# Patient Record
Sex: MALE | Race: WHITE | NOT HISPANIC OR LATINO | Employment: OTHER | URBAN - METROPOLITAN AREA
[De-identification: names, ages, dates, MRNs, and addresses within clinical notes are randomized per-mention and may not be internally consistent; named-entity substitution may affect disease eponyms.]

---

## 2020-01-01 ENCOUNTER — HOSPITAL ENCOUNTER (INPATIENT)
Facility: HOSPITAL | Age: 75
LOS: 8 days | DRG: 177 | End: 2020-12-23
Attending: EMERGENCY MEDICINE | Admitting: INTERNAL MEDICINE
Payer: MEDICARE

## 2020-01-01 ENCOUNTER — PATIENT OUTREACH (OUTPATIENT)
Dept: ADMINISTRATIVE | Facility: CLINIC | Age: 75
End: 2020-01-01

## 2020-01-01 ENCOUNTER — EXTERNAL HOME HEALTH (OUTPATIENT)
Dept: HOME HEALTH SERVICES | Facility: HOSPITAL | Age: 75
End: 2020-01-01
Payer: MEDICARE

## 2020-01-01 ENCOUNTER — HOSPITAL ENCOUNTER (INPATIENT)
Facility: HOSPITAL | Age: 75
LOS: 4 days | Discharge: NURSING, REHAB OR LONG-TERM CARE | DRG: 947 | End: 2020-11-03
Attending: EMERGENCY MEDICINE | Admitting: INTERNAL MEDICINE
Payer: MEDICARE

## 2020-01-01 ENCOUNTER — DOCUMENTATION ONLY (OUTPATIENT)
Dept: CARDIOLOGY | Facility: CLINIC | Age: 75
End: 2020-01-01

## 2020-01-01 ENCOUNTER — CARE AT HOME (OUTPATIENT)
Dept: HOME HEALTH SERVICES | Facility: CLINIC | Age: 75
End: 2020-01-01
Payer: MEDICARE

## 2020-01-01 ENCOUNTER — HOSPITAL ENCOUNTER (OUTPATIENT)
Facility: HOSPITAL | Age: 75
Discharge: NURSING, REHAB OR LONG-TERM CARE | End: 2020-11-07
Attending: EMERGENCY MEDICINE | Admitting: INTERNAL MEDICINE
Payer: MEDICARE

## 2020-01-01 ENCOUNTER — HOSPITAL ENCOUNTER (INPATIENT)
Facility: HOSPITAL | Age: 75
LOS: 5 days | Discharge: HOME HEALTH | DRG: 291 | End: 2020-10-23
Attending: EMERGENCY MEDICINE | Admitting: INTERNAL MEDICINE
Payer: MEDICARE

## 2020-01-01 ENCOUNTER — TELEPHONE (OUTPATIENT)
Dept: CARDIOLOGY | Facility: CLINIC | Age: 75
End: 2020-01-01

## 2020-01-01 VITALS
SYSTOLIC BLOOD PRESSURE: 125 MMHG | TEMPERATURE: 98 F | HEART RATE: 72 BPM | DIASTOLIC BLOOD PRESSURE: 78 MMHG | RESPIRATION RATE: 16 BRPM | OXYGEN SATURATION: 98 %

## 2020-01-01 VITALS
BODY MASS INDEX: 30.62 KG/M2 | DIASTOLIC BLOOD PRESSURE: 86 MMHG | HEART RATE: 80 BPM | RESPIRATION RATE: 18 BRPM | TEMPERATURE: 98 F | OXYGEN SATURATION: 98 % | SYSTOLIC BLOOD PRESSURE: 159 MMHG | WEIGHT: 213.88 LBS | HEIGHT: 70 IN

## 2020-01-01 VITALS
SYSTOLIC BLOOD PRESSURE: 127 MMHG | DIASTOLIC BLOOD PRESSURE: 78 MMHG | HEIGHT: 70 IN | TEMPERATURE: 98 F | BODY MASS INDEX: 29.03 KG/M2 | OXYGEN SATURATION: 96 % | RESPIRATION RATE: 18 BRPM | WEIGHT: 202.81 LBS | HEART RATE: 84 BPM

## 2020-01-01 VITALS
BODY MASS INDEX: 27.46 KG/M2 | SYSTOLIC BLOOD PRESSURE: 214 MMHG | OXYGEN SATURATION: 83 % | WEIGHT: 191.81 LBS | DIASTOLIC BLOOD PRESSURE: 114 MMHG | TEMPERATURE: 98 F | HEIGHT: 70 IN

## 2020-01-01 VITALS
DIASTOLIC BLOOD PRESSURE: 87 MMHG | WEIGHT: 198.44 LBS | BODY MASS INDEX: 28.41 KG/M2 | OXYGEN SATURATION: 96 % | HEART RATE: 98 BPM | TEMPERATURE: 99 F | RESPIRATION RATE: 18 BRPM | HEIGHT: 70 IN | SYSTOLIC BLOOD PRESSURE: 162 MMHG

## 2020-01-01 DIAGNOSIS — M79.89 LEG SWELLING: ICD-10-CM

## 2020-01-01 DIAGNOSIS — E11.9 DIABETES MELLITUS, NEW ONSET: ICD-10-CM

## 2020-01-01 DIAGNOSIS — R00.0 TACHYCARDIA: ICD-10-CM

## 2020-01-01 DIAGNOSIS — R79.89 ELEVATED TROPONIN: ICD-10-CM

## 2020-01-01 DIAGNOSIS — R00.1 BRADYCARDIA: ICD-10-CM

## 2020-01-01 DIAGNOSIS — U07.1 COVID-19: ICD-10-CM

## 2020-01-01 DIAGNOSIS — R06.02 SOB (SHORTNESS OF BREATH): ICD-10-CM

## 2020-01-01 DIAGNOSIS — I50.9 CHF (CONGESTIVE HEART FAILURE): Primary | ICD-10-CM

## 2020-01-01 DIAGNOSIS — R53.1 WEAKNESS: Primary | ICD-10-CM

## 2020-01-01 DIAGNOSIS — E87.6 HYPOKALEMIA: ICD-10-CM

## 2020-01-01 DIAGNOSIS — R07.9 CHEST PAIN: ICD-10-CM

## 2020-01-01 DIAGNOSIS — E11.9 DIABETES MELLITUS, NEW ONSET: Primary | ICD-10-CM

## 2020-01-01 DIAGNOSIS — G11.4: ICD-10-CM

## 2020-01-01 DIAGNOSIS — R53.1 GENERALIZED WEAKNESS: ICD-10-CM

## 2020-01-01 DIAGNOSIS — R55 SYNCOPE: Primary | ICD-10-CM

## 2020-01-01 DIAGNOSIS — E11.9 NEW ONSET TYPE 2 DIABETES MELLITUS: ICD-10-CM

## 2020-01-01 DIAGNOSIS — U07.1 COVID-19 VIRUS INFECTION: ICD-10-CM

## 2020-01-01 DIAGNOSIS — G93.41 ENCEPHALOPATHY, METABOLIC: ICD-10-CM

## 2020-01-01 DIAGNOSIS — R73.9 HYPERGLYCEMIA: ICD-10-CM

## 2020-01-01 DIAGNOSIS — R06.02 SHORTNESS OF BREATH: ICD-10-CM

## 2020-01-01 DIAGNOSIS — I50.41 ACUTE COMBINED SYSTOLIC AND DIASTOLIC HEART FAILURE: ICD-10-CM

## 2020-01-01 DIAGNOSIS — R79.89 ELEVATED LACTIC ACID LEVEL: ICD-10-CM

## 2020-01-01 DIAGNOSIS — J18.9 PNEUMONIA OF LEFT LOWER LOBE DUE TO INFECTIOUS ORGANISM: ICD-10-CM

## 2020-01-01 DIAGNOSIS — R09.02 HYPOXIA: Primary | ICD-10-CM

## 2020-01-01 DIAGNOSIS — I63.9 CEREBROVASCULAR ACCIDENT (CVA), UNSPECIFIED MECHANISM: ICD-10-CM

## 2020-01-01 DIAGNOSIS — I50.31 ACUTE DIASTOLIC HEART FAILURE: ICD-10-CM

## 2020-01-01 LAB
25(OH)D3+25(OH)D2 SERPL-MCNC: 29 NG/ML (ref 30–96)
ABO + RH BLD: NORMAL
ALBUMIN SERPL BCP-MCNC: 1.7 G/DL (ref 3.5–5.2)
ALBUMIN SERPL BCP-MCNC: 1.8 G/DL (ref 3.5–5.2)
ALBUMIN SERPL BCP-MCNC: 2 G/DL (ref 3.5–5.2)
ALBUMIN SERPL BCP-MCNC: 2.3 G/DL (ref 3.5–5.2)
ALBUMIN SERPL BCP-MCNC: 2.4 G/DL (ref 3.5–5.2)
ALBUMIN SERPL BCP-MCNC: 2.4 G/DL (ref 3.5–5.2)
ALBUMIN SERPL BCP-MCNC: 2.5 G/DL (ref 3.5–5.2)
ALBUMIN SERPL BCP-MCNC: 2.6 G/DL (ref 3.5–5.2)
ALBUMIN SERPL BCP-MCNC: 2.7 G/DL (ref 3.5–5.2)
ALBUMIN SERPL BCP-MCNC: 2.7 G/DL (ref 3.5–5.2)
ALBUMIN SERPL BCP-MCNC: 2.9 G/DL (ref 3.5–5.2)
ALBUMIN SERPL BCP-MCNC: 3 G/DL (ref 3.5–5.2)
ALDOST SERPL-MCNC: 5.5 NG/DL
ALLENS TEST: ABNORMAL
ALLENS TEST: ABNORMAL
ALP SERPL-CCNC: 101 U/L (ref 55–135)
ALP SERPL-CCNC: 105 U/L (ref 55–135)
ALP SERPL-CCNC: 107 U/L (ref 55–135)
ALP SERPL-CCNC: 110 U/L (ref 55–135)
ALP SERPL-CCNC: 139 U/L (ref 55–135)
ALP SERPL-CCNC: 218 U/L (ref 55–135)
ALP SERPL-CCNC: 68 U/L (ref 55–135)
ALP SERPL-CCNC: 69 U/L (ref 55–135)
ALP SERPL-CCNC: 80 U/L (ref 55–135)
ALP SERPL-CCNC: 81 U/L (ref 55–135)
ALP SERPL-CCNC: 82 U/L (ref 55–135)
ALP SERPL-CCNC: 83 U/L (ref 55–135)
ALP SERPL-CCNC: 85 U/L (ref 55–135)
ALP SERPL-CCNC: 88 U/L (ref 55–135)
ALP SERPL-CCNC: 90 U/L (ref 55–135)
ALP SERPL-CCNC: 91 U/L (ref 55–135)
ALP SERPL-CCNC: 98 U/L (ref 55–135)
ALP SERPL-CCNC: 98 U/L (ref 55–135)
ALT SERPL W/O P-5'-P-CCNC: 100 U/L (ref 10–44)
ALT SERPL W/O P-5'-P-CCNC: 107 U/L (ref 10–44)
ALT SERPL W/O P-5'-P-CCNC: 196 U/L (ref 10–44)
ALT SERPL W/O P-5'-P-CCNC: 202 U/L (ref 10–44)
ALT SERPL W/O P-5'-P-CCNC: 203 U/L (ref 10–44)
ALT SERPL W/O P-5'-P-CCNC: 218 U/L (ref 10–44)
ALT SERPL W/O P-5'-P-CCNC: 221 U/L (ref 10–44)
ALT SERPL W/O P-5'-P-CCNC: 248 U/L (ref 10–44)
ALT SERPL W/O P-5'-P-CCNC: 32 U/L (ref 10–44)
ALT SERPL W/O P-5'-P-CCNC: 38 U/L (ref 10–44)
ALT SERPL W/O P-5'-P-CCNC: 40 U/L (ref 10–44)
ALT SERPL W/O P-5'-P-CCNC: 42 U/L (ref 10–44)
ALT SERPL W/O P-5'-P-CCNC: 43 U/L (ref 10–44)
ALT SERPL W/O P-5'-P-CCNC: 46 U/L (ref 10–44)
ALT SERPL W/O P-5'-P-CCNC: 65 U/L (ref 10–44)
ALT SERPL W/O P-5'-P-CCNC: 66 U/L (ref 10–44)
ALT SERPL W/O P-5'-P-CCNC: 68 U/L (ref 10–44)
ALT SERPL W/O P-5'-P-CCNC: 85 U/L (ref 10–44)
ALT SERPL W/O P-5'-P-CCNC: 92 U/L (ref 10–44)
ALT SERPL W/O P-5'-P-CCNC: 92 U/L (ref 10–44)
AMMONIA PLAS-SCNC: 39 UMOL/L (ref 10–50)
AMMONIA PLAS-SCNC: 53 UMOL/L (ref 10–50)
AMMONIA PLAS-SCNC: 75 UMOL/L (ref 10–50)
AMPHET+METHAMPHET UR QL: NEGATIVE
AMPHET+METHAMPHET UR QL: NEGATIVE
ANION GAP SERPL CALC-SCNC: 10 MMOL/L (ref 8–16)
ANION GAP SERPL CALC-SCNC: 12 MMOL/L (ref 8–16)
ANION GAP SERPL CALC-SCNC: 12 MMOL/L (ref 8–16)
ANION GAP SERPL CALC-SCNC: 14 MMOL/L (ref 8–16)
ANION GAP SERPL CALC-SCNC: 15 MMOL/L (ref 8–16)
ANION GAP SERPL CALC-SCNC: 5 MMOL/L (ref 8–16)
ANION GAP SERPL CALC-SCNC: 6 MMOL/L (ref 8–16)
ANION GAP SERPL CALC-SCNC: 7 MMOL/L (ref 8–16)
ANION GAP SERPL CALC-SCNC: 8 MMOL/L (ref 8–16)
ANION GAP SERPL CALC-SCNC: 9 MMOL/L (ref 8–16)
AORTIC ROOT ANNULUS: 3.66 CM
APTT BLDCRRT: 26.3 SEC (ref 21–32)
APTT BLDCRRT: 38.8 SEC (ref 21–32)
APTT BLDCRRT: 53.2 SEC (ref 21–32)
APTT BLDCRRT: 54.4 SEC (ref 21–32)
APTT BLDCRRT: 56.8 SEC (ref 21–32)
APTT BLDCRRT: 58.5 SEC (ref 21–32)
APTT BLDCRRT: 58.9 SEC (ref 21–32)
APTT BLDCRRT: 59 SEC (ref 21–32)
APTT BLDCRRT: 60.5 SEC (ref 21–32)
APTT BLDCRRT: 66.1 SEC (ref 21–32)
APTT BLDCRRT: 70.2 SEC (ref 21–32)
APTT BLDCRRT: 86 SEC (ref 21–32)
ASCENDING AORTA: 2.97 CM
AST SERPL-CCNC: 100 U/L (ref 10–40)
AST SERPL-CCNC: 122 U/L (ref 10–40)
AST SERPL-CCNC: 150 U/L (ref 10–40)
AST SERPL-CCNC: 26 U/L (ref 10–40)
AST SERPL-CCNC: 26 U/L (ref 10–40)
AST SERPL-CCNC: 29 U/L (ref 10–40)
AST SERPL-CCNC: 33 U/L (ref 10–40)
AST SERPL-CCNC: 35 U/L (ref 10–40)
AST SERPL-CCNC: 37 U/L (ref 10–40)
AST SERPL-CCNC: 37 U/L (ref 10–40)
AST SERPL-CCNC: 38 U/L (ref 10–40)
AST SERPL-CCNC: 42 U/L (ref 10–40)
AST SERPL-CCNC: 43 U/L (ref 10–40)
AST SERPL-CCNC: 53 U/L (ref 10–40)
AST SERPL-CCNC: 56 U/L (ref 10–40)
AST SERPL-CCNC: 68 U/L (ref 10–40)
AST SERPL-CCNC: 77 U/L (ref 10–40)
AST SERPL-CCNC: 78 U/L (ref 10–40)
AST SERPL-CCNC: 78 U/L (ref 10–40)
AST SERPL-CCNC: 87 U/L (ref 10–40)
AV INDEX (PROSTH): 0.78
AV MEAN GRADIENT: 7 MMHG
AV PEAK GRADIENT: 13 MMHG
AV VALVE AREA: 3.48 CM2
AV VELOCITY RATIO: 0.72
BACTERIA #/AREA URNS HPF: ABNORMAL /HPF
BACTERIA #/AREA URNS HPF: ABNORMAL /HPF
BACTERIA #/AREA URNS HPF: NORMAL /HPF
BACTERIA BLD CULT: ABNORMAL
BACTERIA BLD CULT: NORMAL
BACTERIA UR CULT: NORMAL
BACTERIA UR CULT: NORMAL
BARBITURATES UR QL SCN>200 NG/ML: NEGATIVE
BARBITURATES UR QL SCN>200 NG/ML: NEGATIVE
BASOPHILS # BLD AUTO: 0 K/UL (ref 0–0.2)
BASOPHILS # BLD AUTO: 0.01 K/UL (ref 0–0.2)
BASOPHILS # BLD AUTO: 0.02 K/UL (ref 0–0.2)
BASOPHILS # BLD AUTO: 0.02 K/UL (ref 0–0.2)
BASOPHILS # BLD AUTO: 0.03 K/UL (ref 0–0.2)
BASOPHILS # BLD AUTO: 0.03 K/UL (ref 0–0.2)
BASOPHILS # BLD AUTO: 0.04 K/UL (ref 0–0.2)
BASOPHILS # BLD AUTO: 0.05 K/UL (ref 0–0.2)
BASOPHILS # BLD AUTO: ABNORMAL K/UL (ref 0–0.2)
BASOPHILS NFR BLD: 0 % (ref 0–1.9)
BASOPHILS NFR BLD: 0.1 % (ref 0–1.9)
BASOPHILS NFR BLD: 0.2 % (ref 0–1.9)
BASOPHILS NFR BLD: 0.3 % (ref 0–1.9)
BASOPHILS NFR BLD: 0.4 % (ref 0–1.9)
BENZODIAZ UR QL SCN>200 NG/ML: NEGATIVE
BENZODIAZ UR QL SCN>200 NG/ML: NEGATIVE
BILIRUB SERPL-MCNC: 0.5 MG/DL (ref 0.1–1)
BILIRUB SERPL-MCNC: 0.6 MG/DL (ref 0.1–1)
BILIRUB SERPL-MCNC: 0.7 MG/DL (ref 0.1–1)
BILIRUB SERPL-MCNC: 0.8 MG/DL (ref 0.1–1)
BILIRUB SERPL-MCNC: 0.8 MG/DL (ref 0.1–1)
BILIRUB SERPL-MCNC: 0.9 MG/DL (ref 0.1–1)
BILIRUB SERPL-MCNC: 1 MG/DL (ref 0.1–1)
BILIRUB SERPL-MCNC: 1 MG/DL (ref 0.1–1)
BILIRUB SERPL-MCNC: 1.1 MG/DL (ref 0.1–1)
BILIRUB SERPL-MCNC: 1.2 MG/DL (ref 0.1–1)
BILIRUB SERPL-MCNC: 1.4 MG/DL (ref 0.1–1)
BILIRUB UR QL STRIP: NEGATIVE
BLD GP AB SCN CELLS X3 SERPL QL: NORMAL
BNP SERPL-MCNC: 130 PG/ML (ref 0–99)
BNP SERPL-MCNC: 159 PG/ML (ref 0–99)
BNP SERPL-MCNC: 283 PG/ML (ref 0–99)
BNP SERPL-MCNC: 303 PG/ML (ref 0–99)
BUN SERPL-MCNC: 15 MG/DL (ref 8–23)
BUN SERPL-MCNC: 16 MG/DL (ref 8–23)
BUN SERPL-MCNC: 17 MG/DL (ref 8–23)
BUN SERPL-MCNC: 20 MG/DL (ref 8–23)
BUN SERPL-MCNC: 21 MG/DL (ref 8–23)
BUN SERPL-MCNC: 21 MG/DL (ref 8–23)
BUN SERPL-MCNC: 22 MG/DL (ref 8–23)
BUN SERPL-MCNC: 23 MG/DL (ref 8–23)
BUN SERPL-MCNC: 23 MG/DL (ref 8–23)
BUN SERPL-MCNC: 24 MG/DL (ref 8–23)
BUN SERPL-MCNC: 24 MG/DL (ref 8–23)
BUN SERPL-MCNC: 25 MG/DL (ref 8–23)
BUN SERPL-MCNC: 26 MG/DL (ref 8–23)
BUN SERPL-MCNC: 28 MG/DL (ref 8–23)
BUN SERPL-MCNC: 28 MG/DL (ref 8–23)
BUN SERPL-MCNC: 35 MG/DL (ref 8–23)
BUN SERPL-MCNC: 40 MG/DL (ref 8–23)
BUN SERPL-MCNC: 49 MG/DL (ref 8–23)
BUN SERPL-MCNC: 59 MG/DL (ref 8–23)
BUN SERPL-MCNC: 63 MG/DL (ref 8–23)
BUN SERPL-MCNC: 67 MG/DL (ref 8–23)
BUN SERPL-MCNC: 74 MG/DL (ref 8–23)
BZE UR QL SCN: NEGATIVE
BZE UR QL SCN: NEGATIVE
CALCIUM SERPL-MCNC: 7.6 MG/DL (ref 8.7–10.5)
CALCIUM SERPL-MCNC: 7.7 MG/DL (ref 8.7–10.5)
CALCIUM SERPL-MCNC: 7.8 MG/DL (ref 8.7–10.5)
CALCIUM SERPL-MCNC: 7.9 MG/DL (ref 8.7–10.5)
CALCIUM SERPL-MCNC: 8 MG/DL (ref 8.7–10.5)
CALCIUM SERPL-MCNC: 8.2 MG/DL (ref 8.7–10.5)
CALCIUM SERPL-MCNC: 8.4 MG/DL (ref 8.7–10.5)
CALCIUM SERPL-MCNC: 8.6 MG/DL (ref 8.7–10.5)
CALCIUM SERPL-MCNC: 9 MG/DL (ref 8.7–10.5)
CANNABINOIDS UR QL SCN: NEGATIVE
CANNABINOIDS UR QL SCN: NEGATIVE
CHLORIDE SERPL-SCNC: 101 MMOL/L (ref 95–110)
CHLORIDE SERPL-SCNC: 102 MMOL/L (ref 95–110)
CHLORIDE SERPL-SCNC: 103 MMOL/L (ref 95–110)
CHLORIDE SERPL-SCNC: 104 MMOL/L (ref 95–110)
CHLORIDE SERPL-SCNC: 105 MMOL/L (ref 95–110)
CHLORIDE SERPL-SCNC: 107 MMOL/L (ref 95–110)
CHLORIDE SERPL-SCNC: 107 MMOL/L (ref 95–110)
CHLORIDE SERPL-SCNC: 109 MMOL/L (ref 95–110)
CHLORIDE SERPL-SCNC: 90 MMOL/L (ref 95–110)
CHLORIDE SERPL-SCNC: 91 MMOL/L (ref 95–110)
CHLORIDE SERPL-SCNC: 91 MMOL/L (ref 95–110)
CHLORIDE SERPL-SCNC: 93 MMOL/L (ref 95–110)
CHLORIDE SERPL-SCNC: 93 MMOL/L (ref 95–110)
CHLORIDE SERPL-SCNC: 96 MMOL/L (ref 95–110)
CHLORIDE SERPL-SCNC: 97 MMOL/L (ref 95–110)
CHLORIDE SERPL-SCNC: 98 MMOL/L (ref 95–110)
CHLORIDE SERPL-SCNC: 99 MMOL/L (ref 95–110)
CHOLEST SERPL-MCNC: 115 MG/DL (ref 120–199)
CHOLEST/HDLC SERPL: 3.6 {RATIO} (ref 2–5)
CK SERPL-CCNC: 105 U/L (ref 20–200)
CK SERPL-CCNC: 1501 U/L (ref 20–200)
CK SERPL-CCNC: 77 U/L (ref 20–200)
CLARITY UR: ABNORMAL
CLARITY UR: CLEAR
CO2 SERPL-SCNC: 25 MMOL/L (ref 23–29)
CO2 SERPL-SCNC: 25 MMOL/L (ref 23–29)
CO2 SERPL-SCNC: 26 MMOL/L (ref 23–29)
CO2 SERPL-SCNC: 27 MMOL/L (ref 23–29)
CO2 SERPL-SCNC: 27 MMOL/L (ref 23–29)
CO2 SERPL-SCNC: 28 MMOL/L (ref 23–29)
CO2 SERPL-SCNC: 28 MMOL/L (ref 23–29)
CO2 SERPL-SCNC: 29 MMOL/L (ref 23–29)
CO2 SERPL-SCNC: 30 MMOL/L (ref 23–29)
CO2 SERPL-SCNC: 31 MMOL/L (ref 23–29)
CO2 SERPL-SCNC: 31 MMOL/L (ref 23–29)
CO2 SERPL-SCNC: 32 MMOL/L (ref 23–29)
CO2 SERPL-SCNC: 33 MMOL/L (ref 23–29)
CO2 SERPL-SCNC: 34 MMOL/L (ref 23–29)
CO2 SERPL-SCNC: 38 MMOL/L (ref 23–29)
CO2 SERPL-SCNC: 39 MMOL/L (ref 23–29)
CO2 SERPL-SCNC: 40 MMOL/L (ref 23–29)
CO2 SERPL-SCNC: 41 MMOL/L (ref 23–29)
COLOR UR: YELLOW
CREAT SERPL-MCNC: 0.7 MG/DL (ref 0.5–1.4)
CREAT SERPL-MCNC: 0.8 MG/DL (ref 0.5–1.4)
CREAT SERPL-MCNC: 0.9 MG/DL (ref 0.5–1.4)
CREAT SERPL-MCNC: 1 MG/DL (ref 0.5–1.4)
CREAT SERPL-MCNC: 1.1 MG/DL (ref 0.5–1.4)
CREAT SERPL-MCNC: 1.2 MG/DL (ref 0.5–1.4)
CREAT SERPL-MCNC: 1.2 MG/DL (ref 0.5–1.4)
CREAT SERPL-MCNC: 1.3 MG/DL (ref 0.5–1.4)
CREAT SERPL-MCNC: 1.4 MG/DL (ref 0.5–1.4)
CREAT SERPL-MCNC: 1.6 MG/DL (ref 0.5–1.4)
CREAT SERPL-MCNC: 1.7 MG/DL (ref 0.5–1.4)
CREAT UR-MCNC: 28.9 MG/DL (ref 23–375)
CREAT UR-MCNC: 35.7 MG/DL (ref 23–375)
CREAT UR-MCNC: 35.7 MG/DL (ref 23–375)
CREAT UR-MCNC: 82.8 MG/DL (ref 23–375)
CRP SERPL-MCNC: 169.8 MG/L (ref 0–8.2)
CRP SERPL-MCNC: 263.5 MG/L (ref 0–8.2)
CRP SERPL-MCNC: 61.1 MG/L (ref 0–8.2)
CRP SERPL-MCNC: 81.4 MG/L (ref 0–8.2)
CV ECHO LV RWT: 0.57 CM
CV STRESS BASE HR: 81 BPM
D DIMER PPP IA.FEU-MCNC: 1.1 MG/L FEU
D DIMER PPP IA.FEU-MCNC: 10.59 MG/L FEU
D DIMER PPP IA.FEU-MCNC: 2.56 MG/L FEU
DELSYS: ABNORMAL
DELSYS: ABNORMAL
DIASTOLIC BLOOD PRESSURE: 95 MMHG
DIFFERENTIAL METHOD: ABNORMAL
DOP CALC AO PEAK VEL: 1.79 M/S
DOP CALC AO VTI: 27.91 CM
DOP CALC LVOT AREA: 4.5 CM2
DOP CALC LVOT DIAMETER: 2.39 CM
DOP CALC LVOT PEAK VEL: 1.28 M/S
DOP CALC LVOT STROKE VOLUME: 97.03 CM3
DOP CALCLVOT PEAK VEL VTI: 21.64 CM
E WAVE DECELERATION TIME: 190.97 MSEC
E/A RATIO: 0.72
E/E' RATIO: 12.15 M/S
ECHO LV POSTERIOR WALL: 1.08 CM (ref 0.6–1.1)
EOSINOPHIL # BLD AUTO: 0 K/UL (ref 0–0.5)
EOSINOPHIL # BLD AUTO: ABNORMAL K/UL (ref 0–0.5)
EOSINOPHIL NFR BLD: 0 % (ref 0–8)
EOSINOPHIL NFR BLD: 0.1 % (ref 0–8)
EOSINOPHIL NFR BLD: 0.2 % (ref 0–8)
EOSINOPHIL NFR BLD: 0.4 % (ref 0–8)
ERYTHROCYTE [DISTWIDTH] IN BLOOD BY AUTOMATED COUNT: 13.4 % (ref 11.5–14.5)
ERYTHROCYTE [DISTWIDTH] IN BLOOD BY AUTOMATED COUNT: 13.5 % (ref 11.5–14.5)
ERYTHROCYTE [DISTWIDTH] IN BLOOD BY AUTOMATED COUNT: 13.7 % (ref 11.5–14.5)
ERYTHROCYTE [DISTWIDTH] IN BLOOD BY AUTOMATED COUNT: 13.9 % (ref 11.5–14.5)
ERYTHROCYTE [DISTWIDTH] IN BLOOD BY AUTOMATED COUNT: 13.9 % (ref 11.5–14.5)
ERYTHROCYTE [DISTWIDTH] IN BLOOD BY AUTOMATED COUNT: 14.4 % (ref 11.5–14.5)
ERYTHROCYTE [DISTWIDTH] IN BLOOD BY AUTOMATED COUNT: 15.9 % (ref 11.5–14.5)
ERYTHROCYTE [DISTWIDTH] IN BLOOD BY AUTOMATED COUNT: 16 % (ref 11.5–14.5)
ERYTHROCYTE [DISTWIDTH] IN BLOOD BY AUTOMATED COUNT: 16.2 % (ref 11.5–14.5)
ERYTHROCYTE [DISTWIDTH] IN BLOOD BY AUTOMATED COUNT: 16.3 % (ref 11.5–14.5)
ERYTHROCYTE [DISTWIDTH] IN BLOOD BY AUTOMATED COUNT: 16.5 % (ref 11.5–14.5)
ERYTHROCYTE [DISTWIDTH] IN BLOOD BY AUTOMATED COUNT: 16.7 % (ref 11.5–14.5)
ERYTHROCYTE [DISTWIDTH] IN BLOOD BY AUTOMATED COUNT: 17.3 % (ref 11.5–14.5)
ERYTHROCYTE [DISTWIDTH] IN BLOOD BY AUTOMATED COUNT: 17.3 % (ref 11.5–14.5)
ERYTHROCYTE [DISTWIDTH] IN BLOOD BY AUTOMATED COUNT: 17.4 % (ref 11.5–14.5)
ERYTHROCYTE [DISTWIDTH] IN BLOOD BY AUTOMATED COUNT: 17.5 % (ref 11.5–14.5)
ERYTHROCYTE [DISTWIDTH] IN BLOOD BY AUTOMATED COUNT: 17.6 % (ref 11.5–14.5)
ERYTHROCYTE [DISTWIDTH] IN BLOOD BY AUTOMATED COUNT: 17.8 % (ref 11.5–14.5)
ERYTHROCYTE [DISTWIDTH] IN BLOOD BY AUTOMATED COUNT: 17.9 % (ref 11.5–14.5)
ERYTHROCYTE [DISTWIDTH] IN BLOOD BY AUTOMATED COUNT: 17.9 % (ref 11.5–14.5)
ERYTHROCYTE [SEDIMENTATION RATE] IN BLOOD BY WESTERGREN METHOD: 105 MM/HR (ref 0–10)
EST. GFR  (AFRICAN AMERICAN): 45 ML/MIN/1.73 M^2
EST. GFR  (AFRICAN AMERICAN): 48 ML/MIN/1.73 M^2
EST. GFR  (AFRICAN AMERICAN): 57 ML/MIN/1.73 M^2
EST. GFR  (AFRICAN AMERICAN): >60 ML/MIN/1.73 M^2
EST. GFR  (NON AFRICAN AMERICAN): 39 ML/MIN/1.73 M^2
EST. GFR  (NON AFRICAN AMERICAN): 42 ML/MIN/1.73 M^2
EST. GFR  (NON AFRICAN AMERICAN): 49 ML/MIN/1.73 M^2
EST. GFR  (NON AFRICAN AMERICAN): 53 ML/MIN/1.73 M^2
EST. GFR  (NON AFRICAN AMERICAN): 59 ML/MIN/1.73 M^2
EST. GFR  (NON AFRICAN AMERICAN): 59 ML/MIN/1.73 M^2
EST. GFR  (NON AFRICAN AMERICAN): >60 ML/MIN/1.73 M^2
ESTIMATED AVG GLUCOSE: 220 MG/DL (ref 68–131)
ETHANOL SERPL-MCNC: <10 MG/DL
FERRITIN SERPL-MCNC: 1907 NG/ML (ref 20–300)
FERRITIN SERPL-MCNC: 2413 NG/ML (ref 20–300)
FERRITIN SERPL-MCNC: 3174 NG/ML (ref 20–300)
FERRITIN SERPL-MCNC: 3314 NG/ML (ref 20–300)
FERRITIN SERPL-MCNC: 3329 NG/ML (ref 20–300)
FERRITIN SERPL-MCNC: 588 NG/ML (ref 20–300)
FOLATE SERPL-MCNC: 5.5 NG/ML (ref 4–24)
FOLATE SERPL-MCNC: 6.5 NG/ML (ref 4–24)
FRACTIONAL SHORTENING: 40 % (ref 28–44)
GLUCOSE SERPL-MCNC: 104 MG/DL (ref 70–110)
GLUCOSE SERPL-MCNC: 104 MG/DL (ref 70–110)
GLUCOSE SERPL-MCNC: 111 MG/DL (ref 70–110)
GLUCOSE SERPL-MCNC: 122 MG/DL (ref 70–110)
GLUCOSE SERPL-MCNC: 125 MG/DL (ref 70–110)
GLUCOSE SERPL-MCNC: 144 MG/DL (ref 70–110)
GLUCOSE SERPL-MCNC: 149 MG/DL (ref 70–110)
GLUCOSE SERPL-MCNC: 150 MG/DL (ref 70–110)
GLUCOSE SERPL-MCNC: 161 MG/DL (ref 70–110)
GLUCOSE SERPL-MCNC: 162 MG/DL (ref 70–110)
GLUCOSE SERPL-MCNC: 163 MG/DL (ref 70–110)
GLUCOSE SERPL-MCNC: 170 MG/DL (ref 70–110)
GLUCOSE SERPL-MCNC: 172 MG/DL (ref 70–110)
GLUCOSE SERPL-MCNC: 194 MG/DL (ref 70–110)
GLUCOSE SERPL-MCNC: 215 MG/DL (ref 70–110)
GLUCOSE SERPL-MCNC: 233 MG/DL (ref 70–110)
GLUCOSE SERPL-MCNC: 235 MG/DL (ref 70–110)
GLUCOSE SERPL-MCNC: 236 MG/DL (ref 70–110)
GLUCOSE SERPL-MCNC: 241 MG/DL (ref 70–110)
GLUCOSE SERPL-MCNC: 249 MG/DL (ref 70–110)
GLUCOSE SERPL-MCNC: 293 MG/DL (ref 70–110)
GLUCOSE SERPL-MCNC: 313 MG/DL (ref 70–110)
GLUCOSE SERPL-MCNC: 343 MG/DL (ref 70–110)
GLUCOSE SERPL-MCNC: 480 MG/DL (ref 70–110)
GLUCOSE SERPL-MCNC: 77 MG/DL (ref 70–110)
GLUCOSE SERPL-MCNC: 92 MG/DL (ref 70–110)
GLUCOSE UR QL STRIP: ABNORMAL
GLUCOSE UR QL STRIP: NEGATIVE
HAV IGM SERPL QL IA: NEGATIVE
HBA1C MFR BLD HPLC: 9.3 % (ref 4–5.6)
HBV CORE IGM SERPL QL IA: NEGATIVE
HBV SURFACE AG SERPL QL IA: NEGATIVE
HCO3 UR-SCNC: 39.1 MMOL/L (ref 24–28)
HCO3 UR-SCNC: 40.7 MMOL/L (ref 24–28)
HCT VFR BLD AUTO: 25 % (ref 40–54)
HCT VFR BLD AUTO: 25.5 % (ref 40–54)
HCT VFR BLD AUTO: 25.6 % (ref 40–54)
HCT VFR BLD AUTO: 25.8 % (ref 40–54)
HCT VFR BLD AUTO: 26.4 % (ref 40–54)
HCT VFR BLD AUTO: 26.9 % (ref 40–54)
HCT VFR BLD AUTO: 27.4 % (ref 40–54)
HCT VFR BLD AUTO: 27.6 % (ref 40–54)
HCT VFR BLD AUTO: 27.7 % (ref 40–54)
HCT VFR BLD AUTO: 28.6 % (ref 40–54)
HCT VFR BLD AUTO: 29.7 % (ref 40–54)
HCT VFR BLD AUTO: 29.9 % (ref 40–54)
HCT VFR BLD AUTO: 30.1 % (ref 40–54)
HCT VFR BLD AUTO: 31.3 % (ref 40–54)
HCT VFR BLD AUTO: 31.3 % (ref 40–54)
HCT VFR BLD AUTO: 31.7 % (ref 40–54)
HCT VFR BLD AUTO: 37.3 % (ref 40–54)
HCT VFR BLD AUTO: 39.5 % (ref 40–54)
HCT VFR BLD AUTO: 39.9 % (ref 40–54)
HCT VFR BLD AUTO: 41.3 % (ref 40–54)
HCT VFR BLD AUTO: 42.7 % (ref 40–54)
HCT VFR BLD AUTO: 43.1 % (ref 40–54)
HCV AB SERPL QL IA: NEGATIVE
HCV AB SERPL QL IA: NEGATIVE
HDLC SERPL-MCNC: 32 MG/DL (ref 40–75)
HDLC SERPL: 27.8 % (ref 20–50)
HGB BLD-MCNC: 10 G/DL (ref 14–18)
HGB BLD-MCNC: 10.1 G/DL (ref 14–18)
HGB BLD-MCNC: 12.2 G/DL (ref 14–18)
HGB BLD-MCNC: 12.9 G/DL (ref 14–18)
HGB BLD-MCNC: 13 G/DL (ref 14–18)
HGB BLD-MCNC: 13.3 G/DL (ref 14–18)
HGB BLD-MCNC: 14 G/DL (ref 14–18)
HGB BLD-MCNC: 14.1 G/DL (ref 14–18)
HGB BLD-MCNC: 7.6 G/DL (ref 14–18)
HGB BLD-MCNC: 7.9 G/DL (ref 14–18)
HGB BLD-MCNC: 7.9 G/DL (ref 14–18)
HGB BLD-MCNC: 8 G/DL (ref 14–18)
HGB BLD-MCNC: 8.2 G/DL (ref 14–18)
HGB BLD-MCNC: 8.3 G/DL (ref 14–18)
HGB BLD-MCNC: 8.7 G/DL (ref 14–18)
HGB BLD-MCNC: 8.9 G/DL (ref 14–18)
HGB BLD-MCNC: 9.5 G/DL (ref 14–18)
HGB BLD-MCNC: 9.7 G/DL (ref 14–18)
HGB BLD-MCNC: 9.7 G/DL (ref 14–18)
HGB BLD-MCNC: 9.8 G/DL (ref 14–18)
HGB UR QL STRIP: ABNORMAL
HGB UR QL STRIP: NEGATIVE
HGB UR QL STRIP: NEGATIVE
HYALINE CASTS #/AREA URNS LPF: 0 /LPF
IMM GRANULOCYTES # BLD AUTO: 0.03 K/UL (ref 0–0.04)
IMM GRANULOCYTES # BLD AUTO: 0.05 K/UL (ref 0–0.04)
IMM GRANULOCYTES # BLD AUTO: 0.06 K/UL (ref 0–0.04)
IMM GRANULOCYTES # BLD AUTO: 0.07 K/UL (ref 0–0.04)
IMM GRANULOCYTES # BLD AUTO: 0.08 K/UL (ref 0–0.04)
IMM GRANULOCYTES # BLD AUTO: 0.09 K/UL (ref 0–0.04)
IMM GRANULOCYTES # BLD AUTO: 0.1 K/UL (ref 0–0.04)
IMM GRANULOCYTES # BLD AUTO: 0.11 K/UL (ref 0–0.04)
IMM GRANULOCYTES # BLD AUTO: 0.13 K/UL (ref 0–0.04)
IMM GRANULOCYTES # BLD AUTO: 0.15 K/UL (ref 0–0.04)
IMM GRANULOCYTES # BLD AUTO: 0.21 K/UL (ref 0–0.04)
IMM GRANULOCYTES # BLD AUTO: 0.24 K/UL (ref 0–0.04)
IMM GRANULOCYTES # BLD AUTO: 0.32 K/UL (ref 0–0.04)
IMM GRANULOCYTES # BLD AUTO: 0.39 K/UL (ref 0–0.04)
IMM GRANULOCYTES # BLD AUTO: 0.5 K/UL (ref 0–0.04)
IMM GRANULOCYTES # BLD AUTO: 0.53 K/UL (ref 0–0.04)
IMM GRANULOCYTES # BLD AUTO: 0.54 K/UL (ref 0–0.04)
IMM GRANULOCYTES # BLD AUTO: ABNORMAL K/UL (ref 0–0.04)
IMM GRANULOCYTES NFR BLD AUTO: 0.7 % (ref 0–0.5)
IMM GRANULOCYTES NFR BLD AUTO: 0.7 % (ref 0–0.5)
IMM GRANULOCYTES NFR BLD AUTO: 0.9 % (ref 0–0.5)
IMM GRANULOCYTES NFR BLD AUTO: 1 % (ref 0–0.5)
IMM GRANULOCYTES NFR BLD AUTO: 1.1 % (ref 0–0.5)
IMM GRANULOCYTES NFR BLD AUTO: 1.1 % (ref 0–0.5)
IMM GRANULOCYTES NFR BLD AUTO: 1.2 % (ref 0–0.5)
IMM GRANULOCYTES NFR BLD AUTO: 1.2 % (ref 0–0.5)
IMM GRANULOCYTES NFR BLD AUTO: 1.4 % (ref 0–0.5)
IMM GRANULOCYTES NFR BLD AUTO: 1.6 % (ref 0–0.5)
IMM GRANULOCYTES NFR BLD AUTO: 1.6 % (ref 0–0.5)
IMM GRANULOCYTES NFR BLD AUTO: 1.8 % (ref 0–0.5)
IMM GRANULOCYTES NFR BLD AUTO: 2 % (ref 0–0.5)
IMM GRANULOCYTES NFR BLD AUTO: 2.1 % (ref 0–0.5)
IMM GRANULOCYTES NFR BLD AUTO: 2.4 % (ref 0–0.5)
IMM GRANULOCYTES NFR BLD AUTO: 2.5 % (ref 0–0.5)
IMM GRANULOCYTES NFR BLD AUTO: 2.9 % (ref 0–0.5)
IMM GRANULOCYTES NFR BLD AUTO: 3.3 % (ref 0–0.5)
IMM GRANULOCYTES NFR BLD AUTO: 3.4 % (ref 0–0.5)
IMM GRANULOCYTES NFR BLD AUTO: ABNORMAL % (ref 0–0.5)
INR PPP: 1 (ref 0.8–1.2)
INR PPP: 1.1 (ref 0.8–1.2)
INTERVENTRICULAR SEPTUM: 1.39 CM (ref 0.6–1.1)
IRON SERPL-MCNC: 38 UG/DL (ref 45–160)
IRON SERPL-MCNC: 72 UG/DL (ref 45–160)
KETONES UR QL STRIP: NEGATIVE
LA MAJOR: 4.62 CM
LA WIDTH: 4.11 CM
LACTATE SERPL-SCNC: 1.6 MMOL/L (ref 0.5–2.2)
LACTATE SERPL-SCNC: 2.2 MMOL/L (ref 0.5–2.2)
LACTATE SERPL-SCNC: 2.5 MMOL/L (ref 0.5–2.2)
LACTATE SERPL-SCNC: 2.5 MMOL/L (ref 0.5–2.2)
LACTATE SERPL-SCNC: 3.2 MMOL/L (ref 0.5–2.2)
LDH SERPL L TO P-CCNC: 1051 U/L (ref 110–260)
LDH SERPL L TO P-CCNC: 1192 U/L (ref 110–260)
LDH SERPL L TO P-CCNC: 738 U/L (ref 110–260)
LDH SERPL L TO P-CCNC: 996 U/L (ref 110–260)
LDLC SERPL CALC-MCNC: 3.4 MG/DL (ref 63–159)
LEFT ATRIUM SIZE: 4.21 CM
LEFT INTERNAL DIMENSION IN SYSTOLE: 2.29 CM (ref 2.1–4)
LEFT VENTRICLE DIASTOLIC VOLUME INDEX: 28.4 ML/M2
LEFT VENTRICLE DIASTOLIC VOLUME: 62.69 ML
LEFT VENTRICLE MASS INDEX: 73 G/M2
LEFT VENTRICLE SYSTOLIC VOLUME INDEX: 8.1 ML/M2
LEFT VENTRICLE SYSTOLIC VOLUME: 17.96 ML
LEFT VENTRICULAR INTERNAL DIMENSION IN DIASTOLE: 3.82 CM (ref 3.5–6)
LEFT VENTRICULAR MASS: 161.27 G
LEUKOCYTE ESTERASE UR QL STRIP: ABNORMAL
LEUKOCYTE ESTERASE UR QL STRIP: NEGATIVE
LV LATERAL E/E' RATIO: 11.29 M/S
LV SEPTAL E/E' RATIO: 13.17 M/S
LYMPHOCYTES # BLD AUTO: 0.3 K/UL (ref 1–4.8)
LYMPHOCYTES # BLD AUTO: 0.3 K/UL (ref 1–4.8)
LYMPHOCYTES # BLD AUTO: 0.4 K/UL (ref 1–4.8)
LYMPHOCYTES # BLD AUTO: 0.5 K/UL (ref 1–4.8)
LYMPHOCYTES # BLD AUTO: 0.5 K/UL (ref 1–4.8)
LYMPHOCYTES # BLD AUTO: 0.6 K/UL (ref 1–4.8)
LYMPHOCYTES # BLD AUTO: 0.7 K/UL (ref 1–4.8)
LYMPHOCYTES # BLD AUTO: 0.7 K/UL (ref 1–4.8)
LYMPHOCYTES # BLD AUTO: 0.8 K/UL (ref 1–4.8)
LYMPHOCYTES # BLD AUTO: 1 K/UL (ref 1–4.8)
LYMPHOCYTES # BLD AUTO: 1.1 K/UL (ref 1–4.8)
LYMPHOCYTES # BLD AUTO: 1.2 K/UL (ref 1–4.8)
LYMPHOCYTES # BLD AUTO: 1.3 K/UL (ref 1–4.8)
LYMPHOCYTES # BLD AUTO: ABNORMAL K/UL (ref 1–4.8)
LYMPHOCYTES NFR BLD: 15 % (ref 18–48)
LYMPHOCYTES NFR BLD: 16.5 % (ref 18–48)
LYMPHOCYTES NFR BLD: 19 % (ref 18–48)
LYMPHOCYTES NFR BLD: 21 % (ref 18–48)
LYMPHOCYTES NFR BLD: 4.4 % (ref 18–48)
LYMPHOCYTES NFR BLD: 5.1 % (ref 18–48)
LYMPHOCYTES NFR BLD: 5.3 % (ref 18–48)
LYMPHOCYTES NFR BLD: 5.4 % (ref 18–48)
LYMPHOCYTES NFR BLD: 5.4 % (ref 18–48)
LYMPHOCYTES NFR BLD: 5.6 % (ref 18–48)
LYMPHOCYTES NFR BLD: 5.8 % (ref 18–48)
LYMPHOCYTES NFR BLD: 6 % (ref 18–48)
LYMPHOCYTES NFR BLD: 6.5 % (ref 18–48)
LYMPHOCYTES NFR BLD: 6.6 % (ref 18–48)
LYMPHOCYTES NFR BLD: 6.9 % (ref 18–48)
LYMPHOCYTES NFR BLD: 7.5 % (ref 18–48)
LYMPHOCYTES NFR BLD: 7.8 % (ref 18–48)
LYMPHOCYTES NFR BLD: 8.2 % (ref 18–48)
LYMPHOCYTES NFR BLD: 8.3 % (ref 18–48)
LYMPHOCYTES NFR BLD: 8.5 % (ref 18–48)
LYMPHOCYTES NFR BLD: 9.2 % (ref 18–48)
LYMPHOCYTES NFR BLD: 9.5 % (ref 18–48)
MAGNESIUM SERPL-MCNC: 2 MG/DL (ref 1.6–2.6)
MAGNESIUM SERPL-MCNC: 2.1 MG/DL (ref 1.6–2.6)
MAGNESIUM SERPL-MCNC: 2.2 MG/DL (ref 1.6–2.6)
MAGNESIUM SERPL-MCNC: 2.2 MG/DL (ref 1.6–2.6)
MAGNESIUM SERPL-MCNC: 2.4 MG/DL (ref 1.6–2.6)
MCH RBC QN AUTO: 28.7 PG (ref 27–31)
MCH RBC QN AUTO: 28.9 PG (ref 27–31)
MCH RBC QN AUTO: 28.9 PG (ref 27–31)
MCH RBC QN AUTO: 29 PG (ref 27–31)
MCH RBC QN AUTO: 29 PG (ref 27–31)
MCH RBC QN AUTO: 29.1 PG (ref 27–31)
MCH RBC QN AUTO: 29.1 PG (ref 27–31)
MCH RBC QN AUTO: 29.2 PG (ref 27–31)
MCH RBC QN AUTO: 29.2 PG (ref 27–31)
MCH RBC QN AUTO: 29.4 PG (ref 27–31)
MCH RBC QN AUTO: 29.4 PG (ref 27–31)
MCH RBC QN AUTO: 29.5 PG (ref 27–31)
MCH RBC QN AUTO: 29.7 PG (ref 27–31)
MCH RBC QN AUTO: 29.8 PG (ref 27–31)
MCH RBC QN AUTO: 29.9 PG (ref 27–31)
MCHC RBC AUTO-ENTMCNC: 29.6 G/DL (ref 32–36)
MCHC RBC AUTO-ENTMCNC: 29.7 G/DL (ref 32–36)
MCHC RBC AUTO-ENTMCNC: 29.9 G/DL (ref 32–36)
MCHC RBC AUTO-ENTMCNC: 30.4 G/DL (ref 32–36)
MCHC RBC AUTO-ENTMCNC: 30.6 G/DL (ref 32–36)
MCHC RBC AUTO-ENTMCNC: 30.9 G/DL (ref 32–36)
MCHC RBC AUTO-ENTMCNC: 31.1 G/DL (ref 32–36)
MCHC RBC AUTO-ENTMCNC: 31.1 G/DL (ref 32–36)
MCHC RBC AUTO-ENTMCNC: 31.3 G/DL (ref 32–36)
MCHC RBC AUTO-ENTMCNC: 31.5 G/DL (ref 32–36)
MCHC RBC AUTO-ENTMCNC: 31.8 G/DL (ref 32–36)
MCHC RBC AUTO-ENTMCNC: 31.9 G/DL (ref 32–36)
MCHC RBC AUTO-ENTMCNC: 31.9 G/DL (ref 32–36)
MCHC RBC AUTO-ENTMCNC: 32.2 G/DL (ref 32–36)
MCHC RBC AUTO-ENTMCNC: 32.2 G/DL (ref 32–36)
MCHC RBC AUTO-ENTMCNC: 32.5 G/DL (ref 32–36)
MCHC RBC AUTO-ENTMCNC: 32.5 G/DL (ref 32–36)
MCHC RBC AUTO-ENTMCNC: 32.6 G/DL (ref 32–36)
MCHC RBC AUTO-ENTMCNC: 32.7 G/DL (ref 32–36)
MCHC RBC AUTO-ENTMCNC: 33 G/DL (ref 32–36)
MCV RBC AUTO: 100 FL (ref 82–98)
MCV RBC AUTO: 88 FL (ref 82–98)
MCV RBC AUTO: 89 FL (ref 82–98)
MCV RBC AUTO: 90 FL (ref 82–98)
MCV RBC AUTO: 91 FL (ref 82–98)
MCV RBC AUTO: 92 FL (ref 82–98)
MCV RBC AUTO: 93 FL (ref 82–98)
MCV RBC AUTO: 93 FL (ref 82–98)
MCV RBC AUTO: 94 FL (ref 82–98)
MCV RBC AUTO: 94 FL (ref 82–98)
MCV RBC AUTO: 96 FL (ref 82–98)
MCV RBC AUTO: 97 FL (ref 82–98)
METHADONE UR QL SCN>300 NG/ML: NEGATIVE
METHADONE UR QL SCN>300 NG/ML: NEGATIVE
MICROSCOPIC COMMENT: ABNORMAL
MICROSCOPIC COMMENT: ABNORMAL
MICROSCOPIC COMMENT: NORMAL
MODE: ABNORMAL
MODE: ABNORMAL
MONOCYTES # BLD AUTO: 0.1 K/UL (ref 0.3–1)
MONOCYTES # BLD AUTO: 0.2 K/UL (ref 0.3–1)
MONOCYTES # BLD AUTO: 0.3 K/UL (ref 0.3–1)
MONOCYTES # BLD AUTO: 0.3 K/UL (ref 0.3–1)
MONOCYTES # BLD AUTO: 0.4 K/UL (ref 0.3–1)
MONOCYTES # BLD AUTO: 0.4 K/UL (ref 0.3–1)
MONOCYTES # BLD AUTO: 0.5 K/UL (ref 0.3–1)
MONOCYTES # BLD AUTO: 0.6 K/UL (ref 0.3–1)
MONOCYTES # BLD AUTO: 0.7 K/UL (ref 0.3–1)
MONOCYTES # BLD AUTO: 0.7 K/UL (ref 0.3–1)
MONOCYTES # BLD AUTO: ABNORMAL K/UL (ref 0.3–1)
MONOCYTES NFR BLD: 1 % (ref 4–15)
MONOCYTES NFR BLD: 1 % (ref 4–15)
MONOCYTES NFR BLD: 2 % (ref 4–15)
MONOCYTES NFR BLD: 2.2 % (ref 4–15)
MONOCYTES NFR BLD: 2.3 % (ref 4–15)
MONOCYTES NFR BLD: 2.4 % (ref 4–15)
MONOCYTES NFR BLD: 2.5 % (ref 4–15)
MONOCYTES NFR BLD: 2.6 % (ref 4–15)
MONOCYTES NFR BLD: 2.7 % (ref 4–15)
MONOCYTES NFR BLD: 3.1 % (ref 4–15)
MONOCYTES NFR BLD: 3.3 % (ref 4–15)
MONOCYTES NFR BLD: 3.4 % (ref 4–15)
MONOCYTES NFR BLD: 3.5 % (ref 4–15)
MONOCYTES NFR BLD: 3.7 % (ref 4–15)
MONOCYTES NFR BLD: 3.7 % (ref 4–15)
MONOCYTES NFR BLD: 5.2 % (ref 4–15)
MONOCYTES NFR BLD: 6.2 % (ref 4–15)
MONOCYTES NFR BLD: 6.3 % (ref 4–15)
MONOCYTES NFR BLD: 6.6 % (ref 4–15)
MONOCYTES NFR BLD: 7.8 % (ref 4–15)
MV PEAK A VEL: 1.1 M/S
MV PEAK E VEL: 0.79 M/S
MV STENOSIS PRESSURE HALF TIME: 55.38 MS
MV VALVE AREA P 1/2 METHOD: 3.97 CM2
NEUTROPHILS # BLD AUTO: 13.9 K/UL (ref 1.8–7.7)
NEUTROPHILS # BLD AUTO: 15.6 K/UL (ref 1.8–7.7)
NEUTROPHILS # BLD AUTO: 15.9 K/UL (ref 1.8–7.7)
NEUTROPHILS # BLD AUTO: 16.9 K/UL (ref 1.8–7.7)
NEUTROPHILS # BLD AUTO: 17.7 K/UL (ref 1.8–7.7)
NEUTROPHILS # BLD AUTO: 3.7 K/UL (ref 1.8–7.7)
NEUTROPHILS # BLD AUTO: 4.1 K/UL (ref 1.8–7.7)
NEUTROPHILS # BLD AUTO: 4.9 K/UL (ref 1.8–7.7)
NEUTROPHILS # BLD AUTO: 5.2 K/UL (ref 1.8–7.7)
NEUTROPHILS # BLD AUTO: 5.4 K/UL (ref 1.8–7.7)
NEUTROPHILS # BLD AUTO: 5.6 K/UL (ref 1.8–7.7)
NEUTROPHILS # BLD AUTO: 6.1 K/UL (ref 1.8–7.7)
NEUTROPHILS # BLD AUTO: 6.6 K/UL (ref 1.8–7.7)
NEUTROPHILS # BLD AUTO: 6.6 K/UL (ref 1.8–7.7)
NEUTROPHILS # BLD AUTO: 6.7 K/UL (ref 1.8–7.7)
NEUTROPHILS # BLD AUTO: 7 K/UL (ref 1.8–7.7)
NEUTROPHILS # BLD AUTO: 7.3 K/UL (ref 1.8–7.7)
NEUTROPHILS # BLD AUTO: 8.2 K/UL (ref 1.8–7.7)
NEUTROPHILS # BLD AUTO: 9 K/UL (ref 1.8–7.7)
NEUTROPHILS NFR BLD: 73.2 % (ref 38–73)
NEUTROPHILS NFR BLD: 77 % (ref 38–73)
NEUTROPHILS NFR BLD: 80 % (ref 38–73)
NEUTROPHILS NFR BLD: 80.6 % (ref 38–73)
NEUTROPHILS NFR BLD: 82 % (ref 38–73)
NEUTROPHILS NFR BLD: 82.3 % (ref 38–73)
NEUTROPHILS NFR BLD: 82.4 % (ref 38–73)
NEUTROPHILS NFR BLD: 84.8 % (ref 38–73)
NEUTROPHILS NFR BLD: 86.5 % (ref 38–73)
NEUTROPHILS NFR BLD: 86.7 % (ref 38–73)
NEUTROPHILS NFR BLD: 87 % (ref 38–73)
NEUTROPHILS NFR BLD: 87.8 % (ref 38–73)
NEUTROPHILS NFR BLD: 88 % (ref 38–73)
NEUTROPHILS NFR BLD: 88.2 % (ref 38–73)
NEUTROPHILS NFR BLD: 88.6 % (ref 38–73)
NEUTROPHILS NFR BLD: 88.7 % (ref 38–73)
NEUTROPHILS NFR BLD: 89.1 % (ref 38–73)
NEUTROPHILS NFR BLD: 90.9 % (ref 38–73)
NEUTROPHILS NFR BLD: 91.2 % (ref 38–73)
NEUTROPHILS NFR BLD: 91.2 % (ref 38–73)
NEUTROPHILS NFR BLD: 91.3 % (ref 38–73)
NEUTROPHILS NFR BLD: 92.2 % (ref 38–73)
NEUTS BAND NFR BLD MANUAL: 1 %
NEUTS BAND NFR BLD MANUAL: 1 %
NITRITE UR QL STRIP: NEGATIVE
NONHDLC SERPL-MCNC: 83 MG/DL
NRBC BLD-RTO: 0 /100 WBC
NRBC BLD-RTO: 1 /100 WBC
NUC REST DIASTOLIC VOLUME INDEX: 100
NUC REST EJECTION FRACTION: 59
NUC REST SYSTOLIC VOLUME INDEX: 41
NUC STRESS DIASTOLIC VOLUME INDEX: 98
NUC STRESS EJECTION FRACTION: 60 %
NUC STRESS SYSTOLIC VOLUME INDEX: 39
OHS CV CPX 85 PERCENT MAX PREDICTED HEART RATE MALE: 123
OHS CV CPX MAX PREDICTED HEART RATE: 145
OHS CV CPX PATIENT IS FEMALE: 0
OHS CV CPX PATIENT IS MALE: 1
OHS CV CPX PEAK DIASTOLIC BLOOD PRESSURE: 95 MMHG
OHS CV CPX PEAK HEAR RATE: 88 BPM
OHS CV CPX PEAK RATE PRESSURE PRODUCT: NORMAL
OHS CV CPX PEAK SYSTOLIC BLOOD PRESSURE: 148 MMHG
OHS CV CPX PERCENT MAX PREDICTED HEART RATE ACHIEVED: 61
OHS CV CPX RATE PRESSURE PRODUCT PRESENTING: NORMAL
OPIATES UR QL SCN: NEGATIVE
OPIATES UR QL SCN: NEGATIVE
OSMOLALITY UR: 465 MOSM/KG (ref 50–1200)
PCO2 BLDA: 39 MMHG (ref 35–45)
PCO2 BLDA: 47.2 MMHG (ref 35–45)
PCP UR QL SCN>25 NG/ML: NEGATIVE
PCP UR QL SCN>25 NG/ML: NEGATIVE
PH SMN: 7.54 [PH] (ref 7.35–7.45)
PH SMN: 7.61 [PH] (ref 7.35–7.45)
PH UR STRIP: 6 [PH] (ref 5–8)
PH UR STRIP: 6 [PH] (ref 5–8)
PH UR STRIP: 8 [PH] (ref 5–8)
PHOSPHATE SERPL-MCNC: 1.3 MG/DL (ref 2.7–4.5)
PHOSPHATE SERPL-MCNC: 1.7 MG/DL (ref 2.7–4.5)
PHOSPHATE SERPL-MCNC: 1.7 MG/DL (ref 2.7–4.5)
PHOSPHATE SERPL-MCNC: 2 MG/DL (ref 2.7–4.5)
PHOSPHATE SERPL-MCNC: 2.2 MG/DL (ref 2.7–4.5)
PHOSPHATE SERPL-MCNC: 2.3 MG/DL (ref 2.7–4.5)
PHOSPHATE SERPL-MCNC: 2.4 MG/DL (ref 2.7–4.5)
PHOSPHATE SERPL-MCNC: 2.6 MG/DL (ref 2.7–4.5)
PHOSPHATE SERPL-MCNC: 2.6 MG/DL (ref 2.7–4.5)
PHOSPHATE SERPL-MCNC: 3.2 MG/DL (ref 2.7–4.5)
PHOSPHATE SERPL-MCNC: 3.3 MG/DL (ref 2.7–4.5)
PHOSPHATE SERPL-MCNC: 3.5 MG/DL (ref 2.7–4.5)
PHOSPHATE SERPL-MCNC: 4.2 MG/DL (ref 2.7–4.5)
PHOSPHATE SERPL-MCNC: 5.3 MG/DL (ref 2.7–4.5)
PISA TR MAX VEL: 1.74 M/S
PLATELET # BLD AUTO: 100 K/UL (ref 150–350)
PLATELET # BLD AUTO: 105 K/UL (ref 150–350)
PLATELET # BLD AUTO: 152 K/UL (ref 150–350)
PLATELET # BLD AUTO: 160 K/UL (ref 150–350)
PLATELET # BLD AUTO: 171 K/UL (ref 150–350)
PLATELET # BLD AUTO: 174 K/UL (ref 150–350)
PLATELET # BLD AUTO: 182 K/UL (ref 150–350)
PLATELET # BLD AUTO: 183 K/UL (ref 150–350)
PLATELET # BLD AUTO: 184 K/UL (ref 150–350)
PLATELET # BLD AUTO: 193 K/UL (ref 150–350)
PLATELET # BLD AUTO: 215 K/UL (ref 150–350)
PLATELET # BLD AUTO: 216 K/UL (ref 150–350)
PLATELET # BLD AUTO: 220 K/UL (ref 150–350)
PLATELET # BLD AUTO: 234 K/UL (ref 150–350)
PLATELET # BLD AUTO: 239 K/UL (ref 150–350)
PLATELET # BLD AUTO: 251 K/UL (ref 150–350)
PLATELET # BLD AUTO: 263 K/UL (ref 150–350)
PLATELET # BLD AUTO: 44 K/UL (ref 150–350)
PLATELET # BLD AUTO: 79 K/UL (ref 150–350)
PLATELET # BLD AUTO: 88 K/UL (ref 150–350)
PLATELET # BLD AUTO: 93 K/UL (ref 150–350)
PLATELET # BLD AUTO: 95 K/UL (ref 150–350)
PLATELET BLD QL SMEAR: ABNORMAL
PMV BLD AUTO: 10.1 FL (ref 9.2–12.9)
PMV BLD AUTO: 10.1 FL (ref 9.2–12.9)
PMV BLD AUTO: 10.2 FL (ref 9.2–12.9)
PMV BLD AUTO: 10.3 FL (ref 9.2–12.9)
PMV BLD AUTO: 10.5 FL (ref 9.2–12.9)
PMV BLD AUTO: 10.6 FL (ref 9.2–12.9)
PMV BLD AUTO: 10.7 FL (ref 9.2–12.9)
PMV BLD AUTO: 10.8 FL (ref 9.2–12.9)
PMV BLD AUTO: 11.9 FL (ref 9.2–12.9)
PMV BLD AUTO: 9.4 FL (ref 9.2–12.9)
PMV BLD AUTO: 9.4 FL (ref 9.2–12.9)
PMV BLD AUTO: 9.5 FL (ref 9.2–12.9)
PMV BLD AUTO: 9.6 FL (ref 9.2–12.9)
PMV BLD AUTO: 9.7 FL (ref 9.2–12.9)
PMV BLD AUTO: 9.7 FL (ref 9.2–12.9)
PMV BLD AUTO: 9.9 FL (ref 9.2–12.9)
PO2 BLDA: 27 MMHG (ref 80–100)
PO2 BLDA: 63 MMHG (ref 80–100)
POC BE: 18 MMOL/L
POC BE: 18 MMOL/L
POC SATURATED O2: 57 % (ref 95–100)
POC SATURATED O2: 95 % (ref 95–100)
POCT GLUCOSE: 103 MG/DL (ref 70–110)
POCT GLUCOSE: 109 MG/DL (ref 70–110)
POCT GLUCOSE: 112 MG/DL (ref 70–110)
POCT GLUCOSE: 117 MG/DL (ref 70–110)
POCT GLUCOSE: 121 MG/DL (ref 70–110)
POCT GLUCOSE: 125 MG/DL (ref 70–110)
POCT GLUCOSE: 126 MG/DL (ref 70–110)
POCT GLUCOSE: 128 MG/DL (ref 70–110)
POCT GLUCOSE: 133 MG/DL (ref 70–110)
POCT GLUCOSE: 134 MG/DL (ref 70–110)
POCT GLUCOSE: 139 MG/DL (ref 70–110)
POCT GLUCOSE: 140 MG/DL (ref 70–110)
POCT GLUCOSE: 140 MG/DL (ref 70–110)
POCT GLUCOSE: 141 MG/DL (ref 70–110)
POCT GLUCOSE: 143 MG/DL (ref 70–110)
POCT GLUCOSE: 153 MG/DL (ref 70–110)
POCT GLUCOSE: 156 MG/DL (ref 70–110)
POCT GLUCOSE: 160 MG/DL (ref 70–110)
POCT GLUCOSE: 166 MG/DL (ref 70–110)
POCT GLUCOSE: 167 MG/DL (ref 70–110)
POCT GLUCOSE: 168 MG/DL (ref 70–110)
POCT GLUCOSE: 170 MG/DL (ref 70–110)
POCT GLUCOSE: 174 MG/DL (ref 70–110)
POCT GLUCOSE: 174 MG/DL (ref 70–110)
POCT GLUCOSE: 175 MG/DL (ref 70–110)
POCT GLUCOSE: 175 MG/DL (ref 70–110)
POCT GLUCOSE: 176 MG/DL (ref 70–110)
POCT GLUCOSE: 177 MG/DL (ref 70–110)
POCT GLUCOSE: 183 MG/DL (ref 70–110)
POCT GLUCOSE: 191 MG/DL (ref 70–110)
POCT GLUCOSE: 192 MG/DL (ref 70–110)
POCT GLUCOSE: 196 MG/DL (ref 70–110)
POCT GLUCOSE: 205 MG/DL (ref 70–110)
POCT GLUCOSE: 208 MG/DL (ref 70–110)
POCT GLUCOSE: 213 MG/DL (ref 70–110)
POCT GLUCOSE: 218 MG/DL (ref 70–110)
POCT GLUCOSE: 219 MG/DL (ref 70–110)
POCT GLUCOSE: 221 MG/DL (ref 70–110)
POCT GLUCOSE: 223 MG/DL (ref 70–110)
POCT GLUCOSE: 226 MG/DL (ref 70–110)
POCT GLUCOSE: 227 MG/DL (ref 70–110)
POCT GLUCOSE: 228 MG/DL (ref 70–110)
POCT GLUCOSE: 233 MG/DL (ref 70–110)
POCT GLUCOSE: 234 MG/DL (ref 70–110)
POCT GLUCOSE: 234 MG/DL (ref 70–110)
POCT GLUCOSE: 239 MG/DL (ref 70–110)
POCT GLUCOSE: 239 MG/DL (ref 70–110)
POCT GLUCOSE: 240 MG/DL (ref 70–110)
POCT GLUCOSE: 245 MG/DL (ref 70–110)
POCT GLUCOSE: 247 MG/DL (ref 70–110)
POCT GLUCOSE: 252 MG/DL (ref 70–110)
POCT GLUCOSE: 275 MG/DL (ref 70–110)
POCT GLUCOSE: 278 MG/DL (ref 70–110)
POCT GLUCOSE: 281 MG/DL (ref 70–110)
POCT GLUCOSE: 281 MG/DL (ref 70–110)
POCT GLUCOSE: 291 MG/DL (ref 70–110)
POCT GLUCOSE: 296 MG/DL (ref 70–110)
POCT GLUCOSE: 296 MG/DL (ref 70–110)
POCT GLUCOSE: 318 MG/DL (ref 70–110)
POCT GLUCOSE: 319 MG/DL (ref 70–110)
POCT GLUCOSE: 322 MG/DL (ref 70–110)
POCT GLUCOSE: 337 MG/DL (ref 70–110)
POCT GLUCOSE: 340 MG/DL (ref 70–110)
POCT GLUCOSE: 457 MG/DL (ref 70–110)
POCT GLUCOSE: 59 MG/DL (ref 70–110)
POCT GLUCOSE: 92 MG/DL (ref 70–110)
POTASSIUM SERPL-SCNC: 2.3 MMOL/L (ref 3.5–5.1)
POTASSIUM SERPL-SCNC: 2.4 MMOL/L (ref 3.5–5.1)
POTASSIUM SERPL-SCNC: 2.5 MMOL/L (ref 3.5–5.1)
POTASSIUM SERPL-SCNC: 2.6 MMOL/L (ref 3.5–5.1)
POTASSIUM SERPL-SCNC: 3 MMOL/L (ref 3.5–5.1)
POTASSIUM SERPL-SCNC: 3.1 MMOL/L (ref 3.5–5.1)
POTASSIUM SERPL-SCNC: 3.2 MMOL/L (ref 3.5–5.1)
POTASSIUM SERPL-SCNC: 3.2 MMOL/L (ref 3.5–5.1)
POTASSIUM SERPL-SCNC: 3.3 MMOL/L (ref 3.5–5.1)
POTASSIUM SERPL-SCNC: 3.4 MMOL/L (ref 3.5–5.1)
POTASSIUM SERPL-SCNC: 3.6 MMOL/L (ref 3.5–5.1)
POTASSIUM SERPL-SCNC: 3.7 MMOL/L (ref 3.5–5.1)
POTASSIUM SERPL-SCNC: 4 MMOL/L (ref 3.5–5.1)
POTASSIUM SERPL-SCNC: 4 MMOL/L (ref 3.5–5.1)
POTASSIUM SERPL-SCNC: 4.3 MMOL/L (ref 3.5–5.1)
POTASSIUM UR-SCNC: 49 MMOL/L (ref 15–95)
PROCALCITONIN SERPL IA-MCNC: 0.05 NG/ML
PROCALCITONIN SERPL IA-MCNC: 0.07 NG/ML
PROCALCITONIN SERPL IA-MCNC: 0.2 NG/ML
PROCALCITONIN SERPL IA-MCNC: 0.35 NG/ML
PROT SERPL-MCNC: 4.5 G/DL (ref 6–8.4)
PROT SERPL-MCNC: 4.8 G/DL (ref 6–8.4)
PROT SERPL-MCNC: 4.9 G/DL (ref 6–8.4)
PROT SERPL-MCNC: 5 G/DL (ref 6–8.4)
PROT SERPL-MCNC: 5 G/DL (ref 6–8.4)
PROT SERPL-MCNC: 5.1 G/DL (ref 6–8.4)
PROT SERPL-MCNC: 5.2 G/DL (ref 6–8.4)
PROT SERPL-MCNC: 5.3 G/DL (ref 6–8.4)
PROT SERPL-MCNC: 5.3 G/DL (ref 6–8.4)
PROT SERPL-MCNC: 5.4 G/DL (ref 6–8.4)
PROT SERPL-MCNC: 5.4 G/DL (ref 6–8.4)
PROT SERPL-MCNC: 5.6 G/DL (ref 6–8.4)
PROT SERPL-MCNC: 5.6 G/DL (ref 6–8.4)
PROT SERPL-MCNC: 5.8 G/DL (ref 6–8.4)
PROT UR QL STRIP: ABNORMAL
PROT UR QL STRIP: NEGATIVE
PROT UR-MCNC: 20 MG/DL (ref 0–15)
PROT/CREAT UR: 0.56 MG/G{CREAT} (ref 0–0.2)
PROTHROMBIN TIME: 10.4 SEC (ref 9–12.5)
PROTHROMBIN TIME: 11.4 SEC (ref 9–12.5)
RA MAJOR: 4.32 CM
RA PRESSURE: 3 MMHG
RA WIDTH: 2.24 CM
RBC # BLD AUTO: 2.61 M/UL (ref 4.6–6.2)
RBC # BLD AUTO: 2.65 M/UL (ref 4.6–6.2)
RBC # BLD AUTO: 2.68 M/UL (ref 4.6–6.2)
RBC # BLD AUTO: 2.74 M/UL (ref 4.6–6.2)
RBC # BLD AUTO: 2.77 M/UL (ref 4.6–6.2)
RBC # BLD AUTO: 2.78 M/UL (ref 4.6–6.2)
RBC # BLD AUTO: 2.78 M/UL (ref 4.6–6.2)
RBC # BLD AUTO: 2.83 M/UL (ref 4.6–6.2)
RBC # BLD AUTO: 2.95 M/UL (ref 4.6–6.2)
RBC # BLD AUTO: 2.98 M/UL (ref 4.6–6.2)
RBC # BLD AUTO: 3.19 M/UL (ref 4.6–6.2)
RBC # BLD AUTO: 3.25 M/UL (ref 4.6–6.2)
RBC # BLD AUTO: 3.25 M/UL (ref 4.6–6.2)
RBC # BLD AUTO: 3.37 M/UL (ref 4.6–6.2)
RBC # BLD AUTO: 3.41 M/UL (ref 4.6–6.2)
RBC # BLD AUTO: 3.46 M/UL (ref 4.6–6.2)
RBC # BLD AUTO: 4.21 M/UL (ref 4.6–6.2)
RBC # BLD AUTO: 4.39 M/UL (ref 4.6–6.2)
RBC # BLD AUTO: 4.45 M/UL (ref 4.6–6.2)
RBC # BLD AUTO: 4.6 M/UL (ref 4.6–6.2)
RBC # BLD AUTO: 4.77 M/UL (ref 4.6–6.2)
RBC # BLD AUTO: 4.84 M/UL (ref 4.6–6.2)
RBC #/AREA URNS HPF: 0 /HPF (ref 0–4)
RBC #/AREA URNS HPF: 2 /HPF (ref 0–4)
RENIN PLAS-CCNC: <0.6 NG/ML/H
SAMPLE: ABNORMAL
SAMPLE: ABNORMAL
SARS-COV-2 RDRP RESP QL NAA+PROBE: NEGATIVE
SARS-COV-2 RDRP RESP QL NAA+PROBE: POSITIVE
SATURATED IRON: 14 % (ref 20–50)
SATURATED IRON: 25 % (ref 20–50)
SITE: ABNORMAL
SITE: ABNORMAL
SODIUM SERPL-SCNC: 137 MMOL/L (ref 136–145)
SODIUM SERPL-SCNC: 137 MMOL/L (ref 136–145)
SODIUM SERPL-SCNC: 138 MMOL/L (ref 136–145)
SODIUM SERPL-SCNC: 139 MMOL/L (ref 136–145)
SODIUM SERPL-SCNC: 140 MMOL/L (ref 136–145)
SODIUM SERPL-SCNC: 141 MMOL/L (ref 136–145)
SODIUM SERPL-SCNC: 142 MMOL/L (ref 136–145)
SODIUM SERPL-SCNC: 143 MMOL/L (ref 136–145)
SODIUM SERPL-SCNC: 143 MMOL/L (ref 136–145)
SODIUM SERPL-SCNC: 144 MMOL/L (ref 136–145)
SODIUM SERPL-SCNC: 146 MMOL/L (ref 136–145)
SODIUM UR-SCNC: 37 MMOL/L (ref 20–250)
SP GR UR STRIP: 1.01 (ref 1–1.03)
SP GR UR STRIP: 1.02 (ref 1–1.03)
STJ: 2.57 CM
SYSTOLIC BLOOD PRESSURE: 148 MMHG
TDI LATERAL: 0.07 M/S
TDI SEPTAL: 0.06 M/S
TDI: 0.07 M/S
TOTAL IRON BINDING CAPACITY: 265 UG/DL (ref 250–450)
TOTAL IRON BINDING CAPACITY: 284 UG/DL (ref 250–450)
TOXICOLOGY INFORMATION: NORMAL
TOXICOLOGY INFORMATION: NORMAL
TR MAX PG: 12 MMHG
TRANSFERRIN SERPL-MCNC: 179 MG/DL (ref 200–375)
TRANSFERRIN SERPL-MCNC: 192 MG/DL (ref 200–375)
TRANSFERRIN SERPL-MCNC: 192 MG/DL (ref 200–375)
TRICUSPID ANNULAR PLANE SYSTOLIC EXCURSION: 2.81 CM
TRIGL SERPL-MCNC: 398 MG/DL (ref 30–150)
TROPONIN I SERPL DL<=0.01 NG/ML-MCNC: 0.03 NG/ML (ref 0–0.03)
TROPONIN I SERPL DL<=0.01 NG/ML-MCNC: 0.04 NG/ML (ref 0–0.03)
TROPONIN I SERPL DL<=0.01 NG/ML-MCNC: 0.04 NG/ML (ref 0–0.03)
TROPONIN I SERPL DL<=0.01 NG/ML-MCNC: 0.06 NG/ML (ref 0–0.03)
TROPONIN I SERPL DL<=0.01 NG/ML-MCNC: 0.07 NG/ML (ref 0–0.03)
TROPONIN I SERPL DL<=0.01 NG/ML-MCNC: 0.1 NG/ML (ref 0–0.03)
TROPONIN I SERPL DL<=0.01 NG/ML-MCNC: 0.12 NG/ML (ref 0–0.03)
TROPONIN I SERPL DL<=0.01 NG/ML-MCNC: 0.13 NG/ML (ref 0–0.03)
TROPONIN I SERPL DL<=0.01 NG/ML-MCNC: 0.15 NG/ML (ref 0–0.03)
TROPONIN I SERPL DL<=0.01 NG/ML-MCNC: 0.25 NG/ML (ref 0–0.03)
TROPONIN I SERPL DL<=0.01 NG/ML-MCNC: 0.32 NG/ML (ref 0–0.03)
TSH SERPL DL<=0.005 MIU/L-ACNC: 0.77 UIU/ML (ref 0.4–4)
TV REST PULMONARY ARTERY PRESSURE: 15 MMHG
URN SPEC COLLECT METH UR: ABNORMAL
UROBILINOGEN UR STRIP-ACNC: 1 EU/DL
UROBILINOGEN UR STRIP-ACNC: ABNORMAL EU/DL
UROBILINOGEN UR STRIP-ACNC: ABNORMAL EU/DL
UROBILINOGEN UR STRIP-ACNC: NEGATIVE EU/DL
UROBILINOGEN UR STRIP-ACNC: NEGATIVE EU/DL
VIT B12 SERPL-MCNC: 452 PG/ML (ref 210–950)
VIT B12 SERPL-MCNC: 570 PG/ML (ref 210–950)
WBC # BLD AUTO: 15.31 K/UL (ref 3.9–12.7)
WBC # BLD AUTO: 16.08 K/UL (ref 3.9–12.7)
WBC # BLD AUTO: 17.99 K/UL (ref 3.9–12.7)
WBC # BLD AUTO: 18.14 K/UL (ref 3.9–12.7)
WBC # BLD AUTO: 18.99 K/UL (ref 3.9–12.7)
WBC # BLD AUTO: 20.09 K/UL (ref 3.9–12.7)
WBC # BLD AUTO: 4.2 K/UL (ref 3.9–12.7)
WBC # BLD AUTO: 4.61 K/UL (ref 3.9–12.7)
WBC # BLD AUTO: 5.47 K/UL (ref 3.9–12.7)
WBC # BLD AUTO: 6.12 K/UL (ref 3.9–12.7)
WBC # BLD AUTO: 6.3 K/UL (ref 3.9–12.7)
WBC # BLD AUTO: 6.81 K/UL (ref 3.9–12.7)
WBC # BLD AUTO: 7.21 K/UL (ref 3.9–12.7)
WBC # BLD AUTO: 7.29 K/UL (ref 3.9–12.7)
WBC # BLD AUTO: 7.29 K/UL (ref 3.9–12.7)
WBC # BLD AUTO: 7.34 K/UL (ref 3.9–12.7)
WBC # BLD AUTO: 7.58 K/UL (ref 3.9–12.7)
WBC # BLD AUTO: 8.03 K/UL (ref 3.9–12.7)
WBC # BLD AUTO: 8.3 K/UL (ref 3.9–12.7)
WBC # BLD AUTO: 8.91 K/UL (ref 3.9–12.7)
WBC # BLD AUTO: 9.17 K/UL (ref 3.9–12.7)
WBC # BLD AUTO: 9.83 K/UL (ref 3.9–12.7)
WBC #/AREA URNS HPF: 15 /HPF (ref 0–5)
YEAST URNS QL MICRO: ABNORMAL
YEAST URNS QL MICRO: NORMAL

## 2020-01-01 PROCEDURE — 86140 C-REACTIVE PROTEIN: CPT

## 2020-01-01 PROCEDURE — 99232 SBSQ HOSP IP/OBS MODERATE 35: CPT | Mod: ,,, | Performed by: INTERNAL MEDICINE

## 2020-01-01 PROCEDURE — 63600175 PHARM REV CODE 636 W HCPCS: Performed by: INTERNAL MEDICINE

## 2020-01-01 PROCEDURE — 84484 ASSAY OF TROPONIN QUANT: CPT

## 2020-01-01 PROCEDURE — 63600175 PHARM REV CODE 636 W HCPCS: Performed by: EMERGENCY MEDICINE

## 2020-01-01 PROCEDURE — 25000003 PHARM REV CODE 250: Performed by: NURSE PRACTITIONER

## 2020-01-01 PROCEDURE — 99496 TRANSJ CARE MGMT HIGH F2F 7D: CPT | Mod: S$GLB,,, | Performed by: NURSE PRACTITIONER

## 2020-01-01 PROCEDURE — 63600175 PHARM REV CODE 636 W HCPCS: Performed by: NURSE PRACTITIONER

## 2020-01-01 PROCEDURE — 84100 ASSAY OF PHOSPHORUS: CPT

## 2020-01-01 PROCEDURE — 99233 PR SUBSEQUENT HOSPITAL CARE,LEVL III: ICD-10-PCS | Mod: ,,, | Performed by: INTERNAL MEDICINE

## 2020-01-01 PROCEDURE — 83735 ASSAY OF MAGNESIUM: CPT

## 2020-01-01 PROCEDURE — 25000003 PHARM REV CODE 250: Performed by: INTERNAL MEDICINE

## 2020-01-01 PROCEDURE — 84484 ASSAY OF TROPONIN QUANT: CPT | Mod: 91

## 2020-01-01 PROCEDURE — 99900035 HC TECH TIME PER 15 MIN (STAT)

## 2020-01-01 PROCEDURE — 93010 ELECTROCARDIOGRAM REPORT: CPT | Mod: ,,, | Performed by: INTERNAL MEDICINE

## 2020-01-01 PROCEDURE — 21400001 HC TELEMETRY ROOM

## 2020-01-01 PROCEDURE — 97110 THERAPEUTIC EXERCISES: CPT

## 2020-01-01 PROCEDURE — 94640 AIRWAY INHALATION TREATMENT: CPT

## 2020-01-01 PROCEDURE — 80053 COMPREHEN METABOLIC PANEL: CPT

## 2020-01-01 PROCEDURE — 85025 COMPLETE CBC W/AUTO DIFF WBC: CPT

## 2020-01-01 PROCEDURE — 99497 ADVNCD CARE PLAN 30 MIN: CPT | Mod: 25,,, | Performed by: PHYSICIAN ASSISTANT

## 2020-01-01 PROCEDURE — 63700000 PHARM REV CODE 250 ALT 637 W/O HCPCS: Performed by: NURSE PRACTITIONER

## 2020-01-01 PROCEDURE — 84132 ASSAY OF SERUM POTASSIUM: CPT

## 2020-01-01 PROCEDURE — 85379 FIBRIN DEGRADATION QUANT: CPT

## 2020-01-01 PROCEDURE — 99291 CRITICAL CARE FIRST HOUR: CPT

## 2020-01-01 PROCEDURE — 84244 ASSAY OF RENIN: CPT

## 2020-01-01 PROCEDURE — 80048 BASIC METABOLIC PNL TOTAL CA: CPT

## 2020-01-01 PROCEDURE — 99291 PR CRITICAL CARE, E/M 30-74 MINUTES: ICD-10-PCS | Mod: ,,, | Performed by: INTERNAL MEDICINE

## 2020-01-01 PROCEDURE — 99225 PR SUBSEQUENT OBSERVATION CARE,LEVEL II: CPT | Mod: ,,, | Performed by: INTERNAL MEDICINE

## 2020-01-01 PROCEDURE — 82140 ASSAY OF AMMONIA: CPT

## 2020-01-01 PROCEDURE — U0002 COVID-19 LAB TEST NON-CDC: HCPCS

## 2020-01-01 PROCEDURE — 20000000 HC ICU ROOM

## 2020-01-01 PROCEDURE — 36415 COLL VENOUS BLD VENIPUNCTURE: CPT

## 2020-01-01 PROCEDURE — 99220 PR INITIAL OBSERVATION CARE,LEVL III: CPT | Mod: 25,,, | Performed by: INTERNAL MEDICINE

## 2020-01-01 PROCEDURE — 97116 GAIT TRAINING THERAPY: CPT | Mod: CQ

## 2020-01-01 PROCEDURE — 97162 PT EVAL MOD COMPLEX 30 MIN: CPT

## 2020-01-01 PROCEDURE — 25000242 PHARM REV CODE 250 ALT 637 W/ HCPCS: Performed by: NURSE PRACTITIONER

## 2020-01-01 PROCEDURE — 80061 LIPID PANEL: CPT

## 2020-01-01 PROCEDURE — 83605 ASSAY OF LACTIC ACID: CPT | Mod: 91

## 2020-01-01 PROCEDURE — 83615 LACTATE (LD) (LDH) ENZYME: CPT

## 2020-01-01 PROCEDURE — 87040 BLOOD CULTURE FOR BACTERIA: CPT

## 2020-01-01 PROCEDURE — 85730 THROMBOPLASTIN TIME PARTIAL: CPT | Mod: 91

## 2020-01-01 PROCEDURE — 84443 ASSAY THYROID STIM HORMONE: CPT

## 2020-01-01 PROCEDURE — 96372 THER/PROPH/DIAG INJ SC/IM: CPT

## 2020-01-01 PROCEDURE — 25000003 PHARM REV CODE 250: Performed by: EMERGENCY MEDICINE

## 2020-01-01 PROCEDURE — 25000003 PHARM REV CODE 250: Performed by: HOSPITALIST

## 2020-01-01 PROCEDURE — 97116 GAIT TRAINING THERAPY: CPT

## 2020-01-01 PROCEDURE — G0378 HOSPITAL OBSERVATION PER HR: HCPCS

## 2020-01-01 PROCEDURE — 99291 CRITICAL CARE FIRST HOUR: CPT | Mod: ,,, | Performed by: INTERNAL MEDICINE

## 2020-01-01 PROCEDURE — 27100171 HC OXYGEN HIGH FLOW UP TO 24 HOURS

## 2020-01-01 PROCEDURE — 99233 SBSQ HOSP IP/OBS HIGH 50: CPT | Mod: ,,, | Performed by: PHYSICIAN ASSISTANT

## 2020-01-01 PROCEDURE — 99232 PR SUBSEQUENT HOSPITAL CARE,LEVL II: ICD-10-PCS | Mod: ,,, | Performed by: NURSE PRACTITIONER

## 2020-01-01 PROCEDURE — 87086 URINE CULTURE/COLONY COUNT: CPT

## 2020-01-01 PROCEDURE — 99232 SBSQ HOSP IP/OBS MODERATE 35: CPT | Mod: ,,, | Performed by: NURSE PRACTITIONER

## 2020-01-01 PROCEDURE — C9399 UNCLASSIFIED DRUGS OR BIOLOG: HCPCS | Performed by: EMERGENCY MEDICINE

## 2020-01-01 PROCEDURE — 82550 ASSAY OF CK (CPK): CPT

## 2020-01-01 PROCEDURE — 80069 RENAL FUNCTION PANEL: CPT

## 2020-01-01 PROCEDURE — 97530 THERAPEUTIC ACTIVITIES: CPT | Performed by: PHYSICAL THERAPIST

## 2020-01-01 PROCEDURE — 86140 C-REACTIVE PROTEIN: CPT | Mod: 91

## 2020-01-01 PROCEDURE — 25000242 PHARM REV CODE 250 ALT 637 W/ HCPCS: Performed by: EMERGENCY MEDICINE

## 2020-01-01 PROCEDURE — 85027 COMPLETE CBC AUTOMATED: CPT

## 2020-01-01 PROCEDURE — 96376 TX/PRO/DX INJ SAME DRUG ADON: CPT | Mod: 59 | Performed by: EMERGENCY MEDICINE

## 2020-01-01 PROCEDURE — 99291 CRITICAL CARE FIRST HOUR: CPT | Mod: 25

## 2020-01-01 PROCEDURE — 84300 ASSAY OF URINE SODIUM: CPT

## 2020-01-01 PROCEDURE — 85610 PROTHROMBIN TIME: CPT

## 2020-01-01 PROCEDURE — 99225 PR SUBSEQUENT OBSERVATION CARE,LEVEL II: ICD-10-PCS | Mod: ,,, | Performed by: INTERNAL MEDICINE

## 2020-01-01 PROCEDURE — 51702 INSERT TEMP BLADDER CATH: CPT

## 2020-01-01 PROCEDURE — 97535 SELF CARE MNGMENT TRAINING: CPT

## 2020-01-01 PROCEDURE — 82570 ASSAY OF URINE CREATININE: CPT

## 2020-01-01 PROCEDURE — 97530 THERAPEUTIC ACTIVITIES: CPT

## 2020-01-01 PROCEDURE — 82728 ASSAY OF FERRITIN: CPT

## 2020-01-01 PROCEDURE — 94761 N-INVAS EAR/PLS OXIMETRY MLT: CPT

## 2020-01-01 PROCEDURE — 80074 ACUTE HEPATITIS PANEL: CPT

## 2020-01-01 PROCEDURE — 83605 ASSAY OF LACTIC ACID: CPT

## 2020-01-01 PROCEDURE — 83880 ASSAY OF NATRIURETIC PEPTIDE: CPT

## 2020-01-01 PROCEDURE — 93005 ELECTROCARDIOGRAM TRACING: CPT

## 2020-01-01 PROCEDURE — 80320 DRUG SCREEN QUANTALCOHOLS: CPT

## 2020-01-01 PROCEDURE — 82607 VITAMIN B-12: CPT

## 2020-01-01 PROCEDURE — 97167 OT EVAL HIGH COMPLEX 60 MIN: CPT

## 2020-01-01 PROCEDURE — 85730 THROMBOPLASTIN TIME PARTIAL: CPT

## 2020-01-01 PROCEDURE — 94660 CPAP INITIATION&MGMT: CPT

## 2020-01-01 PROCEDURE — 99205 PR OFFICE/OUTPT VISIT, NEW, LEVL V, 60-74 MIN: ICD-10-PCS | Mod: ,,, | Performed by: INTERNAL MEDICINE

## 2020-01-01 PROCEDURE — 99285 EMERGENCY DEPT VISIT HI MDM: CPT | Mod: 25

## 2020-01-01 PROCEDURE — 96366 THER/PROPH/DIAG IV INF ADDON: CPT

## 2020-01-01 PROCEDURE — 99233 SBSQ HOSP IP/OBS HIGH 50: CPT | Mod: ,,, | Performed by: INTERNAL MEDICINE

## 2020-01-01 PROCEDURE — 27000190 HC CPAP FULL FACE MASK W/VALVE

## 2020-01-01 PROCEDURE — 93010 EKG 12-LEAD: ICD-10-PCS | Mod: ,,, | Performed by: INTERNAL MEDICINE

## 2020-01-01 PROCEDURE — 84145 PROCALCITONIN (PCT): CPT

## 2020-01-01 PROCEDURE — 96372 THER/PROPH/DIAG INJ SC/IM: CPT | Mod: 59

## 2020-01-01 PROCEDURE — 99225 PR SUBSEQUENT OBSERVATION CARE,LEVEL II: ICD-10-PCS | Mod: ,,, | Performed by: NURSE PRACTITIONER

## 2020-01-01 PROCEDURE — 96361 HYDRATE IV INFUSION ADD-ON: CPT

## 2020-01-01 PROCEDURE — 83540 ASSAY OF IRON: CPT

## 2020-01-01 PROCEDURE — 82306 VITAMIN D 25 HYDROXY: CPT

## 2020-01-01 PROCEDURE — 97161 PT EVAL LOW COMPLEX 20 MIN: CPT | Performed by: PHYSICAL THERAPIST

## 2020-01-01 PROCEDURE — 96361 HYDRATE IV INFUSION ADD-ON: CPT | Mod: 59 | Performed by: EMERGENCY MEDICINE

## 2020-01-01 PROCEDURE — 94760 N-INVAS EAR/PLS OXIMETRY 1: CPT

## 2020-01-01 PROCEDURE — 83615 LACTATE (LD) (LDH) ENZYME: CPT | Mod: 91

## 2020-01-01 PROCEDURE — 97110 THERAPEUTIC EXERCISES: CPT | Performed by: PHYSICAL THERAPIST

## 2020-01-01 PROCEDURE — 97530 THERAPEUTIC ACTIVITIES: CPT | Mod: CQ

## 2020-01-01 PROCEDURE — 96374 THER/PROPH/DIAG INJ IV PUSH: CPT | Mod: 59

## 2020-01-01 PROCEDURE — 87186 SC STD MICRODIL/AGAR DIL: CPT | Mod: 59

## 2020-01-01 PROCEDURE — 96365 THER/PROPH/DIAG IV INF INIT: CPT

## 2020-01-01 PROCEDURE — 99223 PR INITIAL HOSPITAL CARE,LEVL III: ICD-10-PCS | Mod: ,,, | Performed by: INTERNAL MEDICINE

## 2020-01-01 PROCEDURE — 63600175 PHARM REV CODE 636 W HCPCS: Performed by: PHYSICIAN ASSISTANT

## 2020-01-01 PROCEDURE — 97165 OT EVAL LOW COMPLEX 30 MIN: CPT | Performed by: PHYSICAL THERAPIST

## 2020-01-01 PROCEDURE — 82803 BLOOD GASES ANY COMBINATION: CPT

## 2020-01-01 PROCEDURE — S0030 INJECTION, METRONIDAZOLE: HCPCS | Performed by: NURSE PRACTITIONER

## 2020-01-01 PROCEDURE — 82746 ASSAY OF FOLIC ACID SERUM: CPT

## 2020-01-01 PROCEDURE — 99223 PR INITIAL HOSPITAL CARE,LEVL III: ICD-10-PCS | Mod: ,,, | Performed by: PHYSICIAN ASSISTANT

## 2020-01-01 PROCEDURE — 97166 OT EVAL MOD COMPLEX 45 MIN: CPT

## 2020-01-01 PROCEDURE — 99900038 HC OT GENERIC THERAPY SCREENING (STAT): Performed by: PHYSICAL THERAPIST

## 2020-01-01 PROCEDURE — 96375 TX/PRO/DX INJ NEW DRUG ADDON: CPT

## 2020-01-01 PROCEDURE — 99205 OFFICE O/P NEW HI 60 MIN: CPT | Mod: ,,, | Performed by: INTERNAL MEDICINE

## 2020-01-01 PROCEDURE — 96372 THER/PROPH/DIAG INJ SC/IM: CPT | Mod: 59 | Performed by: EMERGENCY MEDICINE

## 2020-01-01 PROCEDURE — 96375 TX/PRO/DX INJ NEW DRUG ADDON: CPT | Mod: 59 | Performed by: EMERGENCY MEDICINE

## 2020-01-01 PROCEDURE — 82088 ASSAY OF ALDOSTERONE: CPT

## 2020-01-01 PROCEDURE — 81000 URINALYSIS NONAUTO W/SCOPE: CPT

## 2020-01-01 PROCEDURE — 81003 URINALYSIS AUTO W/O SCOPE: CPT

## 2020-01-01 PROCEDURE — 85651 RBC SED RATE NONAUTOMATED: CPT

## 2020-01-01 PROCEDURE — 99900038 HC OT GENERIC THERAPY SCREENING (STAT)

## 2020-01-01 PROCEDURE — 84156 ASSAY OF PROTEIN URINE: CPT

## 2020-01-01 PROCEDURE — 99497 PR ADVNCD CARE PLAN 30 MIN: ICD-10-PCS | Mod: 25,,, | Performed by: PHYSICIAN ASSISTANT

## 2020-01-01 PROCEDURE — 80307 DRUG TEST PRSMV CHEM ANLYZR: CPT

## 2020-01-01 PROCEDURE — 99220 PR INITIAL OBSERVATION CARE,LEVL III: ICD-10-PCS | Mod: 25,,, | Performed by: INTERNAL MEDICINE

## 2020-01-01 PROCEDURE — C9399 UNCLASSIFIED DRUGS OR BIOLOG: HCPCS | Performed by: NURSE PRACTITIONER

## 2020-01-01 PROCEDURE — 85007 BL SMEAR W/DIFF WBC COUNT: CPT | Mod: NCS

## 2020-01-01 PROCEDURE — 85007 BL SMEAR W/DIFF WBC COUNT: CPT

## 2020-01-01 PROCEDURE — 99233 PR SUBSEQUENT HOSPITAL CARE,LEVL III: ICD-10-PCS | Mod: ,,, | Performed by: PHYSICIAN ASSISTANT

## 2020-01-01 PROCEDURE — 99496 TRANSITIONAL CARE MANAGE SERVICE 7 DAY DISCHARGE: ICD-10-PCS | Mod: S$GLB,,, | Performed by: NURSE PRACTITIONER

## 2020-01-01 PROCEDURE — 36600 WITHDRAWAL OF ARTERIAL BLOOD: CPT

## 2020-01-01 PROCEDURE — 83036 HEMOGLOBIN GLYCOSYLATED A1C: CPT

## 2020-01-01 PROCEDURE — 86850 RBC ANTIBODY SCREEN: CPT

## 2020-01-01 PROCEDURE — 97802 MEDICAL NUTRITION INDIV IN: CPT

## 2020-01-01 PROCEDURE — 99900037 HC PT THERAPY SCREENING (STAT)

## 2020-01-01 PROCEDURE — 96374 THER/PROPH/DIAG INJ IV PUSH: CPT | Mod: 59 | Performed by: EMERGENCY MEDICINE

## 2020-01-01 PROCEDURE — 96367 TX/PROPH/DG ADDL SEQ IV INF: CPT | Mod: 59 | Performed by: EMERGENCY MEDICINE

## 2020-01-01 PROCEDURE — 99292 CRITICAL CARE ADDL 30 MIN: CPT

## 2020-01-01 PROCEDURE — 83935 ASSAY OF URINE OSMOLALITY: CPT

## 2020-01-01 PROCEDURE — 99225 PR SUBSEQUENT OBSERVATION CARE,LEVEL II: CPT | Mod: ,,, | Performed by: NURSE PRACTITIONER

## 2020-01-01 PROCEDURE — 99223 1ST HOSP IP/OBS HIGH 75: CPT | Mod: ,,, | Performed by: INTERNAL MEDICINE

## 2020-01-01 PROCEDURE — 86803 HEPATITIS C AB TEST: CPT

## 2020-01-01 PROCEDURE — 84133 ASSAY OF URINE POTASSIUM: CPT

## 2020-01-01 PROCEDURE — 99223 1ST HOSP IP/OBS HIGH 75: CPT | Mod: ,,, | Performed by: PHYSICIAN ASSISTANT

## 2020-01-01 PROCEDURE — 81003 URINALYSIS AUTO W/O SCOPE: CPT | Mod: 59

## 2020-01-01 PROCEDURE — 82728 ASSAY OF FERRITIN: CPT | Mod: 91

## 2020-01-01 PROCEDURE — 99232 PR SUBSEQUENT HOSPITAL CARE,LEVL II: ICD-10-PCS | Mod: ,,, | Performed by: INTERNAL MEDICINE

## 2020-01-01 PROCEDURE — 87077 CULTURE AEROBIC IDENTIFY: CPT | Mod: 59

## 2020-01-01 RX ORDER — SYRING-NEEDL,DISP,INSUL,0.3 ML 29 G X1/2"
296 SYRINGE, EMPTY DISPOSABLE MISCELLANEOUS ONCE
Status: COMPLETED | OUTPATIENT
Start: 2020-01-01 | End: 2020-01-01

## 2020-01-01 RX ORDER — IBUPROFEN 200 MG
16 TABLET ORAL
Status: DISCONTINUED | OUTPATIENT
Start: 2020-01-01 | End: 2020-01-01

## 2020-01-01 RX ORDER — ACETAMINOPHEN 325 MG/1
650 TABLET ORAL EVERY 4 HOURS PRN
Status: DISCONTINUED | OUTPATIENT
Start: 2020-01-01 | End: 2020-01-01 | Stop reason: HOSPADM

## 2020-01-01 RX ORDER — FUROSEMIDE 10 MG/ML
40 INJECTION INTRAMUSCULAR; INTRAVENOUS DAILY
Status: DISCONTINUED | OUTPATIENT
Start: 2020-01-01 | End: 2020-01-01

## 2020-01-01 RX ORDER — MORPHINE SULFATE 2 MG/ML
2 INJECTION, SOLUTION INTRAMUSCULAR; INTRAVENOUS EVERY 4 HOURS PRN
Status: DISCONTINUED | OUTPATIENT
Start: 2020-01-01 | End: 2020-01-01

## 2020-01-01 RX ORDER — LISINOPRIL 20 MG/1
40 TABLET ORAL DAILY
Status: DISCONTINUED | OUTPATIENT
Start: 2020-01-01 | End: 2020-01-01

## 2020-01-01 RX ORDER — HYDRALAZINE HYDROCHLORIDE 25 MG/1
25 TABLET, FILM COATED ORAL EVERY 8 HOURS
Status: DISCONTINUED | OUTPATIENT
Start: 2020-01-01 | End: 2020-01-01

## 2020-01-01 RX ORDER — REGADENOSON 0.08 MG/ML
0.4 INJECTION, SOLUTION INTRAVENOUS ONCE
Status: COMPLETED | OUTPATIENT
Start: 2020-01-01 | End: 2020-01-01

## 2020-01-01 RX ORDER — BENAZEPRIL HYDROCHLORIDE 20 MG/1
TABLET ORAL
Status: ON HOLD | COMMUNITY
Start: 2019-11-25 | End: 2020-01-01 | Stop reason: HOSPADM

## 2020-01-01 RX ORDER — SPIRONOLACTONE 25 MG/1
25 TABLET ORAL DAILY
Status: DISCONTINUED | OUTPATIENT
Start: 2020-01-01 | End: 2020-01-01

## 2020-01-01 RX ORDER — CHOLECALCIFEROL (VITAMIN D3) 25 MCG
1000 TABLET ORAL DAILY
Status: DISCONTINUED | OUTPATIENT
Start: 2020-01-01 | End: 2020-01-01

## 2020-01-01 RX ORDER — ISOSORBIDE DINITRATE 20 MG/1
20 TABLET ORAL 3 TIMES DAILY
Qty: 90 TABLET | Refills: 11 | Status: ON HOLD | OUTPATIENT
Start: 2020-01-01 | End: 2020-01-01 | Stop reason: HOSPADM

## 2020-01-01 RX ORDER — CYANOCOBALAMIN 1000 UG/ML
1000 INJECTION, SOLUTION INTRAMUSCULAR; SUBCUTANEOUS DAILY
Status: COMPLETED | OUTPATIENT
Start: 2020-01-01 | End: 2020-01-01

## 2020-01-01 RX ORDER — HYDRALAZINE HYDROCHLORIDE 50 MG/1
50 TABLET, FILM COATED ORAL EVERY 8 HOURS
Qty: 90 TABLET | Refills: 11 | Status: ON HOLD | OUTPATIENT
Start: 2020-01-01 | End: 2020-01-01 | Stop reason: HOSPADM

## 2020-01-01 RX ORDER — POTASSIUM CHLORIDE 20 MEQ/1
60 TABLET, EXTENDED RELEASE ORAL ONCE
Status: COMPLETED | OUTPATIENT
Start: 2020-01-01 | End: 2020-01-01

## 2020-01-01 RX ORDER — ASCORBIC ACID 500 MG
500 TABLET ORAL 2 TIMES DAILY
Status: DISCONTINUED | OUTPATIENT
Start: 2020-01-01 | End: 2020-01-01

## 2020-01-01 RX ORDER — LISINOPRIL 10 MG/1
10 TABLET ORAL DAILY
Status: DISCONTINUED | OUTPATIENT
Start: 2020-01-01 | End: 2020-01-01

## 2020-01-01 RX ORDER — ONDANSETRON 2 MG/ML
4 INJECTION INTRAMUSCULAR; INTRAVENOUS EVERY 8 HOURS PRN
Status: DISCONTINUED | OUTPATIENT
Start: 2020-01-01 | End: 2020-01-01 | Stop reason: HOSPADM

## 2020-01-01 RX ORDER — ISOSORBIDE MONONITRATE 30 MG/1
30 TABLET, EXTENDED RELEASE ORAL DAILY
Qty: 30 TABLET | Refills: 0
Start: 2020-01-01 | End: 2020-01-01

## 2020-01-01 RX ORDER — POTASSIUM CHLORIDE 20 MEQ/1
40 TABLET, EXTENDED RELEASE ORAL ONCE
Status: DISCONTINUED | OUTPATIENT
Start: 2020-01-01 | End: 2020-01-01

## 2020-01-01 RX ORDER — OLMESARTAN MEDOXOMIL 40 MG/1
40 TABLET ORAL DAILY
Status: ON HOLD | COMMUNITY
End: 2020-01-01 | Stop reason: HOSPADM

## 2020-01-01 RX ORDER — MUPIROCIN 20 MG/G
OINTMENT TOPICAL 2 TIMES DAILY
Status: DISCONTINUED | OUTPATIENT
Start: 2020-01-01 | End: 2020-01-01 | Stop reason: HOSPADM

## 2020-01-01 RX ORDER — IBUPROFEN 200 MG
16 TABLET ORAL
Status: DISCONTINUED | OUTPATIENT
Start: 2020-01-01 | End: 2020-01-01 | Stop reason: HOSPADM

## 2020-01-01 RX ORDER — POLYETHYLENE GLYCOL 3350 17 G/17G
17 POWDER, FOR SOLUTION ORAL DAILY
Status: DISCONTINUED | OUTPATIENT
Start: 2020-01-01 | End: 2020-01-01

## 2020-01-01 RX ORDER — POTASSIUM CHLORIDE 20 MEQ/1
40 TABLET, EXTENDED RELEASE ORAL ONCE
Status: COMPLETED | OUTPATIENT
Start: 2020-01-01 | End: 2020-01-01

## 2020-01-01 RX ORDER — HYDRALAZINE HYDROCHLORIDE 25 MG/1
25 TABLET, FILM COATED ORAL 3 TIMES DAILY
Status: DISCONTINUED | OUTPATIENT
Start: 2020-01-01 | End: 2020-01-01

## 2020-01-01 RX ORDER — LISINOPRIL 20 MG/1
40 TABLET ORAL DAILY
Status: DISCONTINUED | OUTPATIENT
Start: 2020-01-01 | End: 2020-01-01 | Stop reason: HOSPADM

## 2020-01-01 RX ORDER — ISOSORBIDE MONONITRATE 30 MG/1
30 TABLET, EXTENDED RELEASE ORAL DAILY
Status: DISCONTINUED | OUTPATIENT
Start: 2020-01-01 | End: 2020-01-01 | Stop reason: HOSPADM

## 2020-01-01 RX ORDER — SPIRONOLACTONE 25 MG/1
25 TABLET ORAL DAILY
Status: DISCONTINUED | OUTPATIENT
Start: 2020-01-01 | End: 2020-01-01 | Stop reason: HOSPADM

## 2020-01-01 RX ORDER — DOXYCYCLINE 100 MG/1
100 CAPSULE ORAL EVERY 12 HOURS
Qty: 4 CAPSULE | Refills: 0
Start: 2020-01-01 | End: 2020-01-01

## 2020-01-01 RX ORDER — BISACODYL 10 MG
10 SUPPOSITORY, RECTAL RECTAL DAILY PRN
Status: DISCONTINUED | OUTPATIENT
Start: 2020-01-01 | End: 2020-01-01

## 2020-01-01 RX ORDER — MORPHINE SULFATE 4 MG/ML
4 INJECTION, SOLUTION INTRAMUSCULAR; INTRAVENOUS ONCE AS NEEDED
Status: COMPLETED | OUTPATIENT
Start: 2020-01-01 | End: 2020-01-01

## 2020-01-01 RX ORDER — POTASSIUM CHLORIDE 20 MEQ/1
40 TABLET, EXTENDED RELEASE ORAL EVERY 6 HOURS
Status: COMPLETED | OUTPATIENT
Start: 2020-01-01 | End: 2020-01-01

## 2020-01-01 RX ORDER — GLUCAGON 1 MG
1 KIT INJECTION
Status: DISCONTINUED | OUTPATIENT
Start: 2020-01-01 | End: 2020-01-01 | Stop reason: HOSPADM

## 2020-01-01 RX ORDER — DILTIAZEM HYDROCHLORIDE 180 MG/1
180 CAPSULE, COATED, EXTENDED RELEASE ORAL DAILY
Status: ON HOLD | COMMUNITY
End: 2020-01-01 | Stop reason: HOSPADM

## 2020-01-01 RX ORDER — ROSUVASTATIN CALCIUM 10 MG/1
10 TABLET, COATED ORAL DAILY
Status: DISCONTINUED | OUTPATIENT
Start: 2020-01-01 | End: 2020-01-01 | Stop reason: HOSPADM

## 2020-01-01 RX ORDER — POTASSIUM CHLORIDE 20 MEQ/1
20 TABLET, EXTENDED RELEASE ORAL 2 TIMES DAILY
Status: ON HOLD | COMMUNITY
End: 2020-01-01 | Stop reason: HOSPADM

## 2020-01-01 RX ORDER — CARVEDILOL 3.12 MG/1
3.12 TABLET ORAL 2 TIMES DAILY
Status: DISCONTINUED | OUTPATIENT
Start: 2020-01-01 | End: 2020-01-01

## 2020-01-01 RX ORDER — ACETAMINOPHEN 325 MG/1
650 TABLET ORAL EVERY 8 HOURS PRN
Status: DISCONTINUED | OUTPATIENT
Start: 2020-01-01 | End: 2020-01-01

## 2020-01-01 RX ORDER — INSULIN ASPART 100 [IU]/ML
1-10 INJECTION, SOLUTION INTRAVENOUS; SUBCUTANEOUS
Status: DISCONTINUED | OUTPATIENT
Start: 2020-01-01 | End: 2020-01-01

## 2020-01-01 RX ORDER — SODIUM CHLORIDE 450 MG/100ML
INJECTION, SOLUTION INTRAVENOUS CONTINUOUS
Status: DISCONTINUED | OUTPATIENT
Start: 2020-01-01 | End: 2020-01-01

## 2020-01-01 RX ORDER — POTASSIUM CHLORIDE 20 MEQ/1
20 TABLET, EXTENDED RELEASE ORAL 2 TIMES DAILY
Status: DISCONTINUED | OUTPATIENT
Start: 2020-01-01 | End: 2020-01-01 | Stop reason: HOSPADM

## 2020-01-01 RX ORDER — IPRATROPIUM BROMIDE AND ALBUTEROL SULFATE 2.5; .5 MG/3ML; MG/3ML
3 SOLUTION RESPIRATORY (INHALATION) EVERY 12 HOURS
Status: DISCONTINUED | OUTPATIENT
Start: 2020-01-01 | End: 2020-01-01 | Stop reason: HOSPADM

## 2020-01-01 RX ORDER — FUROSEMIDE 20 MG/1
TABLET ORAL
Refills: 0
Start: 2020-01-01 | End: 2020-01-01

## 2020-01-01 RX ORDER — POTASSIUM CHLORIDE 7.45 MG/ML
10 INJECTION INTRAVENOUS
Status: COMPLETED | OUTPATIENT
Start: 2020-01-01 | End: 2020-01-01

## 2020-01-01 RX ORDER — POTASSIUM CHLORIDE 20 MEQ/1
40 TABLET, EXTENDED RELEASE ORAL
Status: DISCONTINUED | OUTPATIENT
Start: 2020-01-01 | End: 2020-01-01

## 2020-01-01 RX ORDER — ENOXAPARIN SODIUM 100 MG/ML
40 INJECTION SUBCUTANEOUS EVERY 24 HOURS
Status: DISCONTINUED | OUTPATIENT
Start: 2020-01-01 | End: 2020-01-01 | Stop reason: HOSPADM

## 2020-01-01 RX ORDER — ISOSORBIDE DINITRATE 20 MG/1
20 TABLET ORAL 3 TIMES DAILY
Status: DISCONTINUED | OUTPATIENT
Start: 2020-01-01 | End: 2020-01-01

## 2020-01-01 RX ORDER — LISINOPRIL 20 MG/1
20 TABLET ORAL DAILY
Status: DISCONTINUED | OUTPATIENT
Start: 2020-01-01 | End: 2020-01-01

## 2020-01-01 RX ORDER — POTASSIUM CHLORIDE 20 MEQ/1
60 TABLET, EXTENDED RELEASE ORAL ONCE
Status: DISCONTINUED | OUTPATIENT
Start: 2020-01-01 | End: 2020-01-01

## 2020-01-01 RX ORDER — FAMOTIDINE 20 MG/1
20 TABLET, FILM COATED ORAL 2 TIMES DAILY
Status: DISCONTINUED | OUTPATIENT
Start: 2020-01-01 | End: 2020-01-01 | Stop reason: DRUGHIGH

## 2020-01-01 RX ORDER — ACETAZOLAMIDE 250 MG/1
250 TABLET ORAL 2 TIMES DAILY
Status: COMPLETED | OUTPATIENT
Start: 2020-01-01 | End: 2020-01-01

## 2020-01-01 RX ORDER — FENOFIBRATE 160 MG/1
160 TABLET ORAL DAILY
Status: DISCONTINUED | OUTPATIENT
Start: 2020-01-01 | End: 2020-01-01

## 2020-01-01 RX ORDER — FUROSEMIDE 10 MG/ML
80 INJECTION INTRAMUSCULAR; INTRAVENOUS
Status: COMPLETED | OUTPATIENT
Start: 2020-01-01 | End: 2020-01-01

## 2020-01-01 RX ORDER — CARVEDILOL 12.5 MG/1
12.5 TABLET ORAL 2 TIMES DAILY WITH MEALS
Status: DISCONTINUED | OUTPATIENT
Start: 2020-01-01 | End: 2020-01-01

## 2020-01-01 RX ORDER — FUROSEMIDE 40 MG/1
40 TABLET ORAL DAILY
Status: ON HOLD | COMMUNITY
End: 2020-01-01 | Stop reason: HOSPADM

## 2020-01-01 RX ORDER — FUROSEMIDE 10 MG/ML
40 INJECTION INTRAMUSCULAR; INTRAVENOUS
Status: DISCONTINUED | OUTPATIENT
Start: 2020-01-01 | End: 2020-01-01

## 2020-01-01 RX ORDER — ROSUVASTATIN CALCIUM 10 MG/1
10 TABLET, COATED ORAL
Status: ON HOLD | COMMUNITY
End: 2020-01-01 | Stop reason: HOSPADM

## 2020-01-01 RX ORDER — POTASSIUM CHLORIDE 20 MEQ/1
40 TABLET, EXTENDED RELEASE ORAL EVERY 4 HOURS
Status: DISPENSED | OUTPATIENT
Start: 2020-01-01 | End: 2020-01-01

## 2020-01-01 RX ORDER — GLUCAGON 1 MG
1 KIT INJECTION
Status: DISCONTINUED | OUTPATIENT
Start: 2020-01-01 | End: 2020-01-01

## 2020-01-01 RX ORDER — IPRATROPIUM BROMIDE AND ALBUTEROL SULFATE 2.5; .5 MG/3ML; MG/3ML
3 SOLUTION RESPIRATORY (INHALATION) EVERY 6 HOURS PRN
Status: DISCONTINUED | OUTPATIENT
Start: 2020-01-01 | End: 2020-01-01

## 2020-01-01 RX ORDER — TIZANIDINE 4 MG/1
4 TABLET ORAL EVERY 12 HOURS PRN
Status: DISCONTINUED | OUTPATIENT
Start: 2020-01-01 | End: 2020-01-01 | Stop reason: HOSPADM

## 2020-01-01 RX ORDER — HYDROMORPHONE HYDROCHLORIDE 1 MG/ML
1 INJECTION, SOLUTION INTRAMUSCULAR; INTRAVENOUS; SUBCUTANEOUS
Status: DISCONTINUED | OUTPATIENT
Start: 2020-01-01 | End: 2020-01-01 | Stop reason: HOSPADM

## 2020-01-01 RX ORDER — NAPROXEN SODIUM 220 MG/1
81 TABLET, FILM COATED ORAL ONCE
Status: COMPLETED | OUTPATIENT
Start: 2020-01-01 | End: 2020-01-01

## 2020-01-01 RX ORDER — SYRING-NEEDL,DISP,INSUL,0.3 ML 29 G X1/2"
296 SYRINGE, EMPTY DISPOSABLE MISCELLANEOUS DAILY PRN
Status: DISCONTINUED | OUTPATIENT
Start: 2020-01-01 | End: 2020-01-01 | Stop reason: HOSPADM

## 2020-01-01 RX ORDER — INSULIN ASPART 100 [IU]/ML
1-10 INJECTION, SOLUTION INTRAVENOUS; SUBCUTANEOUS EVERY 6 HOURS PRN
Status: DISCONTINUED | OUTPATIENT
Start: 2020-01-01 | End: 2020-01-01 | Stop reason: HOSPADM

## 2020-01-01 RX ORDER — SODIUM,POTASSIUM PHOSPHATES 280-250MG
1 POWDER IN PACKET (EA) ORAL
Status: COMPLETED | OUTPATIENT
Start: 2020-01-01 | End: 2020-01-01

## 2020-01-01 RX ORDER — HYDRALAZINE HYDROCHLORIDE 50 MG/1
50 TABLET, FILM COATED ORAL 3 TIMES DAILY
Status: DISCONTINUED | OUTPATIENT
Start: 2020-01-01 | End: 2020-01-01

## 2020-01-01 RX ORDER — ACETAMINOPHEN 325 MG/1
650 TABLET ORAL EVERY 6 HOURS PRN
Status: DISCONTINUED | OUTPATIENT
Start: 2020-01-01 | End: 2020-01-01 | Stop reason: HOSPADM

## 2020-01-01 RX ORDER — CARVEDILOL 6.25 MG/1
6.25 TABLET ORAL 2 TIMES DAILY
Status: DISCONTINUED | OUTPATIENT
Start: 2020-01-01 | End: 2020-01-01

## 2020-01-01 RX ORDER — POTASSIUM CHLORIDE 20 MEQ/1
20 TABLET, EXTENDED RELEASE ORAL ONCE
Status: COMPLETED | OUTPATIENT
Start: 2020-01-01 | End: 2020-01-01

## 2020-01-01 RX ORDER — CARVEDILOL 12.5 MG/1
12.5 TABLET ORAL 2 TIMES DAILY
Status: DISCONTINUED | OUTPATIENT
Start: 2020-01-01 | End: 2020-01-01

## 2020-01-01 RX ORDER — SODIUM CHLORIDE 9 MG/ML
INJECTION, SOLUTION INTRAVENOUS CONTINUOUS
Status: DISCONTINUED | OUTPATIENT
Start: 2020-01-01 | End: 2020-01-01

## 2020-01-01 RX ORDER — AMLODIPINE BESYLATE 5 MG/1
TABLET ORAL
Status: ON HOLD | COMMUNITY
Start: 2019-11-25 | End: 2020-01-01 | Stop reason: HOSPADM

## 2020-01-01 RX ORDER — ISOSORBIDE DINITRATE 20 MG/1
20 TABLET ORAL 3 TIMES DAILY
Status: DISCONTINUED | OUTPATIENT
Start: 2020-01-01 | End: 2020-01-01 | Stop reason: HOSPADM

## 2020-01-01 RX ORDER — ROSUVASTATIN CALCIUM 10 MG/1
10 TABLET, COATED ORAL NIGHTLY
Status: DISCONTINUED | OUTPATIENT
Start: 2020-01-01 | End: 2020-01-01

## 2020-01-01 RX ORDER — POTASSIUM CHLORIDE 20 MEQ/1
40 TABLET, EXTENDED RELEASE ORAL EVERY 4 HOURS
Status: COMPLETED | OUTPATIENT
Start: 2020-01-01 | End: 2020-01-01

## 2020-01-01 RX ORDER — HYDRALAZINE HYDROCHLORIDE 25 MG/1
25 TABLET, FILM COATED ORAL 3 TIMES DAILY
Qty: 90 TABLET | Refills: 0
Start: 2020-01-01 | End: 2020-01-01

## 2020-01-01 RX ORDER — ISOSORBIDE MONONITRATE 30 MG/1
30 TABLET, EXTENDED RELEASE ORAL DAILY
Status: DISCONTINUED | OUTPATIENT
Start: 2020-01-01 | End: 2020-01-01

## 2020-01-01 RX ORDER — IBUPROFEN 200 MG
24 TABLET ORAL
Status: DISCONTINUED | OUTPATIENT
Start: 2020-01-01 | End: 2020-01-01

## 2020-01-01 RX ORDER — CARVEDILOL 12.5 MG/1
25 TABLET ORAL 2 TIMES DAILY WITH MEALS
Status: DISCONTINUED | OUTPATIENT
Start: 2020-01-01 | End: 2020-01-01 | Stop reason: HOSPADM

## 2020-01-01 RX ORDER — METOPROLOL TARTRATE 1 MG/ML
5 INJECTION, SOLUTION INTRAVENOUS EVERY 5 MIN PRN
Status: DISCONTINUED | OUTPATIENT
Start: 2020-01-01 | End: 2020-01-01 | Stop reason: HOSPADM

## 2020-01-01 RX ORDER — TIZANIDINE 4 MG/1
4 TABLET ORAL 2 TIMES DAILY
Status: DISCONTINUED | OUTPATIENT
Start: 2020-01-01 | End: 2020-01-01

## 2020-01-01 RX ORDER — LACTULOSE 10 G/15ML
15 SOLUTION ORAL 3 TIMES DAILY
Status: COMPLETED | OUTPATIENT
Start: 2020-01-01 | End: 2020-01-01

## 2020-01-01 RX ORDER — ONDANSETRON 8 MG/1
8 TABLET, ORALLY DISINTEGRATING ORAL EVERY 8 HOURS PRN
Status: DISCONTINUED | OUTPATIENT
Start: 2020-01-01 | End: 2020-01-01 | Stop reason: HOSPADM

## 2020-01-01 RX ORDER — HEPARIN SODIUM,PORCINE/D5W 25000/250
0-40 INTRAVENOUS SOLUTION INTRAVENOUS CONTINUOUS
Status: DISCONTINUED | OUTPATIENT
Start: 2020-01-01 | End: 2020-01-01

## 2020-01-01 RX ORDER — ASPIRIN 81 MG/1
81 TABLET ORAL DAILY
Status: DISCONTINUED | OUTPATIENT
Start: 2020-01-01 | End: 2020-01-01 | Stop reason: HOSPADM

## 2020-01-01 RX ORDER — FAMOTIDINE 20 MG/1
20 TABLET, FILM COATED ORAL DAILY
Status: DISCONTINUED | OUTPATIENT
Start: 2020-01-01 | End: 2020-01-01

## 2020-01-01 RX ORDER — AZITHROMYCIN 250 MG/1
500 TABLET, FILM COATED ORAL DAILY
Status: COMPLETED | OUTPATIENT
Start: 2020-01-01 | End: 2020-01-01

## 2020-01-01 RX ORDER — HYDROCHLOROTHIAZIDE 25 MG/1
TABLET ORAL
Status: ON HOLD | COMMUNITY
Start: 2019-11-25 | End: 2020-01-01 | Stop reason: HOSPADM

## 2020-01-01 RX ORDER — FAMOTIDINE 20 MG/1
20 TABLET, FILM COATED ORAL 2 TIMES DAILY
Qty: 60 TABLET | Refills: 0
Start: 2020-01-01 | End: 2020-01-01

## 2020-01-01 RX ORDER — IBUPROFEN 200 MG
24 TABLET ORAL
Status: DISCONTINUED | OUTPATIENT
Start: 2020-01-01 | End: 2020-01-01 | Stop reason: HOSPADM

## 2020-01-01 RX ORDER — CELECOXIB 100 MG/1
100 CAPSULE ORAL 2 TIMES DAILY
Status: DISCONTINUED | OUTPATIENT
Start: 2020-01-01 | End: 2020-01-01

## 2020-01-01 RX ORDER — DOCUSATE SODIUM 50 MG/5ML
100 LIQUID ORAL DAILY
Status: DISCONTINUED | OUTPATIENT
Start: 2020-01-01 | End: 2020-01-01

## 2020-01-01 RX ORDER — FAMOTIDINE 20 MG/1
20 TABLET, FILM COATED ORAL 2 TIMES DAILY
Status: DISCONTINUED | OUTPATIENT
Start: 2020-01-01 | End: 2020-01-01 | Stop reason: HOSPADM

## 2020-01-01 RX ORDER — MUPIROCIN 20 MG/G
OINTMENT TOPICAL 2 TIMES DAILY
Status: DISCONTINUED | OUTPATIENT
Start: 2020-01-01 | End: 2020-01-01

## 2020-01-01 RX ORDER — LISINOPRIL 40 MG/1
40 TABLET ORAL DAILY
Qty: 90 TABLET | Refills: 3 | Status: SHIPPED | OUTPATIENT
Start: 2020-01-01 | End: 2021-10-24

## 2020-01-01 RX ORDER — TIZANIDINE HYDROCHLORIDE 4 MG/1
4 CAPSULE, GELATIN COATED ORAL 2 TIMES DAILY
COMMUNITY

## 2020-01-01 RX ORDER — MORPHINE SULFATE 2 MG/ML
2 INJECTION, SOLUTION INTRAMUSCULAR; INTRAVENOUS
Status: DISCONTINUED | OUTPATIENT
Start: 2020-01-01 | End: 2020-01-01

## 2020-01-01 RX ORDER — FENOFIBRATE 145 MG/1
145 TABLET, FILM COATED ORAL DAILY
Status: DISCONTINUED | OUTPATIENT
Start: 2020-01-01 | End: 2020-01-01 | Stop reason: HOSPADM

## 2020-01-01 RX ORDER — FAMOTIDINE 20 MG/1
20 TABLET, FILM COATED ORAL 2 TIMES DAILY
Status: DISCONTINUED | OUTPATIENT
Start: 2020-01-01 | End: 2020-01-01

## 2020-01-01 RX ORDER — INSULIN ASPART 100 [IU]/ML
0-5 INJECTION, SOLUTION INTRAVENOUS; SUBCUTANEOUS
Status: DISCONTINUED | OUTPATIENT
Start: 2020-01-01 | End: 2020-01-01 | Stop reason: HOSPADM

## 2020-01-01 RX ORDER — TALC
6 POWDER (GRAM) TOPICAL NIGHTLY PRN
Status: DISCONTINUED | OUTPATIENT
Start: 2020-01-01 | End: 2020-01-01

## 2020-01-01 RX ORDER — HYDRALAZINE HYDROCHLORIDE 50 MG/1
50 TABLET, FILM COATED ORAL EVERY 8 HOURS
Status: DISCONTINUED | OUTPATIENT
Start: 2020-01-01 | End: 2020-01-01 | Stop reason: HOSPADM

## 2020-01-01 RX ORDER — POTASSIUM CHLORIDE 1.5 G/1.58G
40 POWDER, FOR SOLUTION ORAL 2 TIMES DAILY
Status: COMPLETED | OUTPATIENT
Start: 2020-01-01 | End: 2020-01-01

## 2020-01-01 RX ORDER — NAPROXEN SODIUM 220 MG/1
81 TABLET, FILM COATED ORAL DAILY
Status: DISCONTINUED | OUTPATIENT
Start: 2020-01-01 | End: 2020-01-01

## 2020-01-01 RX ORDER — SPIRONOLACTONE 25 MG/1
25 TABLET ORAL DAILY
Qty: 30 TABLET | Refills: 11 | Status: SHIPPED | OUTPATIENT
Start: 2020-01-01 | End: 2021-10-24

## 2020-01-01 RX ORDER — LISINOPRIL 2.5 MG/1
2.5 TABLET ORAL DAILY
Status: DISCONTINUED | OUTPATIENT
Start: 2020-01-01 | End: 2020-01-01

## 2020-01-01 RX ORDER — CYANOCOBALAMIN 1000 UG/ML
1000 INJECTION, SOLUTION INTRAMUSCULAR; SUBCUTANEOUS ONCE
Status: COMPLETED | OUTPATIENT
Start: 2020-01-01 | End: 2020-01-01

## 2020-01-01 RX ORDER — METFORMIN HYDROCHLORIDE 500 MG/1
500 TABLET ORAL 2 TIMES DAILY WITH MEALS
Qty: 180 TABLET | Refills: 3 | Status: ON HOLD
Start: 2020-01-01 | End: 2020-01-01

## 2020-01-01 RX ORDER — FERROUS GLUCONATE 324(38)MG
324 TABLET ORAL 2 TIMES DAILY WITH MEALS
Start: 2020-01-01 | End: 2020-01-01

## 2020-01-01 RX ORDER — DOXYCYCLINE HYCLATE 50 MG/1
50 CAPSULE ORAL
Status: ON HOLD | COMMUNITY
Start: 2020-01-01 | End: 2020-01-01 | Stop reason: HOSPADM

## 2020-01-01 RX ORDER — IBUPROFEN 200 MG
24 TABLET ORAL
Status: DISCONTINUED | OUTPATIENT
Start: 2020-01-01 | End: 2020-01-01 | Stop reason: SDUPTHER

## 2020-01-01 RX ORDER — POTASSIUM CHLORIDE 20 MEQ/1
20 TABLET, EXTENDED RELEASE ORAL 2 TIMES DAILY
Qty: 6 TABLET | Refills: 0 | Status: SHIPPED | OUTPATIENT
Start: 2020-01-01

## 2020-01-01 RX ORDER — INSULIN ASPART 100 [IU]/ML
0-5 INJECTION, SOLUTION INTRAVENOUS; SUBCUTANEOUS
Qty: 3 ML | Refills: 0
Start: 2020-01-01 | End: 2020-01-01

## 2020-01-01 RX ORDER — PANTOPRAZOLE SODIUM 40 MG/1
40 TABLET, DELAYED RELEASE ORAL DAILY
Status: DISCONTINUED | OUTPATIENT
Start: 2020-01-01 | End: 2020-01-01 | Stop reason: HOSPADM

## 2020-01-01 RX ORDER — POTASSIUM CHLORIDE 1.5 G/1.58G
40 POWDER, FOR SOLUTION ORAL ONCE
Status: COMPLETED | OUTPATIENT
Start: 2020-01-01 | End: 2020-01-01

## 2020-01-01 RX ORDER — INSULIN ASPART 100 [IU]/ML
0-5 INJECTION, SOLUTION INTRAVENOUS; SUBCUTANEOUS
Status: DISCONTINUED | OUTPATIENT
Start: 2020-01-01 | End: 2020-01-01

## 2020-01-01 RX ORDER — FUROSEMIDE 10 MG/ML
20 INJECTION INTRAMUSCULAR; INTRAVENOUS 2 TIMES DAILY
Status: DISCONTINUED | OUTPATIENT
Start: 2020-01-01 | End: 2020-01-01

## 2020-01-01 RX ORDER — FENOFIBRATE 160 MG/1
160 TABLET ORAL DAILY
Status: DISCONTINUED | OUTPATIENT
Start: 2020-01-01 | End: 2020-01-01 | Stop reason: HOSPADM

## 2020-01-01 RX ORDER — HALOPERIDOL 5 MG/ML
2 INJECTION INTRAMUSCULAR EVERY 4 HOURS PRN
Status: DISCONTINUED | OUTPATIENT
Start: 2020-01-01 | End: 2020-01-01 | Stop reason: HOSPADM

## 2020-01-01 RX ORDER — SODIUM CHLORIDE 0.9 % (FLUSH) 0.9 %
10 SYRINGE (ML) INJECTION
Status: DISCONTINUED | OUTPATIENT
Start: 2020-01-01 | End: 2020-01-01 | Stop reason: HOSPADM

## 2020-01-01 RX ORDER — GLUCAGON 1 MG
1 KIT INJECTION
Status: DISCONTINUED | OUTPATIENT
Start: 2020-01-01 | End: 2020-01-01 | Stop reason: SDUPTHER

## 2020-01-01 RX ORDER — SODIUM CHLORIDE 0.9 % (FLUSH) 0.9 %
10 SYRINGE (ML) INJECTION
Status: DISCONTINUED | OUTPATIENT
Start: 2020-01-01 | End: 2020-01-01

## 2020-01-01 RX ORDER — BACLOFEN 20 MG/1
20 TABLET ORAL
Status: ON HOLD | COMMUNITY
End: 2020-01-01 | Stop reason: HOSPADM

## 2020-01-01 RX ORDER — CARVEDILOL 25 MG/1
25 TABLET ORAL 2 TIMES DAILY WITH MEALS
Qty: 60 TABLET | Refills: 11 | Status: SHIPPED | OUTPATIENT
Start: 2020-01-01 | End: 2021-10-23

## 2020-01-01 RX ORDER — FUROSEMIDE 20 MG/1
20 TABLET ORAL EVERY OTHER DAY
Status: DISCONTINUED | OUTPATIENT
Start: 2020-01-01 | End: 2020-01-01 | Stop reason: HOSPADM

## 2020-01-01 RX ORDER — ROSUVASTATIN CALCIUM 10 MG/1
10 TABLET, COATED ORAL DAILY
COMMUNITY

## 2020-01-01 RX ORDER — FENOFIBRATE 160 MG/1
160 TABLET ORAL DAILY
COMMUNITY

## 2020-01-01 RX ORDER — BISACODYL 10 MG
10 SUPPOSITORY, RECTAL RECTAL DAILY PRN
Status: DISCONTINUED | OUTPATIENT
Start: 2020-01-01 | End: 2020-01-01 | Stop reason: HOSPADM

## 2020-01-01 RX ORDER — ALBUTEROL SULFATE 90 UG/1
2 AEROSOL, METERED RESPIRATORY (INHALATION) EVERY 6 HOURS
Status: DISCONTINUED | OUTPATIENT
Start: 2020-01-01 | End: 2020-01-01 | Stop reason: RX

## 2020-01-01 RX ORDER — IBUPROFEN 200 MG
16 TABLET ORAL
Status: DISCONTINUED | OUTPATIENT
Start: 2020-01-01 | End: 2020-01-01 | Stop reason: SDUPTHER

## 2020-01-01 RX ORDER — DEXAMETHASONE SODIUM PHOSPHATE 4 MG/ML
4 INJECTION, SOLUTION INTRA-ARTICULAR; INTRALESIONAL; INTRAMUSCULAR; INTRAVENOUS; SOFT TISSUE EVERY 24 HOURS
Status: DISCONTINUED | OUTPATIENT
Start: 2020-01-01 | End: 2020-01-01

## 2020-01-01 RX ORDER — ASPIRIN 81 MG/1
81 TABLET ORAL DAILY
Qty: 30 TABLET | Refills: 0
Start: 2020-01-01 | End: 2020-01-01

## 2020-01-01 RX ORDER — METRONIDAZOLE 500 MG/100ML
500 INJECTION, SOLUTION INTRAVENOUS
Status: DISCONTINUED | OUTPATIENT
Start: 2020-01-01 | End: 2020-01-01

## 2020-01-01 RX ORDER — FUROSEMIDE 10 MG/ML
20 INJECTION INTRAMUSCULAR; INTRAVENOUS ONCE
Status: COMPLETED | OUTPATIENT
Start: 2020-01-01 | End: 2020-01-01

## 2020-01-01 RX ADMIN — POTASSIUM & SODIUM PHOSPHATES POWDER PACK 280-160-250 MG 1 PACKET: 280-160-250 PACK at 10:10

## 2020-01-01 RX ADMIN — POTASSIUM CHLORIDE 40 MEQ: 1500 TABLET, EXTENDED RELEASE ORAL at 03:10

## 2020-01-01 RX ADMIN — Medication 16 G: at 11:12

## 2020-01-01 RX ADMIN — IPRATROPIUM BROMIDE AND ALBUTEROL SULFATE 3 ML: .5; 3 SOLUTION RESPIRATORY (INHALATION) at 07:12

## 2020-01-01 RX ADMIN — ISOSORBIDE DINITRATE 20 MG: 20 TABLET ORAL at 09:11

## 2020-01-01 RX ADMIN — OXYCODONE HYDROCHLORIDE AND ACETAMINOPHEN 500 MG: 500 TABLET ORAL at 08:12

## 2020-01-01 RX ADMIN — LISINOPRIL 20 MG: 20 TABLET ORAL at 08:10

## 2020-01-01 RX ADMIN — TIZANIDINE 4 MG: 4 TABLET ORAL at 10:12

## 2020-01-01 RX ADMIN — CARVEDILOL 12.5 MG: 12.5 TABLET, FILM COATED ORAL at 08:12

## 2020-01-01 RX ADMIN — FENOFIBRATE 160 MG: 160 TABLET ORAL at 08:12

## 2020-01-01 RX ADMIN — POLYETHYLENE GLYCOL 3350 17 G: 17 POWDER, FOR SOLUTION ORAL at 11:12

## 2020-01-01 RX ADMIN — CARVEDILOL 25 MG: 12.5 TABLET, FILM COATED ORAL at 05:10

## 2020-01-01 RX ADMIN — IPRATROPIUM BROMIDE AND ALBUTEROL 2 PUFF: 20; 100 SPRAY, METERED RESPIRATORY (INHALATION) at 08:12

## 2020-01-01 RX ADMIN — ISOSORBIDE MONONITRATE 30 MG: 30 TABLET, EXTENDED RELEASE ORAL at 08:12

## 2020-01-01 RX ADMIN — THERA TABS 1 TABLET: TAB at 08:12

## 2020-01-01 RX ADMIN — TIZANIDINE 4 MG: 4 TABLET ORAL at 08:12

## 2020-01-01 RX ADMIN — ASPIRIN 81 MG: 81 TABLET, CHEWABLE ORAL at 08:12

## 2020-01-01 RX ADMIN — CARVEDILOL 25 MG: 12.5 TABLET, FILM COATED ORAL at 05:11

## 2020-01-01 RX ADMIN — ACETAZOLAMIDE 250 MG: 250 TABLET ORAL at 03:10

## 2020-01-01 RX ADMIN — IRON SUCROSE 200 MG: 20 INJECTION, SOLUTION INTRAVENOUS at 09:11

## 2020-01-01 RX ADMIN — PIPERACILLIN AND TAZOBACTAM 4.5 G: 4; .5 INJECTION, POWDER, LYOPHILIZED, FOR SOLUTION INTRAVENOUS; PARENTERAL at 05:10

## 2020-01-01 RX ADMIN — METRONIDAZOLE 500 MG: 500 INJECTION, SOLUTION INTRAVENOUS at 02:10

## 2020-01-01 RX ADMIN — ASPIRIN 81 MG: 81 TABLET, COATED ORAL at 08:11

## 2020-01-01 RX ADMIN — ROSUVASTATIN 10 MG: 10 TABLET, FILM COATED ORAL at 10:11

## 2020-01-01 RX ADMIN — FAMOTIDINE 20 MG: 20 TABLET, FILM COATED ORAL at 08:12

## 2020-01-01 RX ADMIN — POTASSIUM CHLORIDE 40 MEQ: 1500 TABLET, EXTENDED RELEASE ORAL at 11:10

## 2020-01-01 RX ADMIN — ISOSORBIDE DINITRATE 20 MG: 20 TABLET ORAL at 09:10

## 2020-01-01 RX ADMIN — SODIUM CHLORIDE: 0.45 INJECTION, SOLUTION INTRAVENOUS at 09:10

## 2020-01-01 RX ADMIN — CEFTRIAXONE 2 G: 2 INJECTION, SOLUTION INTRAVENOUS at 02:12

## 2020-01-01 RX ADMIN — HYDROMORPHONE HYDROCHLORIDE 1 MG: 1 INJECTION, SOLUTION INTRAMUSCULAR; INTRAVENOUS; SUBCUTANEOUS at 10:12

## 2020-01-01 RX ADMIN — ISOSORBIDE DINITRATE 20 MG: 20 TABLET ORAL at 03:11

## 2020-01-01 RX ADMIN — IPRATROPIUM BROMIDE AND ALBUTEROL SULFATE 3 ML: .5; 3 SOLUTION RESPIRATORY (INHALATION) at 08:12

## 2020-01-01 RX ADMIN — LORAZEPAM 1 MG: 2 INJECTION INTRAMUSCULAR; INTRAVENOUS at 07:12

## 2020-01-01 RX ADMIN — ACETAMINOPHEN 650 MG: 325 TABLET ORAL at 09:10

## 2020-01-01 RX ADMIN — FAMOTIDINE 20 MG: 20 TABLET ORAL at 08:11

## 2020-01-01 RX ADMIN — HYDRALAZINE HYDROCHLORIDE 50 MG: 50 TABLET, FILM COATED ORAL at 02:12

## 2020-01-01 RX ADMIN — HYDRALAZINE HYDROCHLORIDE 50 MG: 50 TABLET, FILM COATED ORAL at 04:12

## 2020-01-01 RX ADMIN — POTASSIUM CHLORIDE 40 MEQ: 1500 TABLET, EXTENDED RELEASE ORAL at 09:10

## 2020-01-01 RX ADMIN — SPIRONOLACTONE 25 MG: 25 TABLET ORAL at 08:11

## 2020-01-01 RX ADMIN — INSULIN ASPART 2 UNITS: 100 INJECTION, SOLUTION INTRAVENOUS; SUBCUTANEOUS at 05:10

## 2020-01-01 RX ADMIN — HEPARIN SODIUM AND DEXTROSE 12 UNITS/KG/HR: 10000; 5 INJECTION INTRAVENOUS at 07:12

## 2020-01-01 RX ADMIN — IPRATROPIUM BROMIDE AND ALBUTEROL 2 PUFF: 20; 100 SPRAY, METERED RESPIRATORY (INHALATION) at 12:12

## 2020-01-01 RX ADMIN — SPIRONOLACTONE 25 MG: 25 TABLET ORAL at 09:12

## 2020-01-01 RX ADMIN — POTASSIUM CHLORIDE 20 MEQ: 1500 TABLET, EXTENDED RELEASE ORAL at 08:11

## 2020-01-01 RX ADMIN — ROSUVASTATIN 10 MG: 10 TABLET, FILM COATED ORAL at 10:12

## 2020-01-01 RX ADMIN — REMDESIVIR 100 MG: 100 INJECTION, POWDER, LYOPHILIZED, FOR SOLUTION INTRAVENOUS at 04:12

## 2020-01-01 RX ADMIN — IPRATROPIUM BROMIDE AND ALBUTEROL SULFATE 3 ML: .5; 3 SOLUTION RESPIRATORY (INHALATION) at 07:11

## 2020-01-01 RX ADMIN — ISOSORBIDE MONONITRATE 30 MG: 30 TABLET, EXTENDED RELEASE ORAL at 08:10

## 2020-01-01 RX ADMIN — IPRATROPIUM BROMIDE AND ALBUTEROL SULFATE 3 ML: .5; 3 SOLUTION RESPIRATORY (INHALATION) at 01:12

## 2020-01-01 RX ADMIN — FUROSEMIDE 20 MG: 10 INJECTION, SOLUTION INTRAMUSCULAR; INTRAVENOUS at 08:12

## 2020-01-01 RX ADMIN — INSULIN ASPART 4 UNITS: 100 INJECTION, SOLUTION INTRAVENOUS; SUBCUTANEOUS at 11:12

## 2020-01-01 RX ADMIN — INSULIN ASPART 2 UNITS: 100 INJECTION, SOLUTION INTRAVENOUS; SUBCUTANEOUS at 10:11

## 2020-01-01 RX ADMIN — HYDRALAZINE HYDROCHLORIDE 50 MG: 50 TABLET, FILM COATED ORAL at 08:12

## 2020-01-01 RX ADMIN — FENOFIBRATE 160 MG: 160 TABLET ORAL at 09:12

## 2020-01-01 RX ADMIN — HYDRALAZINE HYDROCHLORIDE 25 MG: 25 TABLET, FILM COATED ORAL at 08:12

## 2020-01-01 RX ADMIN — POTASSIUM CHLORIDE 60 MEQ: 1500 TABLET, EXTENDED RELEASE ORAL at 04:10

## 2020-01-01 RX ADMIN — FAMOTIDINE 20 MG: 20 TABLET, FILM COATED ORAL at 09:12

## 2020-01-01 RX ADMIN — POTASSIUM CHLORIDE 40 MEQ: 1.5 FOR SOLUTION ORAL at 07:12

## 2020-01-01 RX ADMIN — INSULIN ASPART 3 UNITS: 100 INJECTION, SOLUTION INTRAVENOUS; SUBCUTANEOUS at 05:11

## 2020-01-01 RX ADMIN — INSULIN ASPART 2 UNITS: 100 INJECTION, SOLUTION INTRAVENOUS; SUBCUTANEOUS at 11:12

## 2020-01-01 RX ADMIN — CARVEDILOL 25 MG: 12.5 TABLET, FILM COATED ORAL at 04:11

## 2020-01-01 RX ADMIN — SODIUM CHLORIDE: 0.9 INJECTION, SOLUTION INTRAVENOUS at 05:11

## 2020-01-01 RX ADMIN — IPRATROPIUM BROMIDE AND ALBUTEROL SULFATE 3 ML: .5; 3 SOLUTION RESPIRATORY (INHALATION) at 08:11

## 2020-01-01 RX ADMIN — Medication 6 MG: at 08:12

## 2020-01-01 RX ADMIN — HYDRALAZINE HYDROCHLORIDE 25 MG: 25 TABLET, FILM COATED ORAL at 04:12

## 2020-01-01 RX ADMIN — ISOSORBIDE DINITRATE 20 MG: 20 TABLET ORAL at 02:10

## 2020-01-01 RX ADMIN — ASPIRIN 81 MG: 81 TABLET, COATED ORAL at 09:10

## 2020-01-01 RX ADMIN — DOXYCYCLINE 100 MG: 100 INJECTION, POWDER, LYOPHILIZED, FOR SOLUTION INTRAVENOUS at 02:12

## 2020-01-01 RX ADMIN — ENOXAPARIN SODIUM 40 MG: 40 INJECTION SUBCUTANEOUS at 04:11

## 2020-01-01 RX ADMIN — CARVEDILOL 12.5 MG: 12.5 TABLET, FILM COATED ORAL at 08:10

## 2020-01-01 RX ADMIN — CARVEDILOL 3.12 MG: 3.12 TABLET, FILM COATED ORAL at 11:10

## 2020-01-01 RX ADMIN — IPRATROPIUM BROMIDE AND ALBUTEROL 2 PUFF: 20; 100 SPRAY, METERED RESPIRATORY (INHALATION) at 07:12

## 2020-01-01 RX ADMIN — MUPIROCIN: 20 OINTMENT TOPICAL at 08:12

## 2020-01-01 RX ADMIN — Medication 6 MG: at 10:12

## 2020-01-01 RX ADMIN — INSULIN ASPART 2 UNITS: 100 INJECTION, SOLUTION INTRAVENOUS; SUBCUTANEOUS at 11:10

## 2020-01-01 RX ADMIN — ROSUVASTATIN 10 MG: 10 TABLET, FILM COATED ORAL at 08:12

## 2020-01-01 RX ADMIN — MORPHINE SULFATE 2 MG: 2 INJECTION, SOLUTION INTRAMUSCULAR; INTRAVENOUS at 07:12

## 2020-01-01 RX ADMIN — POTASSIUM CHLORIDE 20 MEQ: 1500 TABLET, EXTENDED RELEASE ORAL at 09:11

## 2020-01-01 RX ADMIN — INSULIN ASPART 8 UNITS: 100 INJECTION, SOLUTION INTRAVENOUS; SUBCUTANEOUS at 11:10

## 2020-01-01 RX ADMIN — PIPERACILLIN AND TAZOBACTAM 4.5 G: 4; .5 INJECTION, POWDER, LYOPHILIZED, FOR SOLUTION INTRAVENOUS; PARENTERAL at 09:10

## 2020-01-01 RX ADMIN — TIZANIDINE 4 MG: 4 TABLET ORAL at 09:12

## 2020-01-01 RX ADMIN — CARVEDILOL 6.25 MG: 6.25 TABLET, FILM COATED ORAL at 08:10

## 2020-01-01 RX ADMIN — IPRATROPIUM BROMIDE AND ALBUTEROL SULFATE 3 ML: .5; 3 SOLUTION RESPIRATORY (INHALATION) at 02:12

## 2020-01-01 RX ADMIN — POTASSIUM CHLORIDE 40 MEQ: 1500 TABLET, EXTENDED RELEASE ORAL at 04:10

## 2020-01-01 RX ADMIN — HYDRALAZINE HYDROCHLORIDE 25 MG: 25 TABLET, FILM COATED ORAL at 03:10

## 2020-01-01 RX ADMIN — THERA TABS 1 TABLET: TAB at 09:12

## 2020-01-01 RX ADMIN — AMPICILLIN SODIUM AND SULBACTAM SODIUM 3 G: 2; 1 INJECTION, POWDER, FOR SOLUTION INTRAMUSCULAR; INTRAVENOUS at 04:12

## 2020-01-01 RX ADMIN — HYDRALAZINE HYDROCHLORIDE 25 MG: 25 TABLET, FILM COATED ORAL at 09:12

## 2020-01-01 RX ADMIN — POTASSIUM CHLORIDE 40 MEQ: 1.5 FOR SOLUTION ORAL at 08:12

## 2020-01-01 RX ADMIN — CARVEDILOL 25 MG: 12.5 TABLET, FILM COATED ORAL at 10:11

## 2020-01-01 RX ADMIN — ISOSORBIDE MONONITRATE 30 MG: 30 TABLET, EXTENDED RELEASE ORAL at 09:12

## 2020-01-01 RX ADMIN — FAMOTIDINE 20 MG: 20 TABLET ORAL at 09:11

## 2020-01-01 RX ADMIN — CYANOCOBALAMIN 1000 MCG: 1000 INJECTION, SOLUTION INTRAMUSCULAR; SUBCUTANEOUS at 08:12

## 2020-01-01 RX ADMIN — INSULIN ASPART 3 UNITS: 100 INJECTION, SOLUTION INTRAVENOUS; SUBCUTANEOUS at 08:10

## 2020-01-01 RX ADMIN — HYDRALAZINE HYDROCHLORIDE 50 MG: 50 TABLET, FILM COATED ORAL at 05:10

## 2020-01-01 RX ADMIN — POTASSIUM CHLORIDE 20 MEQ: 1500 TABLET, EXTENDED RELEASE ORAL at 09:10

## 2020-01-01 RX ADMIN — DEXAMETHASONE 6 MG: 4 TABLET ORAL at 08:12

## 2020-01-01 RX ADMIN — ISOSORBIDE DINITRATE 20 MG: 20 TABLET ORAL at 08:11

## 2020-01-01 RX ADMIN — MUPIROCIN: 20 OINTMENT TOPICAL at 09:12

## 2020-01-01 RX ADMIN — AZITHROMYCIN MONOHYDRATE 500 MG: 250 TABLET ORAL at 10:11

## 2020-01-01 RX ADMIN — HYDRALAZINE HYDROCHLORIDE 25 MG: 25 TABLET, FILM COATED ORAL at 06:10

## 2020-01-01 RX ADMIN — LACTULOSE 15 G: 20 SOLUTION ORAL at 09:10

## 2020-01-01 RX ADMIN — CARVEDILOL 12.5 MG: 12.5 TABLET, FILM COATED ORAL at 04:10

## 2020-01-01 RX ADMIN — Medication 1000 UNITS: at 08:12

## 2020-01-01 RX ADMIN — CARVEDILOL 25 MG: 12.5 TABLET, FILM COATED ORAL at 04:10

## 2020-01-01 RX ADMIN — DOXYCYCLINE 100 MG: 100 INJECTION, POWDER, LYOPHILIZED, FOR SOLUTION INTRAVENOUS at 03:12

## 2020-01-01 RX ADMIN — DOCUSATE SODIUM 100 MG: 50 LIQUID ORAL at 08:12

## 2020-01-01 RX ADMIN — SPIRONOLACTONE 25 MG: 25 TABLET ORAL at 04:10

## 2020-01-01 RX ADMIN — SPIRONOLACTONE 25 MG: 25 TABLET ORAL at 08:12

## 2020-01-01 RX ADMIN — CEFTRIAXONE 1 G: 1 INJECTION, SOLUTION INTRAVENOUS at 01:11

## 2020-01-01 RX ADMIN — LISINOPRIL 40 MG: 20 TABLET ORAL at 08:10

## 2020-01-01 RX ADMIN — REMDESIVIR 200 MG: 100 INJECTION, POWDER, LYOPHILIZED, FOR SOLUTION INTRAVENOUS at 05:12

## 2020-01-01 RX ADMIN — INSULIN ASPART 6 UNITS: 100 INJECTION, SOLUTION INTRAVENOUS; SUBCUTANEOUS at 04:10

## 2020-01-01 RX ADMIN — FENOFIBRATE 145 MG: 145 TABLET ORAL at 09:10

## 2020-01-01 RX ADMIN — INSULIN ASPART 3 UNITS: 100 INJECTION, SOLUTION INTRAVENOUS; SUBCUTANEOUS at 10:12

## 2020-01-01 RX ADMIN — ISOSORBIDE MONONITRATE 30 MG: 30 TABLET, EXTENDED RELEASE ORAL at 02:10

## 2020-01-01 RX ADMIN — AZITHROMYCIN MONOHYDRATE 500 MG: 250 TABLET ORAL at 08:11

## 2020-01-01 RX ADMIN — INSULIN ASPART 4 UNITS: 100 INJECTION, SOLUTION INTRAVENOUS; SUBCUTANEOUS at 11:10

## 2020-01-01 RX ADMIN — INSULIN ASPART 2 UNITS: 100 INJECTION, SOLUTION INTRAVENOUS; SUBCUTANEOUS at 04:12

## 2020-01-01 RX ADMIN — ONDANSETRON 4 MG: 2 INJECTION INTRAMUSCULAR; INTRAVENOUS at 09:10

## 2020-01-01 RX ADMIN — MORPHINE SULFATE 2 MG: 2 INJECTION, SOLUTION INTRAMUSCULAR; INTRAVENOUS at 08:12

## 2020-01-01 RX ADMIN — CARVEDILOL 25 MG: 12.5 TABLET, FILM COATED ORAL at 09:10

## 2020-01-01 RX ADMIN — INSULIN ASPART 6 UNITS: 100 INJECTION, SOLUTION INTRAVENOUS; SUBCUTANEOUS at 04:12

## 2020-01-01 RX ADMIN — CARVEDILOL 12.5 MG: 12.5 TABLET, FILM COATED ORAL at 09:12

## 2020-01-01 RX ADMIN — CARVEDILOL 25 MG: 12.5 TABLET, FILM COATED ORAL at 08:11

## 2020-01-01 RX ADMIN — CELECOXIB 100 MG: 100 CAPSULE ORAL at 08:12

## 2020-01-01 RX ADMIN — INSULIN ASPART 1 UNITS: 100 INJECTION, SOLUTION INTRAVENOUS; SUBCUTANEOUS at 09:11

## 2020-01-01 RX ADMIN — LISINOPRIL 40 MG: 20 TABLET ORAL at 04:10

## 2020-01-01 RX ADMIN — METOROPROLOL TARTRATE 5 MG: 5 INJECTION, SOLUTION INTRAVENOUS at 04:10

## 2020-01-01 RX ADMIN — INSULIN ASPART 3 UNITS: 100 INJECTION, SOLUTION INTRAVENOUS; SUBCUTANEOUS at 10:10

## 2020-01-01 RX ADMIN — Medication 6 MG: at 11:12

## 2020-01-01 RX ADMIN — ACETAZOLAMIDE 250 MG: 250 TABLET ORAL at 08:10

## 2020-01-01 RX ADMIN — BISACODYL 10 MG: 10 SUPPOSITORY RECTAL at 03:11

## 2020-01-01 RX ADMIN — Medication 1000 UNITS: at 09:12

## 2020-01-01 RX ADMIN — Medication 6 MG: at 09:12

## 2020-01-01 RX ADMIN — HYDRALAZINE HYDROCHLORIDE 50 MG: 50 TABLET, FILM COATED ORAL at 09:11

## 2020-01-01 RX ADMIN — LISINOPRIL 40 MG: 20 TABLET ORAL at 09:12

## 2020-01-01 RX ADMIN — POTASSIUM CHLORIDE 40 MEQ: 1500 TABLET, EXTENDED RELEASE ORAL at 02:10

## 2020-01-01 RX ADMIN — HYDRALAZINE HYDROCHLORIDE 50 MG: 50 TABLET, FILM COATED ORAL at 01:11

## 2020-01-01 RX ADMIN — HEPARIN SODIUM AND DEXTROSE 12 UNITS/KG/HR: 10000; 5 INJECTION INTRAVENOUS at 11:12

## 2020-01-01 RX ADMIN — LISINOPRIL 40 MG: 20 TABLET ORAL at 09:10

## 2020-01-01 RX ADMIN — INSULIN ASPART 4 UNITS: 100 INJECTION, SOLUTION INTRAVENOUS; SUBCUTANEOUS at 08:12

## 2020-01-01 RX ADMIN — SPIRONOLACTONE 25 MG: 25 TABLET ORAL at 08:10

## 2020-01-01 RX ADMIN — DOCUSATE SODIUM 100 MG: 50 LIQUID ORAL at 02:12

## 2020-01-01 RX ADMIN — DEXAMETHASONE SODIUM PHOSPHATE 4 MG: 4 INJECTION INTRA-ARTICULAR; INTRALESIONAL; INTRAMUSCULAR; INTRAVENOUS; SOFT TISSUE at 08:12

## 2020-01-01 RX ADMIN — HYDRALAZINE HYDROCHLORIDE 25 MG: 25 TABLET, FILM COATED ORAL at 02:12

## 2020-01-01 RX ADMIN — INSULIN ASPART 1 UNITS: 100 INJECTION, SOLUTION INTRAVENOUS; SUBCUTANEOUS at 10:12

## 2020-01-01 RX ADMIN — ACETAZOLAMIDE 250 MG: 250 TABLET ORAL at 09:10

## 2020-01-01 RX ADMIN — POTASSIUM CHLORIDE 10 MEQ: 7.46 INJECTION, SOLUTION INTRAVENOUS at 05:10

## 2020-01-01 RX ADMIN — OXYCODONE HYDROCHLORIDE AND ACETAMINOPHEN 500 MG: 500 TABLET ORAL at 09:12

## 2020-01-01 RX ADMIN — Medication 1 TABLET: at 10:11

## 2020-01-01 RX ADMIN — PIPERACILLIN AND TAZOBACTAM 4.5 G: 4; .5 INJECTION, POWDER, LYOPHILIZED, FOR SOLUTION INTRAVENOUS; PARENTERAL at 08:10

## 2020-01-01 RX ADMIN — POTASSIUM CHLORIDE 20 MEQ: 1500 TABLET, EXTENDED RELEASE ORAL at 10:11

## 2020-01-01 RX ADMIN — APIXABAN 2.5 MG: 2.5 TABLET, FILM COATED ORAL at 08:12

## 2020-01-01 RX ADMIN — POTASSIUM PHOSPHATE, MONOBASIC AND POTASSIUM PHOSPHATE, DIBASIC 15 MMOL: 224; 236 INJECTION, SOLUTION, CONCENTRATE INTRAVENOUS at 10:10

## 2020-01-01 RX ADMIN — ACETAMINOPHEN 650 MG: 325 TABLET ORAL at 03:10

## 2020-01-01 RX ADMIN — INSULIN ASPART 2 UNITS: 100 INJECTION, SOLUTION INTRAVENOUS; SUBCUTANEOUS at 09:12

## 2020-01-01 RX ADMIN — REGADENOSON 0.4 MG: 0.08 INJECTION, SOLUTION INTRAVENOUS at 11:11

## 2020-01-01 RX ADMIN — POTASSIUM CHLORIDE 10 MEQ: 7.46 INJECTION, SOLUTION INTRAVENOUS at 11:10

## 2020-01-01 RX ADMIN — CARVEDILOL 12.5 MG: 12.5 TABLET, FILM COATED ORAL at 09:10

## 2020-01-01 RX ADMIN — ACETAMINOPHEN 650 MG: 325 TABLET ORAL at 11:12

## 2020-01-01 RX ADMIN — INSULIN ASPART 2 UNITS: 100 INJECTION, SOLUTION INTRAVENOUS; SUBCUTANEOUS at 11:11

## 2020-01-01 RX ADMIN — HYDRALAZINE HYDROCHLORIDE 50 MG: 50 TABLET, FILM COATED ORAL at 06:11

## 2020-01-01 RX ADMIN — MORPHINE SULFATE 4 MG: 4 INJECTION INTRAVENOUS at 09:12

## 2020-01-01 RX ADMIN — LISINOPRIL 40 MG: 20 TABLET ORAL at 08:11

## 2020-01-01 RX ADMIN — FENOFIBRATE 145 MG: 145 TABLET ORAL at 08:10

## 2020-01-01 RX ADMIN — PIPERACILLIN AND TAZOBACTAM 4.5 G: 4; .5 INJECTION, POWDER, LYOPHILIZED, FOR SOLUTION INTRAVENOUS; PARENTERAL at 02:10

## 2020-01-01 RX ADMIN — HYDRALAZINE HYDROCHLORIDE 25 MG: 25 TABLET, FILM COATED ORAL at 03:12

## 2020-01-01 RX ADMIN — HYDRALAZINE HYDROCHLORIDE 50 MG: 50 TABLET, FILM COATED ORAL at 04:11

## 2020-01-01 RX ADMIN — HYDRALAZINE HYDROCHLORIDE 50 MG: 50 TABLET, FILM COATED ORAL at 05:11

## 2020-01-01 RX ADMIN — PIPERACILLIN AND TAZOBACTAM 4.5 G: 4; .5 INJECTION, POWDER, LYOPHILIZED, FOR SOLUTION INTRAVENOUS; PARENTERAL at 01:10

## 2020-01-01 RX ADMIN — POTASSIUM & SODIUM PHOSPHATES POWDER PACK 280-160-250 MG 1 PACKET: 280-160-250 PACK at 05:10

## 2020-01-01 RX ADMIN — INSULIN DETEMIR 10 UNITS: 100 INJECTION, SOLUTION SUBCUTANEOUS at 09:12

## 2020-01-01 RX ADMIN — SPIRONOLACTONE 25 MG: 25 TABLET ORAL at 10:11

## 2020-01-01 RX ADMIN — PIPERACILLIN AND TAZOBACTAM 4.5 G: 4; .5 INJECTION, POWDER, LYOPHILIZED, FOR SOLUTION INTRAVENOUS; PARENTERAL at 06:10

## 2020-01-01 RX ADMIN — INSULIN ASPART 6 UNITS: 100 INJECTION, SOLUTION INTRAVENOUS; SUBCUTANEOUS at 06:12

## 2020-01-01 RX ADMIN — INSULIN DETEMIR 10 UNITS: 100 INJECTION, SOLUTION SUBCUTANEOUS at 07:12

## 2020-01-01 RX ADMIN — OXYCODONE HYDROCHLORIDE AND ACETAMINOPHEN 500 MG: 500 TABLET ORAL at 10:12

## 2020-01-01 RX ADMIN — POTASSIUM CHLORIDE 10 MEQ: 7.46 INJECTION, SOLUTION INTRAVENOUS at 10:10

## 2020-01-01 RX ADMIN — HYDRALAZINE HYDROCHLORIDE 50 MG: 50 TABLET, FILM COATED ORAL at 03:11

## 2020-01-01 RX ADMIN — LORAZEPAM 2 MG: 2 INJECTION INTRAMUSCULAR; INTRAVENOUS at 09:12

## 2020-01-01 RX ADMIN — LISINOPRIL 2.5 MG: 2.5 TABLET ORAL at 08:12

## 2020-01-01 RX ADMIN — MUPIROCIN: 20 OINTMENT TOPICAL at 10:10

## 2020-01-01 RX ADMIN — INSULIN DETEMIR 5 UNITS: 100 INJECTION, SOLUTION SUBCUTANEOUS at 09:10

## 2020-01-01 RX ADMIN — LACTULOSE 15 G: 20 SOLUTION ORAL at 02:10

## 2020-01-01 RX ADMIN — CARVEDILOL 25 MG: 12.5 TABLET, FILM COATED ORAL at 08:10

## 2020-01-01 RX ADMIN — ISOSORBIDE MONONITRATE 30 MG: 30 TABLET, EXTENDED RELEASE ORAL at 10:11

## 2020-01-01 RX ADMIN — LISINOPRIL 40 MG: 20 TABLET ORAL at 10:11

## 2020-01-01 RX ADMIN — POTASSIUM CHLORIDE 40 MEQ: 1500 TABLET, EXTENDED RELEASE ORAL at 01:10

## 2020-01-01 RX ADMIN — INSULIN ASPART 2 UNITS: 100 INJECTION, SOLUTION INTRAVENOUS; SUBCUTANEOUS at 10:10

## 2020-01-01 RX ADMIN — POTASSIUM CHLORIDE 40 MEQ: 1500 TABLET, EXTENDED RELEASE ORAL at 06:10

## 2020-01-01 RX ADMIN — HYDRALAZINE HYDROCHLORIDE 50 MG: 50 TABLET, FILM COATED ORAL at 02:10

## 2020-01-01 RX ADMIN — INSULIN ASPART 5 UNITS: 100 INJECTION, SOLUTION INTRAVENOUS; SUBCUTANEOUS at 12:10

## 2020-01-01 RX ADMIN — PANTOPRAZOLE SODIUM 40 MG: 40 TABLET, DELAYED RELEASE ORAL at 08:11

## 2020-01-01 RX ADMIN — APIXABAN 5 MG: 2.5 TABLET, FILM COATED ORAL at 08:12

## 2020-01-01 RX ADMIN — LORAZEPAM 1 MG: 2 INJECTION INTRAMUSCULAR; INTRAVENOUS at 12:12

## 2020-01-01 RX ADMIN — REMDESIVIR 100 MG: 100 INJECTION, POWDER, LYOPHILIZED, FOR SOLUTION INTRAVENOUS at 05:12

## 2020-01-01 RX ADMIN — ACETAZOLAMIDE 250 MG: 250 TABLET ORAL at 11:10

## 2020-01-01 RX ADMIN — FUROSEMIDE 80 MG: 10 INJECTION, SOLUTION INTRAMUSCULAR; INTRAVENOUS at 03:10

## 2020-01-01 RX ADMIN — INSULIN DETEMIR 10 UNITS: 100 INJECTION, SOLUTION SUBCUTANEOUS at 08:10

## 2020-01-01 RX ADMIN — SODIUM CHLORIDE: 0.45 INJECTION, SOLUTION INTRAVENOUS at 03:10

## 2020-01-01 RX ADMIN — FUROSEMIDE 40 MG: 10 INJECTION, SOLUTION INTRAMUSCULAR; INTRAVENOUS at 08:10

## 2020-01-01 RX ADMIN — ENOXAPARIN SODIUM 40 MG: 40 INJECTION SUBCUTANEOUS at 05:11

## 2020-01-01 RX ADMIN — POTASSIUM CHLORIDE 40 MEQ: 1500 TABLET, EXTENDED RELEASE ORAL at 10:10

## 2020-01-01 RX ADMIN — AMPICILLIN SODIUM AND SULBACTAM SODIUM 3 G: 2; 1 INJECTION, POWDER, FOR SOLUTION INTRAMUSCULAR; INTRAVENOUS at 05:12

## 2020-01-01 RX ADMIN — HEPARIN SODIUM AND DEXTROSE 12 UNITS/KG/HR: 10000; 5 INJECTION INTRAVENOUS at 02:12

## 2020-01-01 RX ADMIN — PIPERACILLIN AND TAZOBACTAM 4.5 G: 4; .5 INJECTION, POWDER, FOR SOLUTION INTRAVENOUS at 02:10

## 2020-01-01 RX ADMIN — INSULIN ASPART 2 UNITS: 100 INJECTION, SOLUTION INTRAVENOUS; SUBCUTANEOUS at 04:11

## 2020-01-01 RX ADMIN — ROSUVASTATIN 10 MG: 10 TABLET, FILM COATED ORAL at 09:12

## 2020-01-01 RX ADMIN — ASPIRIN 81 MG: 81 TABLET, COATED ORAL at 10:11

## 2020-01-01 RX ADMIN — FUROSEMIDE 40 MG: 10 INJECTION, SOLUTION INTRAMUSCULAR; INTRAVENOUS at 09:10

## 2020-01-01 RX ADMIN — ROSUVASTATIN 10 MG: 10 TABLET, FILM COATED ORAL at 08:11

## 2020-01-01 RX ADMIN — SPIRONOLACTONE 25 MG: 25 TABLET ORAL at 11:10

## 2020-01-01 RX ADMIN — ASPIRIN 81 MG CHEWABLE TABLET 81 MG: 81 TABLET CHEWABLE at 02:12

## 2020-01-01 RX ADMIN — IPRATROPIUM BROMIDE AND ALBUTEROL SULFATE 3 ML: .5; 3 SOLUTION RESPIRATORY (INHALATION) at 12:12

## 2020-01-01 RX ADMIN — HEPARIN SODIUM AND DEXTROSE 9 UNITS/KG/HR: 10000; 5 INJECTION INTRAVENOUS at 04:12

## 2020-01-01 RX ADMIN — ASPIRIN 81 MG: 81 TABLET, COATED ORAL at 04:10

## 2020-01-01 RX ADMIN — INSULIN ASPART 8 UNITS: 100 INJECTION, SOLUTION INTRAVENOUS; SUBCUTANEOUS at 06:12

## 2020-01-01 RX ADMIN — CEFTRIAXONE 1 G: 1 INJECTION, SOLUTION INTRAVENOUS at 03:11

## 2020-01-01 RX ADMIN — INSULIN ASPART 4 UNITS: 100 INJECTION, SOLUTION INTRAVENOUS; SUBCUTANEOUS at 01:12

## 2020-01-01 RX ADMIN — CYANOCOBALAMIN 1000 MCG: 1000 INJECTION, SOLUTION INTRAMUSCULAR; SUBCUTANEOUS at 10:11

## 2020-01-01 RX ADMIN — MUPIROCIN: 20 OINTMENT TOPICAL at 09:10

## 2020-01-01 RX ADMIN — INSULIN ASPART 4 UNITS: 100 INJECTION, SOLUTION INTRAVENOUS; SUBCUTANEOUS at 06:12

## 2020-01-01 RX ADMIN — POTASSIUM CHLORIDE 40 MEQ: 1500 TABLET, EXTENDED RELEASE ORAL at 01:11

## 2020-01-01 RX ADMIN — CYANOCOBALAMIN 1000 MCG: 1000 INJECTION, SOLUTION INTRAMUSCULAR; SUBCUTANEOUS at 07:12

## 2020-01-01 RX ADMIN — HYDRALAZINE HYDROCHLORIDE 25 MG: 25 TABLET, FILM COATED ORAL at 10:10

## 2020-01-01 RX ADMIN — FENOFIBRATE 160 MG: 160 TABLET ORAL at 09:10

## 2020-01-01 RX ADMIN — INSULIN ASPART 6 UNITS: 100 INJECTION, SOLUTION INTRAVENOUS; SUBCUTANEOUS at 05:12

## 2020-01-01 RX ADMIN — POTASSIUM CHLORIDE 40 MEQ: 1.5 FOR SOLUTION ORAL at 10:12

## 2020-01-01 RX ADMIN — HYDRALAZINE HYDROCHLORIDE 25 MG: 25 TABLET, FILM COATED ORAL at 10:12

## 2020-01-01 RX ADMIN — POTASSIUM CHLORIDE 20 MEQ: 1500 TABLET, EXTENDED RELEASE ORAL at 07:10

## 2020-01-01 RX ADMIN — FENOFIBRATE 160 MG: 160 TABLET ORAL at 08:11

## 2020-01-01 RX ADMIN — INSULIN ASPART 8 UNITS: 100 INJECTION, SOLUTION INTRAVENOUS; SUBCUTANEOUS at 05:10

## 2020-01-01 RX ADMIN — ISOSORBIDE MONONITRATE 30 MG: 30 TABLET, EXTENDED RELEASE ORAL at 09:10

## 2020-01-01 RX ADMIN — IPRATROPIUM BROMIDE AND ALBUTEROL 2 PUFF: 20; 100 SPRAY, METERED RESPIRATORY (INHALATION) at 02:12

## 2020-01-01 RX ADMIN — CARVEDILOL 12.5 MG: 12.5 TABLET, FILM COATED ORAL at 06:10

## 2020-01-01 RX ADMIN — IPRATROPIUM BROMIDE AND ALBUTEROL 2 PUFF: 20; 100 SPRAY, METERED RESPIRATORY (INHALATION) at 09:12

## 2020-01-01 RX ADMIN — ISOSORBIDE DINITRATE 20 MG: 20 TABLET ORAL at 04:10

## 2020-01-01 RX ADMIN — FUROSEMIDE 20 MG: 10 INJECTION, SOLUTION INTRAMUSCULAR; INTRAVENOUS at 05:12

## 2020-01-01 RX ADMIN — HEPARIN SODIUM AND DEXTROSE 14 UNITS/KG/HR: 10000; 5 INJECTION INTRAVENOUS at 09:12

## 2020-01-01 RX ADMIN — IPRATROPIUM BROMIDE AND ALBUTEROL 2 PUFF: 20; 100 SPRAY, METERED RESPIRATORY (INHALATION) at 01:12

## 2020-01-01 RX ADMIN — PANTOPRAZOLE SODIUM 40 MG: 40 TABLET, DELAYED RELEASE ORAL at 04:10

## 2020-01-01 RX ADMIN — LACTULOSE 15 G: 20 SOLUTION ORAL at 08:10

## 2020-01-01 RX ADMIN — POTASSIUM & SODIUM PHOSPHATES POWDER PACK 280-160-250 MG 1 PACKET: 280-160-250 PACK at 09:10

## 2020-01-01 RX ADMIN — CELECOXIB 100 MG: 100 CAPSULE ORAL at 07:12

## 2020-01-01 RX ADMIN — POTASSIUM CHLORIDE 20 MEQ: 1500 TABLET, EXTENDED RELEASE ORAL at 10:10

## 2020-01-01 RX ADMIN — POTASSIUM CHLORIDE 40 MEQ: 1500 TABLET, EXTENDED RELEASE ORAL at 05:10

## 2020-01-01 RX ADMIN — AZITHROMYCIN MONOHYDRATE 500 MG: 250 TABLET ORAL at 05:11

## 2020-01-01 RX ADMIN — CEFTRIAXONE 2 G: 2 INJECTION, SOLUTION INTRAVENOUS at 01:10

## 2020-01-01 RX ADMIN — DEXAMETHASONE 6 MG: 4 TABLET ORAL at 09:12

## 2020-01-01 RX ADMIN — INSULIN ASPART 4 UNITS: 100 INJECTION, SOLUTION INTRAVENOUS; SUBCUTANEOUS at 06:10

## 2020-01-01 RX ADMIN — HYDRALAZINE HYDROCHLORIDE 50 MG: 50 TABLET, FILM COATED ORAL at 09:10

## 2020-01-01 RX ADMIN — INSULIN ASPART 2 UNITS: 100 INJECTION, SOLUTION INTRAVENOUS; SUBCUTANEOUS at 06:10

## 2020-01-01 RX ADMIN — FENOFIBRATE 160 MG: 160 TABLET ORAL at 10:11

## 2020-01-01 RX ADMIN — POTASSIUM CHLORIDE 40 MEQ: 1500 TABLET, EXTENDED RELEASE ORAL at 08:12

## 2020-01-01 RX ADMIN — PANTOPRAZOLE SODIUM 40 MG: 40 TABLET, DELAYED RELEASE ORAL at 09:10

## 2020-01-01 RX ADMIN — FAMOTIDINE 20 MG: 20 TABLET ORAL at 10:11

## 2020-01-01 RX ADMIN — POTASSIUM & SODIUM PHOSPHATES POWDER PACK 280-160-250 MG 1 PACKET: 280-160-250 PACK at 05:11

## 2020-01-01 RX ADMIN — LISINOPRIL 10 MG: 10 TABLET ORAL at 11:10

## 2020-01-01 RX ADMIN — AMPICILLIN SODIUM AND SULBACTAM SODIUM 3 G: 2; 1 INJECTION, POWDER, FOR SOLUTION INTRAMUSCULAR; INTRAVENOUS at 12:12

## 2020-01-01 RX ADMIN — LACTULOSE 15 G: 20 SOLUTION ORAL at 03:10

## 2020-01-01 RX ADMIN — INSULIN ASPART 8 UNITS: 100 INJECTION, SOLUTION INTRAVENOUS; SUBCUTANEOUS at 01:10

## 2020-01-01 RX ADMIN — SPIRONOLACTONE 25 MG: 25 TABLET ORAL at 09:10

## 2020-01-01 RX ADMIN — LORAZEPAM 2 MG: 2 INJECTION INTRAMUSCULAR; INTRAVENOUS at 10:12

## 2020-01-01 RX ADMIN — MAGNESIUM CITRATE 296 ML: 1.75 LIQUID ORAL at 11:12

## 2020-01-01 RX ADMIN — MUPIROCIN: 20 OINTMENT TOPICAL at 08:10

## 2020-01-01 RX ADMIN — INSULIN ASPART 4 UNITS: 100 INJECTION, SOLUTION INTRAVENOUS; SUBCUTANEOUS at 10:10

## 2020-01-01 RX ADMIN — ASPIRIN 81 MG: 81 TABLET, CHEWABLE ORAL at 09:12

## 2020-10-18 PROBLEM — R74.8 ABNORMAL LIVER ENZYMES: Status: ACTIVE | Noted: 2020-01-01

## 2020-10-18 PROBLEM — I10 ESSENTIAL HYPERTENSION: Status: ACTIVE | Noted: 2020-01-01

## 2020-10-18 PROBLEM — I50.41 ACUTE COMBINED SYSTOLIC AND DIASTOLIC HEART FAILURE: Status: ACTIVE | Noted: 2020-01-01

## 2020-10-18 PROBLEM — N17.9 AKI (ACUTE KIDNEY INJURY): Status: ACTIVE | Noted: 2020-01-01

## 2020-10-18 PROBLEM — E78.5 HYPERLIPEMIA: Status: ACTIVE | Noted: 2020-01-01

## 2020-10-18 PROBLEM — E87.6 HYPOKALEMIA: Status: ACTIVE | Noted: 2020-01-01

## 2020-10-18 PROBLEM — R06.02 SHORTNESS OF BREATH: Status: ACTIVE | Noted: 2020-01-01

## 2020-10-18 PROBLEM — M79.89 LEG SWELLING: Status: ACTIVE | Noted: 2020-01-01

## 2020-10-18 PROBLEM — D72.829 LEUKOCYTOSIS: Status: ACTIVE | Noted: 2020-01-01

## 2020-10-18 NOTE — ED PROVIDER NOTES
"SCRIBE #1 NOTE: I, Galina Roberts, am scribing for, and in the presence of, Petorna Erickson DO. I have scribed the entire note.       History     Chief Complaint   Patient presents with    Leg Swelling     pt c/o BLE swelling, x3 weeks     Review of patient's allergies indicates:  No Known Allergies      History of Present Illness     HPI    10/18/2020, 2:41 PM  History obtained from the patient and son      History of Present Illness: Karan Aburto is a 74 y.o. male patient with no pertinent PMHx who presents to the Emergency Department for evaluation of BLE swelling which onset gradually 3 weeks ago. Pt's son expresses that pt's sxs may be attributed to recent medication change after recently changing insurance companies. Pt is followed by Dr. Ivan (cardiology). Symptoms are constant and moderate in severity. No mitigating or exacerbating factors reported. Associated sxs include mild SOB. Pt states that his sleep is "interrupted" due to having to sleep with his feet elevated on pillows. Pt denies feeling SOB in the supine position or waking up short of breath in the middle of the night. Patient denies any CP, chest pressure, palpitations, cough, congestion, n/v, fever, chills, and all other sxs at this time. Pt was started on Cardizem CD and Norvasc 3 weeks ago. He took both medications yesterday, but discontinued use today. No hx of A-fib, CHF, DVT, PE, liver problems, or use of blood thinners. No further complaints or concerns at this time.       Arrival mode: Personal vehicle    PCP: Mickey Agrawal MD        Past Medical History:  History reviewed. No pertinent medical history.       Past Surgical History:  History reviewed. No pertinent surgical history.       Family History:  History reviewed. No pertinent family history.       Social History:  History reviewed. No pertinent social history.        Review of Systems     Review of Systems   Constitutional: Negative for chills and fever.   HENT: Negative " for congestion, rhinorrhea and sore throat.    Respiratory: Positive for shortness of breath. Negative for cough.    Cardiovascular: Positive for leg swelling. Negative for chest pain and palpitations.   Gastrointestinal: Negative for abdominal pain, diarrhea, nausea and vomiting.   Genitourinary: Negative for dysuria.   Musculoskeletal: Negative for back pain.   Skin: Negative for rash.   Neurological: Negative for dizziness, weakness, light-headedness, numbness and headaches.   Hematological: Does not bruise/bleed easily.   All other systems reviewed and are negative.     Physical Exam     Initial Vitals [10/18/20 1427]   BP Pulse Resp Temp SpO2   (!) 140/93 (!) 112 20 97.8 °F (36.6 °C) (!) 94 %      MAP       --          Physical Exam  Nursing Notes and Vital Signs Reviewed.  Constitutional: Patient is in no acute distress.  Head: Atraumatic. Normocephalic.  Eyes: PERRL. EOM intact. Conjunctivae are not pale. No scleral icterus.  ENT: Mucous membranes are moist. Oropharynx is clear and symmetric.    Neck: Supple. Full ROM. No lymphadenopathy.  Cardiovascular: Pt is tachycardic. Irregular rhythm. No murmurs, rubs, or gallops. Distal pulses are 2+ and symmetric.  Pulmonary/Chest: No respiratory distress. Clear to auscultation bilaterally. No wheezing or rales.  Abdominal: Soft and non-distended.  There is no tenderness.  No rebound, guarding, or rigidity. Good bowel sounds.  Genitourinary: No CVA tenderness  Musculoskeletal: Moves all extremities. No obvious deformities. +2-3 pedal edema. No calf tenderness.  Skin: Warm and dry.  Neurological:  Alert, awake, and appropriate.  Normal speech.  No acute focal neurological deficits are appreciated.  Psychiatric: Normal affect. Good eye contact. Appropriate in content.      ED Course   Critical Care    Date/Time: 10/18/2020 6:34 PM  Performed by: Petrona Erickson DO  Authorized by: Petrona Erickson DO   Direct patient critical care time: 13 minutes  Additional  "history critical care time: 8 minutes  Ordering / reviewing critical care time: 10 minutes  Documentation critical care time: 8 minutes  Consulting other physicians critical care time: 11 minutes  Total critical care time (exclusive of procedural time) : 50 minutes  Critical care time was exclusive of separately billable procedures and treating other patients and teaching time.  Critical care was necessary to treat or prevent imminent or life-threatening deterioration of the following conditions: Tachycardia, electrolyte abnormality, hyperglycemia.  Critical care was time spent personally by me on the following activities: blood draw for specimens, development of treatment plan with patient or surrogate, discussions with consultants, interpretation of cardiac output measurements, evaluation of patient's response to treatment, examination of patient, obtaining history from patient or surrogate, ordering and performing treatments and interventions, ordering and review of laboratory studies, ordering and review of radiographic studies, pulse oximetry, re-evaluation of patient's condition and review of old charts.        ED Vital Signs:  Vitals:    10/18/20 1427 10/18/20 1500 10/18/20 1513 10/18/20 1535   BP: (!) 140/93 (!) 132/94  (!) 214/122   Pulse: (!) 112 110 (!) 120 (!) 119   Resp: 20 18 18   Temp: 97.8 °F (36.6 °C)      TempSrc: Oral      SpO2: (!) 94% (!) 93%  98%   Height: 5' 10" (1.778 m)       10/18/20 1615 10/18/20 1700 10/18/20 1730 10/18/20 1800   BP: (!) 184/102 (!) 179/104 (!) 150/96 (!) 145/96   Pulse: 82 88 88 91   Resp: 17 17 16 19   Temp:       TempSrc:       SpO2: (!) 94% (!) 94% (!) 93% (!) 93%   Height:        10/18/20 1854   BP:    Pulse: 89   Resp: 16   Temp:    TempSrc:    SpO2:    Height:        Abnormal Lab Results:  Labs Reviewed   CBC W/ AUTO DIFFERENTIAL - Abnormal; Notable for the following components:       Result Value    WBC 18.99 (*)     Platelets 105 (*)     Immature Granulocytes " 2.1 (*)     Gran # (ANC) 16.9 (*)     Immature Grans (Abs) 0.39 (*)     Gran% 88.6 (*)     Lymph% 5.3 (*)     Mono% 3.7 (*)     All other components within normal limits   COMPREHENSIVE METABOLIC PANEL - Abnormal; Notable for the following components:    Potassium 2.4 (*)     Chloride 91 (*)     CO2 34 (*)     Glucose 480 (*)     BUN, Bld 28 (*)     Total Protein 5.8 (*)     Albumin 3.0 (*)      (*)      (*)     eGFR if  57 (*)     eGFR if non  49 (*)     All other components within normal limits    Narrative:     K & Glucose critical result(s) called and verbal readback obtained   from Nikkie Morgan RN by TMW2 10/18/2020 15:57   TROPONIN I - Abnormal; Notable for the following components:    Troponin I 0.121 (*)     All other components within normal limits   B-TYPE NATRIURETIC PEPTIDE - Abnormal; Notable for the following components:     (*)     All other components within normal limits   URINALYSIS, REFLEX TO URINE CULTURE - Abnormal; Notable for the following components:    Glucose, UA 3+ (*)     Occult Blood UA 2+ (*)     All other components within normal limits    Narrative:     Specimen Source->Urine   LACTIC ACID, PLASMA - Abnormal; Notable for the following components:    Lactate (Lactic Acid) 3.2 (*)     All other components within normal limits   CULTURE, BLOOD   CULTURE, BLOOD   HEPATITIS C ANTIBODY   PROTIME-INR   MAGNESIUM   SARS-COV-2 RNA AMPLIFICATION, QUAL   PROCALCITONIN   PROCALCITONIN   URINALYSIS MICROSCOPIC    Narrative:     Specimen Source->Urine   LACTIC ACID, PLASMA        All Lab Results:  Results for orders placed or performed during the hospital encounter of 10/18/20   Hepatitis C antibody   Result Value Ref Range    Hepatitis C Ab Negative Negative   CBC auto differential   Result Value Ref Range    WBC 18.99 (H) 3.90 - 12.70 K/uL    RBC 4.84 4.60 - 6.20 M/uL    Hemoglobin 14.1 14.0 - 18.0 g/dL    Hematocrit 42.7 40.0 - 54.0 %    Mean  Corpuscular Volume 88 82 - 98 fL    Mean Corpuscular Hemoglobin 29.1 27.0 - 31.0 pg    Mean Corpuscular Hemoglobin Conc 33.0 32.0 - 36.0 g/dL    RDW 13.4 11.5 - 14.5 %    Platelets 105 (L) 150 - 350 K/uL    MPV 10.3 9.2 - 12.9 fL    Immature Granulocytes 2.1 (H) 0.0 - 0.5 %    Gran # (ANC) 16.9 (H) 1.8 - 7.7 K/uL    Immature Grans (Abs) 0.39 (H) 0.00 - 0.04 K/uL    Lymph # 1.0 1.0 - 4.8 K/uL    Mono # 0.7 0.3 - 1.0 K/uL    Eos # 0.0 0.0 - 0.5 K/uL    Baso # 0.05 0.00 - 0.20 K/uL    nRBC 0 0 /100 WBC    Gran% 88.6 (H) 38.0 - 73.0 %    Lymph% 5.3 (L) 18.0 - 48.0 %    Mono% 3.7 (L) 4.0 - 15.0 %    Eosinophil% 0.0 0.0 - 8.0 %    Basophil% 0.3 0.0 - 1.9 %    Differential Method Automated    Comprehensive metabolic panel   Result Value Ref Range    Sodium 140 136 - 145 mmol/L    Potassium 2.4 (LL) 3.5 - 5.1 mmol/L    Chloride 91 (L) 95 - 110 mmol/L    CO2 34 (H) 23 - 29 mmol/L    Glucose 480 (HH) 70 - 110 mg/dL    BUN, Bld 28 (H) 8 - 23 mg/dL    Creatinine 1.4 0.5 - 1.4 mg/dL    Calcium 9.0 8.7 - 10.5 mg/dL    Total Protein 5.8 (L) 6.0 - 8.4 g/dL    Albumin 3.0 (L) 3.5 - 5.2 g/dL    Total Bilirubin 1.0 0.1 - 1.0 mg/dL    Alkaline Phosphatase 101 55 - 135 U/L     (H) 10 - 40 U/L     (H) 10 - 44 U/L    Anion Gap 15 8 - 16 mmol/L    eGFR if African American 57 (A) >60 mL/min/1.73 m^2    eGFR if non African American 49 (A) >60 mL/min/1.73 m^2   Troponin I   Result Value Ref Range    Troponin I 0.121 (H) 0.000 - 0.026 ng/mL   Brain natriuretic peptide   Result Value Ref Range     (H) 0 - 99 pg/mL   Protime-INR   Result Value Ref Range    Prothrombin Time 10.4 9.0 - 12.5 sec    INR 1.0 0.8 - 1.2   Magnesium   Result Value Ref Range    Magnesium 2.1 1.6 - 2.6 mg/dL   COVID-19 Rapid Screening   Result Value Ref Range    SARS-CoV-2 RNA, Amplification, Qual Negative Negative   Urinalysis, Reflex to Urine Culture Urine, Clean Catch    Specimen: Urine   Result Value Ref Range    Specimen UA Urine, Clean Catch      Color, UA Yellow Yellow, Straw, Rachel    Appearance, UA Clear Clear    pH, UA 8.0 5.0 - 8.0    Specific Gravity, UA 1.020 1.005 - 1.030    Protein, UA Negative Negative    Glucose, UA 3+ (A) Negative    Ketones, UA Negative Negative    Bilirubin (UA) Negative Negative    Occult Blood UA 2+ (A) Negative    Nitrite, UA Negative Negative    Urobilinogen, UA Negative <2.0 EU/dL    Leukocytes, UA Negative Negative   Lactic acid, plasma   Result Value Ref Range    Lactate (Lactic Acid) 3.2 (H) 0.5 - 2.2 mmol/L   Procalcitonin   Result Value Ref Range    Procalcitonin 0.07 <0.25 ng/mL   Urinalysis Microscopic   Result Value Ref Range    RBC, UA 2 0 - 4 /hpf    Bacteria None None-Occ /hpf    Yeast, UA None None    Microscopic Comment SEE COMMENT          Imaging Results:  Imaging Results          US Lower Extremity Veins Bilateral (Final result)  Result time 10/18/20 16:39:01    Final result by Shayan Reza MD (10/18/20 16:39:01)                 Impression:      Negative for DVT.      Electronically signed by: Shayan Reza MD  Date:    10/18/2020  Time:    16:39             Narrative:    EXAMINATION:  US LOWER EXTREMITY VEINS BILATERAL    CLINICAL HISTORY:  Leg pain and swelling.  Other specified soft tissue disorders    COMPARISON:  None    FINDINGS:  Duplex and color flow imaging with spectral wave analysis performed.    The veins demonstrate normal compressibility and phasicity. No evidence of filling defect.                               X-Ray Chest AP Portable (Final result)  Result time 10/18/20 15:30:48    Final result by Chan Vanessa MD (10/18/20 15:30:48)                 Impression:      No acute cardiopulmonary process.      Electronically signed by: Chan Vanessa MD  Date:    10/18/2020  Time:    15:30             Narrative:    EXAMINATION:  XR CHEST AP PORTABLE    CLINICAL HISTORY:  CHF;    COMPARISON:  None.    FINDINGS:  The lungs are clear. The cardiac silhouette is within normal limits. No  pleural effusion or pneumothorax or pulmonary edema.                                 The EKG was ordered, reviewed, and independently interpreted by the ED provider.  Interpretation time: 1506  Rate: 107 BPM  Rhythm: sinus tachycardia  Interpretation: Left axis deviation. Right bundle branch block. Left ventricular hypertrophy with QRS widening. Possible lateral infarct, age undetermined. Abnormal ECG. No STEMI.       The Emergency Provider reviewed the vital signs and test results, which are outlined above.     ED Discussion     4:41 PM: Re-evaluated pt. Pt is resting comfortably and is in no acute distress.  Updated pt on my current plan of care.  D/w pt all pertinent results. D/w pt any concerns expressed at this time. Answered all questions. Pt expresses understanding at this time.    6:06 PM: Re-evaluated pt. I have discussed test results, shared treatment plan, and the need for admission with patient and family at bedside. Pt and family express understanding at this time and agree with all information. All questions answered. Pt and family have no further questions or concerns at this time. Pt is ready for admit.    6:10 PM: Discussed case with Lety Silva NP (Hospital Medicine) who recommends holding fluids for now. Dr. Bhakta (Internal medicine) agrees with current care and management of pt and accepts admission.   Admitting Service: Hospital Medicine  Admitting Physician: Dr. Bhakta  Admit to: Obs/Tele        ED Course as of Oct 18 1900   Sun Oct 18, 2020   1625 Magnesium is normal.  Patient is able to urinate.  Will give oral potassium as well as potassium rider.  Will also insert 2nd IV.  Patient currently given Lopressor 5 mg x3.   Potassium(!!): 2.4 [LB]   1649 Condom catheter placed on patient.  Patient is attempting to get out of bed multiple times.  He is a fall risk.  As patient has critically low potassium, recommend Anaya catheter placement to decrease risk of falling as well as to monitor  urine.    [LB]   1810 Spoke with Hospital Medicine, Lety Silva, ZULAY.  Hold fluids for now.     [LB]      ED Course User Index  [LB] Petrona Erickson,      Medical Decision Making:   Initial Assessment:   Patient with swelling of lower extremities after medication changes.  Patient started on diltiazem as well Norvasc.  Patient is on Lasix on an client via Anaya today.  No temperature, fever, cough, shortness of breath, difficulty breathing or chest pain.  Differential Diagnosis:   A DVT, right side a congestive heart failure, hypo albuminemia, vascular insufficiency  Independently Interpreted Test(s):   I have ordered and independently interpreted EKG Reading(s) - see prior notes  Clinical Tests:   Lab Tests: Ordered and Reviewed  Radiological Study: Ordered and Reviewed  Medical Tests: Ordered and Reviewed  ED Management:  Patient noted had a edema to lower extremities.  Patient was given 80 mg of IV Lasix to help with diuresis.  Patient was noted to be tachycardiac.  No obvious atrial fibrillation nor was there history of atrial fibrillation.  Patient was given Lopressor 5 mg IV in divided doses to help control heart rate.  Unfortunately, patient's potassium was noted to be low.  Stay some was replaced both IV and p.o..  Patient glucose was elevated as well.  As patient had low potassium, it was was not administered in the emergency department secondary to concerns of greater drop in potassium after insulin administration.  Patient had an elevated leukocytosis.  The etiology was unclear.  The chest x-ray did not show pneumonia nor the urine urinary tract infection.  Abdominal exam was benign.  Lactic was added, this noted to be elevated.  Blood cultures and broad-spectrum antibiotics were added to the patient's care.  The case was discussed with hospital medicine.  At this time, they did not recommend fluid administration.  The lactic acidosis could be secondary to patient's heart rate being tachycardic or  could be related to the hyperglycemia.  At the time, will continue to follow lactic acid and intervene should patient have change in clinical status.  Right now, he is nontoxic-appearing he is awake, alert, oriented.  Plan of care was discussed with patient and patient's son.  All questions answered.           ED Medication(s):  Medications   metoprolol injection 5 mg (5 mg Intravenous Given 10/18/20 1613)   potassium chloride SA CR tablet 20 mEq (has no administration in time range)   piperacillin-tazobactam 4.5 g in dextrose 5 % 100 mL IVPB (ready to mix system) (4.5 g Intravenous New Bag 10/18/20 1848)   furosemide injection 80 mg (80 mg Intravenous Given 10/18/20 1521)   potassium chloride SA CR tablet 40 mEq (40 mEq Oral Given 10/18/20 1628)   potassium chloride 10 mEq in 100 mL IVPB (0 mEq Intravenous Stopped 10/18/20 1816)       New Prescriptions    No medications on file               Scribe Attestation:   Scribe #1: I performed the above scribed service and the documentation accurately describes the services I performed. I attest to the accuracy of the note.     Attending:   Physician Attestation Statement for Scribe #1: I, Petrona Erickson DO, personally performed the services described in this documentation, as scribed by Galina Roberts, in my presence, and it is both accurate and complete.           Clinical Impression       ICD-10-CM ICD-9-CM   1. Hypokalemia  E87.6 276.8   2. Shortness of breath  R06.02 786.05   3. Leg swelling  M79.89 729.81   4. Elevated lactic acid level  R79.89 276.2   5. Elevated troponin  R77.8 790.6   6. Hyperglycemia  R73.9 790.29   7. Tachycardia  R00.0 785.0       Disposition:   Disposition: Placed in Observation  Condition: Fair         Petorna Erickson DO  10/18/20 1900

## 2020-10-19 PROBLEM — R79.89 ELEVATED TROPONIN: Status: ACTIVE | Noted: 2020-01-01

## 2020-10-19 PROBLEM — I50.31 ACUTE DIASTOLIC HEART FAILURE: Status: ACTIVE | Noted: 2020-01-01

## 2020-10-19 NOTE — SUBJECTIVE & OBJECTIVE
No past medical history on file.    No past surgical history on file.    Review of patient's allergies indicates:  No Known Allergies    No current facility-administered medications on file prior to encounter.      Current Outpatient Medications on File Prior to Encounter   Medication Sig    amLODIPine (NORVASC) 5 MG tablet TAKE 1 TABLET BY MOUTH DAILY    baclofen (LIORESAL) 20 MG tablet Take 20 mg by mouth.    benazepriL (LOTENSIN) 20 MG tablet TAKE 1 TABLET BY MOUTH TWICE DAILY    diltiaZEM (CARDIZEM CD) 180 MG 24 hr capsule Take 180 mg by mouth once daily.    doxycycline (VIBRAMYCIN) 50 MG capsule Take 50 mg by mouth.    fenofibrate 160 MG Tab Take 160 mg by mouth once daily.    furosemide (LASIX) 40 MG tablet Take 40 mg by mouth once daily.    hydroCHLOROthiazide (HYDRODIURIL) 25 MG tablet TAKE 1 TABLET BY MOUTH DAILY    olmesartan (BENICAR) 40 MG tablet Take 40 mg by mouth once daily.    potassium chloride SA (K-DUR,KLOR-CON) 20 MEQ tablet Take 20 mEq by mouth 2 (two) times daily.    rosuvastatin (CRESTOR) 10 MG tablet Take 10 mg by mouth.    tiZANidine 4 mg Cap Take by mouth.     Family History     None        Tobacco Use    Smoking status: Not on file   Substance and Sexual Activity    Alcohol use: Not on file    Drug use: Not on file    Sexual activity: Not on file     Review of Systems   Constitution: Positive for malaise/fatigue.   HENT: Negative for hearing loss and hoarse voice.    Eyes: Negative for blurred vision and visual disturbance.   Cardiovascular: Positive for leg swelling. Negative for chest pain, claudication, dyspnea on exertion, irregular heartbeat, near-syncope, orthopnea, palpitations, paroxysmal nocturnal dyspnea and syncope.   Respiratory: Negative for cough, hemoptysis, shortness of breath, sleep disturbances due to breathing, snoring and wheezing.    Endocrine: Negative for cold intolerance and heat intolerance.   Hematologic/Lymphatic: Does not bruise/bleed easily.    Skin: Negative for color change, dry skin and nail changes.   Musculoskeletal: Positive for arthritis and back pain. Negative for joint pain and myalgias.   Gastrointestinal: Negative for bloating, abdominal pain, constipation, nausea and vomiting.   Genitourinary: Negative for dysuria, flank pain, hematuria and hesitancy.   Neurological: Positive for weakness. Negative for headaches, light-headedness, loss of balance, numbness and paresthesias.   Psychiatric/Behavioral: Positive for memory loss. Negative for altered mental status.   Allergic/Immunologic: Negative for environmental allergies.     Objective:     Vital Signs (Most Recent):  Temp: 97.9 °F (36.6 °C) (10/19/20 0731)  Pulse: 100 (10/19/20 0731)  Resp: 19 (10/19/20 0731)  BP: (!) 157/104 (10/19/20 0731)  SpO2: 96 % (10/19/20 0731) Vital Signs (24h Range):  Temp:  [97.4 °F (36.3 °C)-98 °F (36.7 °C)] 97.9 °F (36.6 °C)  Pulse:  [] 100  Resp:  [16-20] 19  SpO2:  [90 %-99 %] 96 %  BP: (132-214)/() 157/104     Weight: 103.5 kg (228 lb 2.8 oz)  Body mass index is 32.74 kg/m².    SpO2: 96 %  O2 Device (Oxygen Therapy): room air      Intake/Output Summary (Last 24 hours) at 10/19/2020 1019  Last data filed at 10/19/2020 0600  Gross per 24 hour   Intake 2036 ml   Output 5010 ml   Net -2974 ml       Lines/Drains/Airways     Drain                 Urethral Catheter 10/18/20 1658 Straight-tip 16 Fr. less than 1 day          Peripheral Intravenous Line                 Peripheral IV - Single Lumen 10/18/20 1717 20 G Left Hand less than 1 day                Physical Exam   Constitutional: He is oriented to person, place, and time. He appears well-developed and well-nourished. No distress.   HENT:   Head: Normocephalic and atraumatic.   Eyes: Pupils are equal, round, and reactive to light.   Neck: Normal range of motion and full passive range of motion without pain. Neck supple. JVD present.   Cardiovascular: Regular rhythm, S1 normal, S2 normal and intact  distal pulses. Tachycardia present. PMI is not displaced. Exam reveals no distant heart sounds.   No murmur heard.  Pulses:       Radial pulses are 2+ on the right side and 2+ on the left side.        Dorsalis pedis pulses are 1+ on the right side and 1+ on the left side.   CHEST PAIN FREE   Pulmonary/Chest: Effort normal and breath sounds normal. No accessory muscle usage. No respiratory distress. He has no decreased breath sounds. He has no wheezes. He has no rales.   Abdominal: Soft. Bowel sounds are normal. He exhibits no distension. There is no abdominal tenderness.   Musculoskeletal: Normal range of motion.         General: Edema present.      Right ankle: He exhibits swelling.      Left ankle: He exhibits swelling.      Comments: BLE edema to bilateral thighs   Neurological: He is alert and oriented to person, place, and time.   Skin: Skin is warm and dry. He is not diaphoretic. No cyanosis. Nails show no clubbing.   Psychiatric: He has a normal mood and affect. His speech is normal and behavior is normal. Judgment and thought content normal. Cognition and memory are normal.   Nursing note and vitals reviewed.      Significant Labs:   BMP:   Recent Labs   Lab 10/18/20  1512 10/18/20  2114 10/19/20  0127 10/19/20  0738   * 343*  --  241*    144  --  142   K 2.4* 2.5* 2.6* 3.3*   CL 91* 90*  --  93*   CO2 34* 40*  --  39*   BUN 28* 24*  --  22   CREATININE 1.4 1.2  --  1.1   CALCIUM 9.0 8.2*  --  8.4*   MG 2.1  --   --   --    , CBC   Recent Labs   Lab 10/18/20  1512 10/19/20  0739   WBC 18.99* 20.09*   HGB 14.1 14.0   HCT 42.7 43.1   * 93*    and All pertinent lab results from the last 24 hours have been reviewed.    Significant Imaging: Echocardiogram:   Transthoracic echo (TTE) complete (Cupid Only):   Results for orders placed or performed during the hospital encounter of 10/18/20   Echo Color Flow Doppler? Yes; Bubble Contrast? No   Result Value Ref Range    Ascending aorta 2.97 cm     STJ 2.57 cm    AV mean gradient 7 mmHg    Ao peak lewis 1.79 m/s    Ao VTI 27.91 cm    IVS 1.39 (A) 0.6 - 1.1 cm    LA size 4.21 cm    Left Atrium Major Axis 4.62 cm    LVIDd 3.82 3.5 - 6.0 cm    LVIDs 2.29 2.1 - 4.0 cm    LVOT diameter 2.39 cm    LVOT peak VTI 21.64 cm    Posterior Wall 1.08 0.6 - 1.1 cm    MV Peak A Lewis 1.10 m/s    E wave decelartion time 190.97 msec    MV Peak E Lewis 0.79 m/s    RA Major Axis 4.32 cm    RA Width 2.24 cm    TAPSE 2.81 cm    TR Max Lewis 1.74 m/s    TDI LATERAL 0.07 m/s    TDI SEPTAL 0.06 m/s    LA WIDTH 4.11 cm    Ao root annulus 3.66 cm    MV stenosis pressure 1/2 time 55.38 ms    LV Diastolic Volume 62.69 mL    LV Systolic Volume 17.96 mL    LVOT peak lewis 1.28 m/s    LV LATERAL E/E' RATIO 11.29 m/s    LV SEPTAL E/E' RATIO 13.17 m/s    FS 40 %    LV mass 161.27 g    Left Ventricle Relative Wall Thickness 0.57 cm    AV valve area 3.48 cm2    AV Velocity Ratio 0.72     AV index (prosthetic) 0.78     MV valve area p 1/2 method 3.97 cm2    E/A ratio 0.72     Mean e' 0.07 m/s    LVOT area 4.5 cm2    LVOT stroke volume 97.03 cm3    AV peak gradient 13 mmHg    E/E' ratio 12.15 m/s    LV Systolic Volume Index 8.1 mL/m2    LV Diastolic Volume Index 28.40 mL/m2    LV Mass Index 73 g/m2    Triscuspid Valve Regurgitation Peak Gradient 12 mmHg

## 2020-10-19 NOTE — ASSESSMENT & PLAN NOTE
New Onset  Daily Weights  Strict I/Os  Fluid Restriction <1L daily   Supplemental Oxygen PRN   IV Lasix  ECHO  Cards Consult

## 2020-10-19 NOTE — HPI
Mr. Karan Aburto is a 74 y.o. male patient with Hx of HTN, HLD who presents to the Emergency Department for evaluation of BLE swelling which onset gradually 3 weeks ago. Pt's son expresses that pt's sxs may be attributed to recent medication change after recently changing insurance companies.He reports the leg swelling has been associated with progressive SOB. Denies chest pain, cough, palpitations, fever or chills.  Pt denies feeling SOB in the supine position or waking up short of breath in the middle of the night. No known hx of A-fib, CHF, DVT, PE, liver problems, or use of blood thinners. In ED with concerns for DVT US obtained which was negative, also given IV lasix with concerns for possible CHFE. It was noted his potassium was 2.4 and he was given IV and PO. Patient also has elevated LFTs. Due to multiple issues patient admitted for further management.

## 2020-10-19 NOTE — PLAN OF CARE
P.T. EVAL COMPLETE, PT CURRENTLY REQUIRES MODA FOR BED MOBILITY, MODA X 2 FOR TF.  P.T. RECOMMENDS SNF

## 2020-10-19 NOTE — PT/OT/SLP EVAL
Occupational Therapy   Evaluation    Name: Karan Aburto  MRN: [id number]  Admitting Diagnosis:  Acute diastolic heart failure      Recommendations:     Discharge Recommendations: nursing facility, skilled  Discharge Equipment Recommendations:  none  Barriers to discharge:       Assessment:     Karan Aburto is a 74 y.o. male with a medical diagnosis of Acute diastolic heart failure.  He presents with DEBILITY AND GENERALIZED WEAKNESS. Performance deficits affecting function: weakness, impaired functional mobilty, impaired endurance, gait instability, impaired balance, impaired self care skills.      Rehab Prognosis: Good; patient would benefit from acute skilled OT services to address these deficits and reach maximum level of function.       Plan:     Patient to be seen 3 x/week to address the above listed problems via self-care/home management, therapeutic activities, therapeutic exercises  · Plan of Care Expires: 10/26/20  · Plan of Care Reviewed with: patient    Subjective     Chief Complaint: DEBILITY AND GENERALIZED WEAKNESS  Patient/Family Comments/goals:     Occupational Profile:  Living Environment: LIVES WITH FAMILY IN 1 STORY HOUSE WITH NO STEPS TO ENTER  Previous level of function: (I) WITH ADL'S AND FUNCTIONAL MOBILITY  Roles and Routines: OCCUPATIONAL THERAPY  Equipment Used at Home:  bath bench, walker, rolling, bedside commodeAssistance upon Discharge:     Pain/Comfort:  Pain Rating 1: 0/10    Patients cultural, spiritual, Druze conflicts given the current situation:      Objective:     Communicated with: NURSE [place]OEN[place] AND [facility] CHART REVIEW prior to session.  Patient found HOB elevated with telemetry upon OT entry to room.    General Precautions: Standard, fall   Orthopedic Precautions:N/A   Braces: N/A     Occupational Performance:    Bed Mobility:    · Patient completed Rolling/Turning to Left with  minimum assistance  · Patient completed Rolling/Turning to Right with minimum assistance  · Patient  completed Scooting/Bridging with minimum assistance  · Patient completed Supine to Sit with minimum assistance  · Patient completed Sit to Supine with minimum assistance    Functional Mobility/Transfers:  · Patient completed Sit <> Stand Transfer with moderate assistance  with  rolling walker   · Functional Mobility: PT REQ MA X A WITH SIDE STEPPING L>R HOB    Activities of Daily Living:  · Upper Body Dressing: moderate assistance MAX VC'S FOR INSTRUCTION  · Lower Body Dressing: maximal assistance FADY / DOFF SOCK    Cognitive/Visual Perceptual:  Cognitive/Psychosocial Skills:     -       Oriented to: Person and Place   -       Follows Commands/attention:Follows one-step commands  -       Communication: clear/fluent  -       Memory: Impaired STM, Impaired LTM and Poor immediate recall  -       Safety awareness/insight to disability: impaired   Visual/Perceptual:      -Intact .    Physical Exam:  Upper Extremity Range of Motion:     -       Right Upper Extremity: WFL  -       Left Upper Extremity: WFL  Upper Extremity Strength:    -       Right Upper Extremity: MMT: 4/5 GROSSLY  -       Left Upper Extremity: MMT: 4/5 GROSSLY   Strength:    -       Right Upper Extremity: WFL  -       Left Upper Extremity: WFL    AMPAC 6 Click ADL:  AMPAC Total Score: 16    Treatment & Education:  PT REQ MOD A WITH SIT<>STAND AND MAX A WITH SIDE STEPPING. PT REQ MOD A WITH UE DRESSING AND MAX A WITH LE DRESSING . PT ALSO REQ MAX VC AND TACTILE CUES TO PERFORM TASK . CONSTANT INSTRUCTION AND DIRECTION DURING ACTIVITY. SEE ABOVE EVAL FOR DETAILS  Education:    Patient left HOB elevated with all lines intact, call button in reach, bed alarm on, NURSE [place] notified, SON present and LIANET MONITORING SYSTEM       GOALS:   Multidisciplinary Problems     Occupational Therapy Goals        Problem: Occupational Therapy Goal    Goal Priority Disciplines Outcome Interventions   Occupational Therapy Goal     OT, PT/OT     Description: OT GOALS  TO BE MET BY 10-26-20  PT WILL TOLERATE 1 SET X 15 REPS B UE ROM EXERCISE WITH MIN RESISTANCE  MIN A WITH LE DRESSING  SBA WITH UE DRESSING                     History:     Past Medical History:   Diagnosis Date    Elevated PSA     Hypertension     Rosacea     Thyroid disease     hypothyroidism       No past surgical history on file.    Time Tracking:     OT Date of Treatment: 10/19/20  OT Start Time: 1120  OT Stop Time: 1145  OT Total Time (min): 25 min    Billable Minutes:Evaluation 10 MINUTES  Therapeutic Activity 15 MINUTES    Chanel Escalante, SCOOTER  10/19/2020

## 2020-10-19 NOTE — PT/OT/SLP EVAL
Physical Therapy Evaluation    Patient Name:  Karan Aburto   MRN:  [id number]    Recommendations:     Discharge Recommendations:  nursing facility, skilled   Discharge Equipment Recommendations: none   Barriers to discharge: Decreased caregiver support    Assessment:     Karan Aburto is a 74 y.o. male admitted with a medical diagnosis of Acute combined systolic and diastolic heart failure.  He presents with the following impairments/functional limitations:  weakness, impaired endurance, impaired balance, gait instability, impaired cognition, impaired functional mobilty, decreased coordination.    Rehab Prognosis: Good; patient would benefit from acute skilled PT services to address these deficits and reach maximum level of function.    Recent Surgery: * No surgery found *      Plan:     During this hospitalization, patient to be seen 5 x/week to address the identified rehab impairments via gait training, therapeutic exercises, therapeutic activities and progress toward the following goals:    · Plan of Care Expires:  10/26/20    Subjective     Chief Complaint: C/O BEING THIRSTY  Patient/Family Comments/goals:   Pain/Comfort:  · Pain Rating 1: 0/10    Patients cultural, spiritual, Orthodoxy conflicts given the current situation:      Living Environment:  PT IS POOR HISTORIAN DUE TO CONFUSION BUT PT REPORTS ON PLOF/HISTORY:  PT LIVES WITH GRAND SON WHO IS NOT ALWAYS AT HOUSE, 1 STORY HOUSE NO STEPS, AMB INDEP COMMUNITY DISTANCES, STILL DRIVES, RETIRED, SUZAN WITH DME FOR ADL'S  Prior to admission, patients level of function was SUZAN.  Equipment used at home: bath bench, walker, rolling, bedside commode.  DME owned (not currently used): none.  Upon discharge, patient will have assistance from ?.    Objective:     Communicated with NURSE [place] prior to session.  Patient found supine with telemetry, peripheral IV  upon PT entry to room.    General Precautions: Standard, fall   Orthopedic Precautions:N/A   Braces: N/A      Exams:  · Cognitive Exam:  Patient is oriented to Person, Time and PT CONFUSED/DISORIENTED, AGITATED AT TIMES, IMPULSIVE  · Postural Exam:  Patient presented with the following abnormalities:    · -       Rounded shoulders  · -       Forward head  · Sensation:    · -       Intact  light/touch BLE  · Skin Integrity/Edema:      · -       Edema: Severe BLE  · RLE ROM: WFL  · RLE Strength: GROSSLY 3/5  · LLE ROM: WFL  · LLE Strength: GROSSLY 3/5    Functional Mobility:  · Bed Mobility:     · Rolling Left:  moderate assistance  · Rolling Right: moderate assistance  · Scooting: moderate assistance  · Supine to Sit: moderate assistance  · Sit to Supine: moderate assistance  · Transfers:     · Sit to Stand:  moderate assistance and of 2 persons with no AD  · Gait: PT UNABLE TO TOLERATE GAIT AT THIS TIME, REQUIRED MULTIPLE TRYS TO STAND DUE TO WEAKNESS  · Balance: POOR-    Therapeutic Activities and Exercises:   PT EDUCATED IN ROLE OF P.T. AND POC, PT CONSTANTLY ASKING FOR SOMETHING TO DRINK AND ICE CREAM, NOTIFIED NURSE, PT RE-ORIENTED AS NEEDED DUE TO CONFUSION    AM-PAC 6 CLICK MOBILITY  Total Score:9     Patient left HOB elevated with all lines intact, call button in reach, bed alarm on, NURSE notified, NURSE present and LIANET SYS ON.    GOALS:   Multidisciplinary Problems     Physical Therapy Goals        Problem: Physical Therapy Goal    Goal Priority Disciplines Outcome Goal Variances Interventions   Physical Therapy Goal     PT, PT/OT      Description: LTG'S TO BE MET IN 7 DAYS (10-26-20)  1. PT WILL REQUIRE MYA FOR BED MOBILITY  2. PT WILL REQUIRE MODA FOR TF'S  3. PT WILL AMB 50' WITH RW AND MODA                   History:     No past medical history on file.    No past surgical history on file.    Time Tracking:     PT Received On: 10/19/20  PT Start Time: 1030     PT Stop Time: 1055  PT Total Time (min): 25 min     Billable Minutes: Evaluation 15 and Therapeutic Activity 10    Lilly Bernard  PT  10/19/2020

## 2020-10-19 NOTE — PLAN OF CARE
"Patient admitted to tele room 205 this shift from ED via stretcher.  Plan of care reviewed with patient, pt verbalized understanding.  Pt remains free from falls this shift, fall precautions in place.bed alarm set.  Small open ulceration to coccyx present on admit. Wound care consult entered.   AAOx4 with intermittent confusion,NAD noted at this time.  BP (!) 163/100 (BP Location: Right arm, Patient Position: Lying)   Pulse 85   Temp 97.5 °F (36.4 °C) (Oral)   Resp 18   Ht 5' 10" (1.778 m)   Wt 102.2 kg (225 lb 5 oz)   SpO2 (!) 90%   BMI 32.33 kg/m²    PIV 20 G to L hand , Saline locked  Pt remained afebrile.  NSR with RBBB on the tele monitor  Blood glucose monitoring q6hrs.  Pt admitted for severe leg swelling and SOB. Cardio consulted for possible HF. Bilateral Legs red, +4 edema, warm and weeping. Potassium supplementation both IV and PO. Pt unaware of any hx of diabetes, however BG consistently above 300. Insulin given cautiously due to decreased K. Anaya cath in place for retention.  Avasys at bedside.  Pt currently resting comfortably in bed.  Hourly rounding complete. Bed in lowest position, side rails up, call light in reach.  Will continue to monitor.    Problem: Wound  Goal: Optimal Wound Healing  Outcome: Ongoing, Progressing     Problem: Renal Function Impairment (Acute Kidney Injury/Impairment)  Goal: Effective Renal Function  Outcome: Ongoing, Progressing     Problem: Diabetes Comorbidity  Goal: Blood Glucose Level Within Desired Range  Outcome: Ongoing, Progressing     "

## 2020-10-19 NOTE — SUBJECTIVE & OBJECTIVE
Interval History: Spoke with son about pt's condition. Has had leg swelling over the last 3 weeks but over the last 3 to 4 days pt has become disoriented and confused.     Review of Systems   Constitutional: Positive for activity change. Negative for appetite change, chills, diaphoresis, fatigue, fever and unexpected weight change.   HENT: Negative for congestion, ear discharge, ear pain, hearing loss, nosebleeds, postnasal drip, sinus pressure, sinus pain, sneezing, sore throat, tinnitus, trouble swallowing and voice change.    Eyes: Negative for pain, discharge, redness and itching.   Respiratory: Positive for shortness of breath. Negative for cough, chest tightness and wheezing.    Cardiovascular: Positive for leg swelling. Negative for chest pain and palpitations.   Gastrointestinal: Negative for abdominal distention, abdominal pain, blood in stool, constipation, diarrhea, nausea, rectal pain and vomiting.   Endocrine: Negative for cold intolerance, heat intolerance, polydipsia, polyphagia and polyuria.   Genitourinary: Negative for decreased urine volume, difficulty urinating, discharge, dysuria, flank pain, frequency, hematuria, penile pain, scrotal swelling, testicular pain and urgency.   Musculoskeletal: Negative for arthralgias, back pain, joint swelling, myalgias and neck pain.   Skin: Positive for color change. Negative for pallor and rash.   Neurological: Negative for dizziness, tremors, weakness, light-headedness, numbness and headaches.   Hematological: Negative for adenopathy. Does not bruise/bleed easily.   Psychiatric/Behavioral: Positive for confusion. Negative for agitation, sleep disturbance and suicidal ideas. The patient is not nervous/anxious.    All other systems reviewed and are negative.    Objective:     Vital Signs (Most Recent):  Temp: 98.3 °F (36.8 °C) (10/19/20 1149)  Pulse: (!) 121 (10/19/20 1149)  Resp: 19 (10/19/20 1149)  BP: (!) 187/113 (10/19/20 1149)  SpO2: (!) 94 % (10/19/20  1149) Vital Signs (24h Range):  Temp:  [97.4 °F (36.3 °C)-98.3 °F (36.8 °C)] 98.3 °F (36.8 °C)  Pulse:  [] 121  Resp:  [16-20] 19  SpO2:  [90 %-99 %] 94 %  BP: (132-214)/() 187/113     Weight: 103.5 kg (228 lb 2.8 oz)  Body mass index is 32.74 kg/m².    Intake/Output Summary (Last 24 hours) at 10/19/2020 1453  Last data filed at 10/19/2020 0600  Gross per 24 hour   Intake 2036 ml   Output 5010 ml   Net -2974 ml      Physical Exam  Vitals signs and nursing note reviewed.   Constitutional:       General: He is not in acute distress.     Appearance: He is well-developed. He is obese. He is not diaphoretic.   HENT:      Head: Normocephalic and atraumatic.      Nose: Nose normal.      Mouth/Throat:      Pharynx: No oropharyngeal exudate.   Eyes:      General: No scleral icterus.     Conjunctiva/sclera: Conjunctivae normal.      Pupils: Pupils are equal, round, and reactive to light.   Neck:      Musculoskeletal: Normal range of motion and neck supple.      Thyroid: No thyromegaly.      Vascular: No JVD.      Trachea: No tracheal deviation.   Cardiovascular:      Rate and Rhythm: Regular rhythm. Tachycardia present.      Heart sounds: Normal heart sounds. No murmur. No friction rub. No gallop.    Pulmonary:      Effort: Pulmonary effort is normal. No respiratory distress.      Breath sounds: Normal breath sounds. No wheezing.   Abdominal:      General: Bowel sounds are normal. There is no distension.      Palpations: Abdomen is soft. There is no mass.      Tenderness: There is no abdominal tenderness. There is no guarding or rebound.   Genitourinary:     Penis: No tenderness.    Musculoskeletal: Normal range of motion.         General: No tenderness.      Right lower leg: Edema present.      Left lower leg: Edema present.   Lymphadenopathy:      Cervical: No cervical adenopathy.   Skin:     General: Skin is warm.      Findings: Erythema present. No rash.      Comments: Erythema to scattered areas of BLE and  especially the right foot - no obvious ulceration or purulence   Neurological:      Mental Status: He is alert. He is disoriented.      Cranial Nerves: No cranial nerve deficit.      Sensory: No sensory deficit.      Motor: No abnormal muscle tone.      Deep Tendon Reflexes: Reflexes normal.   Psychiatric:         Behavior: Behavior normal.         Significant Labs:   CBC:   Recent Labs   Lab 10/18/20  1512 10/19/20  0739   WBC 18.99* 20.09*   HGB 14.1 14.0   HCT 42.7 43.1   * 93*     CMP:   Recent Labs   Lab 10/18/20  1512 10/18/20  2114 10/19/20  0127 10/19/20  0738    144  --  142   K 2.4* 2.5* 2.6* 3.3*   CL 91* 90*  --  93*   CO2 34* 40*  --  39*   * 343*  --  241*   BUN 28* 24*  --  22   CREATININE 1.4 1.2  --  1.1   CALCIUM 9.0 8.2*  --  8.4*   PROT 5.8*  --   --  5.6*   ALBUMIN 3.0*  --   --  2.9*   BILITOT 1.0  --   --  1.4*   ALKPHOS 101  --   --  98   *  --   --  122*   *  --   --  221*   ANIONGAP 15 14  --  10   EGFRNONAA 49* 59*  --  >60     All pertinent labs within the past 24 hours have been reviewed.    Significant Imaging: I have reviewed all pertinent imaging results/findings within the past 24 hours.

## 2020-10-19 NOTE — ASSESSMENT & PLAN NOTE
CXR - no acute cardiopulmonary findings  Possibly secondary to diastolic heart failure  Continue supplemental oxygen

## 2020-10-19 NOTE — ASSESSMENT & PLAN NOTE
Hold meds with elevated LFT  Lab Results   Component Value Date    CHOL 115 (L) 10/19/2020     Lab Results   Component Value Date    HDL 32 (L) 10/19/2020     Lab Results   Component Value Date    LDLCALC 3.4 (L) 10/19/2020     Lab Results   Component Value Date    TRIG 398 (H) 10/19/2020     Lab Results   Component Value Date    CHOLHDL 27.8 10/19/2020

## 2020-10-19 NOTE — ED NOTES
VSS. Pt being transported to floor on cardiac monitor at this time. Report was already called by Nikkie CASAS. Called floor to notify them that we are bringing pt up at this time.

## 2020-10-19 NOTE — ASSESSMENT & PLAN NOTE
Troponin elevated and trending downward  Likely due to uncontrolled glucose, CKD, and acute CHF  Will need ischemic evaluation once more compensated

## 2020-10-19 NOTE — ASSESSMENT & PLAN NOTE
Monitor BP  Hold ARB in light of rising Cr   Norvasc hold as well due to possible effect of leg swelling   HCTZ on hold due to low K   Will given PRN Clonidine if needed

## 2020-10-19 NOTE — CONSULTS
"  [facility]  Adult Nutrition  Consult Note    SUMMARY     Recommendations    Recommendation:   1. Add Arginaid drink 1x/day to diet order   2. Consider changing diet order to diabetic  3. RD to f/u    Goals: Meet >50% meal intake + arginaid by RD f/u  Nutrition Goal Status: new  Communication of RD Recs: POC    Reason for Assessment    Reason For Assessment: consult  Diagnosis: (CHF)  Relevant Medical History: Type 2 DM, HTN, HLD    General Information Comments: RD consulted due to wounds. Pt presented to ER yesterday due to edema in legs and SOB. He was hyperglycemic at admission, no known history of diabetes (awaiting results of HgbA1c lab), now trending down but still 337 at last check, and his triglycerides are elevated as well. He is hypokalemic. Pt was coherent at admit but has had worsening AMS, and though he is alert to others in the room, he has difficulty having a logical conversation. He vomited this morning, but has a good appetite.  He is not a good candidate for education at this time due to altered mental status. He is edemateous but does not meet other malnutrition criteria at this time- will continue to monitor.      Nutrition Discharge Planning:  Diabetic cardiac diet     Nutrition Risk Screen    Nutrition Risk Screen: no indicators present    Nutrition/Diet History    Food Allergies: NKFA  Factors Affecting Nutritional Intake: impaired cognitive status/motor control, nausea/vomiting    Anthropometrics    Temp: 98.3 °F (36.8 °C)  Height Method: Stated  Height: 5' 10" (177.8 cm)  Height (inches): 70 in  Weight Method: Bed Scale  Weight: 103.5 kg (228 lb 2.8 oz)  Weight (lb): 228.18 lb  Ideal Body Weight (IBW), Male: 166 lb  BMI (Calculated): 32.7  BMI Grade: 30 - 34.9- obesity - grade I     Lab/Procedures/Meds  Pertinent Labs: reviewed  POCT glucose 337, Albumin 2.9, K 3.3, Cl 93, Triglycerides 398, low cholesterol  Pertinent Medications: reviewed  Lasix, insulin, KCl, " spironolactone    Physical Findings/Assessment     moisture associated dermatitis, as well as a yellow wound    Estimated/Assessed Needs    Weight Used For Calorie Calculations: 103.5 kg (228 lb 2.8 oz)  Energy Calorie Requirements (kcal): 1800  Energy Need Method: [place]or  Protein Requirements: 125- 155g(1.2- 1.5g/kg per wounds)  Weight Used For Protein Calculations: 103.5 kg (228 lb 2.8 oz)     Estimated Fluid Requirement Method: RDA Method(or per MD)  RDA Method (mL): 1800  CHO Requirement: 225g    Nutrition Prescription Ordered    Current Diet Order: cardiac    Evaluation of Received Nutrient/Fluid Intake       Intake/Output Summary (Last 24 hours) at 10/19/2020 1456  Last data filed at 10/19/2020 0600  Gross per 24 hour   Intake 2036 ml   Output 5010 ml   Net -2974 ml     % Intake of Estimated Energy Needs: 50 - 75 %  % Meal Intake: 50 - 75 %    Nutrition Risk    1x week    Assessment and Plan    Nutrition Problem  Altered nutrition-related lab values    Related to (etiology):   Cardiac/endocrine dysfunction    Signs and Symptoms (as evidenced by):   ? Serum lipids, edema, ? Plasma glucose     Interventions:  Nutrition education content- when pt mental status improves, plan for diabetic cardiac diet education  Collaboration with other providers    Nutrition Diagnosis Status:   New    Monitor and Evaluation    Food and Nutrient Intake: food and beverage intake  Food and Nutrient Adminstration: diet order  Anthropometric Measurements: weight  Biochemical Data, Medical Tests and Procedures: electrolyte and renal panel, gastrointestinal profile, glucose/endocrine profile, inflammatory profile, lipid profile  Nutrition-Focused Physical Findings: overall appearance     Malnutrition Assessment     Skin (Micronutrient): red, rough, turgor reduced                   Edema (Fluid Accumulation): 4-->severe   Subcutaneous Fat Loss (Final Summary): well nourished  Muscle Loss Evaluation (Final Summary): well  nourished         Nutrition Follow-Up    RD Follow-up?: Yes    Thanks!  Bill Wilkins MPH RD LDN

## 2020-10-19 NOTE — PLAN OF CARE
Recommendations    Recommendation:   1. Add Arginaid drink 1x/day to diet order   2. Consider changing diet order to diabetic  3. RD to f/u    Goals: Meet >50% meal intake + arginaid by RD f/u  Nutrition Goal Status: new  Communication of RD Recs: POC

## 2020-10-19 NOTE — CONSULTS
Consult received   Chart reviewed  Due to patients current AMS, spoke to his son, Milton, ([phone]) to discuss his diabetes management  He reports patient lives with his step grandson.  He requests deferring any diabetes education until it is determined what is causing his AMS and what his discharge status will be.  Discussed possible need for diabetes education to whoever will assist with his diabetes care following discharge if he needs assistance  Will follow.

## 2020-10-19 NOTE — NURSING
Called to room to replace monitor leads. Upon entering room pt was trying to pull out dela cruz catheter and get out of bed. Bright red blood noted to penis and urine in dela cruz bag pink tinged. Charge RN called to assess. Balloon deflated,dela cruz repositioned and balloon re-inflated.Lety Silva NP notified.Avasys placed at bedside.Will continue to monitor.

## 2020-10-19 NOTE — ASSESSMENT & PLAN NOTE
Patient has no know Hx of DM  Hgb A1 C pending  Glucose - 322, 377  ISC   Long acting insulin started, 10 units nightly

## 2020-10-19 NOTE — PLAN OF CARE
WAYNE met with pt at bedside to complete discharge assessment. Pt lives at home with his grandson. Pt help at home is his family. Pt family will pick him up once he is discharged. Pt uses a walker at home. Pt needs HH. Pt choice signed. WAYNE provided a transitional care folder, information on advanced directives, information on pharmacy bedside delivery, and discharge planning begins on admission with contact information for any needs/questions. Pt board updated with CM name and number.     Payor: [employer] MEDICARE / Plan: [employer] 65 / Product Type: Medicare Advantage /   PCP: Mickey Agrawal MD  Pharmacy: No Pharmacies Listed  Bedside Delivery: Yes  [email or web address]: link sent       10/19/20 1021   Discharge Assessment   Assessment Type Discharge Planning Assessment   Confirmed/corrected address and phone number on facesheet? Yes   Assessment information obtained from? Patient   Expected Length of Stay (days)   (TBD)   Communicated expected length of stay with patient/caregiver yes   Prior to hospitilization cognitive status: Alert/Oriented   Prior to hospitalization functional status: Independent;Assistive Equipment   Current cognitive status: Alert/Oriented   Current Functional Status: Independent;Assistive Equipment   Facility Arrived From: home   Lives With grandchild(greg)   Able to Return to Prior Arrangements yes   Is patient able to care for self after discharge? Yes   Who are your caregiver(s) and their phone number(s)? Milton Aburto (son) [phone]   Patient's perception of discharge disposition home or selfcare   Readmission Within the Last 30 Days no previous admission in last 30 days   Patient currently being followed by outpatient case management? No   Patient currently receives any other outside agency services? No   Equipment Currently Used at Home walker, rolling   Part D Coverage n/a   Do you have any problems affording any of your prescribed medications? No   Is the patient  taking medications as prescribed? yes   Does the patient have transportation home? Yes   Transportation Anticipated family or friend will provide   Dialysis Name and Scheduled days n/a   Does the patient receive services at the [facility]madin Clinic? No   Discharge Plan A Home with family   Discharge Plan B Home   DME Needed Upon Discharge  none   Patient/Family in Agreement with Plan yes       Satnam Clemons LMSW 10/19/2020 3:45 PM

## 2020-10-19 NOTE — CONSULTS
[facility]  Cardiology  Consult Note    Patient Name: Karan Aburto  MRN: [id number]  Admission Date: 10/18/2020  Hospital Length of Stay: 0 days  Code Status: No Order   Attending Provider: Partha Madsen, *   Consulting Provider: CORETTA Ceballos  Primary Care Physician: Mickey Agrawal MD  Principal Problem:Acute combined systolic and diastolic heart failure    Patient information was obtained from patient, past medical records and ER records.     Inpatient consult to Cardiology  Consult performed by: CORETTA Lerner  Consult ordered by: Partha Madsen MD        Subjective:     Chief Complaint:  Leg swelling     HPI:   Karan Aburto is a 74 year old male who presented to [facility] due to LE edema for the past 3 weeks. His current medical conditions include HTN, HLP, obesity. ED workup revealed K+ 2.4, Cr 1.4, Glucose 480, Troponin 0.121, , LA 3.2. Repeat troponin 0.104. ECHO pending. He was placed in observation under the care of hospital medicine. Cardiology consulted to assist with medical management. Chart reviewed, patient seen and examined. Denies chest pain or anginal equivalents. No shortness of breath, BLAIR or palpitations. He does have LE edema to bilateral thighs on exam today. Of note, patient reports that he is being followed by Dr Moncho Culver at [facility]. Denies any previous MI or stents previously. Further recs to follow.     No past medical history on file.    No past surgical history on file.    Review of patient's allergies indicates:  No Known Allergies    No current facility-administered medications on file prior to encounter.      Current Outpatient Medications on File Prior to Encounter   Medication Sig    amLODIPine (NORVASC) 5 MG tablet TAKE 1 TABLET BY MOUTH DAILY    baclofen (LIORESAL) 20 MG tablet Take 20 mg by mouth.    benazepriL (LOTENSIN) 20 MG tablet TAKE 1 TABLET BY MOUTH TWICE DAILY    diltiaZEM (CARDIZEM CD) 180 MG 24  hr capsule Take 180 mg by mouth once daily.    doxycycline (VIBRAMYCIN) 50 MG capsule Take 50 mg by mouth.    fenofibrate 160 MG Tab Take 160 mg by mouth once daily.    furosemide (LASIX) 40 MG tablet Take 40 mg by mouth once daily.    hydroCHLOROthiazide (HYDRODIURIL) 25 MG tablet TAKE 1 TABLET BY MOUTH DAILY    olmesartan (BENICAR) 40 MG tablet Take 40 mg by mouth once daily.    potassium chloride SA (K-DUR,KLOR-CON) 20 MEQ tablet Take 20 mEq by mouth 2 (two) times daily.    rosuvastatin (CRESTOR) 10 MG tablet Take 10 mg by mouth.    tiZANidine 4 mg Cap Take by mouth.     Family History     None        Tobacco Use    Smoking status: Not on file   Substance and Sexual Activity    Alcohol use: Not on file    Drug use: Not on file    Sexual activity: Not on file     Review of Systems   Constitution: Positive for malaise/fatigue.   HENT: Negative for hearing loss and hoarse voice.    Eyes: Negative for blurred vision and visual disturbance.   Cardiovascular: Positive for leg swelling. Negative for chest pain, claudication, dyspnea on exertion, irregular heartbeat, near-syncope, orthopnea, palpitations, paroxysmal nocturnal dyspnea and syncope.   Respiratory: Negative for cough, hemoptysis, shortness of breath, sleep disturbances due to breathing, snoring and wheezing.    Endocrine: Negative for cold intolerance and heat intolerance.   Hematologic/Lymphatic: Does not bruise/bleed easily.   Skin: Negative for color change, dry skin and nail changes.   Musculoskeletal: Positive for arthritis and back pain. Negative for joint pain and myalgias.   Gastrointestinal: Negative for bloating, abdominal pain, constipation, nausea and vomiting.   Genitourinary: Negative for dysuria, flank pain, hematuria and hesitancy.   Neurological: Positive for weakness. Negative for headaches, light-headedness, loss of balance, numbness and paresthesias.   Psychiatric/Behavioral: Positive for memory loss. Negative for altered  mental status.   Allergic/Immunologic: Negative for environmental allergies.     Objective:     Vital Signs (Most Recent):  Temp: 97.9 °F (36.6 °C) (10/19/20 0731)  Pulse: 100 (10/19/20 0731)  Resp: 19 (10/19/20 0731)  BP: (!) 157/104 (10/19/20 0731)  SpO2: 96 % (10/19/20 0731) Vital Signs (24h Range):  Temp:  [97.4 °F (36.3 °C)-98 °F (36.7 °C)] 97.9 °F (36.6 °C)  Pulse:  [] 100  Resp:  [16-20] 19  SpO2:  [90 %-99 %] 96 %  BP: (132-214)/() 157/104     Weight: 103.5 kg (228 lb 2.8 oz)  Body mass index is 32.74 kg/m².    SpO2: 96 %  O2 Device (Oxygen Therapy): room air      Intake/Output Summary (Last 24 hours) at 10/19/2020 1019  Last data filed at 10/19/2020 0600  Gross per 24 hour   Intake 2036 ml   Output 5010 ml   Net -2974 ml       Lines/Drains/Airways     Drain                 Urethral Catheter 10/18/20 1658 Straight-tip 16 Fr. less than 1 day          Peripheral Intravenous Line                 Peripheral IV - Single Lumen 10/18/20 1717 20 G Left Hand less than 1 day                Physical Exam   Constitutional: He is oriented to person, place, and time. He appears well-developed and well-nourished. No distress.   HENT:   Head: Normocephalic and atraumatic.   Eyes: Pupils are equal, round, and reactive to light.   Neck: Normal range of motion and full passive range of motion without pain. Neck supple. JVD present.   Cardiovascular: Regular rhythm, S1 normal, S2 normal and intact distal pulses. Tachycardia present. PMI is not displaced. Exam reveals no distant heart sounds.   No murmur heard.  Pulses:       Radial pulses are 2+ on the right side and 2+ on the left side.        Dorsalis pedis pulses are 1+ on the right side and 1+ on the left side.   CHEST PAIN FREE   Pulmonary/Chest: Effort normal and breath sounds normal. No accessory muscle usage. No respiratory distress. He has no decreased breath sounds. He has no wheezes. He has no rales.   Abdominal: Soft. Bowel sounds are normal. He exhibits  no distension. There is no abdominal tenderness.   Musculoskeletal: Normal range of motion.         General: Edema present.      Right ankle: He exhibits swelling.      Left ankle: He exhibits swelling.      Comments: BLE edema to bilateral thighs   Neurological: He is alert and oriented to person, place, and time.   Skin: Skin is warm and dry. He is not diaphoretic. No cyanosis. Nails show no clubbing.   Psychiatric: He has a normal mood and affect. His speech is normal and behavior is normal. Judgment and thought content normal. Cognition and memory are normal.   Nursing note and vitals reviewed.      Significant Labs:   BMP:   Recent Labs   Lab 10/18/20  1512 10/18/20  2114 10/19/20  0127 10/19/20  0738   * 343*  --  241*    144  --  142   K 2.4* 2.5* 2.6* 3.3*   CL 91* 90*  --  93*   CO2 34* 40*  --  39*   BUN 28* 24*  --  22   CREATININE 1.4 1.2  --  1.1   CALCIUM 9.0 8.2*  --  8.4*   MG 2.1  --   --   --    , CBC   Recent Labs   Lab 10/18/20  1512 10/19/20  0739   WBC 18.99* 20.09*   HGB 14.1 14.0   HCT 42.7 43.1   * 93*    and All pertinent lab results from the last 24 hours have been reviewed.    Significant Imaging: Echocardiogram:   Transthoracic echo (TTE) complete (Cupid Only):   Results for orders placed or performed during the hospital encounter of 10/18/20   Echo Color Flow Doppler? Yes; Bubble Contrast? No   Result Value Ref Range    Ascending aorta 2.97 cm    STJ 2.57 cm    AV mean gradient 7 mmHg    Ao peak lewis 1.79 m/s    Ao VTI 27.91 cm    IVS 1.39 (A) 0.6 - 1.1 cm    LA size 4.21 cm    Left Atrium Major Axis 4.62 cm    LVIDd 3.82 3.5 - 6.0 cm    LVIDs 2.29 2.1 - 4.0 cm    LVOT diameter 2.39 cm    LVOT peak VTI 21.64 cm    Posterior Wall 1.08 0.6 - 1.1 cm    MV Peak A Lewis 1.10 m/s    E wave decelartion time 190.97 msec    MV Peak E Lewis 0.79 m/s    RA Major Axis 4.32 cm    RA Width 2.24 cm    TAPSE 2.81 cm    TR Max Lewis 1.74 m/s    TDI LATERAL 0.07 m/s    TDI SEPTAL 0.06 m/s     LA WIDTH 4.11 cm    Ao root annulus 3.66 cm    MV stenosis pressure 1/2 time 55.38 ms    LV Diastolic Volume 62.69 mL    LV Systolic Volume 17.96 mL    LVOT peak mercedes 1.28 m/s    LV LATERAL E/E' RATIO 11.29 m/s    LV SEPTAL E/E' RATIO 13.17 m/s    FS 40 %    LV mass 161.27 g    Left Ventricle Relative Wall Thickness 0.57 cm    AV valve area 3.48 cm2    AV Velocity Ratio 0.72     AV index (prosthetic) 0.78     MV valve area p 1/2 method 3.97 cm2    E/A ratio 0.72     Mean e' 0.07 m/s    LVOT area 4.5 cm2    LVOT stroke volume 97.03 cm3    AV peak gradient 13 mmHg    E/E' ratio 12.15 m/s    LV Systolic Volume Index 8.1 mL/m2    LV Diastolic Volume Index 28.40 mL/m2    LV Mass Index 73 g/m2    Triscuspid Valve Regurgitation Peak Gradient 12 mmHg     Assessment and Plan:     * Acute combined systolic and diastolic heart failure  ECHO pending  Continue Coreg, IV lasix BID, ACEi, Aldactone  Strict I/Os  Daily weights  Needs additional diuresis  Dash diet, 2 gm sodium restriction  1500 ml fluid restriction  Needs Ischemic evaluation once more compensated   Further recs to follow in AM    Elevated troponin  Troponin elevated and trending downward  Likely due to uncontrolled glucose, CKD, and acute CHF  Will need ischemic evaluation once more compensated    Hyperlipemia  Lipids pending    Essential hypertension  ON Medical therapy  Continue Coreg, ACEi, Aldactone  Monitor BP trend    ANASTASIYA (acute kidney injury)  Monitor closely with need for IV diuresis    Leukocytosis  On IV ABX  Mgmt per primary team    Hypokalemia  Keep K+>4    Abnormal liver enzymes  Monitor with diuresis  Likely due to decompensated CHF    Diabetes mellitus type 2, uncontrolled  Mgmt per primary team        VTE Risk Mitigation (From admission, onward)    None          Thank you for your consult. I will follow-up with patient. Please contact us if you have any additional questions.    Zaira Oconnor, JORGE-C  Cardiology   [facility]

## 2020-10-19 NOTE — NURSING
Pt arrived to unit from ED in no acute distress. VSS. Tele monitor applied to pt and verified with monitor tech. Phone report given from ED nurse to primary nurse. PIV saline locked to. . Bed low, wheels locked, side rails up x2, call light in reach, non-slip footwear socks in place, bed alarm armed.  Pt oriented to room and unit protocols as well as fall risk precautions. Educated pt on importance of collecting accurate I&Os. Anaya cath in place, intact.. Communication board updated with current plan of care. Pt has no complaints at this time, instructed to call for assistance.

## 2020-10-19 NOTE — HPI
Karan Aburto is a 74 year old male who presented to [facility]- due to LE edema for the past 3 weeks. His current medical conditions include HTN, HLP, obesity. ED workup revealed K+ 2.4, Cr 1.4, Glucose 480, Troponin 0.121, , LA 3.2. Repeat troponin 0.104. ECHO pending. He was placed in observation under the care of hospital medicine. Cardiology consulted to assist with medical management. Chart reviewed, patient seen and examined. Denies chest pain or anginal equivalents. No shortness of breath, BLAIR or palpitations. He does have LE edema to bilateral thighs on exam today. Of note, patient reports that he is being followed by Dr Moncho Culver at [facility]. Denies any previous MI or stents previously. Further recs to follow.

## 2020-10-19 NOTE — NURSING
"Pt showing signs of AMS. Oriented x4 upon assessment however signs of confusion such as disorganized speech and possible visual hallucinations. PIV, gown and tele monitor removed by pt. Pt covered in blood and vomit.pt states "I may have upchucked". Bath given, linens changed. Lety Silva,NP notified. STAT head CT ordered.   "

## 2020-10-19 NOTE — SUBJECTIVE & OBJECTIVE
No past medical history on file.    No past surgical history on file.    Review of patient's allergies indicates:  No Known Allergies    No current facility-administered medications on file prior to encounter.      Current Outpatient Medications on File Prior to Encounter   Medication Sig    amLODIPine (NORVASC) 5 MG tablet TAKE 1 TABLET BY MOUTH DAILY    baclofen (LIORESAL) 20 MG tablet Take 20 mg by mouth.    benazepriL (LOTENSIN) 20 MG tablet TAKE 1 TABLET BY MOUTH TWICE DAILY    diltiaZEM (CARDIZEM CD) 180 MG 24 hr capsule Take 180 mg by mouth once daily.    doxycycline (VIBRAMYCIN) 50 MG capsule Take 50 mg by mouth.    fenofibrate 160 MG Tab Take 160 mg by mouth once daily.    furosemide (LASIX) 40 MG tablet Take 40 mg by mouth once daily.    hydroCHLOROthiazide (HYDRODIURIL) 25 MG tablet TAKE 1 TABLET BY MOUTH DAILY    olmesartan (BENICAR) 40 MG tablet Take 40 mg by mouth once daily.    potassium chloride SA (K-DUR,KLOR-CON) 20 MEQ tablet Take 20 mEq by mouth 2 (two) times daily.    rosuvastatin (CRESTOR) 10 MG tablet Take 10 mg by mouth.    tiZANidine 4 mg Cap Take by mouth.     Family History     None        Tobacco Use    Smoking status: Not on file   Substance and Sexual Activity    Alcohol use: Not on file    Drug use: Not on file    Sexual activity: Not on file     Review of Systems   Constitutional: Negative for appetite change, chills, diaphoresis, fatigue, fever and unexpected weight change.   HENT: Negative for congestion, ear discharge, ear pain, hearing loss, nosebleeds, postnasal drip, sinus pressure, sinus pain, sneezing, sore throat, tinnitus, trouble swallowing and voice change.    Eyes: Negative for pain, discharge, redness and itching.   Respiratory: Positive for shortness of breath. Negative for cough, chest tightness and wheezing.    Cardiovascular: Positive for leg swelling. Negative for chest pain and palpitations.   Gastrointestinal: Negative for abdominal distention,  abdominal pain, blood in stool, constipation, diarrhea, nausea, rectal pain and vomiting.   Endocrine: Negative for cold intolerance, heat intolerance, polydipsia, polyphagia and polyuria.   Genitourinary: Negative for decreased urine volume, difficulty urinating, discharge, dysuria, flank pain, frequency, hematuria, penile pain, scrotal swelling, testicular pain and urgency.   Musculoskeletal: Negative for arthralgias, back pain, joint swelling, myalgias and neck pain.   Skin: Negative for pallor and rash.   Neurological: Negative for dizziness, tremors, weakness, light-headedness, numbness and headaches.   Hematological: Negative for adenopathy. Does not bruise/bleed easily.   Psychiatric/Behavioral: Negative for agitation, confusion, sleep disturbance and suicidal ideas. The patient is not nervous/anxious.    All other systems reviewed and are negative.    Objective:     Vital Signs (Most Recent):  Temp: 97.6 °F (36.4 °C) (10/18/20 1932)  Pulse: 89 (10/18/20 1932)  Resp: 16 (10/18/20 1932)  BP: (!) 163/101 (10/18/20 1932)  SpO2: (!) 93 % (10/18/20 1932) Vital Signs (24h Range):  Temp:  [97.6 °F (36.4 °C)-98 °F (36.7 °C)] 97.6 °F (36.4 °C)  Pulse:  [] 89  Resp:  [16-20] 16  SpO2:  [93 %-98 %] 93 %  BP: (132-214)/() 163/101     Weight: 99.2 kg (218 lb 11.1 oz)  Body mass index is 31.38 kg/m².    Physical Exam  Vitals signs and nursing note reviewed.   Constitutional:       General: He is not in acute distress.     Appearance: He is well-developed. He is not diaphoretic.   HENT:      Head: Normocephalic and atraumatic.      Right Ear: External ear normal.      Left Ear: External ear normal.      Nose: Nose normal.      Mouth/Throat:      Pharynx: No oropharyngeal exudate.   Eyes:      General: No scleral icterus.     Conjunctiva/sclera: Conjunctivae normal.      Pupils: Pupils are equal, round, and reactive to light.   Neck:      Musculoskeletal: Normal range of motion and neck supple.      Thyroid: No  thyromegaly.      Vascular: No JVD.      Trachea: No tracheal deviation.   Cardiovascular:      Rate and Rhythm: Regular rhythm. Tachycardia present.      Heart sounds: Normal heart sounds. No murmur. No friction rub. No gallop.    Pulmonary:      Effort: Pulmonary effort is normal. No respiratory distress.      Breath sounds: No stridor. Rales present. No wheezing.   Abdominal:      General: Bowel sounds are normal. There is no distension.      Palpations: Abdomen is soft. There is no mass.      Tenderness: There is no abdominal tenderness. There is no guarding or rebound.   Genitourinary:     Penis: Normal. No tenderness.       Prostate: Normal.      Rectum: Normal.   Musculoskeletal: Normal range of motion.         General: No tenderness.      Right lower leg: Edema present.      Left lower leg: Edema present.   Lymphadenopathy:      Cervical: No cervical adenopathy.   Skin:     General: Skin is warm.      Capillary Refill: Capillary refill takes less than 2 seconds.      Findings: No erythema or rash.   Neurological:      Mental Status: He is alert and oriented to person, place, and time.      Cranial Nerves: No cranial nerve deficit.      Sensory: No sensory deficit.      Motor: No abnormal muscle tone.      Deep Tendon Reflexes: Reflexes normal.   Psychiatric:         Behavior: Behavior normal.           CRANIAL NERVES     CN III, IV, VI   Pupils are equal, round, and reactive to light.       Significant Labs: All pertinent labs within the past 24 hours have been reviewed.    Significant Imaging: I have reviewed and interpreted all pertinent imaging results/findings within the past 24 hours.

## 2020-10-19 NOTE — ASSESSMENT & PLAN NOTE
ECHO pending  Continue Coreg, IV lasix BID, ACEi, Aldactone  Strict I/Os  Daily weights  Needs additional diuresis  Dash diet, 2 gm sodium restriction  1500 ml fluid restriction  Needs Ischemic evaluation once more compensated   Further recs to follow in AM

## 2020-10-19 NOTE — ASSESSMENT & PLAN NOTE
Monitor BP  Creatinine has improved - lisinopril started by Cardiology  Will given PRN Clonidine if needed

## 2020-10-19 NOTE — PROGRESS NOTES
[facility] Hospital Medicine  Progress Note    Patient Name: Karan Aburto  MRN: [id number]  Patient Class: OP- Observation   Admission Date: 10/18/2020  Length of Stay: 0 days  Attending Physician: Partha Madsen, *  Primary Care Provider: Mickey Agrawal MD        Subjective:     Principal Problem:Acute diastolic heart failure        HPI:  Mr. Karan Aburto is a 74 y.o. male patient with Hx of HTN, HLD who presents to the Emergency Department for evaluation of BLE swelling which onset gradually 3 weeks ago. Pt's son expresses that pt's sxs may be attributed to recent medication change after recently changing insurance companies.He reports the leg swelling has been associated with progressive SOB. Denies chest pain, cough, palpitations, fever or chills.  Pt denies feeling SOB in the supine position or waking up short of breath in the middle of the night. No known hx of A-fib, CHF, DVT, PE, liver problems, or use of blood thinners. In ED with concerns for DVT US obtained which was negative, also given IV lasix with concerns for possible CHFE. It was noted his potassium was 2.4 and he was given IV and PO. Patient also has elevated LFTs. Due to multiple issues patient admitted for further management.        Overview/Hospital Course:  Pt admitted with leg swelling, SOB and concerns for new onset CHF. Also, there is leg/foot redness representative of cellulitis. Cardiology saw the patient and initial recs and IV diuresis continued. IV Rocephin and Flagyl given for presumed leg cellulitis. Pt is oriented x 3 but is confused in reference to situation and says peculiar things. Likely encephalopathy secondary to cellulitis. There are no gross functional neuro deficits and CT of Head was negative.     Interval History: Spoke with son about pt's condition. Has had leg swelling over the last 3 weeks but over the last 3 to 4 days pt has become disoriented and confused.     Review of Systems    Constitutional: Positive for activity change. Negative for appetite change, chills, diaphoresis, fatigue, fever and unexpected weight change.   HENT: Negative for congestion, ear discharge, ear pain, hearing loss, nosebleeds, postnasal drip, sinus pressure, sinus pain, sneezing, sore throat, tinnitus, trouble swallowing and voice change.    Eyes: Negative for pain, discharge, redness and itching.   Respiratory: Positive for shortness of breath. Negative for cough, chest tightness and wheezing.    Cardiovascular: Positive for leg swelling. Negative for chest pain and palpitations.   Gastrointestinal: Negative for abdominal distention, abdominal pain, blood in stool, constipation, diarrhea, nausea, rectal pain and vomiting.   Endocrine: Negative for cold intolerance, heat intolerance, polydipsia, polyphagia and polyuria.   Genitourinary: Negative for decreased urine volume, difficulty urinating, discharge, dysuria, flank pain, frequency, hematuria, penile pain, scrotal swelling, testicular pain and urgency.   Musculoskeletal: Negative for arthralgias, back pain, joint swelling, myalgias and neck pain.   Skin: Positive for color change. Negative for pallor and rash.   Neurological: Negative for dizziness, tremors, weakness, light-headedness, numbness and headaches.   Hematological: Negative for adenopathy. Does not bruise/bleed easily.   Psychiatric/Behavioral: Positive for confusion. Negative for agitation, sleep disturbance and suicidal ideas. The patient is not nervous/anxious.    All other systems reviewed and are negative.    Objective:     Vital Signs (Most Recent):  Temp: 98.3 °F (36.8 °C) (10/19/20 1149)  Pulse: (!) 121 (10/19/20 1149)  Resp: 19 (10/19/20 1149)  BP: (!) 187/113 (10/19/20 1149)  SpO2: (!) 94 % (10/19/20 1149) Vital Signs (24h Range):  Temp:  [97.4 °F (36.3 °C)-98.3 °F (36.8 °C)] 98.3 °F (36.8 °C)  Pulse:  [] 121  Resp:  [16-20] 19  SpO2:  [90 %-99 %] 94 %  BP: (132-214)/() 187/113      Weight: 103.5 kg (228 lb 2.8 oz)  Body mass index is 32.74 kg/m².    Intake/Output Summary (Last 24 hours) at 10/19/2020 1453  Last data filed at 10/19/2020 0600  Gross per 24 hour   Intake 2036 ml   Output 5010 ml   Net -2974 ml      Physical Exam  Vitals signs and nursing note reviewed.   Constitutional:       General: He is not in acute distress.     Appearance: He is well-developed. He is obese. He is not diaphoretic.   HENT:      Head: Normocephalic and atraumatic.      Nose: Nose normal.      Mouth/Throat:      Pharynx: No oropharyngeal exudate.   Eyes:      General: No scleral icterus.     Conjunctiva/sclera: Conjunctivae normal.      Pupils: Pupils are equal, round, and reactive to light.   Neck:      Musculoskeletal: Normal range of motion and neck supple.      Thyroid: No thyromegaly.      Vascular: No JVD.      Trachea: No tracheal deviation.   Cardiovascular:      Rate and Rhythm: Regular rhythm. Tachycardia present.      Heart sounds: Normal heart sounds. No murmur. No friction rub. No gallop.    Pulmonary:      Effort: Pulmonary effort is normal. No respiratory distress.      Breath sounds: Normal breath sounds. No wheezing.   Abdominal:      General: Bowel sounds are normal. There is no distension.      Palpations: Abdomen is soft. There is no mass.      Tenderness: There is no abdominal tenderness. There is no guarding or rebound.   Genitourinary:     Penis: No tenderness.    Musculoskeletal: Normal range of motion.         General: No tenderness.      Right lower leg: Edema present.      Left lower leg: Edema present.   Lymphadenopathy:      Cervical: No cervical adenopathy.   Skin:     General: Skin is warm.      Findings: Erythema present. No rash.      Comments: Erythema to scattered areas of BLE and especially the right foot - no obvious ulceration or purulence   Neurological:      Mental Status: He is alert. He is disoriented.      Cranial Nerves: No cranial nerve deficit.      Sensory: No  sensory deficit.      Motor: No abnormal muscle tone.      Deep Tendon Reflexes: Reflexes normal.   Psychiatric:         Behavior: Behavior normal.         Significant Labs:   CBC:   Recent Labs   Lab 10/18/20  1512 10/19/20  0739   WBC 18.99* 20.09*   HGB 14.1 14.0   HCT 42.7 43.1   * 93*     CMP:   Recent Labs   Lab 10/18/20  1512 10/18/20  2114 10/19/20  0127 10/19/20  0738    144  --  142   K 2.4* 2.5* 2.6* 3.3*   CL 91* 90*  --  93*   CO2 34* 40*  --  39*   * 343*  --  241*   BUN 28* 24*  --  22   CREATININE 1.4 1.2  --  1.1   CALCIUM 9.0 8.2*  --  8.4*   PROT 5.8*  --   --  5.6*   ALBUMIN 3.0*  --   --  2.9*   BILITOT 1.0  --   --  1.4*   ALKPHOS 101  --   --  98   *  --   --  122*   *  --   --  221*   ANIONGAP 15 14  --  10   EGFRNONAA 49* 59*  --  >60     All pertinent labs within the past 24 hours have been reviewed.    Significant Imaging: I have reviewed all pertinent imaging results/findings within the past 24 hours.      Assessment/Plan:      * Acute diastolic heart failure  New Onset  Daily Weights  Strict I/Os  Fluid Restriction <1L daily   Supplemental Oxygen PRN   IV Lasix and aldactone  ACE inhibitor started  ECHO - normal systolic function with EF 60 % and grade 1 diastolic dysfunction         Elevated troponin  Troponin elevated and trending downward  Likely due to uncontrolled glucose, CKD, and acute CHF  Will need ischemic evaluation once more compensated      Hyperlipemia  Hold meds with elevated LFT  Lab Results   Component Value Date    CHOL 115 (L) 10/19/2020     Lab Results   Component Value Date    HDL 32 (L) 10/19/2020     Lab Results   Component Value Date    LDLCALC 3.4 (L) 10/19/2020     Lab Results   Component Value Date    TRIG 398 (H) 10/19/2020     Lab Results   Component Value Date    CHOLHDL 27.8 10/19/2020       Essential hypertension  Monitor BP  Creatinine has improved - lisinopril started by Cardiology  Will given PRN Clonidine if needed        Leg swelling  Ruling out CHF and possibly secondary to leg cellulitis  Elevated legs  Diuretics in process  Strict I & O  IV ABX    ANASTASIYA (acute kidney injury)  Serum creatinine has normalized  Likely due to Cardiac Disease   Monitor       Leukocytosis  PCT Negative   Will cover for possible cellulitis of feet   IV Rocephin and Flagyl       Hypokalemia  Monitor and Replete >>> improving    Abnormal liver enzymes  Possibly due to Cardiac Congestion   AST/ALT - 122/221  IV Lasix  Monitor       Diabetes mellitus type 2, uncontrolled  Patient has no know Hx of DM  Hgb A1 C pending  Glucose - 322, 377  ISC   Long acting insulin started, 10 units nightly    Shortness of breath  CXR - no acute cardiopulmonary findings  Possibly secondary to diastolic heart failure  Continue supplemental oxygen          VTE Risk Mitigation (From admission, onward)    None          Discharge Planning   ADAM:      Code Status: Not on file   Is the patient medically ready for discharge?:     Reason for patient still in hospital (select all that apply): Patient new problem, Patient trending condition, Treatment and Pending disposition                     Shayna Haynes NP  Department of Hospital Medicine   [facility]

## 2020-10-19 NOTE — ASSESSMENT & PLAN NOTE
Ruling out CHF and possibly secondary to leg cellulitis  Elevated legs  Diuretics in process  Strict I & O  IV ABX

## 2020-10-19 NOTE — CONSULTS
10/19/20 1045   Handoff Report   Given To YESSENIA Lai   Skin   Skin WDL ex   Skin Color/Characteristics redness blanchable;redness nonblanchable   Skin Temperature warm   Skin Moisture dry;flaky   Skin Elasticity slow return to original state   Skin Integrity rash;wound   Specialty Bed/Overlay Overlay, nonpowered/static (waffle)  (ordered at this time)   Aidan Risk Assessment   Sensory Perception 4-->no impairment   Moisture 3-->occasionally moist   Activity 3-->walks occasionally   Mobility 3-->slightly limited   Nutrition 2-->probably inadequate   Friction and Shear 2-->potential problem   Aidan Score 17        Altered Skin Integrity 10/18/20 midline Coccyx Full thickness tissue loss. Subcutaneous fat may be visible but bone, tendon or muscle are not exposed   Date First Assessed: 10/18/20   Altered Skin Integrity Present on Admission: yes  Orientation: midline  Location: Coccyx  Description of Altered Skin Integrity: Full thickness tissue loss. Subcutaneous fat may be visible but bone, tendon or muscle are...   Description of Altered Skin Integrity Full thickness tissue loss. Subcutaneous fat may be visible but bone, tendon or muscle are not exposed   Dressing Appearance Open to air   Drainage Amount Scant   Drainage Characteristics/Odor Serous   Appearance Red;Yellow;Adipose;Moist   Tissue loss description Full thickness   Red (%), Wound Tissue Color 50 %   Yellow (%), Wound Tissue Color 50 %   Periwound Area Redness   Wound Edges Open   Wound Length (cm) 1.5 cm   Wound Width (cm) 1.5 cm   Wound Depth (cm) 0.1 cm   Wound Volume (cm^3) 0.22 cm^3   Wound Surface Area (cm^2) 2.25 cm^2   Care Cleansed with:;Sterile normal saline;Applied:;Skin Barrier  (critic aid paste)        Altered Skin Integrity 10/18/20  medial Thigh Moisture associated dermatitis   Date First Assessed: 10/18/20   Altered Skin Integrity Present on Admission: yes  Side: (c)   Orientation: medial  Location: (c) Thigh  Primary Wound Type:  Moisture associated dermatitis   Dressing Appearance Open to air   Drainage Amount None   Appearance Red   Tissue loss description Not applicable   Wound Edges Irregular   Care Cleansed with:;Soap and water;Applied:;Skin Barrier  (critic aid paste)        Rash 10/19/20   Date of First Assessment: 10/19/20   Present Prior to Hospital Arrival?: Yes  Additional Comments: generalized red, non-raised rash, lacy pattern   Distribution generalized   Characteristics redness/erythema   Color red     Consulted on this 75 y/o M patient due to present on admission skin breakdown. Patient admitted with CHF exacerbation, BLE swelling. He has PMH significant for HTN. Skin assessed. He has a red, non-raised lacy rash generalized to body. He reports a medicated ointment from a dermatologist, but cannot recall the name of the ointment. He is also noted to mildly confused to situation/place. Bilateral heels with blanchable redness noted, edema to BLE. Dry flaky skin and red rash also noted. MASD noted to bilateral medial thigh, groins, scrotum, and perineum. Critic aid paste mositure barrier applied. Patient turned independently to right side. Stage 3 pressure injury noted to coccyx that is present on admission. Measures 1.5x1.5x0.1cm, wound bed moist red and yellow subcutaneous tissue. Concetta wound erythema noted. Cleansed with saline and patted dry. Thin layer critic aid paste applied to cover, as close to anus. Discussed with primary nurse YESSENIA Lai at bedside. Recommend waffle overlay to bed, heel offloading boots for further pressure injury prevention. Please see below:    Stage 3 pressure injury coccyx, MASD bilateral medial thighs, perineum, groins, scrotum:  1. Cleanse with saline  2. Pat dry  3. Apply thin layer critic aid paste mositure barrier BID and prn

## 2020-10-19 NOTE — HOSPITAL COURSE
Patient was admitted for acute on chronic diastolic heart failure exacerbation and also chronic bilateral lower extremity edema with cellulitis.  He was treated with IV diuresis and Zosyn for cellulitis involving both his lower extremity.  Patient responded to Lasix however his potassium levels dropped continued to be low.  Also his blood pressures were elevated during his hospital stay.  Hydrochlorothiazide was stopped he was started on Aldactone his potassium levels to go up.  He was started on hydralazine and Isordil for uncontrolled blood pressures.  Echo done showed only diastolic heart failure but not systolic heart failure.  His chronic lower extremity swelling is likely to peripheral venous condition for which he was advised to apply Juan Luis hoses.  He will be following with his primary care physician on Monday  For follow up on BMP/Home health to draw BMP and send it to PCP and he will decide whether to continue potasium supplements.

## 2020-10-19 NOTE — ASSESSMENT & PLAN NOTE
New Onset  Daily Weights  Strict I/Os  Fluid Restriction <1L daily   Supplemental Oxygen PRN   IV Lasix and aldactone  ACE inhibitor started  ECHO - normal systolic function with EF 60 % and grade 1 diastolic dysfunction

## 2020-10-19 NOTE — H&P
[facility] Hospital Medicine  History & Physical    Patient Name: Karan Aburto  MRN: [id number]  Admission Date: 10/18/2020  Attending Physician: Win Bhakta MD  Primary Care Provider: Mickey Agrawal MD         Patient information was obtained from patient and ER records.     Subjective:     Principal Problem:Acute combined systolic and diastolic heart failure    Chief Complaint:   Chief Complaint   Patient presents with    Leg Swelling     pt c/o BLE swelling, x3 weeks        HPI: . Karan Aburto is a 74 y.o. male patient with Hx of HTN, HLD who presents to the Emergency Department for evaluation of BLE swelling which onset gradually 3 weeks ago. Pt's son expresses that pt's sxs may be attributed to recent medication change after recently changing insurance companies.He reports the leg swelling has been associated with progressive SOB. Denies chest pain, cough, palpitations, fever or chills.  Pt denies feeling SOB in the supine position or waking up short of breath in the middle of the night. No known hx of A-fib, CHF, DVT, PE, liver problems, or use of blood thinners. In ED with concerns for DVT US obtained which was negative, also given IV lasix with concerns for possible CHFE. It was noted his potassium was 2.4 and he was given IV and PO. Patient also has elevated LFTs. Due to multiple issues patient admitted for further management.        No past medical history on file.    No past surgical history on file.    Review of patient's allergies indicates:  No Known Allergies    No current facility-administered medications on file prior to encounter.      Current Outpatient Medications on File Prior to Encounter   Medication Sig    amLODIPine (NORVASC) 5 MG tablet TAKE 1 TABLET BY MOUTH DAILY    baclofen (LIORESAL) 20 MG tablet Take 20 mg by mouth.    benazepriL (LOTENSIN) 20 MG tablet TAKE 1 TABLET BY MOUTH TWICE DAILY    diltiaZEM (CARDIZEM CD) 180 MG 24 hr capsule Take 180 mg by  mouth once daily.    doxycycline (VIBRAMYCIN) 50 MG capsule Take 50 mg by mouth.    fenofibrate 160 MG Tab Take 160 mg by mouth once daily.    furosemide (LASIX) 40 MG tablet Take 40 mg by mouth once daily.    hydroCHLOROthiazide (HYDRODIURIL) 25 MG tablet TAKE 1 TABLET BY MOUTH DAILY    olmesartan (BENICAR) 40 MG tablet Take 40 mg by mouth once daily.    potassium chloride SA (K-DUR,KLOR-CON) 20 MEQ tablet Take 20 mEq by mouth 2 (two) times daily.    rosuvastatin (CRESTOR) 10 MG tablet Take 10 mg by mouth.    tiZANidine 4 mg Cap Take by mouth.     Family History     None        Tobacco Use    Smoking status: Not on file   Substance and Sexual Activity    Alcohol use: Not on file    Drug use: Not on file    Sexual activity: Not on file     Review of Systems   Constitutional: Negative for appetite change, chills, diaphoresis, fatigue, fever and unexpected weight change.   HENT: Negative for congestion, ear discharge, ear pain, hearing loss, nosebleeds, postnasal drip, sinus pressure, sinus pain, sneezing, sore throat, tinnitus, trouble swallowing and voice change.    Eyes: Negative for pain, discharge, redness and itching.   Respiratory: Positive for shortness of breath. Negative for cough, chest tightness and wheezing.    Cardiovascular: Positive for leg swelling. Negative for chest pain and palpitations.   Gastrointestinal: Negative for abdominal distention, abdominal pain, blood in stool, constipation, diarrhea, nausea, rectal pain and vomiting.   Endocrine: Negative for cold intolerance, heat intolerance, polydipsia, polyphagia and polyuria.   Genitourinary: Negative for decreased urine volume, difficulty urinating, discharge, dysuria, flank pain, frequency, hematuria, penile pain, scrotal swelling, testicular pain and urgency.   Musculoskeletal: Negative for arthralgias, back pain, joint swelling, myalgias and neck pain.   Skin: Negative for pallor and rash.   Neurological: Negative for dizziness,  tremors, weakness, light-headedness, numbness and headaches.   Hematological: Negative for adenopathy. Does not bruise/bleed easily.   Psychiatric/Behavioral: Negative for agitation, confusion, sleep disturbance and suicidal ideas. The patient is not nervous/anxious.    All other systems reviewed and are negative.    Objective:     Vital Signs (Most Recent):  Temp: 97.6 °F (36.4 °C) (10/18/20 1932)  Pulse: 89 (10/18/20 1932)  Resp: 16 (10/18/20 1932)  BP: (!) 163/101 (10/18/20 1932)  SpO2: (!) 93 % (10/18/20 1932) Vital Signs (24h Range):  Temp:  [97.6 °F (36.4 °C)-98 °F (36.7 °C)] 97.6 °F (36.4 °C)  Pulse:  [] 89  Resp:  [16-20] 16  SpO2:  [93 %-98 %] 93 %  BP: (132-214)/() 163/101     Weight: 99.2 kg (218 lb 11.1 oz)  Body mass index is 31.38 kg/m².    Physical Exam  Vitals signs and nursing note reviewed.   Constitutional:       General: He is not in acute distress.     Appearance: He is well-developed. He is not diaphoretic.   HENT:      Head: Normocephalic and atraumatic.      Right Ear: External ear normal.      Left Ear: External ear normal.      Nose: Nose normal.      Mouth/Throat:      Pharynx: No oropharyngeal exudate.   Eyes:      General: No scleral icterus.     Conjunctiva/sclera: Conjunctivae normal.      Pupils: Pupils are equal, round, and reactive to light.   Neck:      Musculoskeletal: Normal range of motion and neck supple.      Thyroid: No thyromegaly.      Vascular: No JVD.      Trachea: No tracheal deviation.   Cardiovascular:      Rate and Rhythm: Regular rhythm. Tachycardia present.      Heart sounds: Normal heart sounds. No murmur. No friction rub. No gallop.    Pulmonary:      Effort: Pulmonary effort is normal. No respiratory distress.      Breath sounds: No stridor. Rales present. No wheezing.   Abdominal:      General: Bowel sounds are normal. There is no distension.      Palpations: Abdomen is soft. There is no mass.      Tenderness: There is no abdominal tenderness. There  is no guarding or rebound.   Genitourinary:     Penis: Normal. No tenderness.       Prostate: Normal.      Rectum: Normal.   Musculoskeletal: Normal range of motion.         General: No tenderness.      Right lower leg: Edema present.      Left lower leg: Edema present.   Lymphadenopathy:      Cervical: No cervical adenopathy.   Skin:     General: Skin is warm.      Capillary Refill: Capillary refill takes less than 2 seconds.      Findings: No erythema or rash.   Neurological:      Mental Status: He is alert and oriented to person, place, and time.      Cranial Nerves: No cranial nerve deficit.      Sensory: No sensory deficit.      Motor: No abnormal muscle tone.      Deep Tendon Reflexes: Reflexes normal.   Psychiatric:         Behavior: Behavior normal.           CRANIAL NERVES     CN III, IV, VI   Pupils are equal, round, and reactive to light.       Significant Labs: All pertinent labs within the past 24 hours have been reviewed.    Significant Imaging: I have reviewed and interpreted all pertinent imaging results/findings within the past 24 hours.    Assessment/Plan:     * Acute combined systolic and diastolic heart failure  New Onset  Daily Weights  Strict I/Os  Fluid Restriction <1L daily   Supplemental Oxygen PRN   IV Lasix  ECHO  Cards Consult       Hyperlipemia  Hold meds with elevated LFT    Essential hypertension  Monitor BP  Hold ARB in light of rising Cr   Norvasc hold as well due to possible effect of leg swelling   HCTZ on hold due to low K   Will given PRN Clonidine if needed       Leg swelling  2/2 CHFE      ANASTASIYA (acute kidney injury)  Likely due to Cardiac Disease   Monitor       Leukocytosis  PCT Negative   Will cover for possible cellulitis of feet   Monitor       Hypokalemia  Monitor and Replete       Abnormal liver enzymes  Possibly due to Cardiac Congestion   IV Lasix  Monitor       Diabetes mellitus type 2, uncontrolled  Patient has no know Hx of DM  Check A1C  Monitor F/S  ISC        Shortness of breath  2/2 CHFE        VTE Risk Mitigation (From admission, onward)    None             Win Bhakta MD  Department of Hospital Medicine   [facility]

## 2020-10-20 PROBLEM — N17.9 AKI (ACUTE KIDNEY INJURY): Status: RESOLVED | Noted: 2020-01-01 | Resolved: 2020-01-01

## 2020-10-20 PROBLEM — E87.3 METABOLIC ALKALOSIS: Status: ACTIVE | Noted: 2020-01-01

## 2020-10-20 NOTE — PLAN OF CARE
Home health orders noted and faxed to [phone] for assignment.

## 2020-10-20 NOTE — SUBJECTIVE & OBJECTIVE
Interval History: no acute issues noted o/n. Needs continued IV diuresis today. K+ replacement ordered per primary team. Needs MPI stress test once more compensated. Assess response in AM.     Review of Systems   Constitution: Positive for malaise/fatigue.   HENT: Negative for hearing loss and hoarse voice.    Eyes: Negative for blurred vision and visual disturbance.   Cardiovascular: Positive for leg swelling. Negative for chest pain, claudication, dyspnea on exertion, irregular heartbeat, near-syncope, orthopnea, palpitations, paroxysmal nocturnal dyspnea and syncope.   Respiratory: Negative for cough, hemoptysis, shortness of breath, sleep disturbances due to breathing, snoring and wheezing.    Endocrine: Negative for cold intolerance and heat intolerance.   Hematologic/Lymphatic: Does not bruise/bleed easily.   Skin: Negative for color change, dry skin and nail changes.   Musculoskeletal: Positive for arthritis and back pain. Negative for joint pain and myalgias.   Gastrointestinal: Negative for bloating, abdominal pain, constipation, nausea and vomiting.   Genitourinary: Negative for dysuria, flank pain, hematuria and hesitancy.   Neurological: Positive for weakness. Negative for headaches, light-headedness, loss of balance, numbness and paresthesias.   Psychiatric/Behavioral: Positive for memory loss. Negative for altered mental status.   Allergic/Immunologic: Negative for environmental allergies.     Objective:     Vital Signs (Most Recent):  Temp: 97.9 °F (36.6 °C) (10/20/20 0719)  Pulse: 86 (10/20/20 0719)  Resp: 18 (10/20/20 0719)  BP: (!) 172/113 (10/20/20 0719)  SpO2: (!) 92 % (10/20/20 0719) Vital Signs (24h Range):  Temp:  [96.7 °F (35.9 °C)-98.3 °F (36.8 °C)] 97.9 °F (36.6 °C)  Pulse:  [] 86  Resp:  [16-20] 18  SpO2:  [90 %-96 %] 92 %  BP: (134-187)/() 172/113     Weight: 98.9 kg (218 lb 0.6 oz)  Body mass index is 31.28 kg/m².     SpO2: (!) 92 %  O2 Device (Oxygen Therapy): room  air      Intake/Output Summary (Last 24 hours) at 10/20/2020 1130  Last data filed at 10/20/2020 0500  Gross per 24 hour   Intake 2870 ml   Output 6715 ml   Net -3845 ml       Lines/Drains/Airways     Drain                 Urethral Catheter 10/18/20 1658 Straight-tip 16 Fr. 1 day          Peripheral Intravenous Line                 Peripheral IV - Single Lumen 10/19/20 1226 20 G Right Antecubital less than 1 day                Physical Exam   Constitutional: He is oriented to person, place, and time. He appears well-developed and well-nourished. No distress.   HENT:   Head: Normocephalic and atraumatic.   Eyes: Pupils are equal, round, and reactive to light.   Neck: Normal range of motion and full passive range of motion without pain. Neck supple. No JVD present. No tracheal deviation present. No thyromegaly present.   Cardiovascular: Normal rate, regular rhythm, S1 normal, S2 normal and intact distal pulses. PMI is not displaced. Exam reveals no distant heart sounds.   No murmur heard.  Pulses:       Radial pulses are 2+ on the right side and 2+ on the left side.        Dorsalis pedis pulses are 1+ on the right side and 1+ on the left side.   CHEST PAIN FREE   Pulmonary/Chest: Effort normal and breath sounds normal. No accessory muscle usage. No respiratory distress. He has no decreased breath sounds. He has no wheezes. He has no rales.   Abdominal: Soft. Bowel sounds are normal. He exhibits no distension. There is no abdominal tenderness.   Musculoskeletal: Normal range of motion.         General: Edema present.      Right ankle: He exhibits swelling.      Left ankle: He exhibits swelling.      Comments: BLE edema to bilateral thighs   Neurological: He is alert and oriented to person, place, and time.   Skin: Skin is warm and dry. He is not diaphoretic. No cyanosis. Nails show no clubbing.   Psychiatric: He has a normal mood and affect. His speech is normal and behavior is normal. Judgment and thought content  normal. Cognition and memory are normal.   Nursing note and vitals reviewed.      Significant Labs:   BMP:   Recent Labs   Lab 10/18/20  1512 10/18/20  2114 10/19/20  0127 10/19/20  0738 10/19/20  1721 10/20/20  0825   * 343*  --  241*  --  236*    144  --  142  --  142   K 2.4* 2.5* 2.6* 3.3*  --  2.6*   CL 91* 90*  --  93*  --  91*   CO2 34* 40*  --  39*  --  41*   BUN 28* 24*  --  22  --  25*   CREATININE 1.4 1.2  --  1.1  --  1.1   CALCIUM 9.0 8.2*  --  8.4*  --  8.0*   MG 2.1  --   --   --  2.0 2.0   , CBC   Recent Labs   Lab 10/18/20  1512 10/19/20  0739 10/20/20  0825   WBC 18.99* 20.09* 18.14*   HGB 14.1 14.0 13.3*   HCT 42.7 43.1 41.3   * 93* 79*    and All pertinent lab results from the last 24 hours have been reviewed.    Significant Imaging: Echocardiogram:   Transthoracic echo (TTE) complete (Cupid Only):   Results for orders placed or performed during the hospital encounter of 10/18/20   Echo Color Flow Doppler? Yes; Bubble Contrast? No   Result Value Ref Range    Ascending aorta 2.97 cm    STJ 2.57 cm    AV mean gradient 7 mmHg    Ao peak lewis 1.79 m/s    Ao VTI 27.91 cm    IVS 1.39 (A) 0.6 - 1.1 cm    LA size 4.21 cm    Left Atrium Major Axis 4.62 cm    LVIDd 3.82 3.5 - 6.0 cm    LVIDs 2.29 2.1 - 4.0 cm    LVOT diameter 2.39 cm    LVOT peak VTI 21.64 cm    Posterior Wall 1.08 0.6 - 1.1 cm    MV Peak A Lewis 1.10 m/s    E wave decelartion time 190.97 msec    MV Peak E Lewis 0.79 m/s    RA Major Axis 4.32 cm    RA Width 2.24 cm    TAPSE 2.81 cm    TR Max Lewis 1.74 m/s    TDI LATERAL 0.07 m/s    TDI SEPTAL 0.06 m/s    LA WIDTH 4.11 cm    Ao root annulus 3.66 cm    MV stenosis pressure 1/2 time 55.38 ms    LV Diastolic Volume 62.69 mL    LV Systolic Volume 17.96 mL    LVOT peak lewis 1.28 m/s    LV LATERAL E/E' RATIO 11.29 m/s    LV SEPTAL E/E' RATIO 13.17 m/s    FS 40 %    LV mass 161.27 g    Left Ventricle Relative Wall Thickness 0.57 cm    AV valve area 3.48 cm2    AV Velocity Ratio 0.72      AV index (prosthetic) 0.78     MV valve area p 1/2 method 3.97 cm2    E/A ratio 0.72     Mean e' 0.07 m/s    LVOT area 4.5 cm2    LVOT stroke volume 97.03 cm3    AV peak gradient 13 mmHg    E/E' ratio 12.15 m/s    LV Systolic Volume Index 8.1 mL/m2    LV Diastolic Volume Index 28.40 mL/m2    LV Mass Index 73 g/m2    Triscuspid Valve Regurgitation Peak Gradient 12 mmHg    Right Atrial Pressure (from IVC) 3 mmHg    TV rest pulmonary artery pressure 15 mmHg    Narrative    · There is mild left ventricular concentric hypertrophy.  · With normal systolic function. The estimated ejection fraction is 60%.  · Grade I diastolic dysfunction.  · Normal right ventricular systolic function.  · Mild left atrial enlargement.  · Mild mitral regurgitation.  · Normal central venous pressure (3 mmHg).  · The estimated PA systolic pressure is 15 mmHg.

## 2020-10-20 NOTE — ASSESSMENT & PLAN NOTE
1. Metabolic / Contraction alkalosis, likely due to aggressive diuresis, at this time will hold diuretics, will start him on IV fluids, also will give couple of doses of acetazolamide,     2.  Volume status, peripheral edema most likely due to combination of 3rd spacing from hypoalbuminemia, decreased mobility and side effect of calcium channel blockers, he appears volume depleted on exam,    3.  Hypertension, resume blood pressure medications, monitor    4.  Hypokalemia from continue potassium supplements    5.  Stable renal function with a serum creatinine of 0.8 mg/dL,    6.  Proteinuria, mild at 0.5 g per day, monitor, he is on an Ace inhibitor

## 2020-10-20 NOTE — PLAN OF CARE
CM met with patient at the bedside to discuss the recommendations of SNF.  Patient declined SNF at this time.  He stated that he is feeling much better.  CM also spoke with patient regarding home health and he is agreeable.  He understands that "[employer] will [facility] a [facility] health [facility].  Choice form completed and placed in the patient blue folder.  BRAD requested home health orders from [facility].     10/20/20 7926   Discharge Reassessment   Assessment Type Discharge Planning Reassessment   Provided patient/caregiver education on the expected discharge date and the discharge plan Yes   Do you have any problems affording any of your prescribed medications? No   Discharge Plan A Home with family;Home Health   Discharge Plan B Home with family;Home Health   DME Needed Upon Discharge  none   Patient choice form signed by patient/caregiver Yes   Anticipated Discharge Disposition Home-Health   Can the patient/caregiver answer the patient profile reliably? Yes, cognitively intact

## 2020-10-20 NOTE — ASSESSMENT & PLAN NOTE
Possibly due to Cardiac Congestion   AST/ALT - 122/221  Ammonia mildly elevated - lactulose given  RUQ indicates fatty liver  Acute hepatitis panel pending  Will need GI outpatient

## 2020-10-20 NOTE — NURSING
Diabetes follow-up:  Patient able to properly participate in assessment and education  He reports is newly diagnosed but has taken care of his  wife in the past who was a diabetic  Provided with a Contour next home glucose monitor   Reports is aware of how to operate meter.  Recommend he test at least FBS daily  Discussed with Dr. Bundy regarding med management.  He is planning to discharge on an oral agent.  Reviewed info on target glucose values (patient was able to correctly verbalize info), hyper/hypoglycemia  Discussed portion control, low fat meal planning and avoiding high calorie liquids  Home health is planned at discharge.   Recommended they follow-up with his diabetes management at home for reinforcement of info  Literature provided

## 2020-10-20 NOTE — PLAN OF CARE
POC reviewed with pt and son; son verbalizes understanding  Pt able to verbalize needs; denies pain or discomfort at this time  Pt oriented to self at all times; pt intermittently oriented to time, place, and situation; needs frequent reorientation  VSS; ST on tele monitor  Anaya in place  Neuro checks Q4  Accuchecks AC/HS  Weeping edema to bilateral LE's 4+  Stage 1 to coccyx  Wound care following pt  Avysis in use  Pt free of falls  Safety precautions maintained

## 2020-10-20 NOTE — PLAN OF CARE
WAYNE spoke with Juanita [phone], at Mickey Agrawal MD PCP. She stated after reviewing pt's information he would benefit from SNF since he health have declined so much. She said she can speak with pt about it, but wanted to update WAYNE.     Satnam Clemons LMSW 10/20/2020 1:22 PM

## 2020-10-20 NOTE — PROGRESS NOTES
[facility] -   Cardiology  Progress Note    Patient Name: Karan Aburto  MRN: [id number]  Admission Date: 10/18/2020  Hospital Length of Stay: 0 days  Code Status: No Order   Attending Physician: Partha Madsen, *   Primary Care Physician: Mickey Agrawal MD  Expected Discharge Date:   Principal Problem:Acute diastolic heart failure    Subjective:   HPI    Karan Aburto is a 74 year old male who presented to [facility] due to LE edema for the past 3 weeks. His current medical conditions include HTN, HLP, obesity. ED workup revealed K+ 2.4, Cr 1.4, Glucose 480, Troponin 0.121, , LA 3.2. Repeat troponin 0.104. ECHO pending. He was placed in observation under the care of hospital medicine. Cardiology consulted to assist with medical management. Chart reviewed, patient seen and examined. Denies chest pain or anginal equivalents. No shortness of breath, BLAIR or palpitations. He does have LE edema to bilateral thighs on exam today. Of note, patient reports that he is being followed by Dr Moncho Culver at [facility]. Denies any previous MI or stents previously. Further recs to follow.       Hospital Course:   10/20/2020-Patient seen and examined in room, lying in bed. Feels ok today. Continues to have significant LE edema today. Has diuresed >6L since admission. Needs continued diuresis. Labs reviewed, K+ 2.6, CO2 41, Cr 1.1    Interval History: no acute issues noted o/n. Needs continued IV diuresis today. K+ replacement ordered per primary team. Needs MPI stress test once more compensated. Assess response in AM.     Review of Systems   Constitution: Positive for malaise/fatigue.   HENT: Negative for hearing loss and hoarse voice.    Eyes: Negative for blurred vision and visual disturbance.   Cardiovascular: Positive for leg swelling. Negative for chest pain, claudication, dyspnea on exertion, irregular heartbeat, near-syncope, orthopnea, palpitations, paroxysmal nocturnal dyspnea and  syncope.   Respiratory: Negative for cough, hemoptysis, shortness of breath, sleep disturbances due to breathing, snoring and wheezing.    Endocrine: Negative for cold intolerance and heat intolerance.   Hematologic/Lymphatic: Does not bruise/bleed easily.   Skin: Negative for color change, dry skin and nail changes.   Musculoskeletal: Positive for arthritis and back pain. Negative for joint pain and myalgias.   Gastrointestinal: Negative for bloating, abdominal pain, constipation, nausea and vomiting.   Genitourinary: Negative for dysuria, flank pain, hematuria and hesitancy.   Neurological: Positive for weakness. Negative for headaches, light-headedness, loss of balance, numbness and paresthesias.   Psychiatric/Behavioral: Positive for memory loss. Negative for altered mental status.   Allergic/Immunologic: Negative for environmental allergies.     Objective:     Vital Signs (Most Recent):  Temp: 97.9 °F (36.6 °C) (10/20/20 0719)  Pulse: 86 (10/20/20 0719)  Resp: 18 (10/20/20 0719)  BP: (!) 172/113 (10/20/20 0719)  SpO2: (!) 92 % (10/20/20 0719) Vital Signs (24h Range):  Temp:  [96.7 °F (35.9 °C)-98.3 °F (36.8 °C)] 97.9 °F (36.6 °C)  Pulse:  [] 86  Resp:  [16-20] 18  SpO2:  [90 %-96 %] 92 %  BP: (134-187)/() 172/113     Weight: 98.9 kg (218 lb 0.6 oz)  Body mass index is 31.28 kg/m².     SpO2: (!) 92 %  O2 Device (Oxygen Therapy): room air      Intake/Output Summary (Last 24 hours) at 10/20/2020 1130  Last data filed at 10/20/2020 0500  Gross per 24 hour   Intake 2870 ml   Output 6715 ml   Net -3845 ml       Lines/Drains/Airways     Drain                 Urethral Catheter 10/18/20 1658 Straight-tip 16 Fr. 1 day          Peripheral Intravenous Line                 Peripheral IV - Single Lumen 10/19/20 1226 20 G Right Antecubital less than 1 day                Physical Exam   Constitutional: He is oriented to person, place, and time. He appears well-developed and well-nourished. No distress.   HENT:    Head: Normocephalic and atraumatic.   Eyes: Pupils are equal, round, and reactive to light.   Neck: Normal range of motion and full passive range of motion without pain. Neck supple. No JVD present. No tracheal deviation present. No thyromegaly present.   Cardiovascular: Normal rate, regular rhythm, S1 normal, S2 normal and intact distal pulses. PMI is not displaced. Exam reveals no distant heart sounds.   No murmur heard.  Pulses:       Radial pulses are 2+ on the right side and 2+ on the left side.        Dorsalis pedis pulses are 1+ on the right side and 1+ on the left side.   CHEST PAIN FREE   Pulmonary/Chest: Effort normal and breath sounds normal. No accessory muscle usage. No respiratory distress. He has no decreased breath sounds. He has no wheezes. He has no rales.   Abdominal: Soft. Bowel sounds are normal. He exhibits no distension. There is no abdominal tenderness.   Musculoskeletal: Normal range of motion.         General: Edema present.      Right ankle: He exhibits swelling.      Left ankle: He exhibits swelling.      Comments: BLE edema to bilateral thighs   Neurological: He is alert and oriented to person, place, and time.   Skin: Skin is warm and dry. He is not diaphoretic. No cyanosis. Nails show no clubbing.   Psychiatric: He has a normal mood and affect. His speech is normal and behavior is normal. Judgment and thought content normal. Cognition and memory are normal.   Nursing note and vitals reviewed.      Significant Labs:   BMP:   Recent Labs   Lab 10/18/20  1512 10/18/20  2114 10/19/20  0127 10/19/20  0738 10/19/20  1721 10/20/20  0825   * 343*  --  241*  --  236*    144  --  142  --  142   K 2.4* 2.5* 2.6* 3.3*  --  2.6*   CL 91* 90*  --  93*  --  91*   CO2 34* 40*  --  39*  --  41*   BUN 28* 24*  --  22  --  25*   CREATININE 1.4 1.2  --  1.1  --  1.1   CALCIUM 9.0 8.2*  --  8.4*  --  8.0*   MG 2.1  --   --   --  2.0 2.0   , CBC   Recent Labs   Lab 10/18/20  1512  10/19/20  0739 10/20/20  0825   WBC 18.99* 20.09* 18.14*   HGB 14.1 14.0 13.3*   HCT 42.7 43.1 41.3   * 93* 79*    and All pertinent lab results from the last 24 hours have been reviewed.    Significant Imaging: Echocardiogram:   Transthoracic echo (TTE) complete (Cupid Only):   Results for orders placed or performed during the hospital encounter of 10/18/20   Echo Color Flow Doppler? Yes; Bubble Contrast? No   Result Value Ref Range    Ascending aorta 2.97 cm    STJ 2.57 cm    AV mean gradient 7 mmHg    Ao peak lewis 1.79 m/s    Ao VTI 27.91 cm    IVS 1.39 (A) 0.6 - 1.1 cm    LA size 4.21 cm    Left Atrium Major Axis 4.62 cm    LVIDd 3.82 3.5 - 6.0 cm    LVIDs 2.29 2.1 - 4.0 cm    LVOT diameter 2.39 cm    LVOT peak VTI 21.64 cm    Posterior Wall 1.08 0.6 - 1.1 cm    MV Peak A Lewis 1.10 m/s    E wave decelartion time 190.97 msec    MV Peak E Lewis 0.79 m/s    RA Major Axis 4.32 cm    RA Width 2.24 cm    TAPSE 2.81 cm    TR Max Lewis 1.74 m/s    TDI LATERAL 0.07 m/s    TDI SEPTAL 0.06 m/s    LA WIDTH 4.11 cm    Ao root annulus 3.66 cm    MV stenosis pressure 1/2 time 55.38 ms    LV Diastolic Volume 62.69 mL    LV Systolic Volume 17.96 mL    LVOT peak lewis 1.28 m/s    LV LATERAL E/E' RATIO 11.29 m/s    LV SEPTAL E/E' RATIO 13.17 m/s    FS 40 %    LV mass 161.27 g    Left Ventricle Relative Wall Thickness 0.57 cm    AV valve area 3.48 cm2    AV Velocity Ratio 0.72     AV index (prosthetic) 0.78     MV valve area p 1/2 method 3.97 cm2    E/A ratio 0.72     Mean e' 0.07 m/s    LVOT area 4.5 cm2    LVOT stroke volume 97.03 cm3    AV peak gradient 13 mmHg    E/E' ratio 12.15 m/s    LV Systolic Volume Index 8.1 mL/m2    LV Diastolic Volume Index 28.40 mL/m2    LV Mass Index 73 g/m2    Triscuspid Valve Regurgitation Peak Gradient 12 mmHg    Right Atrial Pressure (from IVC) 3 mmHg    TV rest pulmonary artery pressure 15 mmHg    Narrative    · There is mild left ventricular concentric hypertrophy.  · With normal systolic  function. The estimated ejection fraction is 60%.  · Grade I diastolic dysfunction.  · Normal right ventricular systolic function.  · Mild left atrial enlargement.  · Mild mitral regurgitation.  · Normal central venous pressure (3 mmHg).  · The estimated PA systolic pressure is 15 mmHg.        Assessment and Plan:       * Acute diastolic heart failure  ECHO pending  Continue Coreg, IV lasix BID, ACEi, Aldactone  Strict I/Os  Daily weights  Needs additional diuresis  Dash diet, 2 gm sodium restriction  1500 ml fluid restriction  Needs Ischemic evaluation once more compensated   Further recs to follow in AM    10/20/2020  -Continue Coreg, IV lasix, Aldactone, ACEi, Imdur  -ECHO revealed EF 60%, DD  -Needs additional IV diuresis today  -MPI stress test once more compensated IP vs OP  -Assess response in AM    Elevated troponin  Troponin elevated and trending downward  Likely due to uncontrolled glucose, CKD, and acute CHF  Will need ischemic evaluation once more compensated    Hyperlipemia  Resume Statin once LFTs improved    Essential hypertension  ON Medical therapy  Continue Coreg, ACEi, Aldactone  Monitor BP trend    ANASTASIYA (acute kidney injury)  Monitor closely with need for IV diuresis    Leukocytosis  On IV ABX  Mgmt per primary team    Hypokalemia  Keep K+>4    Abnormal liver enzymes  Monitor with diuresis  Likely due to decompensated CHF    Diabetes mellitus type 2, uncontrolled  Mgmt per primary team        VTE Risk Mitigation (From admission, onward)    None          JORGE Ceballos-C  Cardiology  [facility]

## 2020-10-20 NOTE — HPI
Karan Aburto this 74-year-old  man with history of hypertension, dyslipidemia, obesity, spastic paraplegia, was admitted to the hospital for progressive worsening of peripheral edema the last few weeks,  admission BNP was 159, echocardiogram shows normal LV function, mild diastolic dysfunction, likely peripheral edema due to decreased mobility from spastic paraplegia, side effect of calcium channel blocker, also has hypoalbuminemia resulting in peripheral edema due to 3rd spacing, patient was being treated with diuretics including hydrochlorothiazide, spironolactone, lasix , lead to significant contraction alkalosis.  We were consulted for contraction alkalosis and management.

## 2020-10-20 NOTE — HOSPITAL COURSE
10/20/2020-Patient seen and examined in room, lying in bed. Feels ok today. Continues to have significant LE edema today. Has diuresed >6L since admission. Needs continued diuresis. Labs reviewed, K+ 2.6, CO2 41, Cr 1.1    10/21/2020-Patient seen and examined in room, lying in bed. Feels ok today on exam. Has diuresed >4L overnight with improvement in LE edema. Labs reviewed, K+ 2.6, Cr 1.1    10/22/2020-Patient seen and examined in room, sitting in bedside chair. Feels ok today. Some improvement to LE edema today on exam. Continues to diurese well. BP slowly trending better. Recommendation for OP MPI stress test.     10/23/2020-Patient seen and examined in room, lying in bed. Feels good today. No complaints on exam. LE edema has significantly improved with FADI hose use. Labs reviewed, K+ 3.0, Cr 0.8. BP uncontrolled this AM. Needs better BP control and K+ repleted to normal to levels.

## 2020-10-20 NOTE — PROGRESS NOTES
[facility] Hospital Medicine  Progress Note    Patient Name: Karan Aburto  MRN: [id number]  Patient Class: OP- Observation   Admission Date: 10/18/2020  Length of Stay: 0 days  Attending Physician: Partha Madsen, *  Primary Care Provider: Mickey Agrawal MD        Subjective:     Principal Problem:Acute diastolic heart failure        HPI:  Mr. Karan Aburto is a 74 y.o. male patient with Hx of HTN, HLD who presents to the Emergency Department for evaluation of BLE swelling which onset gradually 3 weeks ago. Pt's son expresses that pt's sxs may be attributed to recent medication change after recently changing insurance companies.He reports the leg swelling has been associated with progressive SOB. Denies chest pain, cough, palpitations, fever or chills.  Pt denies feeling SOB in the supine position or waking up short of breath in the middle of the night. No known hx of A-fib, CHF, DVT, PE, liver problems, or use of blood thinners. In ED with concerns for DVT US obtained which was negative, also given IV lasix with concerns for possible CHFE. It was noted his potassium was 2.4 and he was given IV and PO. Patient also has elevated LFTs. Due to multiple issues patient admitted for further management.        Overview/Hospital Course:  Pt admitted with leg swelling, SOB and concerns for new onset CHF. Also, there is leg/foot redness representative of cellulitis. Cardiology saw the patient and initial recs and IV diuresis continued. IV Rocephin and Flagyl given for presumed leg cellulitis. Pt is oriented x 3 but is confused in reference to situation and says peculiar things. Likely encephalopathy secondary to cellulitis. There are no gross functional neuro deficits and CT of Head was negative.     10/20/2020 - Bilateral foot erythema has decreased, WBC trending down. Metabolic encephalopathy improving - pt able to verbalize situation and oriented x 3. Disorientation is intermittent. Potassium  replaced. Labs revealed multiple chemical derangements - CO2 41 - contraction alkalosis and Nephrology consulted. Dr. Gamino feels that pt's peripheral edema is secondary to 3rd spacing and pt has intravascular volume depletion. Albumin 2.6. IV fluids and diamox started. Lasix discontinued. Hgb A1C = 9.3  - insulin in progress. Still with elevated liver enzymes. Liver US notes fatty liver, acute hepatitis panel is negative. Ammonia = 53 and lactulose x 2 ordered.     Interval History: Oriented and able to explain his symptoms and why he is hospitalized. Encephalopathy intermittent. He denies any symptoms currently. Pt is refusing SNF placement and home health with PT/OT ordered.     Review of Systems   Constitutional: Positive for activity change. Negative for appetite change, chills, diaphoresis, fatigue, fever and unexpected weight change.   HENT: Negative for congestion, ear discharge, ear pain, hearing loss, nosebleeds, postnasal drip, sinus pressure, sinus pain, sneezing, sore throat, tinnitus, trouble swallowing and voice change.    Eyes: Negative for pain, discharge, redness and itching.   Respiratory: Negative for cough, chest tightness, shortness of breath and wheezing.    Cardiovascular: Positive for leg swelling. Negative for chest pain and palpitations.   Gastrointestinal: Negative for abdominal distention, abdominal pain, blood in stool, constipation, diarrhea, nausea, rectal pain and vomiting.   Endocrine: Negative for cold intolerance, heat intolerance, polydipsia, polyphagia and polyuria.   Genitourinary: Negative for decreased urine volume, difficulty urinating, discharge, dysuria, flank pain, frequency, hematuria, penile pain, scrotal swelling, testicular pain and urgency.   Musculoskeletal: Negative for arthralgias, back pain, joint swelling, myalgias and neck pain.   Skin: Positive for color change. Negative for pallor and rash.   Neurological: Negative for dizziness, tremors, weakness,  light-headedness, numbness and headaches.   Hematological: Negative for adenopathy. Does not bruise/bleed easily.   Psychiatric/Behavioral: Positive for confusion. Negative for agitation, sleep disturbance and suicidal ideas. The patient is not nervous/anxious.    All other systems reviewed and are negative.    Objective:     Vital Signs (Most Recent):  Temp: 97.9 °F (36.6 °C) (10/20/20 0719)  Pulse: 102 (10/20/20 1100)  Resp: 18 (10/20/20 0719)  BP: (!) 172/113 (10/20/20 0719)  SpO2: (!) 93 % (10/20/20 1301) Vital Signs (24h Range):  Temp:  [96.7 °F (35.9 °C)-98.1 °F (36.7 °C)] 97.9 °F (36.6 °C)  Pulse:  [] 102  Resp:  [16-20] 18  SpO2:  [90 %-96 %] 93 %  BP: (134-174)/() 172/113     Weight: 98.9 kg (218 lb 0.6 oz)  Body mass index is 31.28 kg/m².    Intake/Output Summary (Last 24 hours) at 10/20/2020 1438  Last data filed at 10/20/2020 0500  Gross per 24 hour   Intake 2820 ml   Output 6715 ml   Net -3895 ml      Physical Exam  Vitals signs and nursing note reviewed.   Constitutional:       General: He is not in acute distress.     Appearance: He is well-developed. He is obese. He is not diaphoretic.   HENT:      Head: Normocephalic and atraumatic.      Nose: Nose normal.      Mouth/Throat:      Pharynx: No oropharyngeal exudate.   Eyes:      General: No scleral icterus.     Conjunctiva/sclera: Conjunctivae normal.      Pupils: Pupils are equal, round, and reactive to light.   Neck:      Musculoskeletal: Normal range of motion and neck supple.      Thyroid: No thyromegaly.      Vascular: No JVD.      Trachea: No tracheal deviation.   Cardiovascular:      Rate and Rhythm: Normal rate and regular rhythm.      Heart sounds: Normal heart sounds. No murmur. No friction rub. No gallop.    Pulmonary:      Effort: Pulmonary effort is normal. No respiratory distress.      Breath sounds: Normal breath sounds. No wheezing.   Abdominal:      General: Bowel sounds are normal. There is no distension.      Palpations:  Abdomen is soft. There is no mass.      Tenderness: There is no abdominal tenderness. There is no guarding or rebound.   Genitourinary:     Penis: No tenderness.    Musculoskeletal: Normal range of motion.         General: No tenderness.      Right lower leg: Edema present.      Left lower leg: Edema present.      Comments: Generalized weakness   Lymphadenopathy:      Cervical: No cervical adenopathy.   Skin:     General: Skin is warm.      Findings: Erythema present. No rash.      Comments: Pt has splotchy erythematous discoloration of his skin especially to extremities.     Reddened discoloration especially to right foot and right lower leg has decreased.    There is erythema to the anterior right thigh and to the bilateral medial thighs - skin is intact   Neurological:      General: No focal deficit present.      Mental Status: He is alert. He is disoriented.      Cranial Nerves: No cranial nerve deficit.      Sensory: No sensory deficit.      Motor: No abnormal muscle tone.      Deep Tendon Reflexes: Reflexes normal.   Psychiatric:         Behavior: Behavior normal.         Significant Labs:   CBC:   Recent Labs   Lab 10/18/20  1512 10/19/20  0739 10/20/20  0825   WBC 18.99* 20.09* 18.14*   HGB 14.1 14.0 13.3*   HCT 42.7 43.1 41.3   * 93* 79*     CMP:   Recent Labs   Lab 10/18/20  1512 10/18/20  2114 10/19/20  0127 10/19/20  0738 10/20/20  0825    144  --  142 142   K 2.4* 2.5* 2.6* 3.3* 2.6*   CL 91* 90*  --  93* 91*   CO2 34* 40*  --  39* 41*   * 343*  --  241* 236*   BUN 28* 24*  --  22 25*   CREATININE 1.4 1.2  --  1.1 1.1   CALCIUM 9.0 8.2*  --  8.4* 8.0*   PROT 5.8*  --   --  5.6* 5.3*   ALBUMIN 3.0*  --   --  2.9* 2.6*   BILITOT 1.0  --   --  1.4* 1.2*   ALKPHOS 101  --   --  98 98   *  --   --  122* 100*   *  --   --  221* 202*   ANIONGAP 15 14  --  10 10   EGFRNONAA 49* 59*  --  >60 >60     All pertinent labs within the past 24 hours have been  reviewed.  None    Significant Imaging: I have reviewed all pertinent imaging results/findings within the past 24 hours.  I have reviewed and interpreted all pertinent imaging results/findings within the past 24 hours.      Assessment/Plan:      * Acute diastolic heart failure  New Onset  Daily Weights  Strict I/Os  Fluid Restriction <1L daily   Supplemental Oxygen PRN   IV Lasix and aldactone>>>>> stopped due to contraction alkalosis  ACE inhibitor started  ECHO - normal systolic function with EF 60 % and grade 1 diastolic dysfunction         Metabolic alkalosis  Nephrology consulted  IV fluids recommended  Could be attributed to HCTZ  Diamox  Daily chemistries      Elevated troponin  Troponin elevated and trending downward  Likely due to uncontrolled glucose, CKD, and acute CHF  Will need ischemic evaluation once more compensated      Hyperlipemia  Hold meds with elevated LFT - continue fenofibrate  Lab Results   Component Value Date    CHOL 115 (L) 10/19/2020     Lab Results   Component Value Date    HDL 32 (L) 10/19/2020     Lab Results   Component Value Date    LDLCALC 3.4 (L) 10/19/2020     Lab Results   Component Value Date    TRIG 398 (H) 10/19/2020     Lab Results   Component Value Date    CHOLHDL 27.8 10/19/2020       Essential hypertension  Monitor BP  Creatinine has improved - lisinopril started by Cardiology  Will given PRN Clonidine if needed   fluctuating      Leg swelling  Empiric treatment for leg cellulitis  Low albumin  Elevated legs  Strict I & O      Leukocytosis  PCT Negative   Will cover for possible cellulitis of feet   IV Zosyn      Hypokalemia  Monitor and Replete    Abnormal liver enzymes  Possibly due to Cardiac Congestion   AST/ALT - 122/221  Ammonia mildly elevated - lactulose given  RUQ indicates fatty liver  Acute hepatitis panel pending  Will need GI outpatient      Diabetes mellitus type 2, uncontrolled  Patient has no know Hx of DM  Hgb A1 C 9.3  Glucose - 322, 377, 183  ISC   Long  acting insulin started, 15 units nightly    Shortness of breath  CXR - no acute cardiopulmonary findings  Possibly secondary to diastolic heart failure  Continue supplemental oxygen          VTE Risk Mitigation (From admission, onward)    None          Discharge Planning   ADAM:      Code Status: Not on file   Is the patient medically ready for discharge?:     Reason for patient still in hospital (select all that apply): Patient trending condition and Treatment  Discharge Plan A: Home with family, Home Health                  Shayna Haynes NP  Department of Hospital Medicine   [facility]

## 2020-10-20 NOTE — ASSESSMENT & PLAN NOTE
Monitor BP  Creatinine has improved - lisinopril started by Cardiology  Will given PRN Clonidine if needed   fluctuating

## 2020-10-20 NOTE — ASSESSMENT & PLAN NOTE
ECHO pending  Continue Coreg, IV lasix BID, ACEi, Aldactone  Strict I/Os  Daily weights  Needs additional diuresis  Dash diet, 2 gm sodium restriction  1500 ml fluid restriction  Needs Ischemic evaluation once more compensated   Further recs to follow in AM    10/20/2020  -Continue Coreg, IV lasix, Aldactone, ACEi, Imdur  -ECHO revealed EF 60%, DD  -Needs additional IV diuresis today  -MPI stress test once more compensated IP vs OP  -Assess response in AM

## 2020-10-20 NOTE — SUBJECTIVE & OBJECTIVE
Past Medical History:   Diagnosis Date    Elevated PSA     Hypertension     Rosacea     Thyroid disease     hypothyroidism       No past surgical history on file.    Review of patient's allergies indicates:  No Known Allergies  Current Facility-Administered Medications   Medication Frequency    0.45% NaCl infusion Continuous    acetaZOLAMIDE tablet 250 mg BID    carvediloL tablet 12.5 mg BID WM    dextrose 50% injection 12.5 g PRN    dextrose 50% injection 25 g PRN    [START ON 10/21/2020] fenofibrate tablet 145 mg Daily    glucagon (human recombinant) injection 1 mg PRN    glucose chewable tablet 16 g PRN    glucose chewable tablet 24 g PRN    insulin aspart U-100 pen 1-10 Units Q6H PRN    insulin detemir U-100 pen 15 Units QHS    isosorbide mononitrate 24 hr tablet 30 mg Daily    lactulose 20 gram/30 mL solution Soln 15 g TID    lisinopriL tablet 20 mg Daily    metoprolol injection 5 mg Q5 Min PRN    ondansetron injection 4 mg Q8H PRN    piperacillin-tazobactam 4.5 g in dextrose 5 % 100 mL IVPB (ready to mix system) Q8H    potassium chloride SA CR tablet 40 mEq Q6H     Family History     None        Tobacco Use    Smoking status: Not on file   Substance and Sexual Activity    Alcohol use: Never     Frequency: Never    Drug use: Not on file    Sexual activity: Not on file     Review of Systems   Constitutional: Positive for activity change. Negative for appetite change.   HENT: Negative for congestion and facial swelling.    Eyes: Negative for pain, discharge and redness.   Respiratory: Negative for apnea, cough and chest tightness.    Cardiovascular: Positive for leg swelling. Negative for chest pain and palpitations.   Gastrointestinal: Negative for abdominal distention.   Genitourinary: Negative for difficulty urinating, dysuria and frequency.   Musculoskeletal: Positive for arthralgias. Negative for neck pain and neck stiffness.   Skin: Negative for color change, rash and wound.    Neurological: Positive for weakness. Negative for dizziness and numbness.   Psychiatric/Behavioral: Negative for sleep disturbance.   All other systems reviewed and are negative.    Objective:     Vital Signs (Most Recent):  Temp: 97.9 °F (36.6 °C) (10/20/20 0719)  Pulse: 102 (10/20/20 1100)  Resp: 18 (10/20/20 0719)  BP: (!) 172/113 (10/20/20 0719)  SpO2: (!) 93 % (10/20/20 1301)  O2 Device (Oxygen Therapy): room air (10/20/20 1301) Vital Signs (24h Range):  Temp:  [96.7 °F (35.9 °C)-98.1 °F (36.7 °C)] 97.9 °F (36.6 °C)  Pulse:  [] 102  Resp:  [16-20] 18  SpO2:  [90 %-96 %] 93 %  BP: (134-172)/() 172/113     Weight: 98.9 kg (218 lb 0.6 oz) (10/20/20 0500)  Body mass index is 31.28 kg/m².  Body surface area is 2.21 meters squared.    I/O last 3 completed shifts:  In: 4806 [P.O.:4356; IV Piggyback:450]  Out: 03113 [Urine:18138]    Physical Exam  Vitals signs and nursing note reviewed.   Constitutional:       General: He is not in acute distress.     Appearance: He is well-developed. He is not diaphoretic.   HENT:      Head: Normocephalic and atraumatic.   Eyes:      General:         Right eye: No discharge.      Pupils: Pupils are equal, round, and reactive to light.   Neck:      Musculoskeletal: Normal range of motion and neck supple.      Thyroid: No thyromegaly.      Trachea: No tracheal deviation.   Cardiovascular:      Rate and Rhythm: Normal rate and regular rhythm.      Heart sounds: Normal heart sounds. No murmur. No friction rub. No gallop.    Pulmonary:      Effort: Pulmonary effort is normal.      Breath sounds: Normal breath sounds. No wheezing or rales.   Abdominal:      Palpations: Abdomen is soft. There is no mass.      Tenderness: There is no abdominal tenderness. There is no guarding or rebound.      Comments: Obese    Musculoskeletal: Normal range of motion.   Lymphadenopathy:      Cervical: No cervical adenopathy.   Skin:     General: Skin is warm.      Findings: No erythema or rash.    Neurological:      Mental Status: He is alert and oriented to person, place, and time.         Significant Labs:  CBC:   Recent Labs   Lab 10/20/20  0825   WBC 18.14*   RBC 4.60   HGB 13.3*   HCT 41.3   PLT 79*   MCV 90   MCH 28.9   MCHC 32.2     CMP:   Recent Labs   Lab 10/20/20  0825   *   CALCIUM 8.0*   ALBUMIN 2.6*   PROT 5.3*      K 2.6*   CO2 41*   CL 91*   BUN 25*   CREATININE 1.1   ALKPHOS 98   *   *   BILITOT 1.2*     Coagulation:   Recent Labs   Lab 10/18/20  1512   INR 1.0     LFTs:   Recent Labs   Lab 10/20/20  0825   *   *   ALKPHOS 98   BILITOT 1.2*   PROT 5.3*   ALBUMIN 2.6*     All labs within the past 24 hours have been reviewed.    Significant Imaging:  Reviewed    Lab Results   Component Value Date    CALCIUM 8.0 (L) 10/20/2020    PHOS 2.4 (L) 10/19/2020       ABG  Recent Labs   Lab 10/20/20  1310   PH 7.609*   PO2 63*   PCO2 39.0   HCO3 39.1*   BE 18

## 2020-10-20 NOTE — NURSING
"PT helped pt. To [facility] [place], call received pt needed help, upon arriving to room found pt on floor. Pt stated "I did not hit my head. I slid down to the floor and laid down." Pt denies any discomfort, VSS, appears to be in nod distress. Staff helped pt back to bed. Bed Alrm set and Tele sitter at bedside. MD notified Will cont to monitor  "

## 2020-10-20 NOTE — CONSULTS
[facility]  Nephrology  Consult Note      Patient Name: Karan Aburto  MRN: [id number]  Admission Date: 10/18/2020  Hospital Length of Stay: 0 days  Attending Provider: Partha Madsen, *   Primary Care Physician: Mickey Agrawal MD  Principal Problem:Acute diastolic heart failure    Consults  Subjective:     HPI: Karan Aburto this 74-year-old  man with history of hypertension, dyslipidemia, obesity, spastic paraplegia, was admitted to the hospital for progressive worsening of peripheral edema the last few weeks,  admission BNP was 159, echocardiogram shows normal LV function, mild diastolic dysfunction, likely peripheral edema due to decreased mobility from spastic paraplegia, side effect of calcium channel blocker, also has hypoalbuminemia resulting in peripheral edema due to 3rd spacing, patient was being treated with diuretics including hydrochlorothiazide, spironolactone, lasix , lead to significant contraction alkalosis.  We were consulted for contraction alkalosis and management.         Past Medical History:   Diagnosis Date    Elevated PSA     Hypertension     Rosacea     Thyroid disease     hypothyroidism       No past surgical history on file.    Review of patient's allergies indicates:  No Known Allergies  Current Facility-Administered Medications   Medication Frequency    0.45% NaCl infusion Continuous    acetaZOLAMIDE tablet 250 mg BID    carvediloL tablet 12.5 mg BID WM    dextrose 50% injection 12.5 g PRN    dextrose 50% injection 25 g PRN    [START ON 10/21/2020] fenofibrate tablet 145 mg Daily    glucagon (human recombinant) injection 1 mg PRN    glucose chewable tablet 16 g PRN    glucose chewable tablet 24 g PRN    insulin aspart U-100 pen 1-10 Units Q6H PRN    insulin detemir U-100 pen 15 Units QHS    isosorbide mononitrate 24 hr tablet 30 mg Daily    lactulose 20 gram/30 mL solution Soln 15 g TID    lisinopriL tablet 20 mg Daily    metoprolol  injection 5 mg Q5 Min PRN    ondansetron injection 4 mg Q8H PRN    piperacillin-tazobactam 4.5 g in dextrose 5 % 100 mL IVPB (ready to mix system) Q8H    potassium chloride SA CR tablet 40 mEq Q6H     Family History     None        Tobacco Use    Smoking status: Not on file   Substance and Sexual Activity    Alcohol use: Never     Frequency: Never    Drug use: Not on file    Sexual activity: Not on file     Review of Systems   Constitutional: Positive for activity change. Negative for appetite change.   HENT: Negative for congestion and facial swelling.    Eyes: Negative for pain, discharge and redness.   Respiratory: Negative for apnea, cough and chest tightness.    Cardiovascular: Positive for leg swelling. Negative for chest pain and palpitations.   Gastrointestinal: Negative for abdominal distention.   Genitourinary: Negative for difficulty urinating, dysuria and frequency.   Musculoskeletal: Positive for arthralgias. Negative for neck pain and neck stiffness.   Skin: Negative for color change, rash and wound.   Neurological: Positive for weakness. Negative for dizziness and numbness.   Psychiatric/Behavioral: Negative for sleep disturbance.   All other systems reviewed and are negative.    Objective:     Vital Signs (Most Recent):  Temp: 97.9 °F (36.6 °C) (10/20/20 0719)  Pulse: 102 (10/20/20 1100)  Resp: 18 (10/20/20 0719)  BP: (!) 172/113 (10/20/20 0719)  SpO2: (!) 93 % (10/20/20 1301)  O2 Device (Oxygen Therapy): room air (10/20/20 1301) Vital Signs (24h Range):  Temp:  [96.7 °F (35.9 °C)-98.1 °F (36.7 °C)] 97.9 °F (36.6 °C)  Pulse:  [] 102  Resp:  [16-20] 18  SpO2:  [90 %-96 %] 93 %  BP: (134-172)/() 172/113     Weight: 98.9 kg (218 lb 0.6 oz) (10/20/20 0500)  Body mass index is 31.28 kg/m².  Body surface area is 2.21 meters squared.    I/O last 3 completed shifts:  In: 4806 [P.O.:4356; IV Piggyback:450]  Out: 78195 [Urine:52409]    Physical Exam  Vitals signs and nursing note reviewed.    Constitutional:       General: He is not in acute distress.     Appearance: He is well-developed. He is not diaphoretic.   HENT:      Head: Normocephalic and atraumatic.   Eyes:      General:         Right eye: No discharge.      Pupils: Pupils are equal, round, and reactive to light.   Neck:      Musculoskeletal: Normal range of motion and neck supple.      Thyroid: No thyromegaly.      Trachea: No tracheal deviation.   Cardiovascular:      Rate and Rhythm: Normal rate and regular rhythm.      Heart sounds: Normal heart sounds. No murmur. No friction rub. No gallop.    Pulmonary:      Effort: Pulmonary effort is normal.      Breath sounds: Normal breath sounds. No wheezing or rales.   Abdominal:      Palpations: Abdomen is soft. There is no mass.      Tenderness: There is no abdominal tenderness. There is no guarding or rebound.      Comments: Obese    Musculoskeletal: Normal range of motion.   Lymphadenopathy:      Cervical: No cervical adenopathy.   Skin:     General: Skin is warm.      Findings: No erythema or rash.   Neurological:      Mental Status: He is alert and oriented to person, place, and time.         Significant Labs:  CBC:   Recent Labs   Lab 10/20/20  0825   WBC 18.14*   RBC 4.60   HGB 13.3*   HCT 41.3   PLT 79*   MCV 90   MCH 28.9   MCHC 32.2     CMP:   Recent Labs   Lab 10/20/20  0825   *   CALCIUM 8.0*   ALBUMIN 2.6*   PROT 5.3*      K 2.6*   CO2 41*   CL 91*   BUN 25*   CREATININE 1.1   ALKPHOS 98   *   *   BILITOT 1.2*     Coagulation:   Recent Labs   Lab 10/18/20  1512   INR 1.0     LFTs:   Recent Labs   Lab 10/20/20  0825   *   *   ALKPHOS 98   BILITOT 1.2*   PROT 5.3*   ALBUMIN 2.6*     All labs within the past 24 hours have been reviewed.    Significant Imaging:  Reviewed    Lab Results   Component Value Date    CALCIUM 8.0 (L) 10/20/2020    PHOS 2.4 (L) 10/19/2020       ABG  Recent Labs   Lab 10/20/20  1310   PH 7.609*   PO2 63*   PCO2 39.0   HCO3  39.1*   BE 18         Assessment/Plan:     Metabolic alkalosis  1. Metabolic / Contraction alkalosis, likely due to aggressive diuresis, at this time will hold diuretics, will start him on IV fluids, also will give couple of doses of acetazolamide,     2.  Volume status, peripheral edema most likely due to combination of 3rd spacing from hypoalbuminemia, decreased mobility and side effect of calcium channel blockers, he appears volume depleted on exam,    3.  Hypertension, resume blood pressure medications, monitor    4.  Hypokalemia from continue potassium supplements    5.  Stable renal function with a serum creatinine of 0.8 mg/dL,    6.  Proteinuria, mild at 0.5 g per day, monitor, he is on an Ace inhibitor        Thank you for your consult. I will follow-up with patient. Please contact us if you have any additional questions.     Total time spent 70 minutes including time needed to review the records,  patient  evaluation, documentation, face-to-face discussion with the patient, primary team,    more than 50% of the time was spent on coordination of care and counseling.       Edgar Gamino MD   Nephrology  [facility]

## 2020-10-20 NOTE — ASSESSMENT & PLAN NOTE
Patient has no know Hx of DM  Hgb A1 C 9.3  Glucose - 322, 377, 183  ISC   Long acting insulin started, 15 units nightly

## 2020-10-20 NOTE — PLAN OF CARE
Ongoing (interventions implemented as appropriate)  Pt confused at times  VSS  Pt able to make needs known.  Pt remained afebrile throughout this shift.   Pt had a fall today, no distress was noted  Bed Alarm set, Tele sitter at bedside  Pt denies pain this shift.  Plan of care reviewed. Patient verbalizes understanding.   Pt moving/turing independent. Frequent weight shifting encouraged.  Patient sinus rhythm on monitor.   Bed low, side rails up x 2, wheels locked, call light in reach.   Hourly rounding completed.   Will continue to monitor.

## 2020-10-20 NOTE — SUBJECTIVE & OBJECTIVE
Interval History: Oriented and able to explain his symptoms and why he is hospitalized. Encephalopathy intermittent. He denies any symptoms currently. Pt is refusing SNF placement and home health with PT/OT ordered.     Review of Systems   Constitutional: Positive for activity change. Negative for appetite change, chills, diaphoresis, fatigue, fever and unexpected weight change.   HENT: Negative for congestion, ear discharge, ear pain, hearing loss, nosebleeds, postnasal drip, sinus pressure, sinus pain, sneezing, sore throat, tinnitus, trouble swallowing and voice change.    Eyes: Negative for pain, discharge, redness and itching.   Respiratory: Negative for cough, chest tightness, shortness of breath and wheezing.    Cardiovascular: Positive for leg swelling. Negative for chest pain and palpitations.   Gastrointestinal: Negative for abdominal distention, abdominal pain, blood in stool, constipation, diarrhea, nausea, rectal pain and vomiting.   Endocrine: Negative for cold intolerance, heat intolerance, polydipsia, polyphagia and polyuria.   Genitourinary: Negative for decreased urine volume, difficulty urinating, discharge, dysuria, flank pain, frequency, hematuria, penile pain, scrotal swelling, testicular pain and urgency.   Musculoskeletal: Negative for arthralgias, back pain, joint swelling, myalgias and neck pain.   Skin: Positive for color change. Negative for pallor and rash.   Neurological: Negative for dizziness, tremors, weakness, light-headedness, numbness and headaches.   Hematological: Negative for adenopathy. Does not bruise/bleed easily.   Psychiatric/Behavioral: Positive for confusion. Negative for agitation, sleep disturbance and suicidal ideas. The patient is not nervous/anxious.    All other systems reviewed and are negative.    Objective:     Vital Signs (Most Recent):  Temp: 97.9 °F (36.6 °C) (10/20/20 0719)  Pulse: 102 (10/20/20 1100)  Resp: 18 (10/20/20 0719)  BP: (!) 172/113 (10/20/20  0719)  SpO2: (!) 93 % (10/20/20 1301) Vital Signs (24h Range):  Temp:  [96.7 °F (35.9 °C)-98.1 °F (36.7 °C)] 97.9 °F (36.6 °C)  Pulse:  [] 102  Resp:  [16-20] 18  SpO2:  [90 %-96 %] 93 %  BP: (134-174)/() 172/113     Weight: 98.9 kg (218 lb 0.6 oz)  Body mass index is 31.28 kg/m².    Intake/Output Summary (Last 24 hours) at 10/20/2020 1438  Last data filed at 10/20/2020 0500  Gross per 24 hour   Intake 2820 ml   Output 6715 ml   Net -3895 ml      Physical Exam  Vitals signs and nursing note reviewed.   Constitutional:       General: He is not in acute distress.     Appearance: He is well-developed. He is obese. He is not diaphoretic.   HENT:      Head: Normocephalic and atraumatic.      Nose: Nose normal.      Mouth/Throat:      Pharynx: No oropharyngeal exudate.   Eyes:      General: No scleral icterus.     Conjunctiva/sclera: Conjunctivae normal.      Pupils: Pupils are equal, round, and reactive to light.   Neck:      Musculoskeletal: Normal range of motion and neck supple.      Thyroid: No thyromegaly.      Vascular: No JVD.      Trachea: No tracheal deviation.   Cardiovascular:      Rate and Rhythm: Normal rate and regular rhythm.      Heart sounds: Normal heart sounds. No murmur. No friction rub. No gallop.    Pulmonary:      Effort: Pulmonary effort is normal. No respiratory distress.      Breath sounds: Normal breath sounds. No wheezing.   Abdominal:      General: Bowel sounds are normal. There is no distension.      Palpations: Abdomen is soft. There is no mass.      Tenderness: There is no abdominal tenderness. There is no guarding or rebound.   Genitourinary:     Penis: No tenderness.    Musculoskeletal: Normal range of motion.         General: No tenderness.      Right lower leg: Edema present.      Left lower leg: Edema present.      Comments: Generalized weakness   Lymphadenopathy:      Cervical: No cervical adenopathy.   Skin:     General: Skin is warm.      Findings: Erythema present. No  rash.      Comments: Pt has splotchy erythematous discoloration of his skin especially to extremities.     Reddened discoloration especially to right foot and right lower leg has decreased.    There is erythema to the anterior right thigh and to the bilateral medial thighs - skin is intact   Neurological:      General: No focal deficit present.      Mental Status: He is alert. He is disoriented.      Cranial Nerves: No cranial nerve deficit.      Sensory: No sensory deficit.      Motor: No abnormal muscle tone.      Deep Tendon Reflexes: Reflexes normal.   Psychiatric:         Behavior: Behavior normal.         Significant Labs:   CBC:   Recent Labs   Lab 10/18/20  1512 10/19/20  0739 10/20/20  0825   WBC 18.99* 20.09* 18.14*   HGB 14.1 14.0 13.3*   HCT 42.7 43.1 41.3   * 93* 79*     CMP:   Recent Labs   Lab 10/18/20  1512 10/18/20  2114 10/19/20  0127 10/19/20  0738 10/20/20  0825    144  --  142 142   K 2.4* 2.5* 2.6* 3.3* 2.6*   CL 91* 90*  --  93* 91*   CO2 34* 40*  --  39* 41*   * 343*  --  241* 236*   BUN 28* 24*  --  22 25*   CREATININE 1.4 1.2  --  1.1 1.1   CALCIUM 9.0 8.2*  --  8.4* 8.0*   PROT 5.8*  --   --  5.6* 5.3*   ALBUMIN 3.0*  --   --  2.9* 2.6*   BILITOT 1.0  --   --  1.4* 1.2*   ALKPHOS 101  --   --  98 98   *  --   --  122* 100*   *  --   --  221* 202*   ANIONGAP 15 14  --  10 10   EGFRNONAA 49* 59*  --  >60 >60     All pertinent labs within the past 24 hours have been reviewed.  None    Significant Imaging: I have reviewed all pertinent imaging results/findings within the past 24 hours.  I have reviewed and interpreted all pertinent imaging results/findings within the past 24 hours.

## 2020-10-20 NOTE — PT/OT/SLP PROGRESS
Physical Therapy  Treatment    Karan Aburto   MRN: [id number]   Admitting Diagnosis: Acute diastolic heart failure    PT Received On: 10/20/20  PT Start Time: 1120     PT Stop Time: 1145    PT Total Time (min): 25 min       Billable Minutes:  Therapeutic Activity 25    Treatment Type: Treatment  PT/PTA: PT     PTA Visit Number: 0       General Precautions: Standard, fall  Orthopedic Precautions: N/A   Braces: N/A    Subjective:  Communicated with NURSE LOUISE REICH prior to session.  Pain/Comfort  Pain Rating 1: 0/10    Objective:   Patient found with: telemetry, peripheral IV, bed alarm, dela cruz catheter(LIANET SYS ON)    Functional Mobility:  Therapeutic Activities and Exercises:  PT FOUND SUPINE IN BED UPON ARRIVAL, AGREEABLE TO TX., EAGER TO GET OOB TO SIT IN CHAIR FOR LUNCH, NURSE REPORTS OK WITH PT SITTING IN CHAIR AS LONG AS HE IS ABLE TO MOVE, PT'S DELA CRUZ BAG STUCK AT FOOT OF BED SO NURSE CALLED TO ASSIST IN REMOVING AND CHANGING OUT DAMAGED DELA CRUZ BAG, SUP>SIT WITH CGA, SEATED SCOOT TO EOB WITH CGA, PT REQUIRED TOTAL ASSIST FOR DONNING SOCKS, PT EDUCATED IN BLE THEREX TO PERFORM THROUGHOUT THE DAY TO DECREASE SWELLING TO BLE: LAQ, AP'S.  REVIEW RW USE AND SAFETY DURING TF'S AND GAIT, SIT>STAND WITH MYA, CUES FOR UPRIGHT POSTURE, P+ DYNAMIC BALANCE, PT ABLE TO MARCH IN PLACE FOR APPROX. 1 MINUTE WITH RW AND MYA, NO LOB, PT REQUEST TO USE BATHROOM TO HAVE BM, BEDSIDE COMMODE BROUGHT INTO ROOM AND SIZED FOR PT'S HEIGHT, PT TOOK SEVERAL SMALL STEPS WITH RW AND MYA TO TF TO BEDSIDE COMMODE, NO LOB, CUES FOR CORRECT HAND PLACEMENT FOR TF DOWN TO BEDSIDE COMMODE, PT LEFT SEATED WITH CALL BUTTON IN HAND, LIANET SYS ON, SON SITTING OUTSIDE OF ROOM.  PT ENCOURAGED TO TF TO BEDSIDE CHAIR WITH STAFF ASSISTANCE AFTER TOILETING TO HAVE LUNCH SITTING UP, PT AGREEABLE     AM-PAC 6 CLICK MOBILITY  How much help from another person does this patient currently need?   1 = Unable, Total/Dependent Assistance  2 = A lot, Maximum/Moderate  Assistance  3 = A little, Minimum/Contact Guard/Supervision  4 = None, Modified [place]/Independent    Turning over in bed (including adjusting bedclothes, sheets and blankets)?: 4  Sitting down on and standing up from a chair with arms (e.g., wheelchair, bedside commode, etc.): 3  Moving from lying on back to sitting on the side of the bed?: 4  Moving to and from a bed to a chair (including a wheelchair)?: 3  Need to walk in hospital room?: 3  Climbing 3-5 steps with a railing?: 1  Basic Mobility Total Score: 18    AM-PAC Raw Score CMS G-Code Modifier Level of Impairment Assistance   6 % Total / Unable   7 - 9 CM 80 - 100% Maximal Assist   10 - 14 CL 60 - 80% Moderate Assist   15 - 19 CK 40 - 60% Moderate Assist   20 - 22 CJ 20 - 40% Minimal Assist   23 CI 1-20% SBA / CGA   24 CH 0% Independent/ Mod I     Patient left SEATED ON BEDSIDE COMMODE with all lines intact, call button in reach, chair alarm on, NURSE notified and SON SITTING OUTSIDE ROOM present.  UNIT SECRETARY ASKED TO CALL AIDE TO SIT WITH PT IN ROOM FOR SAFETY, NURSE LOUISE NOTIFIED PT SITTING ON COMMODE TO HAVE BM, NOTIFIED NURSE OF PT'S ASSISTANCE LEVEL FOR TF WITH RW TO BEDSIDE CHAIR AFTER TOILETING    Assessment:  Karan Aburto is a 74 y.o. male with a medical diagnosis of Acute diastolic heart failure and presents with IMPAIRED FUNCTIONAL MOBILITY. PT WILL BENEFIT FROM CONT. SKILLED P.T. TO ADDRESS IMPAIRMENTS    Rehab identified problem list/impairments: Rehab identified problem list/impairments: weakness, impaired endurance, impaired balance, gait instability, impaired functional mobilty, decreased coordination    Rehab potential is good.    Activity tolerance: Good    Discharge recommendations: Discharge Facility/Level of Care Needs: nursing facility, skilled     Barriers to discharge:      Equipment recommendations: Equipment Needed After Discharge: none     GOALS:   Multidisciplinary Problems     Physical Therapy Goals         Problem: Physical Therapy Goal    Goal Priority Disciplines Outcome Goal Variances Interventions   Physical Therapy Goal     PT, PT/OT Ongoing, Progressing     Description: LTG'S TO BE MET IN 7 DAYS (10-26-20)  1. PT WILL REQUIRE MYA FOR BED MOBILITY  2. PT WILL REQUIRE MODA FOR TF'S  3. PT WILL AMB 50' WITH RW AND MODA                   PLAN:    Patient to be seen 5 x/week  to address the above listed problems via gait training, therapeutic activities, therapeutic exercises  Plan of Care expires: 10/26/20  Plan of Care reviewed with: patient    Lilly Bernard, PT  10/20/2020

## 2020-10-20 NOTE — ASSESSMENT & PLAN NOTE
New Onset  Daily Weights  Strict I/Os  Fluid Restriction <1L daily   Supplemental Oxygen PRN   IV Lasix and aldactone>>>>> stopped due to contraction alkalosis  ACE inhibitor started  ECHO - normal systolic function with EF 60 % and grade 1 diastolic dysfunction

## 2020-10-20 NOTE — ASSESSMENT & PLAN NOTE
Hold meds with elevated LFT - continue fenofibrate  Lab Results   Component Value Date    CHOL 115 (L) 10/19/2020     Lab Results   Component Value Date    HDL 32 (L) 10/19/2020     Lab Results   Component Value Date    LDLCALC 3.4 (L) 10/19/2020     Lab Results   Component Value Date    TRIG 398 (H) 10/19/2020     Lab Results   Component Value Date    CHOLHDL 27.8 10/19/2020

## 2020-10-20 NOTE — ASSESSMENT & PLAN NOTE
Nephrology consulted  IV fluids recommended  Could be attributed to HCTZ  Diamox  Daily chemistries

## 2020-10-21 PROBLEM — G93.41 ENCEPHALOPATHY, METABOLIC: Status: ACTIVE | Noted: 2020-01-01

## 2020-10-21 PROBLEM — E11.9 NEW ONSET TYPE 2 DIABETES MELLITUS: Status: ACTIVE | Noted: 2020-01-01

## 2020-10-21 PROBLEM — R06.02 SHORTNESS OF BREATH: Status: RESOLVED | Noted: 2020-01-01 | Resolved: 2020-01-01

## 2020-10-21 NOTE — ASSESSMENT & PLAN NOTE
Possibly due to Cardiac Congestion, fatty liver, use of STATIN   AST/ALT - 122/221>>>> trending down  Ammonia mildly elevated - lactulose given  RUQ indicates fatty liver  Acute hepatitis panel negative  Will need GI outpatient

## 2020-10-21 NOTE — PROGRESS NOTES
[facility] -   Nephrology  Progress Note    Patient Name: Karan Aburto  MRN: [id number]  Admission Date: 10/18/2020  Hospital Length of Stay: 1 days  Attending Provider: Partha Madsen, *   Primary Care Physician: Mickey Agrawal MD  Principal Problem:Acute diastolic heart failure    Subjective:     HPI: Karan Aburto this 74-year-old  man with history of hypertension, dyslipidemia, obesity, spastic paraplegia, was admitted to the hospital for progressive worsening of peripheral edema the last few weeks,  admission BNP was 159, echocardiogram shows normal LV function, mild diastolic dysfunction, likely peripheral edema due to decreased mobility from spastic paraplegia, side effect of calcium channel blocker, also has hypoalbuminemia resulting in peripheral edema due to 3rd spacing, patient was being treated with diuretics including hydrochlorothiazide, spironolactone, lasix , lead to significant contraction alkalosis.  We were consulted for contraction alkalosis and management.         Interval History: no acute events, denies complaints,     Review of patient's allergies indicates:  No Known Allergies  Current Facility-Administered Medications   Medication Frequency    acetaZOLAMIDE tablet 250 mg BID    carvediloL tablet 12.5 mg BID WM    dextrose 50% injection 12.5 g PRN    dextrose 50% injection 25 g PRN    fenofibrate tablet 145 mg Daily    glucagon (human recombinant) injection 1 mg PRN    glucose chewable tablet 16 g PRN    glucose chewable tablet 24 g PRN    insulin aspart U-100 pen 1-10 Units Q6H PRN    insulin detemir U-100 pen 15 Units QHS    isosorbide mononitrate 24 hr tablet 30 mg Daily    lactulose 20 gram/30 mL solution Soln 15 g TID    lisinopriL tablet 20 mg Daily    metoprolol injection 5 mg Q5 Min PRN    mupirocin 2 % ointment BID    ondansetron injection 4 mg Q8H PRN    piperacillin-tazobactam 4.5 g in dextrose 5 % 100 mL IVPB (ready to mix system)  Q8H    potassium chloride SA CR tablet 40 mEq Q6H       Objective:     Vital Signs (Most Recent):  Temp: 99 °F (37.2 °C) (10/21/20 0736)  Pulse: 89 (10/21/20 0736)  Resp: 18 (10/21/20 0736)  BP: (!) 155/102 (10/21/20 0736)  SpO2: (!) 90 % (10/21/20 0906)  O2 Device (Oxygen Therapy): room air (10/21/20 0736) Vital Signs (24h Range):  Temp:  [97.1 °F (36.2 °C)-99 °F (37.2 °C)] 99 °F (37.2 °C)  Pulse:  [] 89  Resp:  [18-20] 18  SpO2:  [90 %-95 %] 90 %  BP: (130-161)/() 155/102     Weight: 101.7 kg (224 lb 3.3 oz) (10/21/20 0500)  Body mass index is 32.17 kg/m².  Body surface area is 2.24 meters squared.    I/O last 3 completed shifts:  In: 3640 [P.O.:3240; I.V.:200; IV Piggyback:200]  Out: 6250 [Urine:6250]    Physical Exam  Vitals signs and nursing note reviewed.   Constitutional:       General: He is not in acute distress.     Appearance: He is well-developed. He is not diaphoretic.   HENT:      Head: Normocephalic and atraumatic.   Eyes:      General:         Right eye: No discharge.      Pupils: Pupils are equal, round, and reactive to light.   Neck:      Musculoskeletal: Normal range of motion and neck supple.      Thyroid: No thyromegaly.      Trachea: No tracheal deviation.   Cardiovascular:      Rate and Rhythm: Normal rate and regular rhythm.      Heart sounds: Normal heart sounds. No murmur. No friction rub. No gallop.    Pulmonary:      Effort: Pulmonary effort is normal.      Breath sounds: Normal breath sounds. No wheezing or rales.   Abdominal:      Palpations: Abdomen is soft. There is no mass.      Tenderness: There is no abdominal tenderness. There is no guarding or rebound.      Comments: Obese    Musculoskeletal:         General: Swelling present.      Comments: Mostly around ankles    Lymphadenopathy:      Cervical: No cervical adenopathy.   Skin:     General: Skin is warm.      Findings: No erythema or rash.   Neurological:      Mental Status: He is alert and oriented to person, place,  and time.         Significant Labs:  Cardiac Markers: No results for input(s): CKMB, TROPONINT, MYOGLOBIN in the last 168 hours.  CBC:   Recent Labs   Lab 10/21/20  0825   WBC 17.99*   RBC 4.45*   HGB 13.0*   HCT 39.9*   PLT 88*   MCV 90   MCH 29.2   MCHC 32.6     CMP:   Recent Labs   Lab 10/21/20  0825   *   CALCIUM 8.0*   ALBUMIN 2.5*   PROT 5.3*      K 2.6*   CO2 33*   CL 98   BUN 24*   CREATININE 1.1   ALKPHOS 105   *   AST 78*   BILITOT 1.1*     Coagulation:   Recent Labs   Lab 10/18/20  1512   INR 1.0     LFTs:   Recent Labs   Lab 10/21/20  0825   *   AST 78*   ALKPHOS 105   BILITOT 1.1*   PROT 5.3*   ALBUMIN 2.5*     All labs within the past 24 hours have been reviewed.     Significant Imaging:  Reviewed    Lab Results   Component Value Date    CALCIUM 8.0 (L) 10/21/2020    PHOS 3.3 10/20/2020       Lab Results   Component Value Date    ALBUMIN 2.5 (L) 10/21/2020           Assessment/Plan:     Metabolic alkalosis  1. Metabolic / Contraction alkalosis, likely due to aggressive diuresis, improved, at this point will discontinue IV fluids, continue Diamox due to significant peripheral edema, likely is edema is moved due to decreased mobility and some third spacing ,      2.  Volume status, peripheral edema most likely due to combination of 3rd spacing from hypoalbuminemia, decreased mobility and side effect of calcium channel blockers,     3.  Hypertension, resume blood pressure medications, monitor, increase dose of Lisinopril,     4.  Hypokalemia,  continue potassium supplements , eval for primary hyperaldo ,     5.  Stable renal function with a serum creatinine of 0.8 mg/dL,    6.  Proteinuria, mild at 0.5 g per day, monitor, he is on an Ace inhibitor         Thank you for your consult. I will follow-up with patient. Please contact us if you have any additional questions.     Total time spent 30 minutes including time needed to review the records,  patient  evaluation,  documentation, face-to-face discussion with the patient, family, primary team, more than 50% of the time was spent on coordination of care and counseling.       Edgar Gamino MD  Nephrology  [facility]

## 2020-10-21 NOTE — ASSESSMENT & PLAN NOTE
Lab Results   Component Value Date    HGBA1C 9.3 (H) 10/19/2020   New onset  Will prescribe oral antidiabetic med at time of discharge  Home health with diabetic teaching  Will need to follow up with PCP regarding diabetes  accuchecks

## 2020-10-21 NOTE — SUBJECTIVE & OBJECTIVE
Interval History: Pt denies any complaints currently.     Review of Systems   Constitutional: Positive for activity change. Negative for appetite change, chills, diaphoresis, fatigue, fever and unexpected weight change.   HENT: Negative for congestion, ear discharge, ear pain, hearing loss, nosebleeds, postnasal drip, sinus pressure, sinus pain, sneezing, sore throat, tinnitus, trouble swallowing and voice change.    Eyes: Negative for pain, discharge, redness and itching.   Respiratory: Negative for cough, chest tightness, shortness of breath and wheezing.    Cardiovascular: Positive for leg swelling. Negative for chest pain and palpitations.   Gastrointestinal: Negative for abdominal distention, abdominal pain, blood in stool, constipation, diarrhea, nausea, rectal pain and vomiting.   Endocrine: Negative for cold intolerance, heat intolerance, polydipsia, polyphagia and polyuria.   Genitourinary: Negative for decreased urine volume, difficulty urinating, discharge, dysuria, flank pain, frequency, hematuria, penile pain, scrotal swelling, testicular pain and urgency.   Musculoskeletal: Negative for arthralgias, back pain, joint swelling, myalgias and neck pain.   Skin: Positive for color change. Negative for pallor and rash.   Neurological: Negative for dizziness, tremors, weakness, light-headedness, numbness and headaches.   Hematological: Negative for adenopathy. Does not bruise/bleed easily.   Psychiatric/Behavioral: Positive for confusion. Negative for agitation, sleep disturbance and suicidal ideas. The patient is not nervous/anxious.    All other systems reviewed and are negative.    Objective:     Vital Signs (Most Recent):  Temp: 97.8 °F (36.6 °C) (10/21/20 1130)  Pulse: 98 (10/21/20 1130)  Resp: 18 (10/21/20 1130)  BP: 136/86 (10/21/20 1130)  SpO2: 95 % (10/21/20 1130) Vital Signs (24h Range):  Temp:  [97.1 °F (36.2 °C)-99 °F (37.2 °C)] 97.8 °F (36.6 °C)  Pulse:  [] 98  Resp:  [18-20] 18  SpO2:  [90  %-95 %] 95 %  BP: (130-161)/() 136/86     Weight: 101.7 kg (224 lb 3.3 oz)  Body mass index is 32.17 kg/m².    Intake/Output Summary (Last 24 hours) at 10/21/2020 1346  Last data filed at 10/21/2020 0500  Gross per 24 hour   Intake 1770 ml   Output 4100 ml   Net -2330 ml      Physical Exam  Vitals signs and nursing note reviewed.   Constitutional:       General: He is not in acute distress.     Appearance: He is well-developed. He is obese. He is not diaphoretic.   HENT:      Head: Normocephalic and atraumatic.      Nose: Nose normal.      Mouth/Throat:      Pharynx: No oropharyngeal exudate.   Eyes:      General: No scleral icterus.     Conjunctiva/sclera: Conjunctivae normal.   Neck:      Musculoskeletal: Normal range of motion and neck supple.      Thyroid: No thyromegaly.      Vascular: No JVD.      Trachea: No tracheal deviation.   Cardiovascular:      Rate and Rhythm: Normal rate and regular rhythm.      Heart sounds: Normal heart sounds. No murmur. No friction rub. No gallop.    Pulmonary:      Effort: Pulmonary effort is normal. No respiratory distress.      Breath sounds: Normal breath sounds. No wheezing.   Abdominal:      General: Bowel sounds are normal. There is no distension.      Palpations: Abdomen is soft. There is no mass.      Tenderness: There is no abdominal tenderness. There is no guarding or rebound.   Genitourinary:     Penis: No tenderness.    Musculoskeletal: Normal range of motion.         General: No tenderness.      Right lower leg: Edema present.      Left lower leg: Edema present.      Comments: Generalized weakness - hx of spastic paraplegia   Lymphadenopathy:      Cervical: No cervical adenopathy.   Skin:     General: Skin is warm.      Findings: Erythema present. No rash.      Comments: Pt has splotchy erythematous discoloration of his skin especially to extremities.     Reddened discoloration especially to right foot and right lower leg has decreased.    There is erythema to  the anterior right thigh and to the bilateral medial thighs - skin is intact   Neurological:      General: No focal deficit present.      Mental Status: He is alert and oriented to person, place, and time.      Cranial Nerves: No cranial nerve deficit.      Sensory: No sensory deficit.      Motor: No abnormal muscle tone.      Deep Tendon Reflexes: Reflexes normal.   Psychiatric:         Behavior: Behavior normal.         Thought Content: Thought content normal.         Significant Labs:   CBC:   Recent Labs   Lab 10/20/20  0825 10/21/20  0825   WBC 18.14* 17.99*   HGB 13.3* 13.0*   HCT 41.3 39.9*   PLT 79* 88*     CMP:   Recent Labs   Lab 10/20/20  0825 10/20/20  1741 10/21/20  0825    140 141   K 2.6* 2.3* 2.6*   CL 91* 93* 98   CO2 41* 38* 33*   * 313* 194*   BUN 25* 26* 24*   CREATININE 1.1 1.2 1.1   CALCIUM 8.0* 7.9* 8.0*   PROT 5.3*  --  5.3*   ALBUMIN 2.6* 2.5* 2.5*   BILITOT 1.2*  --  1.1*   ALKPHOS 98  --  105   *  --  78*   *  --  196*   ANIONGAP 10 9 10   EGFRNONAA >60 59* >60     All pertinent labs within the past 24 hours have been reviewed.    Significant Imaging: I have reviewed all pertinent imaging results/findings within the past 24 hours.

## 2020-10-21 NOTE — PLAN OF CARE
Problem: Adult Inpatient Plan of Care  Goal: Patient-Specific Goal (Individualization)  Outcome: Ongoing, Progressing     Pt awake and alert confused at times. Looking for his truck or wanting to write the staff checks. Understands he is at the hospital but can not verbalize his situation. Forgetful.   Lactulose ordered per ammonia level 75  Neuro checks Q 4  SR -ST on tele   Vitals stable   BP meds adjusted per nephro   Diamox administered per nephro   Urine sent for renal studies  Educated son and pt on fluid restriction, sitter at bedside helping enforce  K 2.6, oral K order in place with redraw for serum K at 1600  IVF dc'd this shift   IV zosyn  BG AC and HS with SSI in place   PT/OT  Pt up to chair  Son at the bedside   Sitter at bedside      Patient is calling because he has a pain on his stomach. His call got disconnected before he was able to completely explain his symptoms. Please call.

## 2020-10-21 NOTE — PROGRESS NOTES
Discussed case with . Pt is not a candidate for heart failure disease management program due to not acutely decompensated based on labs and chest x-ray results. Pt will have a 48 hour hospital discharge call scheduled.

## 2020-10-21 NOTE — PT/OT/SLP PROGRESS
Physical Therapy  Treatment    Karan Aburto   MRN: [id number]   Admitting Diagnosis: Acute diastolic heart failure    PT Received On: 10/21/20  PT Start Time: 1040     PT Stop Time: 1105    PT Total Time (min): 25 min       Billable Minutes:  Gait Training 15 and Therapeutic Activity 10    Treatment Type: Treatment  PT/PTA: PT     PTA Visit Number: 0       General Precautions: Standard, fall  Orthopedic Precautions: N/A   Braces: N/A    Subjective:  Communicated with NURSE TOMPKINS prior to session.  Pain/Comfort  Pain Rating 1: 0/10    Objective:   Patient found with: telemetry, peripheral IV, bed alarm, dela cruz catheter    Functional Mobility:  Therapeutic Activities and Exercises:  PT FOUND SUPINE IN BED UPON ARRIVAL, AGREEABLE TO TX., SUP>SIT WITH MYA, SEATED SCOOT TO EOB WITH CGA, REVIEW RW USE AND SAFETY DURING TF'S AND GAIT, SIT>STAND WITH MODA X 2, CONSTANT CUES FOR UPRIGHT POSTURE, PT AMB 12' X 2 TRIALS WITH RW AND MODA X 2, SLOW UNSTEADY GAIT, QUICK TO FATIGUE, PT REQUIRED 1 SEATED REST BREAK, CHAIR CLOSE IN TOW DUE TO UNSTEADY GAIT, PT WALKING ON BENT KNEE'S BUT REPORTS THIS IS BASELINE FOR HIM DUE TO OLD INJURY, P DYNAMIC BALANCE, PT RETURN TO ROOM TO SITTING IN CHAIR WITH MODA, REVIEW BLE THEREX TO PERFORM WHILE SEATED IN CHAIR    AM-PAC 6 CLICK MOBILITY  How much help from another person does this patient currently need?   1 = Unable, Total/Dependent Assistance  2 = A lot, Maximum/Moderate Assistance  3 = A little, Minimum/Contact Guard/Supervision  4 = None, Modified [place]/Independent    Turning over in bed (including adjusting bedclothes, sheets and blankets)?: 3  Sitting down on and standing up from a chair with arms (e.g., wheelchair, bedside commode, etc.): 2  Moving from lying on back to sitting on the side of the bed?: 3  Moving to and from a bed to a chair (including a wheelchair)?: 2  Need to walk in hospital room?: 2  Climbing 3-5 steps with a railing?: 1  Basic Mobility Total Score:  13    AM-PAC Raw Score CMS G-Code Modifier Level of Impairment Assistance   6 % Total / Unable   7 - 9 CM 80 - 100% Maximal Assist   10 - 14 CL 60 - 80% Moderate Assist   15 - 19 CK 40 - 60% Moderate Assist   20 - 22 CJ 20 - 40% Minimal Assist   23 CI 1-20% SBA / CGA   24 CH 0% Independent/ Mod I     Patient left up in chair with all lines intact, call button in reach, chair alarm on, NURSE  notified and FAMILY AND SITTER present.    Assessment:  Karan Aburto is a 74 y.o. male with a medical diagnosis of Acute diastolic heart failure and presents with IMPAIRED FUNCTIONAL MOBILITY. PT WILL BENEFIT FROM CONT. SKILLED P.T. TO ADDRESS IMPAIRMENTS    Rehab identified problem list/impairments: Rehab identified problem list/impairments: weakness, impaired endurance, impaired balance, gait instability, impaired functional mobilty, decreased coordination    Rehab potential is good.    Activity tolerance: Good    Discharge recommendations: Discharge Facility/Level of Care Needs: nursing facility, skilled     Barriers to discharge:      Equipment recommendations: Equipment Needed After Discharge: none     GOALS:   Multidisciplinary Problems     Physical Therapy Goals        Problem: Physical Therapy Goal    Goal Priority Disciplines Outcome Goal Variances Interventions   Physical Therapy Goal     PT, PT/OT Ongoing, Progressing     Description: LTG'S TO BE MET IN 7 DAYS (10-26-20)  1. PT WILL REQUIRE MYA FOR BED MOBILITY  2. PT WILL REQUIRE MODA FOR TF'S  3. PT WILL AMB 50' WITH RW AND MODA                   PLAN:    Patient to be seen 5 x/week  to address the above listed problems via gait training, therapeutic activities, therapeutic exercises  Plan of Care expires: 10/26/20  Plan of Care reviewed with: patient    Lilly Marco Antonio, PT  10/21/2020

## 2020-10-21 NOTE — PROGRESS NOTES
[facility] Hospital Medicine  Progress Note    Patient Name: Karan Aburto  MRN: [id number]  Patient Class: IP- Inpatient   Admission Date: 10/18/2020  Length of Stay: 1 days  Attending Physician: Partha Madsen, *  Primary Care Provider: Mickey Agrawal MD        Subjective:     Principal Problem:Acute diastolic heart failure        HPI:  Mr. Karan Aburto is a 74 y.o. male patient with Hx of HTN, HLD who presents to the Emergency Department for evaluation of BLE swelling which onset gradually 3 weeks ago. Pt's son expresses that pt's sxs may be attributed to recent medication change after recently changing insurance companies.He reports the leg swelling has been associated with progressive SOB. Denies chest pain, cough, palpitations, fever or chills.  Pt denies feeling SOB in the supine position or waking up short of breath in the middle of the night. No known hx of A-fib, CHF, DVT, PE, liver problems, or use of blood thinners. In ED with concerns for DVT US obtained which was negative, also given IV lasix with concerns for possible CHFE. It was noted his potassium was 2.4 and he was given IV and PO. Patient also has elevated LFTs. Due to multiple issues patient admitted for further management.        Overview/Hospital Course:  Pt admitted with leg swelling, SOB and concerns for new onset CHF. Also, there is leg/foot redness representative of cellulitis. Cardiology saw the patient and initial recs and IV diuresis continued. IV Rocephin and Flagyl given for presumed leg cellulitis. Pt is oriented x 3 but is confused in reference to situation and says peculiar things. Likely encephalopathy secondary to cellulitis. There are no gross functional neuro deficits and CT of Head was negative.     10/20/2020 - Bilateral foot erythema has decreased, WBC trending down. Metabolic encephalopathy improving - pt able to verbalize situation and oriented x 3. Disorientation is intermittent. Potassium  replaced. Labs revealed multiple chemical derangements - CO2 41 - contraction alkalosis and Nephrology consulted. Dr. Gamino feels that pt's peripheral edema is secondary to 3rd spacing and pt has intravascular volume depletion. Albumin 2.6. IV fluids and diamox started. Lasix discontinued. Hgb A1C = 9.3  - insulin in progress. Still with elevated liver enzymes. Liver US notes fatty liver, acute hepatitis panel is negative. Ammonia = 53 and lactulose x 2 ordered.     10/21/2020 - Erythema has improved but still present to the bilateral feet - no tenderness, skin intact. Scattered areas of erythema to both legs in various areas. WBC trending down - pt afebrile. Leukocytosis likely reactive due to dehydration. Blood cultures negative. Mostly oriented and able to explain situation for hospitalization. But says unusual things intermittently. Hypokalemia addressed. Metabolic alkalosis is improving with IV hydration and diamox. Glucose 194. Liver enzymes are trending down. Pt still with lower leg swelling/pedal edema. PT/OT in progress secondary to pt's hx of spastic paraplegia.     Interval History: Pt denies any complaints currently.     Review of Systems   Constitutional: Positive for activity change. Negative for appetite change, chills, diaphoresis, fatigue, fever and unexpected weight change.   HENT: Negative for congestion, ear discharge, ear pain, hearing loss, nosebleeds, postnasal drip, sinus pressure, sinus pain, sneezing, sore throat, tinnitus, trouble swallowing and voice change.    Eyes: Negative for pain, discharge, redness and itching.   Respiratory: Negative for cough, chest tightness, shortness of breath and wheezing.    Cardiovascular: Positive for leg swelling. Negative for chest pain and palpitations.   Gastrointestinal: Negative for abdominal distention, abdominal pain, blood in stool, constipation, diarrhea, nausea, rectal pain and vomiting.   Endocrine: Negative for cold intolerance, heat  intolerance, polydipsia, polyphagia and polyuria.   Genitourinary: Negative for decreased urine volume, difficulty urinating, discharge, dysuria, flank pain, frequency, hematuria, penile pain, scrotal swelling, testicular pain and urgency.   Musculoskeletal: Negative for arthralgias, back pain, joint swelling, myalgias and neck pain.   Skin: Positive for color change. Negative for pallor and rash.   Neurological: Negative for dizziness, tremors, weakness, light-headedness, numbness and headaches.   Hematological: Negative for adenopathy. Does not bruise/bleed easily.   Psychiatric/Behavioral: Positive for confusion. Negative for agitation, sleep disturbance and suicidal ideas. The patient is not nervous/anxious.    All other systems reviewed and are negative.    Objective:     Vital Signs (Most Recent):  Temp: 97.8 °F (36.6 °C) (10/21/20 1130)  Pulse: 98 (10/21/20 1130)  Resp: 18 (10/21/20 1130)  BP: 136/86 (10/21/20 1130)  SpO2: 95 % (10/21/20 1130) Vital Signs (24h Range):  Temp:  [97.1 °F (36.2 °C)-99 °F (37.2 °C)] 97.8 °F (36.6 °C)  Pulse:  [] 98  Resp:  [18-20] 18  SpO2:  [90 %-95 %] 95 %  BP: (130-161)/() 136/86     Weight: 101.7 kg (224 lb 3.3 oz)  Body mass index is 32.17 kg/m².    Intake/Output Summary (Last 24 hours) at 10/21/2020 1346  Last data filed at 10/21/2020 0500  Gross per 24 hour   Intake 1770 ml   Output 4100 ml   Net -2330 ml      Physical Exam  Vitals signs and nursing note reviewed.   Constitutional:       General: He is not in acute distress.     Appearance: He is well-developed. He is obese. He is not diaphoretic.   HENT:      Head: Normocephalic and atraumatic.      Nose: Nose normal.      Mouth/Throat:      Pharynx: No oropharyngeal exudate.   Eyes:      General: No scleral icterus.     Conjunctiva/sclera: Conjunctivae normal.   Neck:      Musculoskeletal: Normal range of motion and neck supple.      Thyroid: No thyromegaly.      Vascular: No JVD.      Trachea: No tracheal  deviation.   Cardiovascular:      Rate and Rhythm: Normal rate and regular rhythm.      Heart sounds: Normal heart sounds. No murmur. No friction rub. No gallop.    Pulmonary:      Effort: Pulmonary effort is normal. No respiratory distress.      Breath sounds: Normal breath sounds. No wheezing.   Abdominal:      General: Bowel sounds are normal. There is no distension.      Palpations: Abdomen is soft. There is no mass.      Tenderness: There is no abdominal tenderness. There is no guarding or rebound.   Genitourinary:     Penis: No tenderness.    Musculoskeletal: Normal range of motion.         General: No tenderness.      Right lower leg: Edema present.      Left lower leg: Edema present.      Comments: Generalized weakness - hx of spastic paraplegia   Lymphadenopathy:      Cervical: No cervical adenopathy.   Skin:     General: Skin is warm.      Findings: Erythema present. No rash.      Comments: Pt has splotchy erythematous discoloration of his skin especially to extremities.     Reddened discoloration especially to right foot and right lower leg has decreased.    There is erythema to the anterior right thigh and to the bilateral medial thighs - skin is intact   Neurological:      General: No focal deficit present.      Mental Status: He is alert and oriented to person, place, and time.      Cranial Nerves: No cranial nerve deficit.      Sensory: No sensory deficit.      Motor: No abnormal muscle tone.      Deep Tendon Reflexes: Reflexes normal.   Psychiatric:         Behavior: Behavior normal.         Thought Content: Thought content normal.         Significant Labs:   CBC:   Recent Labs   Lab 10/20/20  0825 10/21/20  0825   WBC 18.14* 17.99*   HGB 13.3* 13.0*   HCT 41.3 39.9*   PLT 79* 88*     CMP:   Recent Labs   Lab 10/20/20  0825 10/20/20  1741 10/21/20  0825    140 141   K 2.6* 2.3* 2.6*   CL 91* 93* 98   CO2 41* 38* 33*   * 313* 194*   BUN 25* 26* 24*   CREATININE 1.1 1.2 1.1   CALCIUM 8.0*  7.9* 8.0*   PROT 5.3*  --  5.3*   ALBUMIN 2.6* 2.5* 2.5*   BILITOT 1.2*  --  1.1*   ALKPHOS 98  --  105   *  --  78*   *  --  196*   ANIONGAP 10 9 10   EGFRNONAA >60 59* >60     All pertinent labs within the past 24 hours have been reviewed.    Significant Imaging: I have reviewed all pertinent imaging results/findings within the past 24 hours.      Assessment/Plan:      * Acute diastolic heart failure  New Onset  Daily Weights  Strict I/Os  Fluid Restriction <1L daily   Supplemental Oxygen PRN   IV Lasix and aldactone>>>>> stopped due to contraction alkalosis  ACE inhibitor started  ECHO - normal systolic function with EF 60 % and grade 1 diastolic dysfunction         Encephalopathy, metabolic  Waxing and waning  Secondary to metabolic derangements  Neuro checks  Bedside sitter      New onset type 2 diabetes mellitus  Lab Results   Component Value Date    HGBA1C 9.3 (H) 10/19/2020   New onset  Will prescribe oral antidiabetic med at time of discharge  Home health with diabetic teaching  Will need to follow up with PCP regarding diabetes  accuchecks      Metabolic alkalosis  Improving as of 10/21/2020  Nephrology consulted  IV fluids recommended  Could be attributed to HCTZ and lasix  Diamox  Daily chemistries      Elevated troponin  Troponin elevated and trending downward  Likely due to uncontrolled glucose, CKD, and acute CHF  Will need ischemic evaluation once more compensated      Hyperlipemia  Hold meds with elevated LFT - continue fenofibrate  Lab Results   Component Value Date    CHOL 115 (L) 10/19/2020     Lab Results   Component Value Date    HDL 32 (L) 10/19/2020     Lab Results   Component Value Date    LDLCALC 3.4 (L) 10/19/2020     Lab Results   Component Value Date    TRIG 398 (H) 10/19/2020     Lab Results   Component Value Date    CHOLHDL 27.8 10/19/2020       Essential hypertension  Monitor BP  Creatinine has improved - lisinopril started by Cardiology  Will given PRN Clonidine if  needed   fluctuating      Leg swelling  Empiric treatment for presumed leg cellulitis, ECHO notes diastolic dysfunction, low albumin likely third spacing and could be attributed to calcium channel blocker medication  Elevated legs  Strict I & O  SCDs  FADI hose      Leukocytosis  Slowly trending down, pt afebrile, no obvious source of infection although slight reduction of erythema to the BLE  Could be reactive due to dehydration  Empiric treatment for cellulitis  IV Zosyn  Blood cultures negative      Hypokalemia  Monitor and Replete    Abnormal liver enzymes  Possibly due to Cardiac Congestion, fatty liver, use of STATIN   AST/ALT - 122/221>>>> trending down  Ammonia mildly elevated - lactulose given  RUQ indicates fatty liver  Acute hepatitis panel negative  Will need GI outpatient      Diabetes mellitus type 2, uncontrolled  Patient has no know Hx of DM  Hgb A1 C 9.3  Glucose - 322, 377, 183  ISC   Long acting insulin started, 15 units nightly      VTE Risk Mitigation (From admission, onward)         Ordered     Place sequential compression device  Until discontinued      10/21/20 1355     Place FADI hose  Until discontinued      10/21/20 1355                Discharge Planning   ADAM:      Code Status: Not on file   Is the patient medically ready for discharge?:     Reason for patient still in hospital (select all that apply): Patient trending condition and Treatment  Discharge Plan A: Home with family, Home Health                  Shayna Haynes NP  Department of Hospital Medicine   [facility]

## 2020-10-21 NOTE — ASSESSMENT & PLAN NOTE
Improving as of 10/21/2020  Nephrology consulted  IV fluids recommended  Could be attributed to HCTZ and lasix  Diamox  Daily chemistries

## 2020-10-21 NOTE — SUBJECTIVE & OBJECTIVE
Interval History: no acute issues noted o/n. Continues to have pitting edema +2 to BLE's today, slowly improving. Plan for possible stress test in AM. Assess response in AM.     Review of Systems   Constitution: Positive for malaise/fatigue.   HENT: Negative for hearing loss and hoarse voice.    Eyes: Negative for blurred vision and visual disturbance.   Cardiovascular: Positive for leg swelling. Negative for chest pain, claudication, dyspnea on exertion, irregular heartbeat, near-syncope, orthopnea, palpitations, paroxysmal nocturnal dyspnea and syncope.   Respiratory: Negative for cough, hemoptysis, shortness of breath, sleep disturbances due to breathing, snoring and wheezing.    Endocrine: Negative for cold intolerance and heat intolerance.   Hematologic/Lymphatic: Does not bruise/bleed easily.   Skin: Negative for color change, dry skin and nail changes.   Musculoskeletal: Positive for arthritis and back pain. Negative for joint pain and myalgias.   Gastrointestinal: Negative for bloating, abdominal pain, constipation, nausea and vomiting.   Genitourinary: Negative for dysuria, flank pain, hematuria and hesitancy.   Neurological: Positive for weakness. Negative for headaches, light-headedness, loss of balance, numbness and paresthesias.   Psychiatric/Behavioral: Positive for memory loss. Negative for altered mental status.   Allergic/Immunologic: Negative for environmental allergies.     Objective:     Vital Signs (Most Recent):  Temp: 99 °F (37.2 °C) (10/21/20 0736)  Pulse: 92 (10/21/20 0905)  Resp: 18 (10/21/20 0736)  BP: (!) 155/102 (10/21/20 0736)  SpO2: (!) 91 % (10/21/20 1106) Vital Signs (24h Range):  Temp:  [97.1 °F (36.2 °C)-99 °F (37.2 °C)] 99 °F (37.2 °C)  Pulse:  [] 92  Resp:  [18-20] 18  SpO2:  [90 %-95 %] 91 %  BP: (130-161)/() 155/102     Weight: 101.7 kg (224 lb 3.3 oz)  Body mass index is 32.17 kg/m².     SpO2: (!) 91 %  O2 Device (Oxygen Therapy): room air      Intake/Output  Summary (Last 24 hours) at 10/21/2020 1119  Last data filed at 10/21/2020 0500  Gross per 24 hour   Intake 1770 ml   Output 4100 ml   Net -2330 ml       Lines/Drains/Airways     Drain                 Urethral Catheter 10/18/20 1658 Straight-tip 16 Fr. 2 days          Peripheral Intravenous Line                 Peripheral IV - Single Lumen 10/19/20 1226 20 G Right Antecubital 1 day                Physical Exam   Constitutional: He is oriented to person, place, and time. He appears well-developed and well-nourished. No distress.   HENT:   Head: Normocephalic and atraumatic.   Eyes: Pupils are equal, round, and reactive to light.   Neck: Normal range of motion and full passive range of motion without pain. Neck supple. No JVD present. No tracheal deviation present. No thyromegaly present.   Cardiovascular: Normal rate, regular rhythm, S1 normal, S2 normal and intact distal pulses. PMI is not displaced. Exam reveals no distant heart sounds.   No murmur heard.  Pulses:       Radial pulses are 2+ on the right side and 2+ on the left side.        Dorsalis pedis pulses are 1+ on the right side and 1+ on the left side.   CHEST PAIN FREE   Pulmonary/Chest: Effort normal and breath sounds normal. No accessory muscle usage. No respiratory distress. He has no decreased breath sounds. He has no wheezes. He has no rales.   Abdominal: Soft. Bowel sounds are normal. He exhibits no distension. There is no abdominal tenderness.   Musculoskeletal: Normal range of motion.         General: Edema present.      Right ankle: He exhibits swelling.      Left ankle: He exhibits swelling.      Comments: Pitting edema +2 to bilateral knees, slowly improving   Neurological: He is alert and oriented to person, place, and time.   Skin: Skin is warm and dry. He is not diaphoretic. No cyanosis. Nails show no clubbing.   Psychiatric: He has a normal mood and affect. His speech is normal and behavior is normal. Judgment and thought content normal.  Cognition and memory are normal.   Nursing note and vitals reviewed.      Significant Labs:   BMP:   Recent Labs   Lab 10/19/20  1721 10/20/20  0825 10/20/20  1741 10/21/20  0825   GLU  --  236* 313* 194*   NA  --  142 140 141   K  --  2.6* 2.3* 2.6*   CL  --  91* 93* 98   CO2  --  41* 38* 33*   BUN  --  25* 26* 24*   CREATININE  --  1.1 1.2 1.1   CALCIUM  --  8.0* 7.9* 8.0*   MG 2.0 2.0  --   --    , CBC   Recent Labs   Lab 10/20/20  0825 10/21/20  0825   WBC 18.14* 17.99*   HGB 13.3* 13.0*   HCT 41.3 39.9*   PLT 79* 88*   , Troponin No results for input(s): TROPONINI in the last 48 hours. and All pertinent lab results from the last 24 hours have been reviewed.    Significant Imaging: Echocardiogram:   Transthoracic echo (TTE) complete (Cupid Only):   Results for orders placed or performed during the hospital encounter of 10/18/20   Echo Color Flow Doppler? Yes; Bubble Contrast? No   Result Value Ref Range    Ascending aorta 2.97 cm    STJ 2.57 cm    AV mean gradient 7 mmHg    Ao peak lewis 1.79 m/s    Ao VTI 27.91 cm    IVS 1.39 (A) 0.6 - 1.1 cm    LA size 4.21 cm    Left Atrium Major Axis 4.62 cm    LVIDd 3.82 3.5 - 6.0 cm    LVIDs 2.29 2.1 - 4.0 cm    LVOT diameter 2.39 cm    LVOT peak VTI 21.64 cm    Posterior Wall 1.08 0.6 - 1.1 cm    MV Peak A Lewis 1.10 m/s    E wave decelartion time 190.97 msec    MV Peak E Lewis 0.79 m/s    RA Major Axis 4.32 cm    RA Width 2.24 cm    TAPSE 2.81 cm    TR Max Lewis 1.74 m/s    TDI LATERAL 0.07 m/s    TDI SEPTAL 0.06 m/s    LA WIDTH 4.11 cm    Ao root annulus 3.66 cm    MV stenosis pressure 1/2 time 55.38 ms    LV Diastolic Volume 62.69 mL    LV Systolic Volume 17.96 mL    LVOT peak lewis 1.28 m/s    LV LATERAL E/E' RATIO 11.29 m/s    LV SEPTAL E/E' RATIO 13.17 m/s    FS 40 %    LV mass 161.27 g    Left Ventricle Relative Wall Thickness 0.57 cm    AV valve area 3.48 cm2    AV Velocity Ratio 0.72     AV index (prosthetic) 0.78     MV valve area p 1/2 method 3.97 cm2    E/A ratio 0.72      Mean e' 0.07 m/s    LVOT area 4.5 cm2    LVOT stroke volume 97.03 cm3    AV peak gradient 13 mmHg    E/E' ratio 12.15 m/s    LV Systolic Volume Index 8.1 mL/m2    LV Diastolic Volume Index 28.40 mL/m2    LV Mass Index 73 g/m2    Triscuspid Valve Regurgitation Peak Gradient 12 mmHg    Right Atrial Pressure (from IVC) 3 mmHg    TV rest pulmonary artery pressure 15 mmHg    Narrative    · There is mild left ventricular concentric hypertrophy.  · With normal systolic function. The estimated ejection fraction is 60%.  · Grade I diastolic dysfunction.  · Normal right ventricular systolic function.  · Mild left atrial enlargement.  · Mild mitral regurgitation.  · Normal central venous pressure (3 mmHg).  · The estimated PA systolic pressure is 15 mmHg.

## 2020-10-21 NOTE — SUBJECTIVE & OBJECTIVE
Interval History: no acute events, denies complaints,     Review of patient's allergies indicates:  No Known Allergies  Current Facility-Administered Medications   Medication Frequency    acetaZOLAMIDE tablet 250 mg BID    carvediloL tablet 12.5 mg BID WM    dextrose 50% injection 12.5 g PRN    dextrose 50% injection 25 g PRN    fenofibrate tablet 145 mg Daily    glucagon (human recombinant) injection 1 mg PRN    glucose chewable tablet 16 g PRN    glucose chewable tablet 24 g PRN    insulin aspart U-100 pen 1-10 Units Q6H PRN    insulin detemir U-100 pen 15 Units QHS    isosorbide mononitrate 24 hr tablet 30 mg Daily    lactulose 20 gram/30 mL solution Soln 15 g TID    lisinopriL tablet 20 mg Daily    metoprolol injection 5 mg Q5 Min PRN    mupirocin 2 % ointment BID    ondansetron injection 4 mg Q8H PRN    piperacillin-tazobactam 4.5 g in dextrose 5 % 100 mL IVPB (ready to mix system) Q8H    potassium chloride SA CR tablet 40 mEq Q6H       Objective:     Vital Signs (Most Recent):  Temp: 99 °F (37.2 °C) (10/21/20 0736)  Pulse: 89 (10/21/20 0736)  Resp: 18 (10/21/20 0736)  BP: (!) 155/102 (10/21/20 0736)  SpO2: (!) 90 % (10/21/20 0906)  O2 Device (Oxygen Therapy): room air (10/21/20 0736) Vital Signs (24h Range):  Temp:  [97.1 °F (36.2 °C)-99 °F (37.2 °C)] 99 °F (37.2 °C)  Pulse:  [] 89  Resp:  [18-20] 18  SpO2:  [90 %-95 %] 90 %  BP: (130-161)/() 155/102     Weight: 101.7 kg (224 lb 3.3 oz) (10/21/20 0500)  Body mass index is 32.17 kg/m².  Body surface area is 2.24 meters squared.    I/O last 3 completed shifts:  In: 3640 [P.O.:3240; I.V.:200; IV Piggyback:200]  Out: 6250 [Urine:6250]    Physical Exam  Vitals signs and nursing note reviewed.   Constitutional:       General: He is not in acute distress.     Appearance: He is well-developed. He is not diaphoretic.   HENT:      Head: Normocephalic and atraumatic.   Eyes:      General:         Right eye: No discharge.      Pupils: Pupils  are equal, round, and reactive to light.   Neck:      Musculoskeletal: Normal range of motion and neck supple.      Thyroid: No thyromegaly.      Trachea: No tracheal deviation.   Cardiovascular:      Rate and Rhythm: Normal rate and regular rhythm.      Heart sounds: Normal heart sounds. No murmur. No friction rub. No gallop.    Pulmonary:      Effort: Pulmonary effort is normal.      Breath sounds: Normal breath sounds. No wheezing or rales.   Abdominal:      Palpations: Abdomen is soft. There is no mass.      Tenderness: There is no abdominal tenderness. There is no guarding or rebound.      Comments: Obese    Musculoskeletal:         General: Swelling present.      Comments: Mostly around ankles    Lymphadenopathy:      Cervical: No cervical adenopathy.   Skin:     General: Skin is warm.      Findings: No erythema or rash.   Neurological:      Mental Status: He is alert and oriented to person, place, and time.         Significant Labs:  Cardiac Markers: No results for input(s): CKMB, TROPONINT, MYOGLOBIN in the last 168 hours.  CBC:   Recent Labs   Lab 10/21/20  0825   WBC 17.99*   RBC 4.45*   HGB 13.0*   HCT 39.9*   PLT 88*   MCV 90   MCH 29.2   MCHC 32.6     CMP:   Recent Labs   Lab 10/21/20  0825   *   CALCIUM 8.0*   ALBUMIN 2.5*   PROT 5.3*      K 2.6*   CO2 33*   CL 98   BUN 24*   CREATININE 1.1   ALKPHOS 105   *   AST 78*   BILITOT 1.1*     Coagulation:   Recent Labs   Lab 10/18/20  1512   INR 1.0     LFTs:   Recent Labs   Lab 10/21/20  0825   *   AST 78*   ALKPHOS 105   BILITOT 1.1*   PROT 5.3*   ALBUMIN 2.5*     All labs within the past 24 hours have been reviewed.     Significant Imaging:  Reviewed    Lab Results   Component Value Date    CALCIUM 8.0 (L) 10/21/2020    PHOS 3.3 10/20/2020       Lab Results   Component Value Date    ALBUMIN 2.5 (L) 10/21/2020

## 2020-10-21 NOTE — ASSESSMENT & PLAN NOTE
Slowly trending down, pt afebrile, no obvious source of infection although slight reduction of erythema to the BLE  Could be reactive due to dehydration  Empiric treatment for cellulitis  IV Zosyn  Blood cultures negative

## 2020-10-21 NOTE — ASSESSMENT & PLAN NOTE
ECHO pending  Continue Coreg, IV lasix BID, ACEi, Aldactone  Strict I/Os  Daily weights  Needs additional diuresis  Dash diet, 2 gm sodium restriction  1500 ml fluid restriction  Needs Ischemic evaluation once more compensated   Further recs to follow in AM    10/20/2020  -Continue Coreg, IV lasix, Aldactone, ACEi, Imdur  -ECHO revealed EF 60%, DD  -Needs additional IV diuresis today  -MPI stress test once more compensated IP vs OP  -Assess response in AM    10/21  -Continue Coreg, ACEi, Imdur  -Received dose of Diamox yesterday  -Has diuresed well so far, needs further diuresis  -Needs K+ replaced to normal levels  -Possible MPI stress test in AM

## 2020-10-21 NOTE — ASSESSMENT & PLAN NOTE
1. Metabolic / Contraction alkalosis, likely due to aggressive diuresis, improved, at this point will discontinue IV fluids, continue Diamox due to significant peripheral edema, likely is edema is moved due to decreased mobility and some third spacing ,      2.  Volume status, peripheral edema most likely due to combination of 3rd spacing from hypoalbuminemia, decreased mobility and side effect of calcium channel blockers,     3.  Hypertension, resume blood pressure medications, monitor, increase dose of Lisinopril,     4.  Hypokalemia,  continue potassium supplements , eval for primary hyperaldo ,     5.  Stable renal function with a serum creatinine of 0.8 mg/dL,    6.  Proteinuria, mild at 0.5 g per day, monitor, he is on an Ace inhibitor

## 2020-10-21 NOTE — PROGRESS NOTES
[facility] -   Cardiology  Progress Note    Patient Name: Karan Aburto  MRN: [id number]  Admission Date: 10/18/2020  Hospital Length of Stay: 1 days  Code Status: No Order   Attending Physician: Partha Madsen, *   Primary Care Physician: Mickey Agrawal MD  Expected Discharge Date:   Principal Problem:Acute diastolic heart failure    Subjective:   HPI      Karan Aburto is a 74 year old male who presented to [facility] due to LE edema for the past 3 weeks. His current medical conditions include HTN, HLP, obesity. ED workup revealed K+ 2.4, Cr 1.4, Glucose 480, Troponin 0.121, , LA 3.2. Repeat troponin 0.104. ECHO pending. He was placed in observation under the care of hospital medicine. Cardiology consulted to assist with medical management. Chart reviewed, patient seen and examined. Denies chest pain or anginal equivalents. No shortness of breath, BLAIR or palpitations. He does have LE edema to bilateral thighs on exam today. Of note, patient reports that he is being followed by Dr Moncho Culver at [facility]. Denies any previous MI or stents previously. Further recs to follow.       Hospital Course:   10/20/2020-Patient seen and examined in room, lying in bed. Feels ok today. Continues to have significant LE edema today. Has diuresed >6L since admission. Needs continued diuresis. Labs reviewed, K+ 2.6, CO2 41, Cr 1.1    10/21/2020-Patient seen and examined in room, lying in bed. Feels ok today on exam. Has diuresed >4L overnight with improvement in LE edema. Labs reviewed, K+ 2.6, Cr 1.1    Interval History: no acute issues noted o/n. Continues to have pitting edema +2 to BLE's today, slowly improving. Plan for possible stress test in AM. Assess response in AM.     Review of Systems   Constitution: Positive for malaise/fatigue.   HENT: Negative for hearing loss and hoarse voice.    Eyes: Negative for blurred vision and visual disturbance.   Cardiovascular: Positive for leg  swelling. Negative for chest pain, claudication, dyspnea on exertion, irregular heartbeat, near-syncope, orthopnea, palpitations, paroxysmal nocturnal dyspnea and syncope.   Respiratory: Negative for cough, hemoptysis, shortness of breath, sleep disturbances due to breathing, snoring and wheezing.    Endocrine: Negative for cold intolerance and heat intolerance.   Hematologic/Lymphatic: Does not bruise/bleed easily.   Skin: Negative for color change, dry skin and nail changes.   Musculoskeletal: Positive for arthritis and back pain. Negative for joint pain and myalgias.   Gastrointestinal: Negative for bloating, abdominal pain, constipation, nausea and vomiting.   Genitourinary: Negative for dysuria, flank pain, hematuria and hesitancy.   Neurological: Positive for weakness. Negative for headaches, light-headedness, loss of balance, numbness and paresthesias.   Psychiatric/Behavioral: Positive for memory loss. Negative for altered mental status.   Allergic/Immunologic: Negative for environmental allergies.     Objective:     Vital Signs (Most Recent):  Temp: 99 °F (37.2 °C) (10/21/20 0736)  Pulse: 92 (10/21/20 0905)  Resp: 18 (10/21/20 0736)  BP: (!) 155/102 (10/21/20 0736)  SpO2: (!) 91 % (10/21/20 1106) Vital Signs (24h Range):  Temp:  [97.1 °F (36.2 °C)-99 °F (37.2 °C)] 99 °F (37.2 °C)  Pulse:  [] 92  Resp:  [18-20] 18  SpO2:  [90 %-95 %] 91 %  BP: (130-161)/() 155/102     Weight: 101.7 kg (224 lb 3.3 oz)  Body mass index is 32.17 kg/m².     SpO2: (!) 91 %  O2 Device (Oxygen Therapy): room air      Intake/Output Summary (Last 24 hours) at 10/21/2020 1119  Last data filed at 10/21/2020 0500  Gross per 24 hour   Intake 1770 ml   Output 4100 ml   Net -2330 ml       Lines/Drains/Airways     Drain                 Urethral Catheter 10/18/20 1658 Straight-tip 16 Fr. 2 days          Peripheral Intravenous Line                 Peripheral IV - Single Lumen 10/19/20 1226 20 G Right Antecubital 1 day                 Physical Exam   Constitutional: He is oriented to person, place, and time. He appears well-developed and well-nourished. No distress.   HENT:   Head: Normocephalic and atraumatic.   Eyes: Pupils are equal, round, and reactive to light.   Neck: Normal range of motion and full passive range of motion without pain. Neck supple. No JVD present. No tracheal deviation present. No thyromegaly present.   Cardiovascular: Normal rate, regular rhythm, S1 normal, S2 normal and intact distal pulses. PMI is not displaced. Exam reveals no distant heart sounds.   No murmur heard.  Pulses:       Radial pulses are 2+ on the right side and 2+ on the left side.        Dorsalis pedis pulses are 1+ on the right side and 1+ on the left side.   CHEST PAIN FREE   Pulmonary/Chest: Effort normal and breath sounds normal. No accessory muscle usage. No respiratory distress. He has no decreased breath sounds. He has no wheezes. He has no rales.   Abdominal: Soft. Bowel sounds are normal. He exhibits no distension. There is no abdominal tenderness.   Musculoskeletal: Normal range of motion.         General: Edema present.      Right ankle: He exhibits swelling.      Left ankle: He exhibits swelling.      Comments: Pitting edema +2 to bilateral knees, slowly improving   Neurological: He is alert and oriented to person, place, and time.   Skin: Skin is warm and dry. He is not diaphoretic. No cyanosis. Nails show no clubbing.   Psychiatric: He has a normal mood and affect. His speech is normal and behavior is normal. Judgment and thought content normal. Cognition and memory are normal.   Nursing note and vitals reviewed.      Significant Labs:   BMP:   Recent Labs   Lab 10/19/20  1721 10/20/20  0825 10/20/20  1741 10/21/20  0825   GLU  --  236* 313* 194*   NA  --  142 140 141   K  --  2.6* 2.3* 2.6*   CL  --  91* 93* 98   CO2  --  41* 38* 33*   BUN  --  25* 26* 24*   CREATININE  --  1.1 1.2 1.1   CALCIUM  --  8.0* 7.9* 8.0*   MG 2.0 2.0  --    --    , CBC   Recent Labs   Lab 10/20/20  0825 10/21/20  0825   WBC 18.14* 17.99*   HGB 13.3* 13.0*   HCT 41.3 39.9*   PLT 79* 88*   , Troponin No results for input(s): TROPONINI in the last 48 hours. and All pertinent lab results from the last 24 hours have been reviewed.    Significant Imaging: Echocardiogram:   Transthoracic echo (TTE) complete (Cupid Only):   Results for orders placed or performed during the hospital encounter of 10/18/20   Echo Color Flow Doppler? Yes; Bubble Contrast? No   Result Value Ref Range    Ascending aorta 2.97 cm    STJ 2.57 cm    AV mean gradient 7 mmHg    Ao peak lewis 1.79 m/s    Ao VTI 27.91 cm    IVS 1.39 (A) 0.6 - 1.1 cm    LA size 4.21 cm    Left Atrium Major Axis 4.62 cm    LVIDd 3.82 3.5 - 6.0 cm    LVIDs 2.29 2.1 - 4.0 cm    LVOT diameter 2.39 cm    LVOT peak VTI 21.64 cm    Posterior Wall 1.08 0.6 - 1.1 cm    MV Peak A Lewis 1.10 m/s    E wave decelartion time 190.97 msec    MV Peak E Lewis 0.79 m/s    RA Major Axis 4.32 cm    RA Width 2.24 cm    TAPSE 2.81 cm    TR Max Lewis 1.74 m/s    TDI LATERAL 0.07 m/s    TDI SEPTAL 0.06 m/s    LA WIDTH 4.11 cm    Ao root annulus 3.66 cm    MV stenosis pressure 1/2 time 55.38 ms    LV Diastolic Volume 62.69 mL    LV Systolic Volume 17.96 mL    LVOT peak lewis 1.28 m/s    LV LATERAL E/E' RATIO 11.29 m/s    LV SEPTAL E/E' RATIO 13.17 m/s    FS 40 %    LV mass 161.27 g    Left Ventricle Relative Wall Thickness 0.57 cm    AV valve area 3.48 cm2    AV Velocity Ratio 0.72     AV index (prosthetic) 0.78     MV valve area p 1/2 method 3.97 cm2    E/A ratio 0.72     Mean e' 0.07 m/s    LVOT area 4.5 cm2    LVOT stroke volume 97.03 cm3    AV peak gradient 13 mmHg    E/E' ratio 12.15 m/s    LV Systolic Volume Index 8.1 mL/m2    LV Diastolic Volume Index 28.40 mL/m2    LV Mass Index 73 g/m2    Triscuspid Valve Regurgitation Peak Gradient 12 mmHg    Right Atrial Pressure (from IVC) 3 mmHg    TV rest pulmonary artery pressure 15 mmHg    Narrative    · There  is mild left ventricular concentric hypertrophy.  · With normal systolic function. The estimated ejection fraction is 60%.  · Grade I diastolic dysfunction.  · Normal right ventricular systolic function.  · Mild left atrial enlargement.  · Mild mitral regurgitation.  · Normal central venous pressure (3 mmHg).  · The estimated PA systolic pressure is 15 mmHg.        Assessment and Plan:       * Acute diastolic heart failure  ECHO pending  Continue Coreg, IV lasix BID, ACEi, Aldactone  Strict I/Os  Daily weights  Needs additional diuresis  Dash diet, 2 gm sodium restriction  1500 ml fluid restriction  Needs Ischemic evaluation once more compensated   Further recs to follow in AM    10/20/2020  -Continue Coreg, IV lasix, Aldactone, ACEi, Imdur  -ECHO revealed EF 60%, DD  -Needs additional IV diuresis today  -MPI stress test once more compensated IP vs OP  -Assess response in AM    10/21  -Continue Coreg, ACEi, Imdur  -Received dose of Diamox yesterday  -Has diuresed well so far, needs further diuresis  -Needs K+ replaced to normal levels  -Possible MPI stress test in AM    Elevated troponin  Troponin elevated and trending downward  Likely due to uncontrolled glucose, CKD, and acute CHF  Will need ischemic evaluation once more compensated    Hyperlipemia  Resume Statin once LFTs improved    Essential hypertension  ON Medical therapy  Continue Coreg, ACEi, Imdur  Monitor BP trend    Leukocytosis  On IV ABX  Mgmt per primary team    Hypokalemia  Keep K+>4  Replacement ordered per primary team    Abnormal liver enzymes  Monitor with diuresis  Likely due to decompensated CHF    Diabetes mellitus type 2, uncontrolled  Mgmt per primary team        VTE Risk Mitigation (From admission, onward)    None          CORETTA Ceballos  Cardiology  [facility]

## 2020-10-22 PROBLEM — G93.41 ENCEPHALOPATHY, METABOLIC: Status: RESOLVED | Noted: 2020-01-01 | Resolved: 2020-01-01

## 2020-10-22 NOTE — PT/OT/SLP PROGRESS
"Occupational Therapy  Treatment    Karan Aburto   MRN: [id number]   Admitting Diagnosis: Encephalopathy, metabolic    OT Date of Treatment: 10/22/20   OT Start Time: 0825  OT Stop Time: 0850  OT Total Time (min): 25 min    Billable Minutes:  Therapeutic Activity 15 min and Therapeutic Exercise 10 min    General Precautions: Standard, fall  Orthopedic Precautions: N/A  Braces: N/A         Subjective:  Communicated with Nurse Lilly and epic chart review prior to session.  Pt found supine in bed and agreeable to tx at this time.   Pain/Comfort  Pain Rating 1: 0/10    Objective:  Patient found with: telemetry, peripheral IV, bed alarm     Functional Mobility:  Therapeutic Activities and Exercises:  Pt performed supine>sit with Min A, scooted to EOB with Min A. Pt sat EOB and performed (B) UE therapeutic exercises 1 x 10 reps: shoulder flex, chest press, bicep curls. Pt stated that he needed to use toilet therefore attempted to use urinal but was not successful. Pt performed sit>stand with Mod A x 2 using RW, step t/f to BSC using RW with Mod A x 2. Sitter present with pt on BSC and instructed to call when he was finished on BSC.    AM-PAC 6 CLICK ADL   How much help from another person does this patient currently need?   1 = Unable, Total/Dependent Assistance  2 = A lot, Maximum/Moderate Assistance  3 = A little, Minimum/Contact Guard/Supervision  4 = None, Modified [place]/Independent    Putting on and taking off regular lower body clothing? : 2  Bathing (including washing, rinsing, drying)?: 2  Toileting, which includes using toilet, bedpan, or urinal? : 2  Putting on and taking off regular upper body clothing?: 3  Taking care of personal grooming such as brushing teeth?: 3  Eating meals?: 4  Daily Activity Total Score: 16     AM-PAC Raw Score CMS "G-Code Modifier Level of Impairment Assistance   6 % Total / Unable   7 - 8 CM 80 - 100% Maximal Assist   9-13 CL 60 - 80% Moderate Assist   14 - 19 CK 40 - " 60% Moderate Assist   20 - 22 CJ 20 - 40% Minimal Assist   23 CI 1-20% SBA / CGA   24 CH 0% Independent/ Mod I       Patient left sitting on BSC with all lines intact, call button in reach, Nurse Lilly notified and sitter present    ASSESSMENT:  Karan Aburto is a 74 y.o. male with a medical diagnosis of Encephalopathy, metabolic and presents with impaired functional mobility and ADLs. Pt will benefit from continued skilled OT in order to address the listed impairments.     Rehab identified problem list/impairments: Rehab identified problem list/impairments: weakness, impaired endurance, impaired self care skills, impaired functional mobilty, gait instability, impaired balance, impaired cognition, decreased coordination, decreased lower extremity function, decreased safety awareness, impaired cardiopulmonary response to activity    Rehab potential is fair.    Activity tolerance: Fair    Discharge recommendations: Discharge Facility/Level of Care Needs: nursing facility, skilled     Barriers to discharge:   UNKNOWN    Equipment recommendations: none     GOALS:   Multidisciplinary Problems     Occupational Therapy Goals        Problem: Occupational Therapy Goal    Goal Priority Disciplines Outcome Interventions   Occupational Therapy Goal     OT, PT/OT Ongoing, Progressing    Description: OT GOALS TO BE MET BY 10-26-20  PT WILL TOLERATE 1 SET X 15 REPS B UE ROM EXERCISE WITH MIN RESISTANCE  MIN A WITH LE DRESSING  SBA WITH UE DRESSING                     Plan:  Patient to be seen 3 x/week to address the above listed problems via self-care/home management, therapeutic activities, therapeutic exercises  Plan of Care expires: 10/26/20  Plan of Care reviewed with: patient         Tania Way, PT/OT  10/22/2020

## 2020-10-22 NOTE — ASSESSMENT & PLAN NOTE
Patient has no know Hx of DM  Hgb A1 C 9.3  Glucose - 322, 377, 183  ISC   Long acting insulin started, 15 units nightly  Needs a prescription of metformin at time of discharge

## 2020-10-22 NOTE — DISCHARGE INSTRUCTIONS
At some point - he may go back on a diuretic but needs to be prescribed by PCP or Cardiologist and resolution of alkalosis    Crestor is held for now until repeat liver enzymes are done on follow up with PCP

## 2020-10-22 NOTE — ASSESSMENT & PLAN NOTE
ECHO pending  Continue Coreg, IV lasix BID, ACEi, Aldactone  Strict I/Os  Daily weights  Needs additional diuresis  Dash diet, 2 gm sodium restriction  1500 ml fluid restriction  Needs Ischemic evaluation once more compensated   Further recs to follow in AM    10/20/2020  -Continue Coreg, IV lasix, Aldactone, ACEi, Imdur  -ECHO revealed EF 60%, DD  -Needs additional IV diuresis today  -MPI stress test once more compensated IP vs OP  -Assess response in AM    10/21  -Continue Coreg, ACEi, Imdur  -Received dose of Diamox yesterday  -Has diuresed well so far, needs further diuresis  -Needs K+ replaced to normal levels  -Possible MPI stress test in AM    10/22  -Continue current meds  -Optimize meds for Bp control  -OP Stress test

## 2020-10-22 NOTE — ASSESSMENT & PLAN NOTE
Monitor BP  Creatinine has improved - lisinopril started by Cardiology  Will given PRN Clonidine if needed   Hydralazine, lisinopril, Imdur and Coreg

## 2020-10-22 NOTE — ASSESSMENT & PLAN NOTE
New Onset  Daily Weights  Strict I/Os  Fluid Restriction <1L daily   Supplemental Oxygen PRN   IV Lasix and aldactone>>>>> stopped due to contraction alkalosis  ACE inhibitor started  ECHO - normal systolic function with EF 60 % and grade 1 diastolic dysfunction  10/22/2020 - aldactone resumed  Needs follow up with Dr. Zackery Ivan and possible outpatient stress test

## 2020-10-22 NOTE — ASSESSMENT & PLAN NOTE
Possibly due to Cardiac Congestion, fatty liver, use of STATIN   AST/ALT - 122/221>>>> trending down  Ammonia mildly elevated - lactulose given 10/22/2020 - resolved  RUQ indicates fatty liver  Acute hepatitis panel negative  Needs repeat labs with PCP and possible GI follow up

## 2020-10-22 NOTE — ASSESSMENT & PLAN NOTE
Hold meds Crestor with elevated LFT - continue fenofibrate  Lab Results   Component Value Date    CHOL 115 (L) 10/19/2020     Lab Results   Component Value Date    HDL 32 (L) 10/19/2020     Lab Results   Component Value Date    LDLCALC 3.4 (L) 10/19/2020     Lab Results   Component Value Date    TRIG 398 (H) 10/19/2020     Lab Results   Component Value Date    CHOLHDL 27.8 10/19/2020

## 2020-10-22 NOTE — PROGRESS NOTES
[facility] -   Nephrology  Progress Note    Patient Name: Karan Aburto  MRN: [id number]  Admission Date: 10/18/2020  Hospital Length of Stay: 2 days  Attending Provider: Edgar Dalton MD   Primary Care Physician: Mickey Agrawal MD  Principal Problem:Encephalopathy, metabolic    Subjective:     HPI: Karan Aburto this 74-year-old  man with history of hypertension, dyslipidemia, obesity, spastic paraplegia, was admitted to the hospital for progressive worsening of peripheral edema the last few weeks,  admission BNP was 159, echocardiogram shows normal LV function, mild diastolic dysfunction, likely peripheral edema due to decreased mobility from spastic paraplegia, side effect of calcium channel blocker, also has hypoalbuminemia resulting in peripheral edema due to 3rd spacing, patient was being treated with diuretics including hydrochlorothiazide, spironolactone, lasix , lead to significant contraction alkalosis.  We were consulted for contraction alkalosis and management.         Interval History: no acute events, denies complaints,     Review of patient's allergies indicates:  No Known Allergies  Current Facility-Administered Medications   Medication Frequency    carvediloL tablet 12.5 mg BID WM    dextrose 50% injection 12.5 g PRN    dextrose 50% injection 25 g PRN    fenofibrate tablet 145 mg Daily    glucagon (human recombinant) injection 1 mg PRN    glucose chewable tablet 16 g PRN    glucose chewable tablet 24 g PRN    insulin aspart U-100 pen 1-10 Units Q6H PRN    insulin detemir U-100 pen 15 Units QHS    isosorbide mononitrate 24 hr tablet 30 mg Daily    lisinopriL tablet 40 mg Daily    metoprolol injection 5 mg Q5 Min PRN    mupirocin 2 % ointment BID    ondansetron injection 4 mg Q8H PRN    potassium chloride SA CR tablet 40 mEq Q4H    potassium phosphate 15 mmol in dextrose 5 % 250 mL infusion Once    spironolactone tablet 25 mg Daily       Objective:     Vital  Signs (Most Recent):  Temp: 97.5 °F (36.4 °C) (10/22/20 1130)  Pulse: 81 (10/22/20 1130)  Resp: 18 (10/22/20 1130)  BP: (!) 168/90 (10/22/20 1130)  SpO2: 98 % (10/22/20 1130)  O2 Device (Oxygen Therapy): room air (10/21/20 1105) Vital Signs (24h Range):  Temp:  [97 °F (36.1 °C)-98.5 °F (36.9 °C)] 97.5 °F (36.4 °C)  Pulse:  [] 81  Resp:  [18] 18  SpO2:  [94 %-98 %] 98 %  BP: (129-173)/() 168/90     Weight: 100 kg (220 lb 7.4 oz) (10/21/20 2340)  Body mass index is 31.63 kg/m².  Body surface area is 2.22 meters squared.    I/O last 3 completed shifts:  In: 2412 [P.O.:1912; I.V.:200; IV Piggyback:300]  Out: 3704 [Urine:3700; Stool:4]    Physical Exam  Vitals signs and nursing note reviewed.   Constitutional:       General: He is not in acute distress.     Appearance: He is well-developed. He is not diaphoretic.   HENT:      Head: Normocephalic and atraumatic.   Eyes:      Pupils: Pupils are equal, round, and reactive to light.   Neck:      Musculoskeletal: Normal range of motion and neck supple.      Thyroid: No thyromegaly.      Trachea: No tracheal deviation.   Cardiovascular:      Rate and Rhythm: Normal rate and regular rhythm.      Heart sounds: Normal heart sounds. No murmur. No friction rub. No gallop.    Pulmonary:      Effort: Pulmonary effort is normal.      Breath sounds: Normal breath sounds. No wheezing or rales.   Abdominal:      Palpations: Abdomen is soft. There is no mass.      Tenderness: There is no abdominal tenderness. There is no guarding or rebound.      Comments: Obese    Musculoskeletal:         General: Swelling present.      Comments: Mostly around ankles    Lymphadenopathy:      Cervical: No cervical adenopathy.   Skin:     General: Skin is warm.      Findings: No erythema or rash.   Neurological:      Mental Status: He is alert and oriented to person, place, and time.         Significant Labs:  Cardiac Markers: No results for input(s): CKMB, TROPONINT, MYOGLOBIN in the last 168  hours.  CBC:   Recent Labs   Lab 10/22/20  0646   WBC 16.08*   RBC 4.21*   HGB 12.2*   HCT 37.3*   PLT 95*   MCV 89   MCH 29.0   MCHC 32.7     CMP:   Recent Labs   Lab 10/22/20  0646   *   CALCIUM 7.9*   ALBUMIN 2.5*   PROT 5.1*   *   K 2.6*   CO2 30*      BUN 23   CREATININE 1.0   ALKPHOS 107   *   AST 87*   BILITOT 0.9     Coagulation:   Recent Labs   Lab 10/18/20  1512   INR 1.0     LFTs:   Recent Labs   Lab 10/22/20  0646   *   AST 87*   ALKPHOS 107   BILITOT 0.9   PROT 5.1*   ALBUMIN 2.5*     All labs within the past 24 hours have been reviewed.     Significant Imaging:  Reviewed    Lab Results   Component Value Date    CALCIUM 7.9 (L) 10/22/2020    PHOS 2.6 (L) 10/22/2020       Lab Results   Component Value Date    ALBUMIN 2.5 (L) 10/22/2020           Assessment/Plan:     Metabolic alkalosis  1. Metabolic / Contraction alkalosis, likely due to aggressive diuresis, improved,     2.  Volume status, peripheral edema most likely due to combination of 3rd spacing from hypoalbuminemia, decreased mobility     3.  Hypertension, agree with addition of aldactone     4.  Hypokalemia,  continue potassium supplements , eval for primary hyperaldo , add aldactone, check urine K     5.  Stable renal function with a serum creatinine of 0.8 mg/dL,    6.  Proteinuria, mild at 0.5 g per day, monitor, he is on an Ace inhibitor         Thank you for your consult. I will follow-up with patient. Please contact us if you have any additional questions.     Total time spent 40 minutes including time needed to review the records,  patient  evaluation, documentation, face-to-face discussion with the patient, son, primary team,    more than 50% of the time was spent on coordination of care and counseling.       Edgar Gamino MD  Nephrology  [facility]

## 2020-10-22 NOTE — PLAN OF CARE
Problem: Fall Injury Risk  Goal: Absence of Fall and Fall-Related Injury  Outcome: Ongoing, Progressing     Problem: Infection  Goal: Infection Symptom Resolution  Outcome: Ongoing, Progressing     Problem: Adult Inpatient Plan of Care  Goal: Plan of Care Review  Outcome: Ongoing, Progressing  Goal: Patient-Specific Goal (Individualization)  Outcome: Ongoing, Progressing  Goal: Absence of Hospital-Acquired Illness or Injury  Outcome: Ongoing, Progressing  Goal: Optimal Comfort and Wellbeing  Outcome: Ongoing, Progressing  Goal: Readiness for Transition of Care  Outcome: Ongoing, Progressing  Goal: Rounds/Family Conference  Outcome: Ongoing, Progressing     Problem: Diabetes Comorbidity  Goal: Blood Glucose Level Within Desired Range  Outcome: Ongoing, Progressing     Problem: Electrolyte Imbalance (Acute Kidney Injury/Impairment)  Goal: Serum Electrolyte Balance  Outcome: Ongoing, Progressing     Problem: Fluid Imbalance (Acute Kidney Injury/Impairment)  Goal: Optimal Fluid Balance  Outcome: Ongoing, Progressing     Problem: Hematologic Alteration (Acute Kidney Injury/Impairment)  Goal: Hemoglobin, Hematocrit and Platelets Within Normal Range  Outcome: Ongoing, Progressing     Problem: Oral Intake Inadequate (Acute Kidney Injury/Impairment)  Goal: Optimal Nutrition Intake  Outcome: Ongoing, Progressing     Problem: Renal Function Impairment (Acute Kidney Injury/Impairment)  Goal: Effective Renal Function  Outcome: Ongoing, Progressing     Problem: Wound  Goal: Optimal Wound Healing  Outcome: Ongoing, Progressing     Problem: Skin Injury Risk Increased  Goal: Skin Health and Integrity  Outcome: Ongoing, Progressing

## 2020-10-22 NOTE — PT/OT/SLP PROGRESS
Physical Therapy  Treatment    Karan Aburto   MRN: [id number]   Admitting Diagnosis: Encephalopathy, metabolic    PT Received On: 10/22/20  PT Start Time: 0825     PT Stop Time: 0850    PT Total Time (min): 25 min       Billable Minutes:  Therapeutic Activity 15 and Therapeutic Exercise 10    Treatment Type: Treatment  PT/PTA: PT     PTA Visit Number: 0       General Precautions: Standard, fall  Orthopedic Precautions: N/A   Braces: N/A    Subjective:  Communicated with NURSE STEPHANIE prior to session.  Pain/Comfort  Pain Rating 1: 0/10    Objective:   Patient found with: telemetry    Functional Mobility:  Therapeutic Activities and Exercises:  PT FOUND SUPINE IN BED UPON ARRIVAL, AGREEABLE TO TX., REQUEST TO USE BATHROOM, MODA FOR SUP>SIT, MYA FOR SEATED SCOOT TO EOB, PT EDUCATED IN AND PERFORMED BLE THEREX X 15 REPS AROM WITH REST: HIP FLEX/EXT, LAQ, AP'S.  TOTAL ASSIST FOR DONNING SOCKS, REVIEW RW USE AND SAFETY DURING TF'S AND GAIT, SIT>STAND WITH MODA X 2, PT TOOK SEVERAL SMALL STEPS WITH RW AND MODA X 2, CUE FOR UPRIGHT POSTURE AND RW SAFETY, MINIMAL BUCKLING AT B KNEE'S BUT NO LOB.  PT LEFT SITTING TO HAVE BM PER HIS REQUEST    AM-PAC 6 CLICK MOBILITY  How much help from another person does this patient currently need?   1 = Unable, Total/Dependent Assistance  2 = A lot, Maximum/Moderate Assistance  3 = A little, Minimum/Contact Guard/Supervision  4 = None, Modified [place]/Independent    Turning over in bed (including adjusting bedclothes, sheets and blankets)?: 2  Sitting down on and standing up from a chair with arms (e.g., wheelchair, bedside commode, etc.): 2  Moving from lying on back to sitting on the side of the bed?: 2  Moving to and from a bed to a chair (including a wheelchair)?: 2  Need to walk in hospital room?: 2  Climbing 3-5 steps with a railing?: 1  Basic Mobility Total Score: 11    AM-PAC Raw Score CMS G-Code Modifier Level of Impairment Assistance   6 % Total / Unable   7 - 9 CM  80 - 100% Maximal Assist   10 - 14 CL 60 - 80% Moderate Assist   15 - 19 CK 40 - 60% Moderate Assist   20 - 22 CJ 20 - 40% Minimal Assist   23 CI 1-20% SBA / CGA   24 CH 0% Independent/ Mod I     Patient left SEATED ON BEDSIDE COMMODE with all lines intact, call button in reach, NURSE notified and SITTER present.    Assessment:  Karan Aburto is a 74 y.o. male with a medical diagnosis of Encephalopathy, metabolic and presents with IMPAIRED FUNCTIONAL MOBILITY. PT WILL BENEFIT FROM CONT. SKILLED P.T. TO ADDRESS IMPAIRMENTS    Rehab identified problem list/impairments: Rehab identified problem list/impairments: weakness, impaired endurance, impaired balance, gait instability, impaired functional mobilty, decreased coordination    Rehab potential is good.    Activity tolerance: Good    Discharge recommendations: Discharge Facility/Level of Care Needs: nursing facility, skilled     Barriers to discharge:      Equipment recommendations: Equipment Needed After Discharge: wheelchair     GOALS:   Multidisciplinary Problems     Physical Therapy Goals        Problem: Physical Therapy Goal    Goal Priority Disciplines Outcome Goal Variances Interventions   Physical Therapy Goal     PT, PT/OT Ongoing, Progressing     Description: LTG'S TO BE MET IN 7 DAYS (10-26-20)  1. PT WILL REQUIRE MYA FOR BED MOBILITY  2. PT WILL REQUIRE MODA FOR TF'S  3. PT WILL AMB 50' WITH RW AND MODA                   PLAN:    Patient to be seen 5 x/week  to address the above listed problems via gait training, therapeutic activities, therapeutic exercises  Plan of Care expires: 10/26/20  Plan of Care reviewed with: patient    Lilly Bernard, PT  10/22/2020

## 2020-10-22 NOTE — SUBJECTIVE & OBJECTIVE
Review of Systems   Constitutional: Positive for activity change. Negative for appetite change, chills, diaphoresis, fatigue, fever and unexpected weight change.   HENT: Negative for congestion, ear discharge, ear pain, hearing loss, nosebleeds, postnasal drip, sinus pressure, sinus pain, sneezing, sore throat, tinnitus, trouble swallowing and voice change.    Eyes: Negative for pain, discharge, redness and itching.   Respiratory: Negative for cough, chest tightness, shortness of breath and wheezing.    Cardiovascular: Positive for leg swelling. Negative for chest pain and palpitations.   Gastrointestinal: Negative for abdominal distention, abdominal pain, blood in stool, constipation, diarrhea, nausea, rectal pain and vomiting.   Endocrine: Negative for cold intolerance, heat intolerance, polydipsia, polyphagia and polyuria.   Genitourinary: Negative for decreased urine volume, difficulty urinating, discharge, dysuria, flank pain, frequency, hematuria, penile pain, scrotal swelling, testicular pain and urgency.   Musculoskeletal: Negative for arthralgias, back pain, joint swelling, myalgias and neck pain.   Skin: Positive for color change. Negative for pallor and rash.   Neurological: Negative for dizziness, tremors, weakness, light-headedness, numbness and headaches.   Hematological: Negative for adenopathy. Does not bruise/bleed easily.   Psychiatric/Behavioral: Negative for agitation, confusion, sleep disturbance and suicidal ideas. The patient is not nervous/anxious.    All other systems reviewed and are negative.    Objective:     Vital Signs (Most Recent):  Temp: 97.5 °F (36.4 °C) (10/22/20 1130)  Pulse: 81 (10/22/20 1130)  Resp: 18 (10/22/20 1130)  BP: (!) 168/90 (10/22/20 1130)  SpO2: 98 % (10/22/20 1130) Vital Signs (24h Range):  Temp:  [97 °F (36.1 °C)-98.5 °F (36.9 °C)] 97.5 °F (36.4 °C)  Pulse:  [] 81  Resp:  [18] 18  SpO2:  [94 %-98 %] 98 %  BP: (129-173)/() 168/90     Weight: 100 kg  (220 lb 7.4 oz)  Body mass index is 31.63 kg/m².    Intake/Output Summary (Last 24 hours) at 10/22/2020 1423  Last data filed at 10/21/2020 1800  Gross per 24 hour   Intake 458 ml   Output 204 ml   Net 254 ml      Physical Exam  Vitals signs and nursing note reviewed.   Constitutional:       General: He is not in acute distress.     Appearance: He is well-developed. He is not diaphoretic.   HENT:      Head: Normocephalic and atraumatic.   Eyes:      Pupils: Pupils are equal, round, and reactive to light.   Neck:      Musculoskeletal: Normal range of motion and neck supple.      Thyroid: No thyromegaly.      Trachea: No tracheal deviation.   Cardiovascular:      Rate and Rhythm: Normal rate and regular rhythm.      Heart sounds: Normal heart sounds. No murmur. No friction rub. No gallop.    Pulmonary:      Effort: Pulmonary effort is normal.      Breath sounds: Normal breath sounds. No wheezing or rales.   Abdominal:      Palpations: Abdomen is soft. There is no mass.      Tenderness: There is no abdominal tenderness. There is no guarding or rebound.      Comments: Obese    Musculoskeletal:         General: Swelling present.      Comments: Mostly around ankles - much improved   Lymphadenopathy:      Cervical: No cervical adenopathy.   Skin:     General: Skin is warm.      Findings: No erythema or rash.   Neurological:      Mental Status: He is alert and oriented to person, place, and time.         Significant Labs:   CBC:   Recent Labs   Lab 10/21/20  0825 10/22/20  0646   WBC 17.99* 16.08*   HGB 13.0* 12.2*   HCT 39.9* 37.3*   PLT 88* 95*     CMP:   Recent Labs   Lab 10/20/20  1741 10/21/20  0825 10/21/20  1543 10/22/20  0646    141  --  146*   K 2.3* 2.6* 2.6* 2.6*   CL 93* 98  --  107   CO2 38* 33*  --  30*   * 194*  --  170*   BUN 26* 24*  --  23   CREATININE 1.2 1.1  --  1.0   CALCIUM 7.9* 8.0*  --  7.9*   PROT  --  5.3*  --  5.1*   ALBUMIN 2.5* 2.5*  --  2.5*   BILITOT  --  1.1*  --  0.9   ALKPHOS   --  105  --  107   AST  --  78*  --  87*   ALT  --  196*  --  218*   ANIONGAP 9 10  --  9   EGFRNONAA 59* >60  --  >60     All pertinent labs within the past 24 hours have been reviewed.    Significant Imaging: I have reviewed all pertinent imaging results/findings within the past 24 hours.

## 2020-10-22 NOTE — SUBJECTIVE & OBJECTIVE
Interval History: no acute events, denies complaints,     Review of patient's allergies indicates:  No Known Allergies  Current Facility-Administered Medications   Medication Frequency    carvediloL tablet 12.5 mg BID WM    dextrose 50% injection 12.5 g PRN    dextrose 50% injection 25 g PRN    fenofibrate tablet 145 mg Daily    glucagon (human recombinant) injection 1 mg PRN    glucose chewable tablet 16 g PRN    glucose chewable tablet 24 g PRN    insulin aspart U-100 pen 1-10 Units Q6H PRN    insulin detemir U-100 pen 15 Units QHS    isosorbide mononitrate 24 hr tablet 30 mg Daily    lisinopriL tablet 40 mg Daily    metoprolol injection 5 mg Q5 Min PRN    mupirocin 2 % ointment BID    ondansetron injection 4 mg Q8H PRN    potassium chloride SA CR tablet 40 mEq Q4H    potassium phosphate 15 mmol in dextrose 5 % 250 mL infusion Once    spironolactone tablet 25 mg Daily       Objective:     Vital Signs (Most Recent):  Temp: 97.5 °F (36.4 °C) (10/22/20 1130)  Pulse: 81 (10/22/20 1130)  Resp: 18 (10/22/20 1130)  BP: (!) 168/90 (10/22/20 1130)  SpO2: 98 % (10/22/20 1130)  O2 Device (Oxygen Therapy): room air (10/21/20 1105) Vital Signs (24h Range):  Temp:  [97 °F (36.1 °C)-98.5 °F (36.9 °C)] 97.5 °F (36.4 °C)  Pulse:  [] 81  Resp:  [18] 18  SpO2:  [94 %-98 %] 98 %  BP: (129-173)/() 168/90     Weight: 100 kg (220 lb 7.4 oz) (10/21/20 2340)  Body mass index is 31.63 kg/m².  Body surface area is 2.22 meters squared.    I/O last 3 completed shifts:  In: 2412 [P.O.:1912; I.V.:200; IV Piggyback:300]  Out: 3704 [Urine:3700; Stool:4]    Physical Exam  Vitals signs and nursing note reviewed.   Constitutional:       General: He is not in acute distress.     Appearance: He is well-developed. He is not diaphoretic.   HENT:      Head: Normocephalic and atraumatic.   Eyes:      Pupils: Pupils are equal, round, and reactive to light.   Neck:      Musculoskeletal: Normal range of motion and neck supple.       Thyroid: No thyromegaly.      Trachea: No tracheal deviation.   Cardiovascular:      Rate and Rhythm: Normal rate and regular rhythm.      Heart sounds: Normal heart sounds. No murmur. No friction rub. No gallop.    Pulmonary:      Effort: Pulmonary effort is normal.      Breath sounds: Normal breath sounds. No wheezing or rales.   Abdominal:      Palpations: Abdomen is soft. There is no mass.      Tenderness: There is no abdominal tenderness. There is no guarding or rebound.      Comments: Obese    Musculoskeletal:         General: Swelling present.      Comments: Mostly around ankles    Lymphadenopathy:      Cervical: No cervical adenopathy.   Skin:     General: Skin is warm.      Findings: No erythema or rash.   Neurological:      Mental Status: He is alert and oriented to person, place, and time.         Significant Labs:  Cardiac Markers: No results for input(s): CKMB, TROPONINT, MYOGLOBIN in the last 168 hours.  CBC:   Recent Labs   Lab 10/22/20  0646   WBC 16.08*   RBC 4.21*   HGB 12.2*   HCT 37.3*   PLT 95*   MCV 89   MCH 29.0   MCHC 32.7     CMP:   Recent Labs   Lab 10/22/20  0646   *   CALCIUM 7.9*   ALBUMIN 2.5*   PROT 5.1*   *   K 2.6*   CO2 30*      BUN 23   CREATININE 1.0   ALKPHOS 107   *   AST 87*   BILITOT 0.9     Coagulation:   Recent Labs   Lab 10/18/20  1512   INR 1.0     LFTs:   Recent Labs   Lab 10/22/20  0646   *   AST 87*   ALKPHOS 107   BILITOT 0.9   PROT 5.1*   ALBUMIN 2.5*     All labs within the past 24 hours have been reviewed.     Significant Imaging:  Reviewed    Lab Results   Component Value Date    CALCIUM 7.9 (L) 10/22/2020    PHOS 2.6 (L) 10/22/2020       Lab Results   Component Value Date    ALBUMIN 2.5 (L) 10/22/2020

## 2020-10-22 NOTE — ASSESSMENT & PLAN NOTE
Troponin elevated and trending downward  Likely due to uncontrolled glucose, CKD, and acute CHF  Will need ischemic evaluation once more compensated  Needs an outpatient stress test

## 2020-10-22 NOTE — ASSESSMENT & PLAN NOTE
Improving as of 10/21/2020  Nephrology consulted  IV fluids recommended>>> now stopped  Could be attributed to HCTZ and lasix according to Nephrology  Diamox given then stopped  Daily chemistries

## 2020-10-22 NOTE — ASSESSMENT & PLAN NOTE
Empiric treatment for presumed leg cellulitis, ECHO notes diastolic dysfunction, low albumin likely third spacing and could be attributed to calcium channel blocker medication  Elevated legs  Strict I & O  SCDs  FADI hose  10/22/2020 - Much improved after wearing FADI hose - needs FADI hose on discharge

## 2020-10-22 NOTE — ASSESSMENT & PLAN NOTE
1. Metabolic / Contraction alkalosis, likely due to aggressive diuresis, improved,     2.  Volume status, peripheral edema most likely due to combination of 3rd spacing from hypoalbuminemia, decreased mobility     3.  Hypertension, agree with addition of aldactone     4.  Hypokalemia,  continue potassium supplements , eval for primary hyperaldo , add aldactone, check urine K     5.  Stable renal function with a serum creatinine of 0.8 mg/dL,    6.  Proteinuria, mild at 0.5 g per day, monitor, he is on an Ace inhibitor

## 2020-10-22 NOTE — SUBJECTIVE & OBJECTIVE
Interval History: no acute issues noted o/n. Will need OP Stress test. Bp slowly improving. Needs K+ repleted to normal levels.     Review of Systems   Constitution: Positive for malaise/fatigue.   HENT: Negative for hearing loss and hoarse voice.    Eyes: Negative for blurred vision and visual disturbance.   Cardiovascular: Positive for leg swelling. Negative for chest pain, claudication, dyspnea on exertion, irregular heartbeat, near-syncope, orthopnea, palpitations, paroxysmal nocturnal dyspnea and syncope.   Respiratory: Negative for cough, hemoptysis, shortness of breath, sleep disturbances due to breathing, snoring and wheezing.    Endocrine: Negative for cold intolerance and heat intolerance.   Hematologic/Lymphatic: Does not bruise/bleed easily.   Skin: Negative for color change, dry skin and nail changes.   Musculoskeletal: Positive for arthritis and back pain. Negative for joint pain and myalgias.   Gastrointestinal: Negative for bloating, abdominal pain, constipation, nausea and vomiting.   Genitourinary: Negative for dysuria, flank pain, hematuria and hesitancy.   Neurological: Positive for weakness. Negative for headaches, light-headedness, loss of balance, numbness and paresthesias.   Psychiatric/Behavioral: Positive for memory loss. Negative for altered mental status.   Allergic/Immunologic: Negative for environmental allergies.     Objective:     Vital Signs (Most Recent):  Temp: 98.5 °F (36.9 °C) (10/22/20 0907)  Pulse: 93 (10/22/20 0957)  Resp: 18 (10/22/20 0907)  BP: (!) 147/98 (10/22/20 0957)  SpO2: 98 % (10/22/20 0907) Vital Signs (24h Range):  Temp:  [97 °F (36.1 °C)-98.5 °F (36.9 °C)] 98.5 °F (36.9 °C)  Pulse:  [] 93  Resp:  [18] 18  SpO2:  [94 %-98 %] 98 %  BP: (129-173)/() 147/98     Weight: 100 kg (220 lb 7.4 oz)  Body mass index is 31.63 kg/m².     SpO2: 98 %  O2 Device (Oxygen Therapy): room air      Intake/Output Summary (Last 24 hours) at 10/22/2020 1114  Last data filed at  10/21/2020 1800  Gross per 24 hour   Intake 458 ml   Output 204 ml   Net 254 ml       Lines/Drains/Airways     Peripheral Intravenous Line                 Peripheral IV - Single Lumen 10/22/20 0456 20 G Posterior;Right Hand less than 1 day                Physical Exam   Constitutional: He is oriented to person, place, and time. He appears well-developed and well-nourished. No distress.   HENT:   Head: Normocephalic and atraumatic.   Eyes: Pupils are equal, round, and reactive to light.   Neck: Normal range of motion and full passive range of motion without pain. Neck supple. No JVD present. No tracheal deviation present. No thyromegaly present.   Cardiovascular: Normal rate, regular rhythm, S1 normal, S2 normal and intact distal pulses. PMI is not displaced. Exam reveals no distant heart sounds.   No murmur heard.  Pulses:       Radial pulses are 2+ on the right side and 2+ on the left side.        Dorsalis pedis pulses are 1+ on the right side and 1+ on the left side.   CHEST PAIN FREE   Pulmonary/Chest: Effort normal and breath sounds normal. No accessory muscle usage. No respiratory distress. He has no decreased breath sounds. He has no wheezes. He has no rales.   Abdominal: Soft. Bowel sounds are normal. He exhibits no distension. There is no abdominal tenderness.   Musculoskeletal: Normal range of motion.         General: Edema present.      Right ankle: He exhibits swelling.      Left ankle: He exhibits swelling.      Comments: Pitting edema +2 to bilateral knees, slowly improving   Neurological: He is alert and oriented to person, place, and time.   Skin: Skin is warm and dry. He is not diaphoretic. No cyanosis. Nails show no clubbing.   Psychiatric: He has a normal mood and affect. His speech is normal and behavior is normal. Judgment and thought content normal. Cognition and memory are normal.   Nursing note and vitals reviewed.      Significant Labs:   BMP:   Recent Labs   Lab 10/20/20  2251 10/21/20  6978  10/21/20  1543 10/21/20  1642 10/22/20  0646   * 194*  --   --  170*    141  --   --  146*   K 2.3* 2.6* 2.6*  --  2.6*   CL 93* 98  --   --  107   CO2 38* 33*  --   --  30*   BUN 26* 24*  --   --  23   CREATININE 1.2 1.1  --   --  1.0   CALCIUM 7.9* 8.0*  --   --  7.9*   MG  --   --   --  2.1 2.2   , CBC   Recent Labs   Lab 10/21/20  0825 10/22/20  0646   WBC 17.99* 16.08*   HGB 13.0* 12.2*   HCT 39.9* 37.3*   PLT 88* 95*    and All pertinent lab results from the last 24 hours have been reviewed.    Significant Imaging: Echocardiogram:   Transthoracic echo (TTE) complete (Cupid Only):   Results for orders placed or performed during the hospital encounter of 10/18/20   Echo Color Flow Doppler? Yes; Bubble Contrast? No   Result Value Ref Range    Ascending aorta 2.97 cm    STJ 2.57 cm    AV mean gradient 7 mmHg    Ao peak lewis 1.79 m/s    Ao VTI 27.91 cm    IVS 1.39 (A) 0.6 - 1.1 cm    LA size 4.21 cm    Left Atrium Major Axis 4.62 cm    LVIDd 3.82 3.5 - 6.0 cm    LVIDs 2.29 2.1 - 4.0 cm    LVOT diameter 2.39 cm    LVOT peak VTI 21.64 cm    Posterior Wall 1.08 0.6 - 1.1 cm    MV Peak A Lewis 1.10 m/s    E wave decelartion time 190.97 msec    MV Peak E Lewis 0.79 m/s    RA Major Axis 4.32 cm    RA Width 2.24 cm    TAPSE 2.81 cm    TR Max Lewis 1.74 m/s    TDI LATERAL 0.07 m/s    TDI SEPTAL 0.06 m/s    LA WIDTH 4.11 cm    Ao root annulus 3.66 cm    MV stenosis pressure 1/2 time 55.38 ms    LV Diastolic Volume 62.69 mL    LV Systolic Volume 17.96 mL    LVOT peak lewis 1.28 m/s    LV LATERAL E/E' RATIO 11.29 m/s    LV SEPTAL E/E' RATIO 13.17 m/s    FS 40 %    LV mass 161.27 g    Left Ventricle Relative Wall Thickness 0.57 cm    AV valve area 3.48 cm2    AV Velocity Ratio 0.72     AV index (prosthetic) 0.78     MV valve area p 1/2 method 3.97 cm2    E/A ratio 0.72     Mean e' 0.07 m/s    LVOT area 4.5 cm2    LVOT stroke volume 97.03 cm3    AV peak gradient 13 mmHg    E/E' ratio 12.15 m/s    LV Systolic Volume Index  8.1 mL/m2    LV Diastolic Volume Index 28.40 mL/m2    LV Mass Index 73 g/m2    Triscuspid Valve Regurgitation Peak Gradient 12 mmHg    Right Atrial Pressure (from IVC) 3 mmHg    TV rest pulmonary artery pressure 15 mmHg    Narrative    · There is mild left ventricular concentric hypertrophy.  · With normal systolic function. The estimated ejection fraction is 60%.  · Grade I diastolic dysfunction.  · Normal right ventricular systolic function.  · Mild left atrial enlargement.  · Mild mitral regurgitation.  · Normal central venous pressure (3 mmHg).  · The estimated PA systolic pressure is 15 mmHg.

## 2020-10-22 NOTE — ASSESSMENT & PLAN NOTE
Lab Results   Component Value Date    HGBA1C 9.3 (H) 10/19/2020   New onset  Will prescribe oral antidiabetic med at time of discharge>> send prescription for Metformin  Home health with diabetic teaching  Will need to follow up with PCP regarding diabetes  accuchecks  10/22/2020 - Pt may discharge to home on 10/23/2020 if hypokalemia resolved. Home health order placed and awaiting approval per [facility]

## 2020-10-22 NOTE — PROGRESS NOTES
[facility] Hospital Medicine  Progress Note    Patient Name: Karan Aburto  MRN: [id number]  Patient Class: IP- Inpatient   Admission Date: 10/18/2020  Length of Stay: 2 days  Attending Physician: Edgar Dalton MD  Primary Care Provider: Mickey Agrawal MD        Subjective:     Principal Problem:New onset type 2 diabetes mellitus        HPI:  Mr. Karan Aburto is a 74 y.o. male patient with Hx of HTN, HLD who presents to the Emergency Department for evaluation of BLE swelling which onset gradually 3 weeks ago. Pt's son expresses that pt's sxs may be attributed to recent medication change after recently changing insurance companies.He reports the leg swelling has been associated with progressive SOB. Denies chest pain, cough, palpitations, fever or chills.  Pt denies feeling SOB in the supine position or waking up short of breath in the middle of the night. No known hx of A-fib, CHF, DVT, PE, liver problems, or use of blood thinners. In ED with concerns for DVT US obtained which was negative, also given IV lasix with concerns for possible CHFE. It was noted his potassium was 2.4 and he was given IV and PO. Patient also has elevated LFTs. Due to multiple issues patient admitted for further management.        Overview/Hospital Course:  Pt admitted with leg swelling, SOB and concerns for new onset CHF. Also, there is leg/foot redness representative of cellulitis. Cardiology saw the patient and initial recs and IV diuresis continued. IV Rocephin and Flagyl given for presumed leg cellulitis. Pt is oriented x 3 but is confused in reference to situation and says peculiar things. Likely encephalopathy secondary to cellulitis. There are no gross functional neuro deficits and CT of Head was negative.     10/20/2020 - Bilateral foot erythema has decreased, WBC trending down. Metabolic encephalopathy improving - pt able to verbalize situation and oriented x 3. Disorientation is intermittent. Potassium  replaced. Labs revealed multiple chemical derangements - CO2 41 - contraction alkalosis and Nephrology consulted. Dr. Gamino feels that pt's peripheral edema is secondary to 3rd spacing and pt has intravascular volume depletion. Albumin 2.6. IV fluids and diamox started. Lasix discontinued. Hgb A1C = 9.3  - insulin in progress. Still with elevated liver enzymes. Liver US notes fatty liver, acute hepatitis panel is negative. Ammonia = 53 and lactulose x 2 ordered.     10/21/2020 - Erythema has improved but still present to the bilateral feet - no tenderness, skin intact. Scattered areas of erythema to both legs in various areas. WBC trending down - pt afebrile. Leukocytosis likely reactive due to dehydration. Blood cultures negative. Mostly oriented and able to explain situation for hospitalization. But says unusual things intermittently. Hypokalemia addressed. Metabolic alkalosis is improving with IV hydration and diamox. Glucose 194. Liver enzymes are trending down. Pt still with lower leg swelling/pedal edema. PT/OT in progress secondary to pt's hx of spastic paraplegia.     10/22/2020 - Pt is much improved. FADI hose and SCDs greatly improved the leg/pedal edema. Only mild erythema to the bilateral feet. Zosyn stopped. Transaminitis likely secondary to STATIN or fatty liver - will need outpatient follow up to follow liver enzymes. Bilirubin has normalized. WBC trending down and likely reactive.Phosphorous replaced. Potassium remains low 2.6 and replacing, aldactone added. According to Nephrology - needs eval for primary hyperaldosteronism. Albumin low at 2.5. Ammonia level was elevated and now resolved. Metabolic alkalosis continues to improve. Home health orders placed and awaiting to hear from [facility] - pt's insurance. If hyperkalemia resolved - pt may be discharged home 10/23/2020. Please review the home meds with the family member as the patient has had multiple medication changes over the last few  months.         Review of Systems   Constitutional: Positive for activity change. Negative for appetite change, chills, diaphoresis, fatigue, fever and unexpected weight change.   HENT: Negative for congestion, ear discharge, ear pain, hearing loss, nosebleeds, postnasal drip, sinus pressure, sinus pain, sneezing, sore throat, tinnitus, trouble swallowing and voice change.    Eyes: Negative for pain, discharge, redness and itching.   Respiratory: Negative for cough, chest tightness, shortness of breath and wheezing.    Cardiovascular: Positive for leg swelling. Negative for chest pain and palpitations.   Gastrointestinal: Negative for abdominal distention, abdominal pain, blood in stool, constipation, diarrhea, nausea, rectal pain and vomiting.   Endocrine: Negative for cold intolerance, heat intolerance, polydipsia, polyphagia and polyuria.   Genitourinary: Negative for decreased urine volume, difficulty urinating, discharge, dysuria, flank pain, frequency, hematuria, penile pain, scrotal swelling, testicular pain and urgency.   Musculoskeletal: Negative for arthralgias, back pain, joint swelling, myalgias and neck pain.   Skin: Positive for color change. Negative for pallor and rash.   Neurological: Negative for dizziness, tremors, weakness, light-headedness, numbness and headaches.   Hematological: Negative for adenopathy. Does not bruise/bleed easily.   Psychiatric/Behavioral: Negative for agitation, confusion, sleep disturbance and suicidal ideas. The patient is not nervous/anxious.    All other systems reviewed and are negative.    Objective:     Vital Signs (Most Recent):  Temp: 97.5 °F (36.4 °C) (10/22/20 1130)  Pulse: 81 (10/22/20 1130)  Resp: 18 (10/22/20 1130)  BP: (!) 168/90 (10/22/20 1130)  SpO2: 98 % (10/22/20 1130) Vital Signs (24h Range):  Temp:  [97 °F (36.1 °C)-98.5 °F (36.9 °C)] 97.5 °F (36.4 °C)  Pulse:  [] 81  Resp:  [18] 18  SpO2:  [94 %-98 %] 98 %  BP: (129-173)/() 168/90      Weight: 100 kg (220 lb 7.4 oz)  Body mass index is 31.63 kg/m².    Intake/Output Summary (Last 24 hours) at 10/22/2020 1423  Last data filed at 10/21/2020 1800  Gross per 24 hour   Intake 458 ml   Output 204 ml   Net 254 ml      Physical Exam  Vitals signs and nursing note reviewed.   Constitutional:       General: He is not in acute distress.     Appearance: He is well-developed. He is not diaphoretic.   HENT:      Head: Normocephalic and atraumatic.   Eyes:      Pupils: Pupils are equal, round, and reactive to light.   Neck:      Musculoskeletal: Normal range of motion and neck supple.      Thyroid: No thyromegaly.      Trachea: No tracheal deviation.   Cardiovascular:      Rate and Rhythm: Normal rate and regular rhythm.      Heart sounds: Normal heart sounds. No murmur. No friction rub. No gallop.    Pulmonary:      Effort: Pulmonary effort is normal.      Breath sounds: Normal breath sounds. No wheezing or rales.   Abdominal:      Palpations: Abdomen is soft. There is no mass.      Tenderness: There is no abdominal tenderness. There is no guarding or rebound.      Comments: Obese    Musculoskeletal:         General: Swelling present.      Comments: Mostly around ankles - much improved   Lymphadenopathy:      Cervical: No cervical adenopathy.   Skin:     General: Skin is warm.      Findings: No erythema or rash.   Neurological:      Mental Status: He is alert and oriented to person, place, and time.         Significant Labs:   CBC:   Recent Labs   Lab 10/21/20  0825 10/22/20  0646   WBC 17.99* 16.08*   HGB 13.0* 12.2*   HCT 39.9* 37.3*   PLT 88* 95*     CMP:   Recent Labs   Lab 10/20/20  1741 10/21/20  0825 10/21/20  1543 10/22/20  0646    141  --  146*   K 2.3* 2.6* 2.6* 2.6*   CL 93* 98  --  107   CO2 38* 33*  --  30*   * 194*  --  170*   BUN 26* 24*  --  23   CREATININE 1.2 1.1  --  1.0   CALCIUM 7.9* 8.0*  --  7.9*   PROT  --  5.3*  --  5.1*   ALBUMIN 2.5* 2.5*  --  2.5*   BILITOT  --  1.1*   --  0.9   ALKPHOS  --  105  --  107   AST  --  78*  --  87*   ALT  --  196*  --  218*   ANIONGAP 9 10  --  9   EGFRNONAA 59* >60  --  >60     All pertinent labs within the past 24 hours have been reviewed.    Significant Imaging: I have reviewed all pertinent imaging results/findings within the past 24 hours.      Assessment/Plan:      * New onset type 2 diabetes mellitus  Lab Results   Component Value Date    HGBA1C 9.3 (H) 10/19/2020   New onset  Will prescribe oral antidiabetic med at time of discharge>> send prescription for Metformin  Home health with diabetic teaching  Will need to follow up with PCP regarding diabetes  accuchecks  10/22/2020 - Pt may discharge to home on 10/23/2020 if hypokalemia resolved. Home health order placed and awaiting approval per [facility]        Metabolic alkalosis  Improving as of 10/21/2020  Nephrology consulted  IV fluids recommended>>> now stopped  Could be attributed to HCTZ and lasix according to Nephrology  Diamox given then stopped  Daily chemistries      Elevated troponin  Troponin elevated and trending downward  Likely due to uncontrolled glucose, CKD, and acute CHF  Will need ischemic evaluation once more compensated  Needs an outpatient stress test      Hyperlipemia  Hold meds Crestor with elevated LFT - continue fenofibrate  Lab Results   Component Value Date    CHOL 115 (L) 10/19/2020     Lab Results   Component Value Date    HDL 32 (L) 10/19/2020     Lab Results   Component Value Date    LDLCALC 3.4 (L) 10/19/2020     Lab Results   Component Value Date    TRIG 398 (H) 10/19/2020     Lab Results   Component Value Date    CHOLHDL 27.8 10/19/2020       Essential hypertension  Monitor BP  Creatinine has improved - lisinopril started by Cardiology  Will given PRN Clonidine if needed   Hydralazine, lisinopril, Imdur and Coreg      Acute diastolic heart failure  New Onset  Daily Weights  Strict I/Os  Fluid Restriction <1L daily   Supplemental Oxygen PRN   IV Lasix and  aldactone>>>>> stopped due to contraction alkalosis  ACE inhibitor started  ECHO - normal systolic function with EF 60 % and grade 1 diastolic dysfunction  10/22/2020 - aldactone resumed  Needs follow up with Dr. Zackery Ivan and possible outpatient stress test         Leg swelling  Empiric treatment for presumed leg cellulitis, ECHO notes diastolic dysfunction, low albumin likely third spacing and could be attributed to calcium channel blocker medication  Elevated legs  Strict I & O  SCDs  FADI hose  10/22/2020 - Much improved after wearing FADI hose - needs FADI hose on discharge      Leukocytosis  Slowly trending down, pt afebrile, no obvious source of infection although slight reduction of erythema to the BLE  Could be reactive due to dehydration  Empiric treatment for cellulitis  IV Zosyn  Blood cultures negative      Hypokalemia  Monitor and Replete  10/22/2020 - potassium still low, replacing  Magnesium normal - 2.2  Replacing phosphorous  Aldactone resumed on 10/22/2020  May discharge on 10/23/2020 - if hypokalemia resolved    Abnormal liver enzymes  Possibly due to Cardiac Congestion, fatty liver, use of STATIN   AST/ALT - 122/221>>>> trending down  Ammonia mildly elevated - lactulose given 10/22/2020 - resolved  RUQ indicates fatty liver  Acute hepatitis panel negative  Needs repeat labs with PCP and possible GI follow up      Diabetes mellitus type 2, uncontrolled  Patient has no know Hx of DM  Hgb A1 C 9.3  Glucose - 322, 377, 183  ISC   Long acting insulin started, 15 units nightly  Needs a prescription of metformin at time of discharge      VTE Risk Mitigation (From admission, onward)         Ordered     Place sequential compression device  Until discontinued      10/21/20 1355     Place FADI hose  Until discontinued      10/21/20 1355                Discharge Planning   ADAM:      Code Status: Not on file   Is the patient medically ready for discharge?:     Reason for patient still in hospital (select all  that apply): Patient trending condition  Discharge Plan A: Home with family, Home Health                  Shayna Haynes NP  Department of Hospital Medicine   [facility]

## 2020-10-22 NOTE — ASSESSMENT & PLAN NOTE
Monitor and Replete  10/22/2020 - potassium still low, replacing  Magnesium normal - 2.2  Replacing phosphorous  Aldactone resumed on 10/22/2020  May discharge on 10/23/2020 - if hypokalemia resolved

## 2020-10-22 NOTE — PT/OT/SLP PROGRESS
"Occupational Therapy  Treatment    Karan Aburto   MRN: [id number]   Admitting Diagnosis: Encephalopathy, metabolic    OT Date of Treatment: 10/22/20   OT Start Time: 0900  OT Stop Time: 0908  OT Total Time (min): 8 min    Billable Minutes:  Therapeutic Activity 8 min    General Precautions: Standard, fall  Orthopedic Precautions: N/A  Braces: N/A         Subjective:  Communicated with Nurse Carr and epic chart review prior to session.  Pt found on BSC and agreeable to tx at this time.   Pain/Comfort  Pain Rating 1: 0/10    Objective:  Patient found with: telemetry     Functional Mobility:  Therapeutic Activities and Exercises:  Pt performed sit>stand from BSC using RW with Mod A x 2, cleaned bottom with Total A in standing using RW, functional mobility using RW with Mod A x 2 ~8 steps, t/f to chair using RW with Mod A x 2.     AM-PAC 6 CLICK ADL   How much help from another person does this patient currently need?   1 = Unable, Total/Dependent Assistance  2 = A lot, Maximum/Moderate Assistance  3 = A little, Minimum/Contact Guard/Supervision  4 = None, Modified Icard/Independent    Putting on and taking off regular lower body clothing? : 2  Bathing (including washing, rinsing, drying)?: 2  Toileting, which includes using toilet, bedpan, or urinal? : 2  Putting on and taking off regular upper body clothing?: 3  Taking care of personal grooming such as brushing teeth?: 3  Eating meals?: 4  Daily Activity Total Score: 16     AM-PAC Raw Score CMS "G-Code Modifier Level of Impairment Assistance   6 % Total / Unable   7 - 8 CM 80 - 100% Maximal Assist   9-13 CL 60 - 80% Moderate Assist   14 - 19 CK 40 - 60% Moderate Assist   20 - 22 CJ 20 - 40% Minimal Assist   23 CI 1-20% SBA / CGA   24 CH 0% Independent/ Mod I       Patient left up in chair with all lines intact, call button in reach, chair alarm on, Nurse Lilly notified and sitter present    ASSESSMENT:  Karan Aburto is a 74 y.o. male with a medical " diagnosis of Encephalopathy, metabolic and presents with  impaired functional mobility and ADLs. Pt will benefit from continued skilled OT in order to address the listed impairments.     Rehab identified problem list/impairments: Rehab identified problem list/impairments: weakness, impaired endurance, impaired self care skills, impaired functional mobilty, gait instability, impaired balance, impaired cognition, decreased coordination, decreased lower extremity function, decreased safety awareness, impaired cardiopulmonary response to activity    Rehab potential is fair.    Activity tolerance: Fair    Discharge recommendations: Discharge Facility/Level of Care Needs: nursing facility, skilled     Barriers to discharge:   UNKNOWN    Equipment recommendations: none     GOALS:   Multidisciplinary Problems     Occupational Therapy Goals        Problem: Occupational Therapy Goal    Goal Priority Disciplines Outcome Interventions   Occupational Therapy Goal     OT, PT/OT Ongoing, Progressing    Description: OT GOALS TO BE MET BY 10-26-20  PT WILL TOLERATE 1 SET X 15 REPS B UE ROM EXERCISE WITH MIN RESISTANCE  MIN A WITH LE DRESSING  SBA WITH UE DRESSING                     Plan:  Patient to be seen 3 x/week to address the above listed problems via self-care/home management, therapeutic activities, therapeutic exercises  Plan of Care expires: 10/26/20  Plan of Care reviewed with: patient         Tania Mannolm, PT/OT  10/22/2020

## 2020-10-22 NOTE — PROGRESS NOTES
[facility]  Cardiology  Progress Note    Patient Name: Karan Aburto  MRN: [id number]  Admission Date: 10/18/2020  Hospital Length of Stay: 2 days  Code Status: No Order   Attending Physician: Edgar Dalton MD   Primary Care Physician: Mickey Agrawal MD  Expected Discharge Date:   Principal Problem:Encephalopathy, metabolic    Subjective:   HPI    Karan Aburto is a 74 year old male who presented to [facility] due to LE edema for the past 3 weeks. His current medical conditions include HTN, HLP, obesity. ED workup revealed K+ 2.4, Cr 1.4, Glucose 480, Troponin 0.121, , LA 3.2. Repeat troponin 0.104. ECHO pending. He was placed in observation under the care of hospital medicine. Cardiology consulted to assist with medical management. Chart reviewed, patient seen and examined. Denies chest pain or anginal equivalents. No shortness of breath, BLAIR or palpitations. He does have LE edema to bilateral thighs on exam today. Of note, patient reports that he is being followed by Dr Moncho Culver at [facility]. Denies any previous MI or stents previously. Further recs to follow.       Hospital Course:   10/20/2020-Patient seen and examined in room, lying in bed. Feels ok today. Continues to have significant LE edema today. Has diuresed >6L since admission. Needs continued diuresis. Labs reviewed, K+ 2.6, CO2 41, Cr 1.1    10/21/2020-Patient seen and examined in room, lying in bed. Feels ok today on exam. Has diuresed >4L overnight with improvement in LE edema. Labs reviewed, K+ 2.6, Cr 1.1    10/22/2020-Patient seen and examined in room, sitting in bedside chair. Feels ok today. Some improvement to LE edema today on exam. Continues to diurese well. BP slowly trending better. Recommendation for OP MPI stress test.     Interval History: no acute issues noted o/n. Will need OP Stress test. Bp slowly improving. Needs K+ repleted to normal levels.     Review of Systems   Constitution: Positive for  malaise/fatigue.   HENT: Negative for hearing loss and hoarse voice.    Eyes: Negative for blurred vision and visual disturbance.   Cardiovascular: Positive for leg swelling. Negative for chest pain, claudication, dyspnea on exertion, irregular heartbeat, near-syncope, orthopnea, palpitations, paroxysmal nocturnal dyspnea and syncope.   Respiratory: Negative for cough, hemoptysis, shortness of breath, sleep disturbances due to breathing, snoring and wheezing.    Endocrine: Negative for cold intolerance and heat intolerance.   Hematologic/Lymphatic: Does not bruise/bleed easily.   Skin: Negative for color change, dry skin and nail changes.   Musculoskeletal: Positive for arthritis and back pain. Negative for joint pain and myalgias.   Gastrointestinal: Negative for bloating, abdominal pain, constipation, nausea and vomiting.   Genitourinary: Negative for dysuria, flank pain, hematuria and hesitancy.   Neurological: Positive for weakness. Negative for headaches, light-headedness, loss of balance, numbness and paresthesias.   Psychiatric/Behavioral: Positive for memory loss. Negative for altered mental status.   Allergic/Immunologic: Negative for environmental allergies.     Objective:     Vital Signs (Most Recent):  Temp: 98.5 °F (36.9 °C) (10/22/20 0907)  Pulse: 93 (10/22/20 0957)  Resp: 18 (10/22/20 0907)  BP: (!) 147/98 (10/22/20 0957)  SpO2: 98 % (10/22/20 0907) Vital Signs (24h Range):  Temp:  [97 °F (36.1 °C)-98.5 °F (36.9 °C)] 98.5 °F (36.9 °C)  Pulse:  [] 93  Resp:  [18] 18  SpO2:  [94 %-98 %] 98 %  BP: (129-173)/() 147/98     Weight: 100 kg (220 lb 7.4 oz)  Body mass index is 31.63 kg/m².     SpO2: 98 %  O2 Device (Oxygen Therapy): room air      Intake/Output Summary (Last 24 hours) at 10/22/2020 1114  Last data filed at 10/21/2020 1800  Gross per 24 hour   Intake 458 ml   Output 204 ml   Net 254 ml       Lines/Drains/Airways     Peripheral Intravenous Line                 Peripheral IV - Single  Lumen 10/22/20 0456 20 G Posterior;Right Hand less than 1 day                Physical Exam   Constitutional: He is oriented to person, place, and time. He appears well-developed and well-nourished. No distress.   HENT:   Head: Normocephalic and atraumatic.   Eyes: Pupils are equal, round, and reactive to light.   Neck: Normal range of motion and full passive range of motion without pain. Neck supple. No JVD present. No tracheal deviation present. No thyromegaly present.   Cardiovascular: Normal rate, regular rhythm, S1 normal, S2 normal and intact distal pulses. PMI is not displaced. Exam reveals no distant heart sounds.   No murmur heard.  Pulses:       Radial pulses are 2+ on the right side and 2+ on the left side.        Dorsalis pedis pulses are 1+ on the right side and 1+ on the left side.   CHEST PAIN FREE   Pulmonary/Chest: Effort normal and breath sounds normal. No accessory muscle usage. No respiratory distress. He has no decreased breath sounds. He has no wheezes. He has no rales.   Abdominal: Soft. Bowel sounds are normal. He exhibits no distension. There is no abdominal tenderness.   Musculoskeletal: Normal range of motion.         General: Edema present.      Right ankle: He exhibits swelling.      Left ankle: He exhibits swelling.      Comments: Pitting edema +2 to bilateral knees, slowly improving   Neurological: He is alert and oriented to person, place, and time.   Skin: Skin is warm and dry. He is not diaphoretic. No cyanosis. Nails show no clubbing.   Psychiatric: He has a normal mood and affect. His speech is normal and behavior is normal. Judgment and thought content normal. Cognition and memory are normal.   Nursing note and vitals reviewed.      Significant Labs:   BMP:   Recent Labs   Lab 10/20/20  1741 10/21/20  0825 10/21/20  1543 10/21/20  1642 10/22/20  0646   * 194*  --   --  170*    141  --   --  146*   K 2.3* 2.6* 2.6*  --  2.6*   CL 93* 98  --   --  107   CO2 38* 33*  --    --  30*   BUN 26* 24*  --   --  23   CREATININE 1.2 1.1  --   --  1.0   CALCIUM 7.9* 8.0*  --   --  7.9*   MG  --   --   --  2.1 2.2   , CBC   Recent Labs   Lab 10/21/20  0825 10/22/20  0646   WBC 17.99* 16.08*   HGB 13.0* 12.2*   HCT 39.9* 37.3*   PLT 88* 95*    and All pertinent lab results from the last 24 hours have been reviewed.    Significant Imaging: Echocardiogram:   Transthoracic echo (TTE) complete (Cupid Only):   Results for orders placed or performed during the hospital encounter of 10/18/20   Echo Color Flow Doppler? Yes; Bubble Contrast? No   Result Value Ref Range    Ascending aorta 2.97 cm    STJ 2.57 cm    AV mean gradient 7 mmHg    Ao peak lewis 1.79 m/s    Ao VTI 27.91 cm    IVS 1.39 (A) 0.6 - 1.1 cm    LA size 4.21 cm    Left Atrium Major Axis 4.62 cm    LVIDd 3.82 3.5 - 6.0 cm    LVIDs 2.29 2.1 - 4.0 cm    LVOT diameter 2.39 cm    LVOT peak VTI 21.64 cm    Posterior Wall 1.08 0.6 - 1.1 cm    MV Peak A Lewis 1.10 m/s    E wave decelartion time 190.97 msec    MV Peak E Lewis 0.79 m/s    RA Major Axis 4.32 cm    RA Width 2.24 cm    TAPSE 2.81 cm    TR Max Lewis 1.74 m/s    TDI LATERAL 0.07 m/s    TDI SEPTAL 0.06 m/s    LA WIDTH 4.11 cm    Ao root annulus 3.66 cm    MV stenosis pressure 1/2 time 55.38 ms    LV Diastolic Volume 62.69 mL    LV Systolic Volume 17.96 mL    LVOT peak lewis 1.28 m/s    LV LATERAL E/E' RATIO 11.29 m/s    LV SEPTAL E/E' RATIO 13.17 m/s    FS 40 %    LV mass 161.27 g    Left Ventricle Relative Wall Thickness 0.57 cm    AV valve area 3.48 cm2    AV Velocity Ratio 0.72     AV index (prosthetic) 0.78     MV valve area p 1/2 method 3.97 cm2    E/A ratio 0.72     Mean e' 0.07 m/s    LVOT area 4.5 cm2    LVOT stroke volume 97.03 cm3    AV peak gradient 13 mmHg    E/E' ratio 12.15 m/s    LV Systolic Volume Index 8.1 mL/m2    LV Diastolic Volume Index 28.40 mL/m2    LV Mass Index 73 g/m2    Triscuspid Valve Regurgitation Peak Gradient 12 mmHg    Right Atrial Pressure (from IVC) 3 mmHg    TV  rest pulmonary artery pressure 15 mmHg    Narrative    · There is mild left ventricular concentric hypertrophy.  · With normal systolic function. The estimated ejection fraction is 60%.  · Grade I diastolic dysfunction.  · Normal right ventricular systolic function.  · Mild left atrial enlargement.  · Mild mitral regurgitation.  · Normal central venous pressure (3 mmHg).  · The estimated PA systolic pressure is 15 mmHg.        Assessment and Plan:       Elevated troponin  Troponin elevated and trending downward  Likely due to uncontrolled glucose, CKD, and acute CHF  Will need ischemic evaluation once more compensated    Hyperlipemia  Resume Statin once LFTs improved    Essential hypertension  ON Medical therapy  Continue Coreg, ACEi, Imdur  Monitor BP trend    Acute diastolic heart failure  ECHO pending  Continue Coreg, IV lasix BID, ACEi, Aldactone  Strict I/Os  Daily weights  Needs additional diuresis  Dash diet, 2 gm sodium restriction  1500 ml fluid restriction  Needs Ischemic evaluation once more compensated   Further recs to follow in AM    10/20/2020  -Continue Coreg, IV lasix, Aldactone, ACEi, Imdur  -ECHO revealed EF 60%, DD  -Needs additional IV diuresis today  -MPI stress test once more compensated IP vs OP  -Assess response in AM    10/21  -Continue Coreg, ACEi, Imdur  -Received dose of Diamox yesterday  -Has diuresed well so far, needs further diuresis  -Needs K+ replaced to normal levels  -Possible MPI stress test in AM    10/22  -Continue current meds  -Optimize meds for Bp control  -OP Stress test    Leukocytosis  On IV ABX  Mgmt per primary team    Hypokalemia  Keep K+>4  Replacement ordered per primary team    Abnormal liver enzymes  Monitor with diuresis  Likely due to decompensated CHF    Diabetes mellitus type 2, uncontrolled  Mgmt per primary team        VTE Risk Mitigation (From admission, onward)         Ordered     Place sequential compression device  Until discontinued      10/21/20 9697      Place FADI hose  Until discontinued      10/21/20 0075                Zaira Oconnor, NURIAP-C  Cardiology  [facility]

## 2020-10-22 NOTE — PLAN OF CARE
POC reviewed with pt , verbalizes understanding   Problem: Fall Injury Risk  Goal: Absence of Fall and Fall-Related Injury  Outcome: Ongoing, Progressing     Problem: Infection  Goal: Infection Symptom Resolution  Outcome: Ongoing, Progressing     Problem: Adult Inpatient Plan of Care  Goal: Plan of Care Review  Outcome: Ongoing, Progressing  Goal: Patient-Specific Goal (Individualization)  Outcome: Ongoing, Progressing  Goal: Absence of Hospital-Acquired Illness or Injury  Outcome: Ongoing, Progressing  Goal: Optimal Comfort and Wellbeing  Outcome: Ongoing, Progressing  Goal: Readiness for Transition of Care  Outcome: Ongoing, Progressing  Goal: Rounds/Family Conference  Outcome: Ongoing, Progressing     Problem: Diabetes Comorbidity  Goal: Blood Glucose Level Within Desired Range  Outcome: Ongoing, Progressing     Problem: Electrolyte Imbalance (Acute Kidney Injury/Impairment)  Goal: Serum Electrolyte Balance  Outcome: Ongoing, Progressing     Problem: Fluid Imbalance (Acute Kidney Injury/Impairment)  Goal: Optimal Fluid Balance  Outcome: Ongoing, Progressing     Problem: Hematologic Alteration (Acute Kidney Injury/Impairment)  Goal: Hemoglobin, Hematocrit and Platelets Within Normal Range  Outcome: Ongoing, Progressing     Problem: Oral Intake Inadequate (Acute Kidney Injury/Impairment)  Goal: Optimal Nutrition Intake  Outcome: Ongoing, Progressing     Problem: Renal Function Impairment (Acute Kidney Injury/Impairment)  Goal: Effective Renal Function  Outcome: Ongoing, Progressing     Problem: Wound  Goal: Optimal Wound Healing  Outcome: Ongoing, Progressing     Problem: Skin Injury Risk Increased  Goal: Skin Health and Integrity  Outcome: Ongoing, Progressing

## 2020-10-23 NOTE — SUBJECTIVE & OBJECTIVE
Interval History:     10/23/20: alkalosis has resolved. Still with hyponatremia (aldosterone and renin are pending). Patient without complaints.       Review of patient's allergies indicates:  No Known Allergies  Current Facility-Administered Medications   Medication Frequency    carvediloL tablet 25 mg BID WM    dextrose 50% injection 12.5 g PRN    dextrose 50% injection 25 g PRN    fenofibrate tablet 145 mg Daily    glucagon (human recombinant) injection 1 mg PRN    glucose chewable tablet 16 g PRN    glucose chewable tablet 24 g PRN    hydrALAZINE tablet 50 mg Q8H    insulin aspart U-100 pen 1-10 Units Q6H PRN    insulin detemir U-100 pen 15 Units QHS    isosorbide dinitrate tablet 20 mg TID    lisinopriL tablet 40 mg Daily    metoprolol injection 5 mg Q5 Min PRN    mupirocin 2 % ointment BID    ondansetron injection 4 mg Q8H PRN    spironolactone tablet 25 mg Daily       Objective:     Vital Signs (Most Recent):  Temp: 97.8 °F (36.6 °C) (10/23/20 1212)  Pulse: 83 (10/23/20 1212)  Resp: 18 (10/23/20 1212)  BP: (!) 159/86 (10/23/20 1212)  SpO2: 98 % (10/23/20 1212)  O2 Device (Oxygen Therapy): room air (10/23/20 0715) Vital Signs (24h Range):  Temp:  [97.2 °F (36.2 °C)-98.6 °F (37 °C)] 97.8 °F (36.6 °C)  Pulse:  [64-99] 83  Resp:  [16-20] 18  SpO2:  [92 %-100 %] 98 %  BP: (134-225)/() 159/86     Weight: 97 kg (213 lb 13.5 oz) (10/23/20 0348)  Body mass index is 30.68 kg/m².  Body surface area is 2.19 meters squared.    I/O last 3 completed shifts:  In: 2074 [P.O.:1798; I.V.:276]  Out: -     Physical Exam  Constitutional:       Appearance: He is well-developed.   HENT:      Head: Normocephalic.      Nose: No rhinorrhea.      Mouth/Throat:      Pharynx: No oropharyngeal exudate.   Eyes:      Pupils: Pupils are equal, round, and reactive to light.   Neck:      Thyroid: No thyroid mass.   Cardiovascular:      Rate and Rhythm: Normal rate and regular rhythm.      Heart sounds: S1 normal and S2  normal.   Pulmonary:      Effort: Pulmonary effort is normal. No respiratory distress.      Breath sounds: No wheezing.   Abdominal:      General: Bowel sounds are normal. There is no distension.      Palpations: Abdomen is soft.      Tenderness: There is no abdominal tenderness.      Hernia: No hernia is present.   Musculoskeletal:      Comments: Mild LE edema   Lymphadenopathy:      Cervical: No cervical adenopathy.   Skin:     General: Skin is warm and dry.   Neurological:      Mental Status: He is alert and oriented to person, place, and time.   Psychiatric:         Behavior: Behavior is cooperative.         Significant Labs:  Lab Results   Component Value Date    CREATININE 0.8 10/23/2020    BUN 20 10/23/2020     10/23/2020    K 3.0 (L) 10/23/2020     10/23/2020    CO2 27 10/23/2020     Lab Results   Component Value Date    CALCIUM 8.2 (L) 10/23/2020    PHOS 2.3 (L) 10/22/2020     Lab Results   Component Value Date    ALBUMIN 2.7 (L) 10/23/2020     Lab Results   Component Value Date    WBC 15.31 (H) 10/23/2020    HGB 12.9 (L) 10/23/2020    HCT 39.5 (L) 10/23/2020    MCV 90 10/23/2020     (L) 10/23/2020       Recent Labs   Lab 10/22/20  0646   MG 2.2         Significant Imaging:  Imaging Results          US Lower Extremity Veins Bilateral (Final result)  Result time 10/18/20 16:39:01    Final result by Shayan Reza MD (10/18/20 16:39:01)                 Impression:      Negative for DVT.      Electronically signed by: Shayan Reza MD  Date:    10/18/2020  Time:    16:39             Narrative:    EXAMINATION:  US LOWER EXTREMITY VEINS BILATERAL    CLINICAL HISTORY:  Leg pain and swelling.  Other specified soft tissue disorders    COMPARISON:  None    FINDINGS:  Duplex and color flow imaging with spectral wave analysis performed.    The veins demonstrate normal compressibility and phasicity. No evidence of filling defect.                               X-Ray Chest AP Portable (Final  result)  Result time 10/18/20 15:30:48    Final result by Chan Vanessa MD (10/18/20 15:30:48)                 Impression:      No acute cardiopulmonary process.      Electronically signed by: Chan Vanessa MD  Date:    10/18/2020  Time:    15:30             Narrative:    EXAMINATION:  XR CHEST AP PORTABLE    CLINICAL HISTORY:  CHF;    COMPARISON:  None.    FINDINGS:  The lungs are clear. The cardiac silhouette is within normal limits. No pleural effusion or pneumothorax or pulmonary edema.                               RADIOLOGY REPORT (Final result)  Result time 10/21/20 17:29:04    Final result by Unknown User (10/21/20 17:29:04)

## 2020-10-23 NOTE — PLAN OF CARE
Pt AAO x4.  VSS  Pt able to make needs known.  Pt remained afebrile throughout this shift.   Pt remained free of falls this shift.   Pt denies pain this shift.  Plan of care reviewed. Patient verbalizes understanding.   Pt moving/turing independent. Frequent weight shifting encouraged.  Patient sinus rhythm on monitor.   Bed low, side rails up x 2, wheels locked, call light in reach.   Hourly rounding completed.   Will continue to monitor.

## 2020-10-23 NOTE — SUBJECTIVE & OBJECTIVE
Interval History: No events overnight . BP elevated this morning     Review of Systems  Objective:     Vital Signs (Most Recent):  Temp: 97.8 °F (36.6 °C) (10/23/20 0715)  Pulse: 92 (10/23/20 0715)  Resp: 17 (10/23/20 0715)  BP: (!) 198/95 (10/23/20 0715)  SpO2: 100 % (10/23/20 0715) Vital Signs (24h Range):  Temp:  [97.2 °F (36.2 °C)-98.6 °F (37 °C)] 97.8 °F (36.6 °C)  Pulse:  [81-99] 92  Resp:  [16-20] 17  SpO2:  [92 %-100 %] 100 %  BP: (134-225)/() 198/95     Weight: 97 kg (213 lb 13.5 oz)  Body mass index is 30.68 kg/m².    Intake/Output Summary (Last 24 hours) at 10/23/2020 0913  Last data filed at 10/22/2020 1819  Gross per 24 hour   Intake 2073.98 ml   Output --   Net 2073.98 ml      Physical Exam  Constitutional:       General: He is not in acute distress.     Appearance: He is well-developed. He is not diaphoretic.   HENT:      Head: Normocephalic and atraumatic.   Eyes:      Pupils: Pupils are equal, round, and reactive to light.   Neck:      Musculoskeletal: Normal range of motion and neck supple.      Thyroid: No thyromegaly.      Trachea: No tracheal deviation.   Cardiovascular:      Rate and Rhythm: Normal rate and regular rhythm.      Heart sounds: Normal heart sounds. No murmur. No friction rub. No gallop.    Pulmonary:      Effort: Pulmonary effort is normal.      Breath sounds: Normal breath sounds. No wheezing or rales.   Abdominal:      Palpations: Abdomen is soft. There is no mass.      Tenderness: There is no abdominal tenderness. There is no guarding or rebound.      Comments: Obese    Musculoskeletal:         General: Swelling present.      Comments: Mostly around ankles - much improved   Lymphadenopathy:      Cervical: No cervical adenopathy.   Skin:     General: Skin is warm.      Findings: No erythema or rash.   Neurological:      Mental Status: He is alert and oriented to person, place, and time.   Significant Labs: All pertinent labs within the past 24 hours have been  reviewed.    Significant Imaging: I have reviewed and interpreted all pertinent imaging results/findings within the past 24 hours.

## 2020-10-23 NOTE — ASSESSMENT & PLAN NOTE
ECHO pending  Continue Coreg, IV lasix BID, ACEi, Aldactone  Strict I/Os  Daily weights  Needs additional diuresis  Dash diet, 2 gm sodium restriction  1500 ml fluid restriction  Needs Ischemic evaluation once more compensated   Further recs to follow in AM    10/20/2020  -Continue Coreg, IV lasix, Aldactone, ACEi, Imdur  -ECHO revealed EF 60%, DD  -Needs additional IV diuresis today  -MPI stress test once more compensated IP vs OP  -Assess response in AM    10/21  -Continue Coreg, ACEi, Imdur  -Received dose of Diamox yesterday  -Has diuresed well so far, needs further diuresis  -Needs K+ replaced to normal levels  -Possible MPI stress test in AM    10/22  -Continue current meds  -Optimize meds for Bp control  -OP Stress test    10/23  -Resolved today, seems compensated  -Needs OP MPI stress test after discharge  -Continue Coreg, ACEi, Aldactone, Hydralazine, Imdur  -Needs adequate BP control  -Dash diet, 2 gm sodium restriction  -OP Cards follow up, already established with Dr Moncho Culver

## 2020-10-23 NOTE — ASSESSMENT & PLAN NOTE
1. Metabolic / Contraction alkalosis, likely due to aggressive diuresis, improved, CO2 at 27 today.     2.  Volume status, peripheral edema most likely due to combination of 3rd spacing from hypoalbuminemia, decreased mobility.     3.  Hypertension, aldactone was added.     4.  Hypokalemia,  continue potassium supplements and aldactone. Jann/renin pending. Magnesium level at goal.     5.  Stable renal function with a serum creatinine of 0.8 mg/dL,    6.  Proteinuria, mild at 0.5 g per day, monitor, he is on an Ace inhibitor.

## 2020-10-23 NOTE — PHYSICIAN QUERY
PT Name: Karan Aburto  MR #: [id number]  CKD CLARIFICATION     CDS/: Zaira Guzman               Contact information:[email or web address]  This form is a permanent document in the medical record.     Query Date: October 23, 2020    By submitting this query, we are merely seeking further clarification of documentation. Please utilize your independent clinical judgment when addressing the question(s) below.    The Medical Record contains the following:     Indicators   Supporting Clinical Findings   Location in Medical Record   x CKD or Chronic Kidney (Renal) Failure / Disease CKD Cardiology note 10-19   x BUN/Creatinine                          GFR Bun=20 to 28  Creatine=0.8 to 1.4  GFR=49 to >60 Lab 10-18 to 10-23    Dehydration      Nausea / Vomiting      Dialysis / CRRT      Medication      Treatment     x Other Chronic Conditions Essential hypertension  Diabetes mellitus type  2  H&P    Other       Provider, please further specify the stage of Chronic Kidney Disease (CKD).     [employer] Definitions     CKD Stage Description eGFR (mL/min)   @[   ]   II Mildly reduced kidney function 60-89   [   ]    III Moderately reduced kidney function 30-59   [   ] Other (please specify): ____________    [   ]  Clinically Undetermined        Please document in your progress notes daily for the duration of treatment until resolved and include in your discharge summary.

## 2020-10-23 NOTE — SUBJECTIVE & OBJECTIVE
Interval History: no acute issues noted o/n. Needs K+ repleted to normal levels prior to discharge. Needs BP controlled prior to discharge as well. Will need OP MPI stress test arranged. LE edema has greatly improved with diuresis and FADI hose. Assess response.     Review of Systems   Constitution: Positive for malaise/fatigue.   HENT: Negative for hearing loss and hoarse voice.    Eyes: Negative for blurred vision and visual disturbance.   Cardiovascular: Positive for leg swelling. Negative for chest pain, claudication, dyspnea on exertion, irregular heartbeat, near-syncope, orthopnea, palpitations, paroxysmal nocturnal dyspnea and syncope.   Respiratory: Negative for cough, hemoptysis, shortness of breath, sleep disturbances due to breathing, snoring and wheezing.    Endocrine: Negative for cold intolerance and heat intolerance.   Hematologic/Lymphatic: Does not bruise/bleed easily.   Skin: Negative for color change, dry skin and nail changes.   Musculoskeletal: Positive for arthritis and back pain. Negative for joint pain and myalgias.   Gastrointestinal: Negative for bloating, abdominal pain, constipation, nausea and vomiting.   Genitourinary: Negative for dysuria, flank pain, hematuria and hesitancy.   Neurological: Positive for weakness. Negative for headaches, light-headedness, loss of balance, numbness and paresthesias.   Psychiatric/Behavioral: Positive for memory loss. Negative for altered mental status.   Allergic/Immunologic: Negative for environmental allergies.     Objective:     Vital Signs (Most Recent):  Temp: 97.8 °F (36.6 °C) (10/23/20 0715)  Pulse: 90 (10/23/20 0900)  Resp: 17 (10/23/20 0715)  BP: (!) 198/95 (10/23/20 0715)  SpO2: 100 % (10/23/20 0715) Vital Signs (24h Range):  Temp:  [97.2 °F (36.2 °C)-98.6 °F (37 °C)] 97.8 °F (36.6 °C)  Pulse:  [81-99] 90  Resp:  [16-20] 17  SpO2:  [92 %-100 %] 100 %  BP: (134-225)/() 198/95     Weight: 97 kg (213 lb 13.5 oz)  Body mass index is 30.68  kg/m².     SpO2: 100 %  O2 Device (Oxygen Therapy): room air      Intake/Output Summary (Last 24 hours) at 10/23/2020 1053  Last data filed at 10/22/2020 1819  Gross per 24 hour   Intake 2073.98 ml   Output --   Net 2073.98 ml       Lines/Drains/Airways     Peripheral Intravenous Line                 Peripheral IV - Single Lumen 10/22/20 0456 20 G Posterior;Right Hand 1 day                Physical Exam   Constitutional: He is oriented to person, place, and time. He appears well-developed and well-nourished. No distress.   HENT:   Head: Normocephalic and atraumatic.   Eyes: Pupils are equal, round, and reactive to light.   Neck: Normal range of motion and full passive range of motion without pain. Neck supple. No JVD present. No tracheal deviation present. No thyromegaly present.   Cardiovascular: Normal rate, regular rhythm, S1 normal, S2 normal and intact distal pulses. PMI is not displaced. Exam reveals no distant heart sounds.   No murmur heard.  Pulses:       Radial pulses are 2+ on the right side and 2+ on the left side.        Dorsalis pedis pulses are 1+ on the right side and 1+ on the left side.   CHEST PAIN FREE   Pulmonary/Chest: Effort normal and breath sounds normal. No accessory muscle usage. No respiratory distress. He has no decreased breath sounds. He has no wheezes. He has no rales.   Abdominal: Soft. Bowel sounds are normal. He exhibits no distension. There is no abdominal tenderness.   Musculoskeletal: Normal range of motion.         General: Edema present.      Right ankle: He exhibits swelling.      Left ankle: He exhibits swelling.      Comments: +FADI hose   Neurological: He is alert and oriented to person, place, and time.   Skin: Skin is warm and dry. He is not diaphoretic. No cyanosis. Nails show no clubbing.   Psychiatric: He has a normal mood and affect. His speech is normal and behavior is normal. Judgment and thought content normal. Cognition and memory are normal.   Nursing note and  vitals reviewed.      Significant Labs:   BMP:   Recent Labs   Lab 10/21/20  1642 10/22/20  0646 10/22/20  1731 10/23/20  0631   GLU  --  170* 233* 111*   NA  --  146* 139 144   K  --  2.6* 3.0*  3.0* 3.0*   CL  --  107 107 109   CO2  --  30* 26 27   BUN  --  23 23 20   CREATININE  --  1.0 1.0 0.8   CALCIUM  --  7.9* 7.8* 8.2*   MG 2.1 2.2  --   --    , CBC   Recent Labs   Lab 10/22/20  0646 10/23/20  0631   WBC 16.08* 15.31*   HGB 12.2* 12.9*   HCT 37.3* 39.5*   PLT 95* 100*   , Troponin No results for input(s): TROPONINI in the last 48 hours. and All pertinent lab results from the last 24 hours have been reviewed.    Significant Imaging: Echocardiogram:   Transthoracic echo (TTE) complete (Cupid Only):   Results for orders placed or performed during the hospital encounter of 10/18/20   Echo Color Flow Doppler? Yes; Bubble Contrast? No   Result Value Ref Range    Ascending aorta 2.97 cm    STJ 2.57 cm    AV mean gradient 7 mmHg    Ao peak lewis 1.79 m/s    Ao VTI 27.91 cm    IVS 1.39 (A) 0.6 - 1.1 cm    LA size 4.21 cm    Left Atrium Major Axis 4.62 cm    LVIDd 3.82 3.5 - 6.0 cm    LVIDs 2.29 2.1 - 4.0 cm    LVOT diameter 2.39 cm    LVOT peak VTI 21.64 cm    Posterior Wall 1.08 0.6 - 1.1 cm    MV Peak A Lewis 1.10 m/s    E wave decelartion time 190.97 msec    MV Peak E Lewis 0.79 m/s    RA Major Axis 4.32 cm    RA Width 2.24 cm    TAPSE 2.81 cm    TR Max Lewis 1.74 m/s    TDI LATERAL 0.07 m/s    TDI SEPTAL 0.06 m/s    LA WIDTH 4.11 cm    Ao root annulus 3.66 cm    MV stenosis pressure 1/2 time 55.38 ms    LV Diastolic Volume 62.69 mL    LV Systolic Volume 17.96 mL    LVOT peak lewis 1.28 m/s    LV LATERAL E/E' RATIO 11.29 m/s    LV SEPTAL E/E' RATIO 13.17 m/s    FS 40 %    LV mass 161.27 g    Left Ventricle Relative Wall Thickness 0.57 cm    AV valve area 3.48 cm2    AV Velocity Ratio 0.72     AV index (prosthetic) 0.78     MV valve area p 1/2 method 3.97 cm2    E/A ratio 0.72     Mean e' 0.07 m/s    LVOT area 4.5 cm2     LVOT stroke volume 97.03 cm3    AV peak gradient 13 mmHg    E/E' ratio 12.15 m/s    LV Systolic Volume Index 8.1 mL/m2    LV Diastolic Volume Index 28.40 mL/m2    LV Mass Index 73 g/m2    Triscuspid Valve Regurgitation Peak Gradient 12 mmHg    Right Atrial Pressure (from IVC) 3 mmHg    TV rest pulmonary artery pressure 15 mmHg    Narrative    · There is mild left ventricular concentric hypertrophy.  · With normal systolic function. The estimated ejection fraction is 60%.  · Grade I diastolic dysfunction.  · Normal right ventricular systolic function.  · Mild left atrial enlargement.  · Mild mitral regurgitation.  · Normal central venous pressure (3 mmHg).  · The estimated PA systolic pressure is 15 mmHg.

## 2020-10-23 NOTE — NURSING
Discharge instructions reviewed with patient and patient's sister at bedside, both verbalized understanding. PIV intact on removal. Tele monitor returned to monitor room. Prescriptions to be delivered at bedside. Stressed the importance of keeping/attending scheduled f/u appts. AVS printed and given to patient. All belongings packed. Instructed to call for wheelchair when ready to d/c.    Patient Instructions by Ck Kim MD at 02/22/17 07:11 AM     Author:  Ck Kim MD Service:  (none) Author Type:  Physician     Filed:  02/23/17 11:54 AM Encounter Date:  2/23/2017 Status:  Addendum     :  Ck Kim MD (Physician)            Use the albuterol only as needed; SLOW breath in!    Nebulizer treatment, just as needed    Keep active    Consider a daily antihistamine; use the Flonase for nasal drasinage    Call if breathing becomes a problem    Please schedule a follow-up visit, to be seen in 12 months; will send a reminder.        Revision History        User Key Date/Time User Provider Type Action    > [N/A] 02/23/17 11:54 AM Ck Kim MD Physician Addend     [N/A] 02/22/17 07:12 AM Ck Kim MD Physician Sign

## 2020-10-23 NOTE — PROGRESS NOTES
[facility] Hospital Medicine  Progress Note    Patient Name: Karan Aburto  MRN: [id number]  Patient Class: IP- Inpatient   Admission Date: 10/18/2020  Length of Stay: 3 days  Attending Physician: Edgar Dalton MD  Primary Care Provider: Mickey Agrawal MD        Subjective:     Principal Problem:New onset type 2 diabetes mellitus        HPI:  Mr. Karan Aburto is a 74 y.o. male patient with Hx of HTN, HLD who presents to the Emergency Department for evaluation of BLE swelling which onset gradually 3 weeks ago. Pt's son expresses that pt's sxs may be attributed to recent medication change after recently changing insurance companies.He reports the leg swelling has been associated with progressive SOB. Denies chest pain, cough, palpitations, fever or chills.  Pt denies feeling SOB in the supine position or waking up short of breath in the middle of the night. No known hx of A-fib, CHF, DVT, PE, liver problems, or use of blood thinners. In ED with concerns for DVT US obtained which was negative, also given IV lasix with concerns for possible CHFE. It was noted his potassium was 2.4 and he was given IV and PO. Patient also has elevated LFTs. Due to multiple issues patient admitted for further management.        Overview/Hospital Course:  Pt admitted with leg swelling, SOB and concerns for new onset CHF. Also, there is leg/foot redness representative of cellulitis. Cardiology saw the patient and initial recs and IV diuresis continued. IV Rocephin and Flagyl given for presumed leg cellulitis. Pt is oriented x 3 but is confused in reference to situation and says peculiar things. Likely encephalopathy secondary to cellulitis. There are no gross functional neuro deficits and CT of Head was negative.     10/20/2020 - Bilateral foot erythema has decreased, WBC trending down. Metabolic encephalopathy improving - pt able to verbalize situation and oriented x 3. Disorientation is intermittent. Potassium  replaced. Labs revealed multiple chemical derangements - CO2 41 - contraction alkalosis and Nephrology consulted. Dr. Gamino feels that pt's peripheral edema is secondary to 3rd spacing and pt has intravascular volume depletion. Albumin 2.6. IV fluids and diamox started. Lasix discontinued. Hgb A1C = 9.3  - insulin in progress. Still with elevated liver enzymes. Liver US notes fatty liver, acute hepatitis panel is negative. Ammonia = 53 and lactulose x 2 ordered.     10/21/2020 - Erythema has improved but still present to the bilateral feet - no tenderness, skin intact. Scattered areas of erythema to both legs in various areas. WBC trending down - pt afebrile. Leukocytosis likely reactive due to dehydration. Blood cultures negative. Mostly oriented and able to explain situation for hospitalization. But says unusual things intermittently. Hypokalemia addressed. Metabolic alkalosis is improving with IV hydration and diamox. Glucose 194. Liver enzymes are trending down. Pt still with lower leg swelling/pedal edema. PT/OT in progress secondary to pt's hx of spastic paraplegia.     10/22/2020 - Pt is much improved. FADI hose and SCDs greatly improved the leg/pedal edema. Only mild erythema to the bilateral feet. Zosyn stopped. Transaminitis likely secondary to STATIN or fatty liver - will need outpatient follow up to follow liver enzymes. Bilirubin has normalized. WBC trending down and likely reactive.Phosphorous replaced. Potassium remains low 2.6 and replacing, aldactone added. According to Nephrology - needs eval for primary hyperaldosteronism. Albumin low at 2.5. Ammonia level was elevated and now resolved. Metabolic alkalosis continues to improve. Home health orders placed and awaiting to hear from [employer] - pt's insurance. If hyperkalemia resolved - pt may be discharged home 10/23/2020. Please review the home meds with the family member as the patient has had multiple medication changes over the last few  months.     Interval History: No events overnight . BP elevated this morning     Review of Systems  Objective:     Vital Signs (Most Recent):  Temp: 97.8 °F (36.6 °C) (10/23/20 0715)  Pulse: 92 (10/23/20 0715)  Resp: 17 (10/23/20 0715)  BP: (!) 198/95 (10/23/20 0715)  SpO2: 100 % (10/23/20 0715) Vital Signs (24h Range):  Temp:  [97.2 °F (36.2 °C)-98.6 °F (37 °C)] 97.8 °F (36.6 °C)  Pulse:  [81-99] 92  Resp:  [16-20] 17  SpO2:  [92 %-100 %] 100 %  BP: (134-225)/() 198/95     Weight: 97 kg (213 lb 13.5 oz)  Body mass index is 30.68 kg/m².    Intake/Output Summary (Last 24 hours) at 10/23/2020 0913  Last data filed at 10/22/2020 1819  Gross per 24 hour   Intake 2073.98 ml   Output --   Net 2073.98 ml      Physical Exam  Constitutional:       General: He is not in acute distress.     Appearance: He is well-developed. He is not diaphoretic.   HENT:      Head: Normocephalic and atraumatic.   Eyes:      Pupils: Pupils are equal, round, and reactive to light.   Neck:      Musculoskeletal: Normal range of motion and neck supple.      Thyroid: No thyromegaly.      Trachea: No tracheal deviation.   Cardiovascular:      Rate and Rhythm: Normal rate and regular rhythm.      Heart sounds: Normal heart sounds. No murmur. No friction rub. No gallop.    Pulmonary:      Effort: Pulmonary effort is normal.      Breath sounds: Normal breath sounds. No wheezing or rales.   Abdominal:      Palpations: Abdomen is soft. There is no mass.      Tenderness: There is no abdominal tenderness. There is no guarding or rebound.      Comments: Obese    Musculoskeletal:         General: Swelling present.      Comments: Mostly around ankles - much improved   Lymphadenopathy:      Cervical: No cervical adenopathy.   Skin:     General: Skin is warm.      Findings: No erythema or rash.   Neurological:      Mental Status: He is alert and oriented to person, place, and time.   Significant Labs: All pertinent labs within the past 24 hours have been  reviewed.    Significant Imaging: I have reviewed and interpreted all pertinent imaging results/findings within the past 24 hours.      Assessment/Plan:      * New onset type 2 diabetes mellitus  Lab Results   Component Value Date    HGBA1C 9.3 (H) 10/19/2020   New onset  Will prescribe oral antidiabetic med at time of discharge>> send prescription for Metformin  Home health with diabetic teaching  Will need to follow up with PCP regarding diabetes  accuchecks  10/22/2020 - Pt may discharge to home on 10/23/2020 if hypokalemia resolved. Home health order placed and awaiting approval per [facility]        Metabolic alkalosis  Improved after stopping lasix and adding aldactone     Elevated troponin  Troponin elevated and trending downward  Likely due to uncontrolled glucose, CKD, and acute CHF  Will need ischemic evaluation once more compensated  Needs an outpatient stress test      Hyperlipemia  Hold meds Crestor with elevated LFT - continue fenofibrate  Lab Results   Component Value Date    CHOL 115 (L) 10/19/2020     Lab Results   Component Value Date    HDL 32 (L) 10/19/2020     Lab Results   Component Value Date    LDLCALC 3.4 (L) 10/19/2020     Lab Results   Component Value Date    TRIG 398 (H) 10/19/2020     Lab Results   Component Value Date    CHOLHDL 27.8 10/19/2020       Essential hypertension  Monitor BP  Creatinine has improved - lisinopril started by Cardiology     Hydralazine, lisinopril, Imdur and Coreg. Change Imdur to Isordil and increase hydralazine to 50 mg TID       Acute diastolic heart failure  New Onset  Daily Weights  Strict I/Os  Fluid Restriction <1L daily   Supplemental Oxygen PRN   IV Lasix and aldactone>>>>> stopped due to contraction alkalosis  ACE inhibitor started  ECHO - normal systolic function with EF 60 % and grade 1 diastolic dysfunction  10/22/2020 - aldactone resumed   Currently looks euvolemic          Leg swelling  Empiric treatment for presumed leg cellulitis, ECHO notes  diastolic dysfunction, low albumin likely third spacing and could be attributed to calcium channel blocker medication  Elevated legs  Strict I & O  SCDs  FADI hose  10/22/2020 - Much improved after wearing FADI hose - needs FADI hose on discharge      Leukocytosis  Slowly trending down, pt afebrile, no obvious source of infection although slight reduction of erythema to the BLE  Could be reactive due to dehydration  Empiric treatment for cellulitis  IV Zosyn  Blood cultures negative      Hypokalemia  Monitor and Replete  10/22/2020 - potassium still low, replacing  Magnesium normal - 2.2  Replacing phosphorous  Aldactone resumed on 10/22/2020  May discharge on 10/23/2020 - if hypokalemia resolved    Abnormal liver enzymes  Possibly due to Cardiac Congestion, fatty liver, use of STATIN   AST/ALT - 122/221>>>> trending down  Ammonia mildly elevated - lactulose given 10/22/2020 - resolved  RUQ indicates fatty liver  Acute hepatitis panel negative  Needs repeat labs with PCP and possible GI follow up      Diabetes mellitus type 2, uncontrolled  Patient has no know Hx of DM  Hgb A1 C 9.3  Glucose - 322, 377, 183  ISC   Long acting insulin started, 15 units nightly  Needs a prescription of metformin at time of discharge      VTE Risk Mitigation (From admission, onward)         Ordered     Place sequential compression device  Until discontinued      10/21/20 1355     Place FADI hose  Until discontinued      10/21/20 1355                Discharge Planning   ADAM:      Code Status: Not on file   Is the patient medically ready for discharge?:     Reason for patient still in hospital (select all that apply): Patient new problem  Discharge Plan A: Home with family, Home Health                  Edgar Dalton MD  Department of Hospital Medicine   [facility]

## 2020-10-23 NOTE — DISCHARGE SUMMARY
[facility] Hospital Medicine  Discharge Summary      Patient Name: Karan Aburto  MRN: [id number]  Admission Date: 10/18/2020  Hospital Length of Stay: 3 days  Discharge Date and Time: No discharge date for patient encounter.  Attending Physician: Edgar Dalton MD   Discharging Provider: Edgar Dalton MD  Primary Care Provider: Mickey Agrawal MD      HPI:   Mr. Karan Aburto is a 74 y.o. male patient with Hx of HTN, HLD who presents to the Emergency Department for evaluation of BLE swelling which onset gradually 3 weeks ago. Pt's son expresses that pt's sxs may be attributed to recent medication change after recently changing insurance companies.He reports the leg swelling has been associated with progressive SOB. Denies chest pain, cough, palpitations, fever or chills.  Pt denies feeling SOB in the supine position or waking up short of breath in the middle of the night. No known hx of A-fib, CHF, DVT, PE, liver problems, or use of blood thinners. In ED with concerns for DVT US obtained which was negative, also given IV lasix with concerns for possible CHFE. It was noted his potassium was 2.4 and he was given IV and PO. Patient also has elevated LFTs. Due to multiple issues patient admitted for further management.        * No surgery found *      Hospital Course:        Patient was admitted for acute on chronic diastolic heart failure exacerbation and also chronic bilateral lower extremity edema with cellulitis.  He was treated with IV diuresis and Zosyn for cellulitis involving both his lower extremity.  Patient responded to Lasix however his potassium levels dropped continued to be low.  Also his blood pressures were elevated during his hospital stay.  Hydrochlorothiazide was stopped he was started on Aldactone his potassium levels to go up.  He was started on hydralazine and Isordil for uncontrolled blood pressures.  Repeat potassium  at the time of discharge was 3.1.  He was given 40 mg  before he was discharged.  Echo done showed only diastolic heart failure but not systolic heart failure.  His chronic lower extremity swelling is likely to peripheral venous condition for which he was advised to apply Juan Luis hoses.  He will be following with his primary care physician on Monday  For follow up on BMP/Home health to draw BMP and send it to PCP and he will decide whether to continue potasium supplements.         Consults:   Consults (From admission, onward)        Status Ordering Provider     Inpatient consult to Cardiology  Once     Provider:  Josh Blanco MD    Completed FORTINO MONTEIRO     Inpatient consult to Diabetes educator  Once     Provider:  (Not yet assigned)    Completed FORTINO MONTEIRO     Inpatient consult to Hospitalist  Once     Provider:  Lety Silva NP    Acknowledged MAXWELL YOUSSEF     Inpatient consult to Nephrology  Once     Provider:  Edgar Gamino MD    Completed WES WOODS     Inpatient consult to Registered Dietitian/Nutritionist  Once     Provider:  (Not yet assigned)    Completed FORTINO MONTEIRO     IP consult to case management  Once     Provider:  (Not yet assigned)    Completed FORTINO MONTEIRO          No new Assessment & Plan notes have been filed under this hospital service since the last note was generated.  Service: Hospital Medicine    Final Active Diagnoses:    Diagnosis Date Noted POA    PRINCIPAL PROBLEM:  New onset type 2 diabetes mellitus [E11.9] 10/21/2020 Yes    Metabolic alkalosis [E87.3] 10/20/2020 Yes    Elevated troponin [R77.8] 10/19/2020 Yes    Diabetes mellitus type 2, uncontrolled [E11.65] 10/18/2020 Yes    Abnormal liver enzymes [R74.8] 10/18/2020 Yes    Hypokalemia [E87.6] 10/18/2020 Yes    Leukocytosis [D72.829] 10/18/2020 Yes    Leg swelling [M79.89] 10/18/2020 Yes    Acute diastolic heart failure [I50.31] 10/18/2020 Yes    Essential hypertension [I10] 10/18/2020 Yes     Hyperlipemia [E78.5] 10/18/2020 Yes      Problems Resolved During this Admission:    Diagnosis Date Noted Date Resolved POA    Encephalopathy, metabolic [G93.41] 10/21/2020 10/22/2020 Yes    Shortness of breath [R06.02] 10/18/2020 10/21/2020 Yes    ANASTASIYA (acute kidney injury) [N17.9] 10/18/2020 10/20/2020 Yes       Discharged Condition: stable    Disposition: Home or Self Care    Follow Up:  Follow-up Information     Mickey Agrawal MD In 3 days.    Specialty: Family Medicine  Why: Hospital Follow Up - New onset diabetes, elevated liver enzymes, metabolic alkalosis, hypokalemia, 3rd spacing with lower leg edema, metabolic encephalopathy  Contact information:  [address] [place] [address]  [phone]             Zackery Ivan MD In 1 week.    Specialty: Cardiology  Why: Hospital Follow Up - Hypertension, diastolic heart failure, contraction alkalosis (pt taking HCTZ and lasix), recommended outpatient stress test  Contact information:  [address] [place] [address]  [phone]             Mickey Agrawal MD In 3 days.    Specialty: Family Medicine  Why: please repeat blood work on monday   Contact information:  [address] [place] [address]  [phone]                 Patient Instructions:      Ambulatory referral/consult to Home Health   Standing Status: Future   Referral Priority: Routine Referral Type: Home Health   Referral Reason: Specialty Services Required   Requested Specialty: Home Health Services   Number of Visits Requested: 1     Diet Adult Regular     Activity as tolerated       Significant Diagnostic Studies: Labs:   CMP   Recent Labs   Lab 10/22/20  0646 10/22/20  1731 10/23/20  0631 10/23/20  1505   * 139 144  --    K 2.6* 3.0*  3.0* 3.0* 3.1*    107 109  --    CO2 30* 26 27  --    * 233* 111*  --    BUN 23 23 20  --    CREATININE 1.0 1.0 0.8  --    CALCIUM 7.9* 7.8* 8.2*  --    PROT 5.1*  --  5.4*  --    ALBUMIN 2.5*  2.3* 2.7*  --    BILITOT 0.9  --  1.0  --    ALKPHOS 107  --  110  --    AST 87*  --  78*  --    *  --  203*  --    ANIONGAP 9 6* 8  --    ESTGFRAFRICA >60 >60 >60  --    EGFRNONAA >60 >60 >60  --     and CBC   Recent Labs   Lab 10/22/20  0646 10/23/20  0631   WBC 16.08* 15.31*   HGB 12.2* 12.9*   HCT 37.3* 39.5*   PLT 95* 100*       Pending Diagnostic Studies:     Procedure Component Value Units Date/Time    Aldosterone [[id number]] Collected: 10/21/20 0635    Order Status: Sent Lab Status: In process Updated: 10/21/20 1632    Specimen: Blood     Renin [[id number]] Collected: 10/21/20 0635    Order Status: Sent Lab Status: In process Updated: 10/21/20 1402    Specimen: Blood          Medications:  Reconciled Home Medications:      Medication List      START taking these medications    carvediloL 25 MG tablet  Commonly known as: COREG  Take 1 tablet (25 mg total) by mouth 2 (two) times daily with meals.     hydrALAZINE 50 MG tablet  Commonly known as: APRESOLINE  Take 1 tablet (50 mg total) by mouth every 8 (eight) hours.     isosorbide dinitrate 20 MG tablet  Commonly known as: ISORDIL  Take 1 tablet (20 mg total) by mouth 3 (three) times daily.     lisinopriL 40 MG tablet  Commonly known as: PRINIVIL,ZESTRIL  Take 1 tablet (40 mg total) by mouth once daily.  Start taking on: October 24, 2020     spironolactone 25 MG tablet  Commonly known as: ALDACTONE  Take 1 tablet (25 mg total) by mouth once daily.  Start taking on: October 24, 2020        CONTINUE taking these medications    fenofibrate 160 MG Tab  Take 160 mg by mouth once daily.     potassium chloride SA 20 MEQ tablet  Commonly known as: K-DUR,KLOR-CON  Take 1 tablet (20 mEq total) by mouth 2 (two) times daily.     tiZANidine 4 mg Cap  Take by mouth.        STOP taking these medications    amLODIPine 5 MG tablet  Commonly known as: NORVASC     baclofen 20 MG tablet  Commonly known as: LIORESAL     benazepriL 20 MG tablet  Commonly known as: LOTENSIN      diltiaZEM 180 MG 24 hr capsule  Commonly known as: CARDIZEM CD     doxycycline 50 MG capsule  Commonly known as: VIBRAMYCIN     furosemide 40 MG tablet  Commonly known as: LASIX     hydroCHLOROthiazide 25 MG tablet  Commonly known as: HYDRODIURIL     olmesartan 40 MG tablet  Commonly known as: BENICAR     rosuvastatin 10 MG tablet  Commonly known as: CRESTOR            Indwelling Lines/Drains at time of discharge:   Lines/Drains/Airways     None                 Time spent on the discharge of patient: 35 minutes  Patient was seen and examined on the date of discharge and determined to be suitable for discharge.         Edgar Dalton MD  Department of Hospital Medicine  [facility]

## 2020-10-23 NOTE — PROGRESS NOTES
[facility]   Nephrology  Progress Note    Patient Name: Karan Aburto  MRN: [id number]  Admission Date: 10/18/2020  Hospital Length of Stay: 3 days  Attending Provider: Egdar Dalton MD   Primary Care Physician: Mickey Agrawal MD  Principal Problem:New onset type 2 diabetes mellitus    Subjective:     HPI: Karan Aburto this 74-year-old  man with history of hypertension, dyslipidemia, obesity, spastic paraplegia, was admitted to the hospital for progressive worsening of peripheral edema the last few weeks,  admission BNP was 159, echocardiogram shows normal LV function, mild diastolic dysfunction, likely peripheral edema due to decreased mobility from spastic paraplegia, side effect of calcium channel blocker, also has hypoalbuminemia resulting in peripheral edema due to 3rd spacing, patient was being treated with diuretics including hydrochlorothiazide, spironolactone, lasix , lead to significant contraction alkalosis.  We were consulted for contraction alkalosis and management.         Interval History:     10/23/20: alkalosis has resolved. Still with hyponatremia (aldosterone and renin are pending). Patient without complaints.       Review of patient's allergies indicates:  No Known Allergies  Current Facility-Administered Medications   Medication Frequency    carvediloL tablet 25 mg BID WM    dextrose 50% injection 12.5 g PRN    dextrose 50% injection 25 g PRN    fenofibrate tablet 145 mg Daily    glucagon (human recombinant) injection 1 mg PRN    glucose chewable tablet 16 g PRN    glucose chewable tablet 24 g PRN    hydrALAZINE tablet 50 mg Q8H    insulin aspart U-100 pen 1-10 Units Q6H PRN    insulin detemir U-100 pen 15 Units QHS    isosorbide dinitrate tablet 20 mg TID    lisinopriL tablet 40 mg Daily    metoprolol injection 5 mg Q5 Min PRN    mupirocin 2 % ointment BID    ondansetron injection 4 mg Q8H PRN    spironolactone tablet 25 mg Daily       Objective:      Vital Signs (Most Recent):  Temp: 97.8 °F (36.6 °C) (10/23/20 1212)  Pulse: 83 (10/23/20 1212)  Resp: 18 (10/23/20 1212)  BP: (!) 159/86 (10/23/20 1212)  SpO2: 98 % (10/23/20 1212)  O2 Device (Oxygen Therapy): room air (10/23/20 0715) Vital Signs (24h Range):  Temp:  [97.2 °F (36.2 °C)-98.6 °F (37 °C)] 97.8 °F (36.6 °C)  Pulse:  [64-99] 83  Resp:  [16-20] 18  SpO2:  [92 %-100 %] 98 %  BP: (134-225)/() 159/86     Weight: 97 kg (213 lb 13.5 oz) (10/23/20 0348)  Body mass index is 30.68 kg/m².  Body surface area is 2.19 meters squared.    I/O last 3 completed shifts:  In: 2074 [P.O.:1798; I.V.:276]  Out: -     Physical Exam  Constitutional:       Appearance: He is well-developed.   HENT:      Head: Normocephalic.      Nose: No rhinorrhea.      Mouth/Throat:      Pharynx: No oropharyngeal exudate.   Eyes:      Pupils: Pupils are equal, round, and reactive to light.   Neck:      Thyroid: No thyroid mass.   Cardiovascular:      Rate and Rhythm: Normal rate and regular rhythm.      Heart sounds: S1 normal and S2 normal.   Pulmonary:      Effort: Pulmonary effort is normal. No respiratory distress.      Breath sounds: No wheezing.   Abdominal:      General: Bowel sounds are normal. There is no distension.      Palpations: Abdomen is soft.      Tenderness: There is no abdominal tenderness.      Hernia: No hernia is present.   Musculoskeletal:      Comments: Mild LE edema   Lymphadenopathy:      Cervical: No cervical adenopathy.   Skin:     General: Skin is warm and dry.   Neurological:      Mental Status: He is alert and oriented to person, place, and time.   Psychiatric:         Behavior: Behavior is cooperative.         Significant Labs:  Lab Results   Component Value Date    CREATININE 0.8 10/23/2020    BUN 20 10/23/2020     10/23/2020    K 3.0 (L) 10/23/2020     10/23/2020    CO2 27 10/23/2020     Lab Results   Component Value Date    CALCIUM 8.2 (L) 10/23/2020    PHOS 2.3 (L) 10/22/2020     Lab  Results   Component Value Date    ALBUMIN 2.7 (L) 10/23/2020     Lab Results   Component Value Date    WBC 15.31 (H) 10/23/2020    HGB 12.9 (L) 10/23/2020    HCT 39.5 (L) 10/23/2020    MCV 90 10/23/2020     (L) 10/23/2020       Recent Labs   Lab 10/22/20  0646   MG 2.2         Significant Imaging:  Imaging Results          US Lower Extremity Veins Bilateral (Final result)  Result time 10/18/20 16:39:01    Final result by Shayan Reza MD (10/18/20 16:39:01)                 Impression:      Negative for DVT.      Electronically signed by: Shayan Reza MD  Date:    10/18/2020  Time:    16:39             Narrative:    EXAMINATION:  US LOWER EXTREMITY VEINS BILATERAL    CLINICAL HISTORY:  Leg pain and swelling.  Other specified soft tissue disorders    COMPARISON:  None    FINDINGS:  Duplex and color flow imaging with spectral wave analysis performed.    The veins demonstrate normal compressibility and phasicity. No evidence of filling defect.                               X-Ray Chest AP Portable (Final result)  Result time 10/18/20 15:30:48    Final result by Chan Vanessa MD (10/18/20 15:30:48)                 Impression:      No acute cardiopulmonary process.      Electronically signed by: Chan Vanessa MD  Date:    10/18/2020  Time:    15:30             Narrative:    EXAMINATION:  XR CHEST AP PORTABLE    CLINICAL HISTORY:  CHF;    COMPARISON:  None.    FINDINGS:  The lungs are clear. The cardiac silhouette is within normal limits. No pleural effusion or pneumothorax or pulmonary edema.                               RADIOLOGY REPORT (Final result)  Result time 10/21/20 17:29:04    Final result by Unknown User (10/21/20 17:29:04)                                    Assessment/Plan:     Metabolic alkalosis  1. Metabolic / Contraction alkalosis, likely due to aggressive diuresis, improved, CO2 at 27 today.     2.  Volume status, peripheral edema most likely due to combination of 3rd spacing from  hypoalbuminemia, decreased mobility.     3.  Hypertension, aldactone was added.     4.  Hypokalemia,  continue potassium supplements and aldactone. Jann/renin pending. Magnesium level at goal.     5.  Stable renal function with a serum creatinine of 0.8 mg/dL,    6.  Proteinuria, mild at 0.5 g per day, monitor, he is on an Ace inhibitor.         Thank you for your consult. I will follow-up with patient. Please contact us if you have any additional questions.    David Brown MD  Nephrology  [facility]

## 2020-10-23 NOTE — PHYSICIAN QUERY
PT Name: Karan Aburto  MR #: [id number]     PHYSICIAN QUERY -  INTEGUMENTARY CLARIFICATION     CDS/: Zaira Guzman               Contact information:[email or web address]  This form is a permanent document in the medical record.     Query Date: October 23, 2020    By submitting this query, we are merely seeking further clarification of documentation.  Please utilize your independent clinical judgment when addressing the question(s) below.  The Medical Record contains the following:   Indicators   Supporting Clinical Findings Location in Medical Record    Redness      Decubitus, Pressure, Ulcer, etc.     x Deep Tissue Injury Stage 3 pressure injury   Wound care note 10-19   x Wound care consult Stage 3 pressure injury noted to coccyx that is present on admission. Measures 1.5x1.5x0.1cm, wound bed moist red and yellow subcutaneous tissue. Concetta wound erythema noted.  Wound care note 10-19    Medication:     x Treatment: Cleansed with saline and patted dry. Thin layer critic aid paste applied to cover, Wound care note 10-19    Other:          National Pressure Ulcer Advisory Panel (2007) Pressure Ulcer Definitions & Stages:  Stage I:                     Intact skin with non-blanchable redness of a localized area usually over a bony prominence.   Stage II:                    Partial thickness loss of dermis presenting as a shallow open ulcer with a red pink wound bed, without slough.   Stage III:                   Full thickness tissue loss. Subcutaneous fat may be visible but bone, tendon or muscle is not exposed. Slough may be                                      present but does not obscure the depth of tissue loss. May include undermining and tunneling.  Stage IV:                  Full thickness tissue loss with exposed bone, tendon or muscle. Slough or eschar may be present on some parts of the                                      wound bed. Often include undermining and  tunneling.  Unstageable:           Full thickness tissue loss but base of ulcer is covered by slough and/or eschar in the wound bed. Until enough                                        slough and/or eschar is removed to expose wound base, its true depth, and therefore stage, cannot be determined  Deep Tissue Injury: Purple or maroon localized area of discolored intact skin or blood-filled blister due to damage of underlying soft tissue   from pressure and/or shear.  Suspected deep tissue injuries may develop into a stageable ulcer or turn out to be another diagnosis (e.g. an ecchymosis)     Provider, please specify the location and  stage of the diagnosis     SITE LATERALITY STAGE   [  ] coccyx ------------N/A--------- [  @] 3          [  ] other site (please specify): ______ [  ]  right       [  ]  left [  ] 3                 [  ] Clinically undetermined         Please document in your progress notes daily for the duration of treatment until resolved and include in your discharge summary.

## 2020-10-23 NOTE — PROGRESS NOTES
[facility]  Cardiology  Progress Note    Patient Name: Karan Aburto  MRN: [id number]  Admission Date: 10/18/2020  Hospital Length of Stay: 3 days  Code Status: No Order   Attending Physician: Edgar Dalton MD   Primary Care Physician: Mickey Agrawal MD  Expected Discharge Date:   Principal Problem:New onset type 2 diabetes mellitus    Subjective:   HPI    Karan Aburto is a 74 year old male who presented to [facility] due to LE edema for the past 3 weeks. His current medical conditions include HTN, HLP, obesity. ED workup revealed K+ 2.4, Cr 1.4, Glucose 480, Troponin 0.121, , LA 3.2. Repeat troponin 0.104. ECHO pending. He was placed in observation under the care of hospital medicine. Cardiology consulted to assist with medical management. Chart reviewed, patient seen and examined. Denies chest pain or anginal equivalents. No shortness of breath, BLAIR or palpitations. He does have LE edema to bilateral thighs on exam today. Of note, patient reports that he is being followed by Dr Moncho Culver at [facility]. Denies any previous MI or stents previously. Further recs to follow.       Hospital Course:   10/20/2020-Patient seen and examined in room, lying in bed. Feels ok today. Continues to have significant LE edema today. Has diuresed >6L since admission. Needs continued diuresis. Labs reviewed, K+ 2.6, CO2 41, Cr 1.1    10/21/2020-Patient seen and examined in room, lying in bed. Feels ok today on exam. Has diuresed >4L overnight with improvement in LE edema. Labs reviewed, K+ 2.6, Cr 1.1    10/22/2020-Patient seen and examined in room, sitting in bedside chair. Feels ok today. Some improvement to LE edema today on exam. Continues to diurese well. BP slowly trending better. Recommendation for OP MPI stress test.     10/23/2020-Patient seen and examined in room, lying in bed. Feels good today. No complaints on exam. LE edema has significantly improved with FADI hose use. Labs reviewed,  K+ 3.0, Cr 0.8. BP uncontrolled this AM. Needs better BP control and K+ repleted to normal to levels.     Interval History: no acute issues noted o/n. Needs K+ repleted to normal levels prior to discharge. Needs BP controlled prior to discharge as well. Will need OP MPI stress test arranged. LE edema has greatly improved with diuresis and FADI hose. Assess response.     Review of Systems   Constitution: Positive for malaise/fatigue.   HENT: Negative for hearing loss and hoarse voice.    Eyes: Negative for blurred vision and visual disturbance.   Cardiovascular: Positive for leg swelling. Negative for chest pain, claudication, dyspnea on exertion, irregular heartbeat, near-syncope, orthopnea, palpitations, paroxysmal nocturnal dyspnea and syncope.   Respiratory: Negative for cough, hemoptysis, shortness of breath, sleep disturbances due to breathing, snoring and wheezing.    Endocrine: Negative for cold intolerance and heat intolerance.   Hematologic/Lymphatic: Does not bruise/bleed easily.   Skin: Negative for color change, dry skin and nail changes.   Musculoskeletal: Positive for arthritis and back pain. Negative for joint pain and myalgias.   Gastrointestinal: Negative for bloating, abdominal pain, constipation, nausea and vomiting.   Genitourinary: Negative for dysuria, flank pain, hematuria and hesitancy.   Neurological: Positive for weakness. Negative for headaches, light-headedness, loss of balance, numbness and paresthesias.   Psychiatric/Behavioral: Positive for memory loss. Negative for altered mental status.   Allergic/Immunologic: Negative for environmental allergies.     Objective:     Vital Signs (Most Recent):  Temp: 97.8 °F (36.6 °C) (10/23/20 0715)  Pulse: 90 (10/23/20 0900)  Resp: 17 (10/23/20 0715)  BP: (!) 198/95 (10/23/20 0715)  SpO2: 100 % (10/23/20 0715) Vital Signs (24h Range):  Temp:  [97.2 °F (36.2 °C)-98.6 °F (37 °C)] 97.8 °F (36.6 °C)  Pulse:  [81-99] 90  Resp:  [16-20] 17  SpO2:  [92  %-100 %] 100 %  BP: (134-225)/() 198/95     Weight: 97 kg (213 lb 13.5 oz)  Body mass index is 30.68 kg/m².     SpO2: 100 %  O2 Device (Oxygen Therapy): room air      Intake/Output Summary (Last 24 hours) at 10/23/2020 1053  Last data filed at 10/22/2020 1819  Gross per 24 hour   Intake 2073.98 ml   Output --   Net 2073.98 ml       Lines/Drains/Airways     Peripheral Intravenous Line                 Peripheral IV - Single Lumen 10/22/20 0456 20 G Posterior;Right Hand 1 day                Physical Exam   Constitutional: He is oriented to person, place, and time. He appears well-developed and well-nourished. No distress.   HENT:   Head: Normocephalic and atraumatic.   Eyes: Pupils are equal, round, and reactive to light.   Neck: Normal range of motion and full passive range of motion without pain. Neck supple. No JVD present. No tracheal deviation present. No thyromegaly present.   Cardiovascular: Normal rate, regular rhythm, S1 normal, S2 normal and intact distal pulses. PMI is not displaced. Exam reveals no distant heart sounds.   No murmur heard.  Pulses:       Radial pulses are 2+ on the right side and 2+ on the left side.        Dorsalis pedis pulses are 1+ on the right side and 1+ on the left side.   CHEST PAIN FREE   Pulmonary/Chest: Effort normal and breath sounds normal. No accessory muscle usage. No respiratory distress. He has no decreased breath sounds. He has no wheezes. He has no rales.   Abdominal: Soft. Bowel sounds are normal. He exhibits no distension. There is no abdominal tenderness.   Musculoskeletal: Normal range of motion.         General: Edema present.      Right ankle: He exhibits swelling.      Left ankle: He exhibits swelling.      Comments: +FADI hose   Neurological: He is alert and oriented to person, place, and time.   Skin: Skin is warm and dry. He is not diaphoretic. No cyanosis. Nails show no clubbing.   Psychiatric: He has a normal mood and affect. His speech is normal and  behavior is normal. Judgment and thought content normal. Cognition and memory are normal.   Nursing note and vitals reviewed.      Significant Labs:   BMP:   Recent Labs   Lab 10/21/20  1642 10/22/20  0646 10/22/20  1731 10/23/20  0631   GLU  --  170* 233* 111*   NA  --  146* 139 144   K  --  2.6* 3.0*  3.0* 3.0*   CL  --  107 107 109   CO2  --  30* 26 27   BUN  --  23 23 20   CREATININE  --  1.0 1.0 0.8   CALCIUM  --  7.9* 7.8* 8.2*   MG 2.1 2.2  --   --    , CBC   Recent Labs   Lab 10/22/20  0646 10/23/20  0631   WBC 16.08* 15.31*   HGB 12.2* 12.9*   HCT 37.3* 39.5*   PLT 95* 100*   , Troponin No results for input(s): TROPONINI in the last 48 hours. and All pertinent lab results from the last 24 hours have been reviewed.    Significant Imaging: Echocardiogram:   Transthoracic echo (TTE) complete (Cupid Only):   Results for orders placed or performed during the hospital encounter of 10/18/20   Echo Color Flow Doppler? Yes; Bubble Contrast? No   Result Value Ref Range    Ascending aorta 2.97 cm    STJ 2.57 cm    AV mean gradient 7 mmHg    Ao peak lewis 1.79 m/s    Ao VTI 27.91 cm    IVS 1.39 (A) 0.6 - 1.1 cm    LA size 4.21 cm    Left Atrium Major Axis 4.62 cm    LVIDd 3.82 3.5 - 6.0 cm    LVIDs 2.29 2.1 - 4.0 cm    LVOT diameter 2.39 cm    LVOT peak VTI 21.64 cm    Posterior Wall 1.08 0.6 - 1.1 cm    MV Peak A Lewis 1.10 m/s    E wave decelartion time 190.97 msec    MV Peak E Lewis 0.79 m/s    RA Major Axis 4.32 cm    RA Width 2.24 cm    TAPSE 2.81 cm    TR Max Lewis 1.74 m/s    TDI LATERAL 0.07 m/s    TDI SEPTAL 0.06 m/s    LA WIDTH 4.11 cm    Ao root annulus 3.66 cm    MV stenosis pressure 1/2 time 55.38 ms    LV Diastolic Volume 62.69 mL    LV Systolic Volume 17.96 mL    LVOT peak lewis 1.28 m/s    LV LATERAL E/E' RATIO 11.29 m/s    LV SEPTAL E/E' RATIO 13.17 m/s    FS 40 %    LV mass 161.27 g    Left Ventricle Relative Wall Thickness 0.57 cm    AV valve area 3.48 cm2    AV Velocity Ratio 0.72     AV index (prosthetic)  0.78     MV valve area p 1/2 method 3.97 cm2    E/A ratio 0.72     Mean e' 0.07 m/s    LVOT area 4.5 cm2    LVOT stroke volume 97.03 cm3    AV peak gradient 13 mmHg    E/E' ratio 12.15 m/s    LV Systolic Volume Index 8.1 mL/m2    LV Diastolic Volume Index 28.40 mL/m2    LV Mass Index 73 g/m2    Triscuspid Valve Regurgitation Peak Gradient 12 mmHg    Right Atrial Pressure (from IVC) 3 mmHg    TV rest pulmonary artery pressure 15 mmHg    Narrative    · There is mild left ventricular concentric hypertrophy.  · With normal systolic function. The estimated ejection fraction is 60%.  · Grade I diastolic dysfunction.  · Normal right ventricular systolic function.  · Mild left atrial enlargement.  · Mild mitral regurgitation.  · Normal central venous pressure (3 mmHg).  · The estimated PA systolic pressure is 15 mmHg.        Assessment and Plan:     Elevated troponin  Troponin elevated and trending downward  Likely due to uncontrolled glucose, CKD, and acute CHF  Will need ischemic evaluation once more compensated    Hyperlipemia  Resume Statin once LFTs improved    Essential hypertension  ON Medical therapy  Continue Coreg, ACEi, Imdur  Monitor BP trend    Acute diastolic heart failure  ECHO pending  Continue Coreg, IV lasix BID, ACEi, Aldactone  Strict I/Os  Daily weights  Needs additional diuresis  Dash diet, 2 gm sodium restriction  1500 ml fluid restriction  Needs Ischemic evaluation once more compensated   Further recs to follow in AM    10/20/2020  -Continue Coreg, IV lasix, Aldactone, ACEi, Imdur  -ECHO revealed EF 60%, DD  -Needs additional IV diuresis today  -MPI stress test once more compensated IP vs OP  -Assess response in AM    10/21  -Continue Coreg, ACEi, Imdur  -Received dose of Diamox yesterday  -Has diuresed well so far, needs further diuresis  -Needs K+ replaced to normal levels  -Possible MPI stress test in AM    10/22  -Continue current meds  -Optimize meds for Bp control  -OP Stress  test    10/23  -Resolved today, seems compensated  -Needs OP MPI stress test after discharge  -Continue Coreg, ACEi, Aldactone, Hydralazine, Imdur  -Needs adequate BP control  -Dash diet, 2 gm sodium restriction  -OP Cards follow up, already established with Dr Moncho Culver    Leg swelling  Has significantly improved with FADI hose and diuresis    Leukocytosis  On IV ABX  Mgmt per primary team    Hypokalemia  Keep K+>4  Replacement ordered per primary team    Abnormal liver enzymes  Monitor with diuresis  Likely due to decompensated CHF    Diabetes mellitus type 2, uncontrolled  Mgmt per primary team        VTE Risk Mitigation (From admission, onward)         Ordered     Place sequential compression device  Until discontinued      10/21/20 1355     Place FADI hose  Until discontinued      10/21/20 1355              Zaira Oconnor, JORGE-C  Cardiology  [facility]

## 2020-10-24 NOTE — PLAN OF CARE
PATIENT AMBULATED TO BATHROOM WITH MIN (A) WITH RW, MIN/CGA WITH SIT <> STAND FROM TOILET, (D) WITH SOCKS, IMPULSIVE WITH T/F'S.

## 2020-10-24 NOTE — PT/OT/SLP PROGRESS
Occupational Therapy  Treatment    Karan Aburto   MRN: [id number]   Admitting Diagnosis: New onset type 2 diabetes mellitus    OT Date of Treatment: 10/23/20   OT Start Time: 1025  OT Stop Time: 1050  OT Total Time (min): 25 min    Billable Minutes:  Self Care/Home Management 15 and Therapeutic Activity 10    General Precautions: Standard, fall(IMPULSIVE)  Orthopedic Precautions:    Braces:           Subjective:  Communicated with NURSE AWAD prior to session.    Pain/Comfort  Pain Rating 1: 0/10    Objective:  Patient found with: bed alarm, peripheral IV, SCD, telemetry     Functional Mobility:  Bed Mobility:       Transfers:        Functional Ambulation: MIN (A)/CGA WITH RW    Activities of Daily Living:     Feeding adaptive equipment:         UE adaptive equipment:         LE adaptive equipment: (D) TO DON SOCKS SEATED EOB                    Bathing adaptive equipment:        Balance:   PATIENT (S) WITH STATIC SIT EOB AND CGA WITH DYNAMIC SIT EOB.    Therapeutic Activities and Exercises:  PATIENT PERFORMED SUPINE > SIT EOB  WITH CGA/SBA AND MIN (A)/CGA EOB > STAND WITH RW DUE TO IMPULSIVITY.  PATIENT AMBULATED TO BATHROOM WITH CGA/MIN (A) AND PERFORMED SIT <> STAND FROM TOILET WITH USE OF GRAB BAR WITH CGA/MIN (A).  PATIENT REQUIRED EDUCATION FOR SAFETY WITH T/F'S WITH DECREASED IMPULSIVITY TO DECREASE FALL RISK.  PATIENT ACKNOWLEDGED UNDERSTANDING OF EDUCATION PROVIDED.    AM-PAC 6 CLICK ADL   How much help from another person does this patient currently need?   1 = Unable, Total/Dependent Assistance  2 = A lot, Maximum/Moderate Assistance  3 = A little, Minimum/Contact Guard/Supervision  4 = None, Modified [place]/Independent    Putting on and taking off regular lower body clothing? : 1  Bathing (including washing, rinsing, drying)?: 1  Toileting, which includes using toilet, bedpan, or urinal? : 2  Putting on and taking off regular upper body clothing?: 3  Taking care of personal grooming such as brushing  "teeth?: 3  Eating meals?: 3  Daily Activity Total Score: 13     AM-PAC Raw Score CMS "G-Code Modifier Level of Impairment Assistance   6 % Total / Unable   7 - 8 CM 80 - 100% Maximal Assist   9-13 CL 60 - 80% Moderate Assist   14 - 19 CK 40 - 60% Moderate Assist   20 - 22 CJ 20 - 40% Minimal Assist   23 CI 1-20% SBA / CGA   24 CH 0% Independent/ Mod I       Patient left with bed in chair position with all lines intact, call button in reach and bed alarm off    ASSESSMENT:  Karan Aburto is a 74 y.o. male with a medical diagnosis of New onset type 2 diabetes mellitus and presents with SELF CARE DEBILITY.    Rehab identified problem list/impairments: Rehab identified problem list/impairments: weakness, impaired endurance, impaired self care skills, impaired functional mobilty, gait instability, impaired balance, decreased safety awareness    Rehab potential is good.    Activity tolerance: Good    Discharge recommendations: Discharge Facility/Level of Care Needs: nursing facility, skilled     Barriers to discharge: Barriers to Discharge: (TBD)    Equipment recommendations: wheelchair     GOALS:   Multidisciplinary Problems     Occupational Therapy Goals        Problem: Occupational Therapy Goal    Goal Priority Disciplines Outcome Interventions   Occupational Therapy Goal     OT, PT/OT Ongoing, Progressing    Description: OT GOALS TO BE MET BY 10-26-20  PT WILL TOLERATE 1 SET X 15 REPS B UE ROM EXERCISE WITH MIN RESISTANCE  MIN A WITH LE DRESSING  SBA WITH UE DRESSING                     Plan:  Patient to be seen 3 x/week to address the above listed problems via self-care/home management, therapeutic activities, therapeutic exercises  Plan of Care expires: 10/26/20  Plan of Care reviewed with: patient         Ana Heredia OT  10/23/2020  "

## 2020-10-26 NOTE — TELEPHONE ENCOUNTER
Sister answered [facility] call.  States attempting to follow Diabetic diet for patient.   Initially states they do not have a blood glucose meter... at the the end of the call, found instructions for blood glucose meter and meter. States need further instructions.  nurse appointment at noon today. States will discuss meter and diet with nurse. Also, accepted offer of NP HV. Order placed.

## 2020-10-26 NOTE — PLAN OF CARE
[facility] arranged by [facility] per Lorie     10/26/20 0730   Final Note   Assessment Type Final Discharge Note   Anticipated Discharge Disposition Home-Health   Right Care Referral Info   Post Acute Recommendation Home-care   Facility Name [facility]

## 2020-10-26 NOTE — PATIENT INSTRUCTIONS
New-Onset Hyperglycemia (Diabetes Suspected)  Your blood sugar is high today. This is called hyperglycemia. It means there is too much glucose (sugar) in your blood. This can have several possible causes. These include taking certain medicines, being under stress, or having an infection. The most common cause is diabetes. Diabetes develops when your body doesn't make enough insulin, which is a hormone that helps control blood sugar. Tests can be done to help find the cause of hyperglycemia. To bring your blood sugar down, you may be given insulin.     Hyperglycemia occurs when glucose cant enter cells, so it builds up in the bloodstream instead.   When your body is working normally, the food you eat is digested and turned into sugar. This sugar goes into your bloodstream. Insulin helps sugar move from the bloodstream into the bodys cells. There it is used as fuel. When you have diabetes, the sugar cant enter the cells. It stays in the blood, causing high blood sugar. If not controlled, diabetes can cause long-term health problems. Even if you dont have symptoms, it can harm your blood vessels, heart, and other organs. Over time, uncontrolled hyperglycemia can make a heart attack or stroke more likely.  Follow-up care  Follow up with your healthcare provider as advised. To determine the underlying cause of the hyperglycemia, you likely need more tests. You will be given more information about these tests. If needed, your healthcare provider will refer you to a team that specializes in diabetes care. The diabetes care team can help you learn about and make the changes needed to manage your hyperglycemia. Talk with your healthcare provider about starting an exercise program and meeting with a dietitian. These actions can help control high blood sugar.  When to seek medical advice  Call your healthcare provider right away if any of these occur:  · Excessive thirst or hunger  · Frequent need to urinate  · Losing  weight without trying  · Feeling tired  · Nausea or vomiting  · Itchy, dry skin  · Dizziness  · Confusion  · Abdominal pain  · Fruity-smelling breath  · Deep or rapid breathing  Date Last Reviewed: 6/1/2016  © 4037-9452 [employer] [address] [place] [address], [place] [address]. All rights reserved. This information is not intended as a substitute for professional medical care. Always follow your healthcare professional's instructions.         Luis M Dowling

## 2020-10-27 NOTE — PATIENT INSTRUCTIONS
- [facility] to schedule follow-up visit with patient in 4-6 weeks or as needed.  - Continue all medications, treatments and therapies as ordered.   - Follow all instructions, recommendations as discussed.  - Maintain Safety Precautions at all times.  - Attend all medical appointments as scheduled.  - For worsening symptoms: call Primary Care Physician or Nurse Practitioner.  - For emergencies, call 911 or immediately report to the nearest emergency room.  - Limit Risks of environmental exposure to coronavirus as discussed including: social distancing, hand hygiene, and limiting departures from the home for necessities only.     Your care is important to us. If your provider recommended a follow-up appointment or test, we are happy to help you coordinate your recommended care. It is important that you complete your recommended follow-up. If you need help scheduling, please call [phone]. Appointments can also be made online through the patient portal.  While scheduling and attending your appointments is your responsibility, our goal is to support and empower you throughout that process.    [facility] Care Line - 24/7 Assistance  Unless otherwise directed by your provider, please contact [facility], our nurse care line that is available for 24/7 assistance.  Registered nurses in the [facility] provide: appointment scheduling, clinical advisement, health education, and other advisory services.  Call: [phone] (toll free)    COVID-19 Prevention   Avoid close contact with people and stay home if youre sick, except to get medical care.   Cover coughs and sneezes with a tissue, or use the inside of your elbow. Immediately wash your hands or use hand .  For more information, see CDC link below:  https://www.cdc.gov/coronavirus/2019-ncov/hcp/guidance-prevent-spread.html#precautions     The following information is provided to all patients.  This information is  to help you find resources for any of the problems found today that may be affecting your health:            Living healthy guide: www.[facility].[place].[facility]    Understanding Diabetes: www.diabetes.org   Eating healthy: www.cdc.gov/healthyweight   CDC home safety checklist: www.cdc.gov/steadi/patient.html  Agency on Aging: www.goea.[place].[facility]    Alcoholics anonymous (AA): www.aa.org   Physical Activity: www.elijah.nih.gov/ny7qpdi    Tobacco use: www.quitwithusla.org

## 2020-10-27 NOTE — TELEPHONE ENCOUNTER
CHF 48 hour call    Called and spoke with pt Reuben wild. Reuben informed pt sleeping. Informed pt still with some SOB, worsening edema to right foot since hospital discharge. Denies CP, orthopnea or PND. Reuben informed that he gives pt his medication. Reviewed medications per MAR and reconciled. Informed pt taking all medications including aldactone 25 mg daily.     Watch for these Signs and Symptoms  If any of these occur, contact your doctor immediately:   Increase in shortness of breath with movement   Increase in swelling in your legs and ankles   Weight gain of more than 2 pounds in a night or 5 pounds in a week   Difficulty breathing when you are lying down   Worsening fatigue or tiredness   Stomach bloating, a full feeling or a loss of appetite   Increased coughing--especially when you are lying down    Call 911 if any of the followin: Worsening chest tightness or chest pain  2: Cough with pink, frothy sputum      Reviewed hospital f/u appt with Dr. Fuentes on Friday, 10-30-20 at 1:20pm.

## 2020-10-27 NOTE — PROGRESS NOTES
[facility]  Transition of Care Home Visit    Visit Date: 10/27/2020  Encounter Provider: Tao Del Valle NP  PCP:  Mickey Agrawal MD    PRESENTING HISTORY      Patient ID: Karna Aburto     Consult Requested By:  Tao Del Valle  Reason for Consult:  Transitional Care Coordination    Chief Complaint: Transitional Care     History of Present Illness:    Mr. Karan Aburto is a 74 y.o. male patient with Hx of HTN, HLD who presents to the Emergency Department for evaluation of BLE swelling which onset gradually 3 weeks ago. Pt's son expresses that pt's sxs may be attributed to recent medication change after recently changing insurance companies.He reports the leg swelling has been associated with progressive SOB. Denies chest pain, cough, palpitations, fever or chills.  Pt denies feeling SOB in the supine position or waking up short of breath in the middle of the night. No known hx of A-fib, CHF, DVT, PE, liver problems, or use of blood thinners. In ED with concerns for DVT US obtained which was negative, also given IV lasix with concerns for possible CHFE. It was noted his potassium was 2.4 and he was given IV and PO. Patient also has elevated LFTs. Due to multiple issues patient admitted for further management.      * No surgery found *       Hospital Course:      Patient was admitted for acute on chronic diastolic heart failure exacerbation and also chronic bilateral lower extremity edema with cellulitis.  He was treated with IV diuresis and Zosyn for cellulitis involving both his lower extremity.  Patient responded to Lasix however his potassium levels dropped continued to be low.  Also his blood pressures were elevated during his hospital stay.  Hydrochlorothiazide was stopped he was started on Aldactone his potassium levels to go up.  He was started on hydralazine and Isordil for uncontrolled blood pressures.  Repeat potassium  at the time of discharge was 3.1.  He was given 40 mg before  he was discharged.  Echo done showed only diastolic heart failure but not systolic heart failure.  His chronic lower extremity swelling is likely to peripheral venous condition for which he was advised to apply Juan Luis hoses.  He will be following with his primary care physician on Monday  For follow up on BMP/Home health to draw BMP and send it to PCP and he will decide whether to continue potasium supplements.    Admit Date: 10/18/20  Discharge Date: 10/23/20  TCC Referral Date: 10/26/20  ____________________________________________    Today:  Mr. Karan Aburto is a 74 y.o. male being seen and examined at home today for transitional care visit to the home environment post-discharge from inpatient hospitalization encounter described above. Patient presents at baseline state of health as reported by patient and caregiver. VSS. Denies any acute issues, concerns or complaints to address on today's visit. Reports taking all medications as prescribed. No other needs identified at this time. Risks of environmental exposure to coronavirus discussed including: social distancing, hand hygiene, and limiting departures from the home for necessities only. Reports understanding and willingness to comply.      Review of Systems   Reason unable to perform ROS: collateral info from grandson.   Constitutional: Positive for activity change. Negative for appetite change.   HENT: Negative.    Respiratory: Negative for chest tightness and shortness of breath.    Cardiovascular: Positive for leg swelling. Negative for chest pain and palpitations.   Gastrointestinal: Negative.  Negative for constipation, nausea and vomiting.   Genitourinary: Positive for frequency.   Musculoskeletal: Positive for gait problem.   Skin: Negative.    Neurological: Positive for weakness.   Psychiatric/Behavioral: Negative.        Assessments:  · Environmental: single story home, no steps to enter, adequate lighting and temeprature control  · Functional Status:  Independent with ADL's dependent on IADL's, ambulates assistance of a cane/walker, continent of bowel and bladder  · Safety: Fall Precautions, COVID Precautions/Social Distancing/Mask Use  · Nutritional: Adequate  · Home Health: [place]   · DME/Supplies: walker    Ethical / Legal: Advance Care Planning   Capacity to make medical decisions:  yes, Conflict no  · Surrogate decision maker:  Name Milton Aburto, Relationship: son  · Advance Directives: none  · HCPOA: none  · [place]:  Signed this visit  · Code Status:  DNR    Advanced Care Directives and HCPOA forms left in the home for family review, and signing with instructions to return upon their next provider encounter for inclusion to the medical record.     PAST HISTORY:     History reviewed. No pertinent surgical history.    History reviewed. No pertinent family history.    Social History     Socioeconomic History    Marital status:      Spouse name: Not on file    Number of children: Not on file    Years of education: Not on file    Highest education level: Not on file   Occupational History    Not on file   Social Needs    Financial resource strain: Not on file    Food insecurity     Worry: Not on file     Inability: Not on file    Transportation needs     Medical: Not on file     Non-medical: Not on file   Tobacco Use    Smoking status: Not on file   Substance and Sexual Activity    Alcohol use: Never     Frequency: Never    Drug use: Not on file    Sexual activity: Not on file   Lifestyle    Physical activity     Days per week: Not on file     Minutes per session: Not on file    Stress: Not on file   Relationships    Social connections     Talks on phone: Not on file     Gets together: Not on file     Attends Alevism service: Not on file     Active member of club or organization: Not on file     Attends meetings of clubs or organizations: Not on file     Relationship status: Not on file   Other Topics Concern    Not on file   Social  History Narrative    Not on file       MEDICATIONS & ALLERGIES:     Current Outpatient Medications on File Prior to Visit   Medication Sig Dispense Refill    carvediloL (COREG) 25 MG tablet Take 1 tablet (25 mg total) by mouth 2 (two) times daily with meals. 60 tablet 11    fenofibrate 160 MG Tab Take 160 mg by mouth once daily.      hydrALAZINE (APRESOLINE) 50 MG tablet Take 1 tablet (50 mg total) by mouth every 8 (eight) hours. 90 tablet 11    isosorbide dinitrate (ISORDIL) 20 MG tablet Take 1 tablet (20 mg total) by mouth 3 (three) times daily. 90 tablet 11    lisinopriL (PRINIVIL,ZESTRIL) 40 MG tablet Take 1 tablet (40 mg total) by mouth once daily. 90 tablet 3    potassium chloride SA (K-DUR,KLOR-CON) 20 MEQ tablet Take 1 tablet (20 mEq total) by mouth 2 (two) times daily. 6 tablet 0    spironolactone (ALDACTONE) 25 MG tablet Take 1 tablet (25 mg total) by mouth once daily. 30 tablet 11    tiZANidine 4 mg Cap Take by mouth.       No current facility-administered medications on file prior to visit.         Review of patient's allergies indicates:  No Known Allergies    OBJECTIVE:     Vital Signs:  Vitals:    10/27/20 1135   BP: 125/78   Pulse: 72   Resp: 16   Temp: 97.6 °F (36.4 °C)     There is no height or weight on file to calculate BMI.       Physical Exam  Vitals signs reviewed.   Constitutional:       General: He is awake. He is not in acute distress.     Appearance: He is well-developed. He is obese. He is not ill-appearing or toxic-appearing.   HENT:      Head: Normocephalic.      Nose: Nose normal.      Mouth/Throat:      Mouth: Mucous membranes are moist.   Eyes:      Extraocular Movements: Extraocular movements intact.      Pupils: Pupils are equal, round, and reactive to light.   Neck:      Musculoskeletal: Normal range of motion.   Cardiovascular:      Rate and Rhythm: Normal rate and regular rhythm.      Pulses: Normal pulses.   Pulmonary:      Effort: Pulmonary effort is normal. No  respiratory distress.   Abdominal:      Palpations: Abdomen is soft.   Musculoskeletal:         General: Swelling present.      Right lower leg: Edema (3+) present.      Left lower leg: Edema (2+) present.   Skin:     General: Skin is warm and dry.      Capillary Refill: Capillary refill takes less than 2 seconds.   Neurological:      General: No focal deficit present.      Mental Status: He is alert and oriented to person, place, and time.      Gait: Gait abnormal.   Psychiatric:         Mood and Affect: Mood normal.         Behavior: Behavior is cooperative.       Laboratory  Lab Results   Component Value Date    WBC 15.31 (H) 10/23/2020    HGB 12.9 (L) 10/23/2020    HCT 39.5 (L) 10/23/2020    MCV 90 10/23/2020     (L) 10/23/2020     Lab Results   Component Value Date    INR 1.0 10/18/2020     Lab Results   Component Value Date    HGBA1C 9.3 (H) 10/19/2020     No results for input(s): POCTGLUCOSE in the last 72 hours.  TRANSITION OF CARE:     Family and/or Caretaker present at visit?  Yes.  Diagnostic tests reviewed/disposition: No diagnosic tests pending after this hospitalization.  Disease/illness education: Importance of Compliance with all prescribed medications and treatments, COVID Precautions/Social Distancing/Mask Use  Home health/community services discussion/referrals: Patient has home health established at [facility].   Establishment or re-establishment of referral orders for community resources: No other necessary community resources.   Discussion with other health care providers: No discussion with other health care providers necessary.     Transition of Care Visit:  I have reviewed and updated the history and problem list. I have reconciled the medication list. I have discussed the hospitalization and current medical issues, prognosis and plans with the patient/family.     Medications Reconciliation:   I have reconciled the patient's home medications and discharge medications with the  patient/family. I have updated all changes. Refer to After-Visit Medication List.    Discharge plans, follow-up instructions, future appointments, provider contact information, indicators to seek emergency treatment and encouragement to call for any questions, concerns or clarification of the patient's plan of care explained to patient and/or caregiver(s), whom confirm understanding of provided information and endorse willingness to comply.     ASSESSMENT & PLAN:     Karan was seen today for transitional care.  Diagnoses and all orders for this visit:    Encounter for Support and Coordination of Transition of Care   - [facility] Nurse Practitioner to schedule home visit with patient PRN    Were controlled substances prescribed? No    Instructions for the patient:    Scheduled Follow-up :  Future Appointments   Date Time Provider Department Center   10/27/2020 12:00 PM Tao Burris NP [address]   10/30/2020 11:00 AM Jacques Fuentes MD [facility]     After Visit Medication List :     Medication List          Accurate as of October 27, 2020 11:51 AM. If you have any questions, ask your nurse or doctor.            CONTINUE taking these medications    carvediloL 25 MG tablet  Commonly known as: COREG  Take 1 tablet (25 mg total) by mouth 2 (two) times daily with meals.     fenofibrate 160 MG Tab     hydrALAZINE 50 MG tablet  Commonly known as: APRESOLINE  Take 1 tablet (50 mg total) by mouth every 8 (eight) hours.     isosorbide dinitrate 20 MG tablet  Commonly known as: ISORDIL  Take 1 tablet (20 mg total) by mouth 3 (three) times daily.     lisinopriL 40 MG tablet  Commonly known as: PRINIVIL,ZESTRIL  Take 1 tablet (40 mg total) by mouth once daily.     potassium chloride SA 20 MEQ tablet  Commonly known as: K-DUR,KLOR-CON  Take 1 tablet (20 mEq total) by mouth 2 (two) times daily.     spironolactone 25 MG tablet  Commonly known as: ALDACTONE  Take 1 tablet (25 mg total) by mouth  once daily.     tiZANidine 4 mg Cap            Patient consent was obtained prior to treatment on this visit.    Attestation: Screening criteria to assess the level of the patient's risk for infection with COVID-19 as recommended by the CDC at the time of the above documented home visit concluded appropriateness to proceed. Universal precautions were maintained at all times, including provider use of >60% alcohol gel hand  immediately prior to entry and upon departing the patient's home as well as cleaning of equipment used in home visit with antibacterial/germicidal disposable wipes.     Signature:      Tao Del Valle, MSN, APRN, FNP-C  [facility]    Total Face-to-Face Encounter: 80 minutes with >50% of time spent discussing the care with the patient/family.     With the consent of the patient and/or caregiver(s), an additional 20 minutes included a comprehensive discussion regarding Advanced Care Planning. Sources of emotional and spiritual support were identified and encouraged for involvement to assist in finalization of decisions regarding the patient's goals of care (Living Will). Topics discussed include statutory guidelines of the [facility] to determine/delineate the legal surrogate medical decision maker should the patient be deemed incompetent to self-direct medical care (next of kin vs.POA) and the required documentation to ensure legal validity thereof (Advanced Directives/[place]).

## 2020-10-30 PROBLEM — G11.4: Status: ACTIVE | Noted: 2020-01-01

## 2020-10-30 PROBLEM — R53.1 GENERALIZED WEAKNESS: Status: ACTIVE | Noted: 2020-01-01

## 2020-10-30 PROBLEM — D64.9 NORMOCYTIC ANEMIA: Status: ACTIVE | Noted: 2020-01-01

## 2020-10-30 PROBLEM — D69.6 THROMBOCYTOPENIA: Status: ACTIVE | Noted: 2020-01-01

## 2020-10-30 NOTE — SUBJECTIVE & OBJECTIVE
Past Medical History:   Diagnosis Date    Elevated PSA     Hypertension     Rosacea     Thyroid disease     hypothyroidism       No past surgical history on file.    Review of patient's allergies indicates:  No Known Allergies    No current facility-administered medications on file prior to encounter.      Current Outpatient Medications on File Prior to Encounter   Medication Sig    carvediloL (COREG) 25 MG tablet Take 1 tablet (25 mg total) by mouth 2 (two) times daily with meals.    fenofibrate 160 MG Tab Take 160 mg by mouth once daily.    hydrALAZINE (APRESOLINE) 50 MG tablet Take 1 tablet (50 mg total) by mouth every 8 (eight) hours.    isosorbide dinitrate (ISORDIL) 20 MG tablet Take 1 tablet (20 mg total) by mouth 3 (three) times daily.    lisinopriL (PRINIVIL,ZESTRIL) 40 MG tablet Take 1 tablet (40 mg total) by mouth once daily.    potassium chloride SA (K-DUR,KLOR-CON) 20 MEQ tablet Take 1 tablet (20 mEq total) by mouth 2 (two) times daily.    spironolactone (ALDACTONE) 25 MG tablet Take 1 tablet (25 mg total) by mouth once daily.    tiZANidine 4 mg Cap Take by mouth.     Family History     None        Tobacco Use    Smoking status: Not on file   Substance and Sexual Activity    Alcohol use: Never     Frequency: Never    Drug use: Not on file    Sexual activity: Not on file     Review of Systems   Constitutional: Positive for activity change and fatigue. Negative for appetite change, chills, diaphoresis, fever and unexpected weight change.   HENT: Negative.    Eyes: Negative for visual disturbance.   Respiratory: Negative for cough and shortness of breath.    Cardiovascular: Positive for leg swelling. Negative for chest pain and palpitations.   Gastrointestinal: Negative for abdominal pain, constipation, diarrhea, nausea and vomiting.   Genitourinary: Negative for difficulty urinating.        Wears incontinent brief   Musculoskeletal: Positive for arthralgias and gait problem.   Skin:  Negative for rash and wound.   Neurological: Positive for weakness. Negative for dizziness, seizures, syncope, speech difficulty, light-headedness and numbness.   Psychiatric/Behavioral: Positive for confusion (intermittent).     Objective:     Vital Signs (Most Recent):  Temp: 98.6 °F (37 °C) (10/30/20 1252)  Pulse: 62 (10/30/20 1302)  Resp: 20 (10/30/20 1302)  BP: (!) 145/77 (10/30/20 1302)  SpO2: 100 % (10/30/20 1302) Vital Signs (24h Range):  Temp:  [98.6 °F (37 °C)] 98.6 °F (37 °C)  Pulse:  [54-74] 62  Resp:  [16-20] 20  SpO2:  [93 %-100 %] 100 %  BP: (106-145)/(70-90) 145/77        There is no height or weight on file to calculate BMI.    Physical Exam  Vitals signs and nursing note reviewed.   Constitutional:       Appearance: Normal appearance. He is well-developed. He is obese. He is not ill-appearing.   HENT:      Head: Normocephalic and atraumatic.      Nose: Nose normal.   Eyes:      General: No scleral icterus.     Conjunctiva/sclera: Conjunctivae normal.      Pupils: Pupils are equal, round, and reactive to light.   Neck:      Musculoskeletal: Normal range of motion and neck supple.   Cardiovascular:      Rate and Rhythm: Normal rate and regular rhythm.      Heart sounds: Normal heart sounds. No murmur. No friction rub. No gallop.    Pulmonary:      Effort: Pulmonary effort is normal.      Breath sounds: Normal breath sounds.   Abdominal:      General: Bowel sounds are normal. There is no distension.      Palpations: Abdomen is soft.      Tenderness: There is no abdominal tenderness.   Musculoskeletal: Normal range of motion.         General: No tenderness.      Right lower leg: Edema (trace) present.      Left lower leg: Edema (trace) present.   Skin:     General: Skin is warm and dry.      Coloration: Skin is pale.      Comments: Healing abrasions to right pretibial area   Neurological:      General: No focal deficit present.      Mental Status: He is alert and oriented to person, place, and time.       Motor: Weakness present.      Gait: Gait abnormal.   Psychiatric:         Behavior: Behavior normal.      Comments: Pt reports intermittent feelings of depression           CRANIAL NERVES     CN III, IV, VI   Pupils are equal, round, and reactive to light.       Significant Labs:   CBC:   Recent Labs   Lab 10/30/20  1104   WBC 8.30   HGB 9.7*   HCT 30.1*   PLT 44*     CMP:   Recent Labs   Lab 10/30/20  1104      K 4.3      CO2 25   *   BUN 21   CREATININE 1.1   CALCIUM 7.8*   PROT 4.8*   ALBUMIN 2.5*   BILITOT 0.9   ALKPHOS 81   AST 43*   *   ANIONGAP 8   EGFRNONAA >60     All pertinent labs within the past 24 hours have been reviewed.    Significant Imaging: I have reviewed all pertinent imaging results/findings within the past 24 hours.

## 2020-10-30 NOTE — ED NOTES
Pt's family member requesting an update/wondering what the treatment plan for the pt is. Dr. Champagne notified of pt and family's questions/request for an update

## 2020-10-30 NOTE — HPI
Pt is a 75 yo male with PMHx of Bilateral leg spastic paraplegia, HTN, diastolic heart failure, obesity and hypothyroidism who was brought to the ED due to generalized weakness that was exacerbated today. Pt stated he was sitting at a table and could not get out of the chair therefore, family called EMS. Also, pt still with intermittent episodes of confusion. Pt was just discharged from [facility] (1 week ago) when he was treated for metabolic alkalosis secondary to medication, new diagnosis of diabetes, peripheral edema (multifactorial) and newly diagnosed diastolic heart failure. Today, the patient denies gross neuro deficits except for generalized weakness. He denies fever, URI symptoms, cough, chest pain, palpitations, abdominal pain, N/V/D & unusual rash. Pt has been using a walker due to generalized weakness over the last 10 years.   Temp 98.6, pulse 62, resp 20, B/P 145/77 and SpO2 100 %  CT of Head - New/larger chronic lacunar infarct within the right basal ganglia. CXR - no acute cardiopulmonary findings  Labs find H/H 9.7/30.1 (last 12.9/39.5), platelets 44, gluc 162, albumin 2.5, AST 43 and . BNP = 303, troponin 0.060.   Pt is placed on Observation to further evaluate generalized weakness and concerns for acute ischemic stroke. Pt states he is a DNR and his children are his surrogate decision makers. ALSO, PT STATES HE CANNOT HAVE A MRI OF BRAIN DUE TO BB PELLET IN HIS HEAD.

## 2020-10-30 NOTE — ASSESSMENT & PLAN NOTE
Lab Results   Component Value Date    HGBA1C 9.3 (H) 10/19/2020   accuchecks  Sliding scale insulin  Diabetic diet  NEEDS TO BE DISCHARGED HOME ON METFORMIN

## 2020-10-30 NOTE — ASSESSMENT & PLAN NOTE
Recent acute hepatitis panel was negative  Abdominal US noted fatty liver  Hold STATIN   levels continue to trend down near normal

## 2020-10-30 NOTE — ED NOTES
Report given to YESSENIA Laurent. Pt assisted with using urinal but wants to sit on side of bed due to hesitancy

## 2020-10-30 NOTE — ED NOTES
Linens changed, brief changed, and hygiene preformed. Pt placed in gown and repositioned in bed. Pt resting in bed comfortably, VSS, pt in NAD. Call light in reach, bed in lowest position, rails up X2. Pt denies any other needs at this time. Will continue to monitor.

## 2020-10-30 NOTE — ASSESSMENT & PLAN NOTE
Monitor for any signs of active bleeding  Will start ASA and hold other anticoagulants  Will need follow up with Hematology   Pt/family denies ETOH intake

## 2020-10-30 NOTE — ED PROVIDER NOTES
SCRIBE #1 NOTE: I, Roberta Roper, am scribing for, and in the presence of, Reuben Champagne MD. I have scribed the entire note.       History     Chief Complaint   Patient presents with    Fatigue     Review of patient's allergies indicates:  No Known Allergies      History of Present Illness     HPI    10/30/2020, 11:05 AM  History obtained from the patient      History of Present Illness: Karan Aburto is a 74 y.o. male patient with a PMHx of HTN who presents to the Emergency Department for evaluation of fatigue which onset today. Patient was sitting down at the table and felt too weak to even stand up. Symptoms are constant and moderate in severity. No mitigating or exacerbating factors reported. No associated sxs. Patient denies any fever, chills, CP, SOB, cough, leg swelling, n/v, dizziness, lightheadedness, HA, and all other sxs at this time. No prior Tx. No further complaints or concerns at this time.         Arrival mode: Personal vehicle    PCP: Mickey Agrawal MD        Past Medical History:  Past Medical History:   Diagnosis Date    Elevated PSA     Hypertension     Rosacea     Thyroid disease     hypothyroidism       Past Surgical History:  No past surgical history on file.      Family History:  No family history on file.    Social History:  Social History     Tobacco Use    Smoking status: Not on file   Substance and Sexual Activity    Alcohol use: Never     Frequency: Never    Drug use: Not on file    Sexual activity: Not on file        Review of Systems     Review of Systems   Constitutional: Positive for fatigue. Negative for activity change, chills, diaphoresis and fever.        (+) generalized weakness   HENT: Negative for congestion, rhinorrhea, sneezing, sore throat and trouble swallowing.    Eyes: Negative for pain.   Respiratory: Negative for cough, chest tightness, shortness of breath, wheezing and stridor.    Cardiovascular: Negative for chest pain, palpitations and leg swelling.    Gastrointestinal: Negative for abdominal distention, abdominal pain, constipation, diarrhea, nausea and vomiting.   Genitourinary: Negative for difficulty urinating, dysuria, frequency and urgency.   Musculoskeletal: Negative for arthralgias, back pain, myalgias, neck pain and neck stiffness.   Skin: Negative for pallor, rash and wound.   Neurological: Negative for dizziness, syncope, weakness, light-headedness, numbness and headaches.   Hematological: Does not bruise/bleed easily.   All other systems reviewed and are negative.     Physical Exam     Initial Vitals   BP Pulse Resp Temp SpO2   10/30/20 1031 10/30/20 1031 10/30/20 1031 10/30/20 1252 10/30/20 1031   112/72 60 18 98.6 °F (37 °C) 97 %      MAP       --                 Physical Exam  Nursing Notes and Vital Signs Reviewed.  Constitutional: Patient is in no acute distress. Well-developed and well-nourished.  Head: Atraumatic. Normocephalic.  Eyes: PERRL. EOM intact. Conjunctivae are not pale. No scleral icterus.  ENT: Mucous membranes are moist. Oropharynx is clear and symmetric.    Neck: Supple. Full ROM. No lymphadenopathy.  Cardiovascular: Regular rate. Regular rhythm. No murmurs, rubs, or gallops. Distal pulses are 2+ and symmetric.  Pulmonary/Chest: No respiratory distress. Clear to auscultation bilaterally. No wheezing or rales.  Abdominal: Soft and non-distended. There is no tenderness. No rebound, guarding, or rigidity. Good bowel sounds.  Genitourinary: No CVA tenderness  Musculoskeletal: Moves all extremities. No obvious deformities. No edema. No calf tenderness.  Skin: Warm and dry.  Neurological:  Alert, awake, and appropriate.  Normal speech.  No acute focal neurological deficits are appreciated.  Psychiatric: Normal affect. Good eye contact. Appropriate in content.     ED Course   Procedures  ED Vital Signs:  Vitals:    10/30/20 1031 10/30/20 1041 10/30/20 1042 10/30/20 1149   BP: 112/72  117/72 106/70   Pulse: 60 (!) 59 (!) 54 62   Resp: 18  16 18 16   Temp:       TempSrc:       SpO2: 97% (!) 93% 100% 100%    10/30/20 1232 10/30/20 1244 10/30/20 1246 10/30/20 1248   BP: 131/75 (!) 142/90 135/78 (!) 140/76   Pulse: 70 66 70 74   Resp: 17      Temp:       TempSrc:       SpO2: 99%       10/30/20 1252   BP:    Pulse:    Resp:    Temp: 98.6 °F (37 °C)   TempSrc: Oral   SpO2:        Abnormal Lab Results:  Labs Reviewed   CBC W/ AUTO DIFFERENTIAL - Abnormal; Notable for the following components:       Result Value    RBC 3.25 (*)     Hemoglobin 9.7 (*)     Hematocrit 30.1 (*)     RDW 16.2 (*)     Platelets 44 (*)     Immature Granulocytes 1.6 (*)     Immature Grans (Abs) 0.13 (*)     Lymph # 0.7 (*)     Gran % 84.8 (*)     Lymph % 8.2 (*)     All other components within normal limits   COMPREHENSIVE METABOLIC PANEL - Abnormal; Notable for the following components:    Glucose 162 (*)     Calcium 7.8 (*)     Total Protein 4.8 (*)     Albumin 2.5 (*)     AST 43 (*)      (*)     All other components within normal limits   TROPONIN I - Abnormal; Notable for the following components:    Troponin I 0.060 (*)     All other components within normal limits   B-TYPE NATRIURETIC PEPTIDE - Abnormal; Notable for the following components:     (*)     All other components within normal limits   SARS-COV-2 RNA AMPLIFICATION, QUAL   CK   URINALYSIS, REFLEX TO URINE CULTURE        All Lab Results:  Results for orders placed or performed during the hospital encounter of 10/30/20   COVID-19 Rapid Screening   Result Value Ref Range    SARS-CoV-2 RNA, Amplification, Qual Negative Negative   CBC Auto Differential   Result Value Ref Range    WBC 8.30 3.90 - 12.70 K/uL    RBC 3.25 (L) 4.60 - 6.20 M/uL    Hemoglobin 9.7 (L) 14.0 - 18.0 g/dL    Hematocrit 30.1 (L) 40.0 - 54.0 %    MCV 93 82 - 98 fL    MCH 29.8 27.0 - 31.0 pg    MCHC 32.2 32.0 - 36.0 g/dL    RDW 16.2 (H) 11.5 - 14.5 %    Platelets 44 (L) 150 - 350 K/uL    MPV 11.9 9.2 - 12.9 fL    Immature Granulocytes 1.6  (H) 0.0 - 0.5 %    Gran # (ANC) 7.0 1.8 - 7.7 K/uL    Immature Grans (Abs) 0.13 (H) 0.00 - 0.04 K/uL    Lymph # 0.7 (L) 1.0 - 4.8 K/uL    Mono # 0.4 0.3 - 1.0 K/uL    Eos # 0.0 0.0 - 0.5 K/uL    Baso # 0.01 0.00 - 0.20 K/uL    nRBC 0 0 /100 WBC    Gran % 84.8 (H) 38.0 - 73.0 %    Lymph % 8.2 (L) 18.0 - 48.0 %    Mono % 5.2 4.0 - 15.0 %    Eosinophil % 0.1 0.0 - 8.0 %    Basophil % 0.1 0.0 - 1.9 %    Differential Method Automated    Comprehensive Metabolic Panel   Result Value Ref Range    Sodium 137 136 - 145 mmol/L    Potassium 4.3 3.5 - 5.1 mmol/L    Chloride 104 95 - 110 mmol/L    CO2 25 23 - 29 mmol/L    Glucose 162 (H) 70 - 110 mg/dL    BUN 21 8 - 23 mg/dL    Creatinine 1.1 0.5 - 1.4 mg/dL    Calcium 7.8 (L) 8.7 - 10.5 mg/dL    Total Protein 4.8 (L) 6.0 - 8.4 g/dL    Albumin 2.5 (L) 3.5 - 5.2 g/dL    Total Bilirubin 0.9 0.1 - 1.0 mg/dL    Alkaline Phosphatase 81 55 - 135 U/L    AST 43 (H) 10 - 40 U/L     (H) 10 - 44 U/L    Anion Gap 8 8 - 16 mmol/L    eGFR if African American >60 >60 mL/min/1.73 m^2    eGFR if non African American >60 >60 mL/min/1.73 m^2   Troponin I   Result Value Ref Range    Troponin I 0.060 (H) 0.000 - 0.026 ng/mL   BNP   Result Value Ref Range     (H) 0 - 99 pg/mL   CK   Result Value Ref Range     20 - 200 U/L         Imaging Results:  Imaging Results          X-Ray Chest AP Portable (Final result)  Result time 10/30/20 13:03:14    Final result by Shun Sloan Jr., MD (10/30/20 13:03:14)                 Impression:      No acute findings.      Electronically signed by: Shun Sloan Jr., MD  Date:    10/30/2020  Time:    13:03             Narrative:    EXAMINATION:  XR CHEST AP PORTABLE    CLINICAL HISTORY:  weakness;    COMPARISON:  Prior radiograph from October 18, 2020.    FINDINGS:  Lungs are clear.  No pleural fluid or pneumothorax.  Heart size within normal limits.  No significant bony findings.                               CT Head Without Contrast (Final  result)  Result time 10/30/20 11:56:05    Final result by Shun Sloan Jr., MD (10/30/20 11:56:05)                 Impression:      1. No acute findings.  2. New/larger chronic lacunar infarct within the right basal ganglia.  3. White matter low density changes consistent with mild chronic microvascular ischemia.  All CT scans at this facility use dose modulation, iterative reconstruction, and/or weight base dosing when appropriate to reduce radiation dose to as low as reasonably achievable.      Electronically signed by: Shun Sloan Jr., MD  Date:    10/30/2020  Time:    11:56             Narrative:    EXAMINATION:  CT HEAD WITHOUT CONTRAST    CLINICAL HISTORY:  Syncope, simple, abnormal neuro exam;    TECHNIQUE:  Contiguous axial CT images were obtained from the skull base through the vertex without intravenous contrast.    COMPARISON:  Prior CT of the head from October 19, 2020.    FINDINGS:  No intracranial hemorrhage. No mass effect or midline shift. Chronic lacunar infarct within the right lentiform nucleus is new compared to the prior study.  Chronic lacunar infarcts within the vasquez.  Mild patchy low density involving the periventricular white matter anteriorly.  The ventricles and sulci are normal in size and configuration. The paranasal sinuses and mastoid air cells are clear.  No concerning osseous findings.                                 The EKG was ordered, reviewed, and independently interpreted by the ED provider.  Interpretation time: 10:42  Rate: 56 BPM  Rhythm: Sinus bradycardia with marked sinus arrhythmia  Interpretation: RBBB. Left ventricular hypertrophy with QRS widening. No STEMI.           The Emergency Provider reviewed the vital signs and test results, which are outlined above.     ED Discussion     1:04 PM: Discussed case with Shayna Haynes NP (Hospital Medicine). Dr. Sahu agrees with current care and management of pt and accepts admission.   Admitting Service: [facility]  medicine  Admitting Physician: Dr. Sahu  Admit to: obs tele    1:05 PM: Re-evaluated pt. I have discussed test results, shared treatment plan, and the need for admission with patient and family at bedside. Pt and family express understanding at this time and agree with all information. All questions answered. Pt and family have no further questions or concerns at this time. Pt is ready for admit.       Medical Decision Making:   Clinical Tests:   Lab Tests: Ordered and Reviewed  Radiological Study: Ordered and Reviewed  Medical Tests: Ordered and Reviewed           ED Medication(s):  Medications - No data to display         Scribe Attestation:   Scribe #1: I performed the above scribed service and the documentation accurately describes the services I performed. I attest to the accuracy of the note.     Attending:   Physician Attestation Statement for Scribe #1: I, Reuben Champagne MD, personally performed the services described in this documentation, as scribed by Roberta Roper, in my presence, and it is both accurate and complete.           Clinical Impression       ICD-10-CM ICD-9-CM   1. Weakness  R53.1 780.79   2. Cerebrovascular accident (CVA), unspecified mechanism  I63.9 434.91       Disposition:   Disposition: Placed in Observation  Condition: Fair         Reuben Champagne MD  10/30/20 [id number]
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
## 2020-10-30 NOTE — ED NOTES
Acknowledge transfer of care of patient. Patient resting in bed with no acute distress noted. Alert and oriented x 3, TAVARES and follows commands. Respirations easy and unlabored. IV site clear and patent. Able to make needs known, updated on plan of care. Cart in low position, side rails up x 2 and call bell in reach.
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
## 2020-10-30 NOTE — ED NOTES
Patient identifiers verified and correct for Karan Aburto.    LOC: The patient is awake, alert and aware of environment with an appropriate affect, the patient is oriented x 3 and speaking appropriately.  APPEARANCE: Patient resting comfortably and in no acute distress, patient is clean and well groomed, patient's clothing is properly fastened.  SKIN: The skin is warm and dry, color consistent with ethnicity, patient has normal skin turgor and moist mucus membranes, skin intact, no breakdown or bruising noted.  MUSCULOSKELETAL: Patient moving all extremities spontaneously.  RESPIRATORY: Airway is open and patent, respirations are spontaneous.  CARDIAC: Patient has a normal rate, no periphreal edema noted, capillary refill < 3 seconds.  ABDOMEN: Soft and non tender to palpation.    Family reports diff standing and weakness in his legs this morning. Pt usually uses a walker at home.

## 2020-10-30 NOTE — ED NOTES
Hospitalist at bedside. Pt requesting hygiene/bed change. Supplies at bedside, will change when MD assessment complete.

## 2020-10-31 NOTE — ASSESSMENT & PLAN NOTE
Recent acute hepatitis panel was negative  Abdominal US noted fatty liver  Hold STATIN and fenofibrate due to causing transaminitis   levels continue to trend down near normal

## 2020-10-31 NOTE — CONSULTS
INPATIENT   NEUROLOGY CONSULT NOTE    Karan Aburto   Consult Requested By: Prosper Sahu MD  Reason for Consult:  Acute stroke  DATE 10/31/2020            Chief Complaint:  Acute stroke    History of Present Illness:  This is a 73 yo RHWM with the diagnosis of Hereditary Spastic Paraplegia, with multiple family members involvement including his sister, brother, mother and grandmother. Patient has labile BP and has been managed by multiple anti-hypertensive medications.  His disease has been progressing. He was recently discharged from the hospital and head CT was done on 10/19 that indicated chronic infarction in the right BG and Right Cerebellar area. Yesterday, he presents with confusion and difficult to arouse and Head CT was done that was read as Large new stroke, however, these strokes were already evident on 10/19   Patient was admitted for stroke. I do believe that patient is progressing with his HSP and may be having intermittent hypotension.                      Jake Coma Scale (GCS)15  Past Medical History:   Diagnosis Date    Elevated PSA     Hypertension     Rosacea     Thyroid disease     hypothyroidism     No past surgical history on file.  No family history on file.  Social History     Tobacco Use    Smoking status: Not on file   Substance Use Topics    Alcohol use: Never     Frequency: Never    Drug use: Not on file       Review of patient's allergies indicates:  No Known Allergies     Scheduled Meds:   aspirin  81 mg Oral Daily    carvediloL  25 mg Oral BID WM    fenofibrate  160 mg Oral Daily    hydrALAZINE  50 mg Oral Q8H    isosorbide dinitrate  20 mg Oral TID    lisinopriL  40 mg Oral Daily    pantoprazole  40 mg Oral Daily    potassium chloride SA  20 mEq Oral BID    potassium, sodium phosphates  1 packet Oral QID (AC & HS)     Continuous Infusions:  PRN Meds:acetaminophen, dextrose 50%, dextrose 50%, glucagon (human recombinant), glucose, glucose, insulin aspart  U-100, ondansetron, tiZANidine      Review of Systems:  All the 14 ROS were reviewed and the pertinent ones are mentioned in the HPI           OBJECTIVE:     Vital Signs (Most Recent)  Temp: 97.2 °F (36.2 °C) (10/31/20 1123)  Pulse: 88 (10/31/20 1123)  Resp: 19 (10/31/20 1123)  BP: (!) 144/86 (10/31/20 1123)  SpO2: 97 % (10/31/20 1123)     Vital Signs Range (Last 24H):  Temp:  [97.2 °F (36.2 °C)-99.7 °F (37.6 °C)]   Pulse:  [62-97]   Resp:  [16-22]   BP: (106-157)/(64-96)   SpO2:  [92 %-100 %]     Physical Exam:  General:  Patient is awake, alert and follows commands   HEENT:   Acephalic, Atraumatic,  EOMI, PERRLA, no nystagmus, no ptosis, no lid lag, Neck: supple, no JVD, no Carotid bruit, + torticollis,   Lungs: CTA,  No rhonchi, no rales  Heart: Regular Rate and rhythm, no murmurs, rubs and or gallops  Abdomen, Soft, non tender, non distended, no abdominal  bruit, bowel sounds present  Extremities: No edema,   Musculoskeletal:  HSP  Skin: No rash, no ecchymoses, seborrheic dermatitis         NEURO    Mental Status:   Awake, alert   X 4  Memory, Recent and Remote: Intact  Mood: intact  Affect:Flat   Behavior: No disinhibition   Attention and Concentration: Compromised  Insight and thought processes: Delayed  Higher Executive functions:  Compromised  Visual spatial: Deferred  Hallucinations, Delusions and suicidal ideation:denies  Language: no dysfunction no syntax error      SPEECH:   No aphasia, no Dysarthria,     CRANIAL NERVES:      II: visual acuity  Intact   II: visual fields Full to confrontation   II: pupils Equal, round, reactive to light   III,VII: ptosis None   III,IV,VI: extraocular muscles  Full ROM   V: mastication Normal   V: facial light touch sensation  Normal   V,VII: corneal reflex  Present   VII: facial muscle function - upper  Normal   VII: facial muscle function - lower Normal   VIII: hearing Not tested   IX: soft palate elevation  Normal   IX,X: gag reflex Present   XI: trapezius strength   Intact   XI: sternocleidomastoid strength Intact   XI: neck flexion strength  Intact   XII: tongue strength  Normal         MOTOR:Upper Extremities    Able to move antigravity  Tone is intact  Muscle wasting+    Lower Extremities  Able to move antigravity  Spastic  With myoclonusMUSCLE MASS:  REFLEXES: Deep tendon reflexes tested:  B/L  Upper extremities:               biceps (C5, C6):2               brachioradialis (C5, C6):2               triceps (C6, C7),2     Lower extremities:                knee or patellar (L2, 3, 4):1               ankle (S1, S2):1      Plantar responses:  Extensors b/l  Clonus: absent   Muscle Fasciculations: no fasciculations     SENSORY: neuropathy in LE b/l         CEREBELLAR  No dysmetria- bradykinetic   No rebound Phenomenon  No Nystagmus  No scanning speech      ROMBERG and  GAIT:  Non ambulatory    EXTRAPYRAMIDALS: Intermittent myoclonus in LE b/l      Results for orders placed or performed during the hospital encounter of 10/30/20   COVID-19 Rapid Screening   Result Value Ref Range    SARS-CoV-2 RNA, Amplification, Qual Negative Negative   CBC Auto Differential   Result Value Ref Range    WBC 8.30 3.90 - 12.70 K/uL    RBC 3.25 (L) 4.60 - 6.20 M/uL    Hemoglobin 9.7 (L) 14.0 - 18.0 g/dL    Hematocrit 30.1 (L) 40.0 - 54.0 %    MCV 93 82 - 98 fL    MCH 29.8 27.0 - 31.0 pg    MCHC 32.2 32.0 - 36.0 g/dL    RDW 16.2 (H) 11.5 - 14.5 %    Platelets 44 (L) 150 - 350 K/uL    MPV 11.9 9.2 - 12.9 fL    Immature Granulocytes 1.6 (H) 0.0 - 0.5 %    Gran # (ANC) 7.0 1.8 - 7.7 K/uL    Immature Grans (Abs) 0.13 (H) 0.00 - 0.04 K/uL    Lymph # 0.7 (L) 1.0 - 4.8 K/uL    Mono # 0.4 0.3 - 1.0 K/uL    Eos # 0.0 0.0 - 0.5 K/uL    Baso # 0.01 0.00 - 0.20 K/uL    nRBC 0 0 /100 WBC    Gran % 84.8 (H) 38.0 - 73.0 %    Lymph % 8.2 (L) 18.0 - 48.0 %    Mono % 5.2 4.0 - 15.0 %    Eosinophil % 0.1 0.0 - 8.0 %    Basophil % 0.1 0.0 - 1.9 %    Differential Method Automated    Comprehensive Metabolic Panel   Result  Value Ref Range    Sodium 137 136 - 145 mmol/L    Potassium 4.3 3.5 - 5.1 mmol/L    Chloride 104 95 - 110 mmol/L    CO2 25 23 - 29 mmol/L    Glucose 162 (H) 70 - 110 mg/dL    BUN 21 8 - 23 mg/dL    Creatinine 1.1 0.5 - 1.4 mg/dL    Calcium 7.8 (L) 8.7 - 10.5 mg/dL    Total Protein 4.8 (L) 6.0 - 8.4 g/dL    Albumin 2.5 (L) 3.5 - 5.2 g/dL    Total Bilirubin 0.9 0.1 - 1.0 mg/dL    Alkaline Phosphatase 81 55 - 135 U/L    AST 43 (H) 10 - 40 U/L     (H) 10 - 44 U/L    Anion Gap 8 8 - 16 mmol/L    eGFR if African American >60 >60 mL/min/1.73 m^2    eGFR if non African American >60 >60 mL/min/1.73 m^2   Urinalysis, Reflex to Urine Culture Urine, Clean Catch    Specimen: Urine   Result Value Ref Range    Specimen UA Urine, Clean Catch     Color, UA Yellow Yellow, Straw, Rachel    Appearance, UA Clear Clear    pH, UA 8.0 5.0 - 8.0    Specific Gravity, UA 1.015 1.005 - 1.030    Protein, UA Negative Negative    Glucose, UA Trace (A) Negative    Ketones, UA Negative Negative    Bilirubin (UA) Negative Negative    Occult Blood UA Negative Negative    Nitrite, UA Negative Negative    Urobilinogen, UA 1.0 <2.0 EU/dL    Leukocytes, UA Negative Negative   Troponin I   Result Value Ref Range    Troponin I 0.060 (H) 0.000 - 0.026 ng/mL   BNP   Result Value Ref Range     (H) 0 - 99 pg/mL   CK   Result Value Ref Range     20 - 200 U/L   Ferritin   Result Value Ref Range    Ferritin 588 (H) 20.0 - 300.0 ng/mL   Folate   Result Value Ref Range    Folate 6.5 4.0 - 24.0 ng/mL   Iron and TIBC   Result Value Ref Range    Iron 72 45 - 160 ug/dL    Transferrin 192 (L) 200 - 375 mg/dL    TIBC 284 250 - 450 ug/dL    Saturated Iron 25 20 - 50 %   Transferrin   Result Value Ref Range    Transferrin 192 (L) 200 - 375 mg/dL   Vitamin B12   Result Value Ref Range    Vitamin B-12 570 210 - 950 pg/mL   Ethanol, Serum   Result Value Ref Range    Alcohol, Serum <10 <10 mg/dL   Drug screen panel, emergency   Result Value Ref Range     Benzodiazepines Negative     Methadone metabolites Negative     Cocaine (Metab.) Negative     Opiate Scrn, Ur Negative     Barbiturate Screen, Ur Negative     Amphetamine Screen, Ur Negative     THC Negative     Phencyclidine Negative     Creatinine, Urine 82.8 23.0 - 375.0 mg/dL    Toxicology Information SEE COMMENT    CBC Auto Differential   Result Value Ref Range    WBC 9.17 3.90 - 12.70 K/uL    RBC 3.37 (L) 4.60 - 6.20 M/uL    Hemoglobin 10.0 (L) 14.0 - 18.0 g/dL    Hematocrit 31.3 (L) 40.0 - 54.0 %    MCV 93 82 - 98 fL    MCH 29.7 27.0 - 31.0 pg    MCHC 31.9 (L) 32.0 - 36.0 g/dL    RDW 15.9 (H) 11.5 - 14.5 %    Platelets 152 150 - 350 K/uL    MPV 9.6 9.2 - 12.9 fL    Immature Granulocytes 1.2 (H) 0.0 - 0.5 %    Gran # (ANC) 8.2 (H) 1.8 - 7.7 K/uL    Immature Grans (Abs) 0.11 (H) 0.00 - 0.04 K/uL    Lymph # 0.5 (L) 1.0 - 4.8 K/uL    Mono # 0.3 0.3 - 1.0 K/uL    Eos # 0.0 0.0 - 0.5 K/uL    Baso # 0.02 0.00 - 0.20 K/uL    nRBC 1 (A) 0 /100 WBC    Gran % 89.1 (H) 38.0 - 73.0 %    Lymph % 5.8 (L) 18.0 - 48.0 %    Mono % 3.7 (L) 4.0 - 15.0 %    Eosinophil % 0.0 0.0 - 8.0 %    Basophil % 0.2 0.0 - 1.9 %    Platelet Estimate Appears normal     Differential Method Automated    Comprehensive Metabolic Panel   Result Value Ref Range    Sodium 141 136 - 145 mmol/L    Potassium 3.0 (L) 3.5 - 5.1 mmol/L    Chloride 104 95 - 110 mmol/L    CO2 30 (H) 23 - 29 mmol/L    Glucose 104 70 - 110 mg/dL    BUN 15 8 - 23 mg/dL    Creatinine 1.1 0.5 - 1.4 mg/dL    Calcium 7.7 (L) 8.7 - 10.5 mg/dL    Total Protein 4.8 (L) 6.0 - 8.4 g/dL    Albumin 2.6 (L) 3.5 - 5.2 g/dL    Total Bilirubin 1.1 (H) 0.1 - 1.0 mg/dL    Alkaline Phosphatase 90 55 - 135 U/L    AST 37 10 - 40 U/L     (H) 10 - 44 U/L    Anion Gap 7 (L) 8 - 16 mmol/L    eGFR if African American >60 >60 mL/min/1.73 m^2    eGFR if non African American >60 >60 mL/min/1.73 m^2   Magnesium   Result Value Ref Range    Magnesium 2.1 1.6 - 2.6 mg/dL   Phosphorus   Result Value Ref  Range    Phosphorus 1.7 (L) 2.7 - 4.5 mg/dL   POCT glucose   Result Value Ref Range    POCT Glucose 125 (H) 70 - 110 mg/dL   POCT glucose   Result Value Ref Range    POCT Glucose 160 (H) 70 - 110 mg/dL   POCT glucose   Result Value Ref Range    POCT Glucose 103 70 - 110 mg/dL       Laboratory:  Lab Results   Component Value Date    WBC 9.17 10/31/2020    HGB 10.0 (L) 10/31/2020    HCT 31.3 (L) 10/31/2020     10/31/2020    CHOL 115 (L) 10/19/2020    TRIG 398 (H) 10/19/2020    HDL 32 (L) 10/19/2020     (H) 10/31/2020    AST 37 10/31/2020     10/31/2020    K 3.0 (L) 10/31/2020     10/31/2020    CREATININE 1.1 10/31/2020    BUN 15 10/31/2020    CO2 30 (H) 10/31/2020    TSH 0.765 10/19/2020    INR 1.0 10/18/2020    HGBA1C 9.3 (H) 10/19/2020      Recent Labs   Lab 10/30/20  1234   COLORU Yellow   SPECGRAV 1.015   PHUR 8.0   PROTEINUA Negative         Diagnostic Results:CT scan films   ( All images reviewed Independently)   Imaging Results          US Carotid Bilateral (Final result)  Result time 10/30/20 15:50:07    Final result by Venkat Cordoba MD (10/30/20 15:50:07)                 Impression:      No significant stenosis.    **Categories of stenosis (0-15%, 16-49%, 50-69% and 70-99% ) are based on published criteria that have been internally validated.  Degree of stenosis is based on NASCET criteria and refers to the degree of luminal diameter narrowing as a percentage of the normal ICA distal to the stenosis and any area of post stenotic dilation.      Electronically signed by: Venkat Cordoba MD  Date:    10/30/2020  Time:    15:50             Narrative:    EXAMINATION:  US CAROTID BILATERAL    CLINICAL HISTORY:  evaluate for stenosis;    COMPARISON:  None    FINDINGS:  Right common carotid:    Peak systolic velocity 82 cm/sec.    Right internal carotid artery: Mild heterogeneous plaque is seen in the bulb    Peak systolic velocity: 59 cm/sec.    IC/CC ratio:  0.7.    Left common  carotid:    Peak systolic velocity 65 cm/sec.    Left internal carotid artery: Mild heterogeneous plaque is seen in the bulb    Peak systolic velocity 62 cm/sec.    IC/CC ratio 0.95.    Both vertebral arteries demonstrate antegrade flow.                               X-Ray Chest AP Portable (Final result)  Result time 10/30/20 13:03:14    Final result by Shun Sloan Jr., MD (10/30/20 13:03:14)                 Impression:      No acute findings.      Electronically signed by: Shun Sloan Jr., MD  Date:    10/30/2020  Time:    13:03             Narrative:    EXAMINATION:  XR CHEST AP PORTABLE    CLINICAL HISTORY:  weakness;    COMPARISON:  Prior radiograph from October 18, 2020.    FINDINGS:  Lungs are clear.  No pleural fluid or pneumothorax.  Heart size within normal limits.  No significant bony findings.                               CT Head Without Contrast (Final result)  Result time 10/30/20 11:56:05    Final result by Shun Sloan Jr., MD (10/30/20 11:56:05)                 Impression:      1. No acute findings.  2. New/larger chronic lacunar infarct within the right basal ganglia.  3. White matter low density changes consistent with mild chronic microvascular ischemia.  All CT scans at this facility use dose modulation, iterative reconstruction, and/or weight base dosing when appropriate to reduce radiation dose to as low as reasonably achievable.      Electronically signed by: Shun Sloan Jr., MD  Date:    10/30/2020  Time:    11:56             Narrative:    EXAMINATION:  CT HEAD WITHOUT CONTRAST    CLINICAL HISTORY:  Syncope, simple, abnormal neuro exam;    TECHNIQUE:  Contiguous axial CT images were obtained from the skull base through the vertex without intravenous contrast.    COMPARISON:  Prior CT of the head from October 19, 2020.    FINDINGS:  No intracranial hemorrhage. No mass effect or midline shift. Chronic lacunar infarct within the right lentiform nucleus is new compared to the prior  study.  Chronic lacunar infarcts within the vasquez.  Mild patchy low density involving the periventricular white matter anteriorly.  The ventricles and sulci are normal in size and configuration. The paranasal sinuses and mastoid air cells are clear.  No concerning osseous findings.                                10/31/2020    Assessment and Recommendations:  Patient with HSP and diabetes  Poorly controlled diabetes and BP    HbA1C 9.5  His recurrent symptoms of generalized weakness is less likely due to chronic infarction in the right BG and or Right cerebellum    Frequent dizzy spells could be secondary to metabolic derangement, Autonomic dysfunction associated with either diabetes and or HSP      Differential diagnosis:  Metabolic Encephalopathy  Diabetic Neuropathy in addition to HSP  Autonomic dysfunction       Recommendations:  ASA  Statin     Patient is bed bound with some ambulation,   He has Home PT/OT    Patient cannot have MRI because of Metal fragments in the scalp -   However, MRI will not change the management  Patient has been deconditioning - would benefit from Skilled Nursing facility   No further Neurology input            Arpan Parry MD., Ph.D., MS

## 2020-10-31 NOTE — PT/OT/SLP EVAL
Occupational Therapy   Evaluation    Name: Karan Aburto  MRN: [id number]  Admitting Diagnosis:  Generalized weakness      Recommendations:     Discharge Recommendations: nursing facility, skilled, rehabilitation facility  Discharge Equipment Recommendations:  none  Barriers to discharge:  None    Assessment:     Karan Aburto is a 74 y.o. male with a medical diagnosis of Generalized weakness.  He presents with GENERALIZED WEAKNESS AND DECLINE IN ADLS. Performance deficits affecting function: weakness, gait instability, decreased upper extremity function, impaired endurance, impaired balance, decreased lower extremity function, impaired self care skills, impaired functional mobilty.      Rehab Prognosis: Good; patient would benefit from acute skilled OT services to address these deficits and reach maximum level of function.       Plan:     Patient to be seen 3 x/week to address the above listed problems via self-care/home management, therapeutic activities, therapeutic exercises  · Plan of Care Expires: 11/07/20  · Plan of Care Reviewed with: patient    Subjective     Chief Complaint: WEAKNESS AND INTERMITTENT PAIN IN LOWER BACK  Patient/Family Comments/goals: TO RETURN HOME    Occupational Profile:  Living Environment: PT LIVES IN A SINGLE STORY HOME WITH NO MITCHELL.  Previous level of function: PT WAS MOD I WITH ADLS AND STILL DRIVING AT THIS TIME  Roles and Routines: PT LIVES WITH HIS GRANDSON BUT DOES NOT NEED ASSISTANCE WITH ADLS  Equipment Used at Home:  walker, rolling, bedside commode, bath bench  Assistance upon Discharge: UNKNOWN    Pain/Comfort:  Pain Rating 1: 0/10    Patients cultural, spiritual, Nondenominational conflicts given the current situation: no    Objective:     Communicated with: NURSE AND COMPLETED Epic CHART REVIEW prior to session.  Patient found SEATED EOB WITH PCT with peripheral IV, telemetry upon OT entry to room.    General Precautions: Standard, fall   Orthopedic Precautions:N/A   Braces:        Occupational Performance:    Bed Mobility:    · Patient completed Rolling/Turning to Left with  stand by assistance  · Patient completed Rolling/Turning to Right with stand by assistance  · Patient completed Sit to Supine with stand by assistance    Functional Mobility/Transfers:  · Patient completed Sit <> Stand Transfer with moderate assistance  with  rolling walker     Activities of Daily Living:  · Upper Body Dressing: supervision    · Lower Body Dressing: moderate assistance    · Toileting: moderate assistance      Cognitive/Visual Perceptual:  Cognitive/Psychosocial Skills:     -       Oriented to: Person, Place, Time and Situation   -       Follows Commands/attention:Easily distracted  -       Safety awareness/insight to disability: impaired     Physical Exam:  Upper Extremity Range of Motion:     -       Right Upper Extremity: WFL  -       Left Upper Extremity: WFL  Upper Extremity Strength:    -       Right Upper Extremity: GROSSLY 4/5  -       Left Upper Extremity: GROSSLY 4/5   Strength: -       Right Upper Extremity: WFL  -       Left Upper Extremity: WFL    AMPAC 6 Click ADL:  AMPAC Total Score: 18    Treatment & Education:  PT EDUCATED ON ROLE OF OT AND OT POC. OT EVALUATION COMPLETED AS DESCRIBED ABOVE. PT WITH SOME IMPULSIVITY NOTED AT TIMES, RE-EDUCATED TO CALL BEFORE GETTING UP. BED ALARM ON. PT EDUCATED ON UB EXERCISES TO COMPLETE THROUGHOUT THE DAY IN HIS ROOM TO IMPROVE UB STRENGTH  Education:    Patient left supine with all lines intact, call button in reach, bed alarm on and NURSE notified    GOALS:   Multidisciplinary Problems     Occupational Therapy Goals        Problem: Occupational Therapy Goal    Goal Priority Disciplines Outcome Interventions   Occupational Therapy Goal     OT, PT/OT     Description: STGS TO BE MET BY 11/7/2020  1. LB DRESS WITH MOD I  2. TOILET TRANSFER WITH MOD I  3. TOILETING WITH MOD I  4. TOLERATE 2 SETS OF 10 UB EXERCISES                    History:      Past Medical History:   Diagnosis Date    Elevated PSA     Hypertension     Rosacea     Thyroid disease     hypothyroidism       No past surgical history on file.    Time Tracking:     OT Date of Treatment: 10/31/20  OT Start Time: 1000  OT Stop Time: 1025  OT Total Time (min): 25 min    Billable Minutes:Evaluation 15  Therapeutic Activity 10    Joy Culver OT  10/31/2020

## 2020-10-31 NOTE — ASSESSMENT & PLAN NOTE
Monitor for any signs of active bleeding  Will start ASA and hold other anticoagulants  Will need follow up with Hematology   Pt/family denies ETOH intake  10/31/2020 - resolved

## 2020-10-31 NOTE — HOSPITAL COURSE
Concerns for acute ischemic stroke were noted when patient's generalized weakness noted therefore, family brought to the ED. CT of Head found a new/larger chronic lacunar infarct within the right basal ganglia. Carotid US was negative for stenosis. Repeat CT of Head found possible subacute infarct involving the inferior right cerebellum that is more evident compared to prior. 2 d ECHO from previous admission found no wall motion abnormalities, preserved LVSF and grade I diastolic dysfunction. Pt had no speech and swallow deficits. Blood pressure controlled. Low dose STATIN and fenofibrate held due to mild transaminitis. PT/OT evaluated the patient and recommended SNF vs Rehab placement. Spoke with pt's son, Milton, and he agreed for SNF placement. Family planned to speak with patient regarding SNF placement. Consult placed for .  11/1: Patient wanting placement at SNF - sister at bedside - she picked 3 facilities  - case management notified. 11/2: Patient had fall last night - stressed importance of calling for assistance before getting out of bed. [facility] submitted for insurance auth - diabetes educator consulted for Hgb 9.3 11/3: patient accepted at [facility]. Rapid covid swab negative. Vitals and labs stable.

## 2020-10-31 NOTE — PLAN OF CARE
Oriented x3 POC reviewed, verbalized understanding. Pt remained free from falls, fall precautions in place. Pt is Sr / st on monitor, VSS. B/p a little elevated. Troponin level 0.068 k 3.0 potassium ordered 40 meq x1 and tid with meals  did well with pt still very unsteady bed alarm in use urinal at bedside complained of headache tylenol prn given PIV intact. Call bell and personal belongings within reach. Hourly rounding complete. Reminded to call for assistance. Will continue to monitor.

## 2020-10-31 NOTE — PROGRESS NOTES
[facility] Hospital Medicine  Progress Note    Patient Name: Karan Aburto  MRN: [id number]  Patient Class: IP- Inpatient   Admission Date: 10/30/2020  Length of Stay: 0 days  Attending Physician: Prosper Sahu MD  Primary Care Provider: Mickey Agrawal MD        Subjective:     Principal Problem:Generalized weakness        HPI:  Pt is a 75 yo male with PMHx of Bilateral leg spastic paraplegia, HTN, diastolic heart failure, obesity and hypothyroidism who was brought to the ED due to generalized weakness that was exacerbated today. Pt stated he was sitting at a table and could not get out of the chair therefore, family called EMS. Also, pt still with intermittent episodes of confusion. Pt was just discharged from [facility] (1 week ago) when he was treated for metabolic alkalosis secondary to medication, new diagnosis of diabetes, peripheral edema (multifactorial) and newly diagnosed diastolic heart failure. Today, the patient denies gross neuro deficits except for generalized weakness. He denies fever, URI symptoms, cough, chest pain, palpitations, abdominal pain, N/V/D & unusual rash. Pt has been using a walker due to generalized weakness over the last 10 years.   Temp 98.6, pulse 62, resp 20, B/P 145/77 and SpO2 100 %  CT of Head - New/larger chronic lacunar infarct within the right basal ganglia. CXR - no acute cardiopulmonary findings  Labs find H/H 9.7/30.1 (last 12.9/39.5), platelets 44, gluc 162, albumin 2.5, AST 43 and . BNP = 303, troponin 0.060.   Pt is placed on Observation to further evaluate generalized weakness and concerns for acute ischemic stroke. Pt states he is a DNR and his children are his surrogate decision makers. ALSO, PT STATES HE CANNOT HAVE A MRI OF BRAIN DUE TO BB PELLET IN HIS HEAD.       Overview/Hospital Course:  Concerns for acute ischemic stroke were noted when patient's generalized weakness noted therefore, family brought to the ED. CT of Head found a  new/larger chronic lacunar infarct within the right basal ganglia. Carotid US was negative for stenosis. Repeat CT of Head found possible subacute infarct involving the inferior right cerebellum that is more evident compared to prior. 2 d ECHO from previous admission found no wall motion abnormalities, preserved LVSF and grade I diastolic dysfunction. Pt had no speech and swallow deficits. Blood pressure controlled. Low dose STATIN and fenofibrate held due to mild transaminitis. PT/OT evaluated the patient and recommended SNF vs Rehab placement. Spoke with pt's son, Milton, and he agreed for SNF placement. Family planned to speak with patient regarding SNF placement. Consult placed for .     Interval History: Pt has intermittent episodes of confusion. However, presently AAOx3 and to situation. No complaints currently.     Review of Systems   Constitutional: Positive for activity change and fatigue. Negative for appetite change, chills, diaphoresis, fever and unexpected weight change.   HENT: Negative.    Eyes: Negative for visual disturbance.   Respiratory: Negative for cough and shortness of breath.    Cardiovascular: Positive for leg swelling. Negative for chest pain and palpitations.   Gastrointestinal: Negative for abdominal pain, constipation, diarrhea, nausea and vomiting.   Genitourinary: Negative for difficulty urinating.        Wears incontinent brief   Musculoskeletal: Positive for arthralgias and gait problem.   Skin: Negative for rash and wound.   Neurological: Positive for weakness. Negative for dizziness, seizures, syncope, speech difficulty, light-headedness and numbness.   Psychiatric/Behavioral: Positive for confusion (intermittent).     Objective:     Vital Signs (Most Recent):  Temp: 97.2 °F (36.2 °C) (10/31/20 1123)  Pulse: 88 (10/31/20 1123)  Resp: 19 (10/31/20 1123)  BP: (!) 144/86 (10/31/20 1123)  SpO2: 97 % (10/31/20 1123) Vital Signs (24h Range):  Temp:  [97.2 °F (36.2  °C)-99.7 °F (37.6 °C)] 97.2 °F (36.2 °C)  Pulse:  [81-97] 88  Resp:  [18-22] 19  SpO2:  [92 %-98 %] 97 %  BP: (137-157)/(64-96) 144/86     Weight: 92.8 kg (204 lb 9.4 oz)  Body mass index is 29.36 kg/m².    Intake/Output Summary (Last 24 hours) at 10/31/2020 1417  Last data filed at 10/31/2020 0500  Gross per 24 hour   Intake 360 ml   Output 500 ml   Net -140 ml      Physical Exam  Vitals signs and nursing note reviewed.   Constitutional:       Appearance: Normal appearance. He is well-developed. He is obese. He is not ill-appearing.   HENT:      Head: Normocephalic and atraumatic.      Nose: Nose normal.   Eyes:      General: No scleral icterus.     Conjunctiva/sclera: Conjunctivae normal.      Pupils: Pupils are equal, round, and reactive to light.   Neck:      Musculoskeletal: Normal range of motion and neck supple.   Cardiovascular:      Rate and Rhythm: Normal rate and regular rhythm.      Heart sounds: Normal heart sounds. No murmur. No friction rub. No gallop.    Pulmonary:      Effort: Pulmonary effort is normal.      Breath sounds: Normal breath sounds.   Abdominal:      General: Bowel sounds are normal. There is no distension.      Palpations: Abdomen is soft.      Tenderness: There is no abdominal tenderness.   Musculoskeletal: Normal range of motion.         General: No tenderness.      Right lower leg: Edema (trace) present.      Left lower leg: Edema (trace) present.   Skin:     General: Skin is warm and dry.      Coloration: Skin is pale.      Comments: Healing abrasions to right pretibial area   Neurological:      General: No focal deficit present.      Mental Status: He is alert and oriented to person, place, and time.      Motor: Weakness present.      Gait: Gait abnormal.   Psychiatric:         Behavior: Behavior normal.      Comments: Pt reports intermittent feelings of depression         Significant Labs:   CBC:   Recent Labs   Lab 10/30/20  1104 10/31/20  0642   WBC 8.30 9.17   HGB 9.7* 10.0*    HCT 30.1* 31.3*   PLT 44* 152     CMP:   Recent Labs   Lab 10/30/20  1104 10/31/20  0642    141   K 4.3 3.0*    104   CO2 25 30*   * 104   BUN 21 15   CREATININE 1.1 1.1   CALCIUM 7.8* 7.7*   PROT 4.8* 4.8*   ALBUMIN 2.5* 2.6*   BILITOT 0.9 1.1*   ALKPHOS 81 90   AST 43* 37   * 100*   ANIONGAP 8 7*   EGFRNONAA >60 >60     All pertinent labs within the past 24 hours have been reviewed.    Significant Imaging: I have reviewed all pertinent imaging results/findings within the past 24 hours.      Assessment/Plan:      * Generalized weakness  CT of Head notes - Chronic lacunar infarct within the right lentiform nucleus is new compared to the prior study.  Chronic lacunar infarcts within the vasquez.  Neuro checks every 4 hours, repeat CT of Head in 24 hours - Pt explains he cannot have an MRI of Brain as he has BB pellet in his head   ASA  81 mg po daily,    PT/OT/ST eval and treat  Hold STATIN due to elevated liver enzymes  Continue fenofibrate  Carotid US pending  Recent 2 D ECHO notes normal LVSF with EF 60 %, grade I diastolic dysfunction    Lab Results   Component Value Date    CHOL 115 (L) 10/19/2020     Lab Results   Component Value Date    HDL 32 (L) 10/19/2020     Lab Results   Component Value Date    LDLCALC 3.4 (L) 10/19/2020     Lab Results   Component Value Date    TRIG 398 (H) 10/19/2020     Lab Results   Component Value Date    CHOLHDL 27.8 10/19/2020   According to Neurology, Dr. Romano, the stroke is old according to previous imaging. Start ASA 81 mg daily. May hold STATIN for now  PT/OT eval and treat - SNF vs Rehab and  consulted for placement.         Thrombocytopenia  Monitor for any signs of active bleeding  Will start ASA and hold other anticoagulants  Will need follow up with Hematology   Pt/family denies ETOH intake  10/31/2020 - resolved    Normocytic anemia  Anemia profile pending  Monitor for S/S of bleeding  Transfuse if Hgb falls to range of  6      Spastic paraplegia, hereditary  Pt is prescribed tizanidine  He ambulates with assistance of walker  PT/OT eval and treat>>>> SNF vs Rehab    Essential hypertension  Controlled on admission  Continue Coreg, hydralazine, isosorbide and lisinopril      Hypokalemia  K+ 3.0, replaced  Hypophosphatemia 1.7 - replace      Abnormal liver enzymes  Recent acute hepatitis panel was negative  Abdominal US noted fatty liver  Hold STATIN and fenofibrate due to causing transaminitis   levels continue to trend down near normal      Diabetes mellitus type 2, uncontrolled  Lab Results   Component Value Date    HGBA1C 9.3 (H) 10/19/2020   accuchecks  Sliding scale insulin  Diabetic diet  NEEDS TO BE DISCHARGED HOME ON METFORMIN  Glucose 125, 103, 143        VTE Risk Mitigation (From admission, onward)    None          Discharge Planning   ADAM:      Code Status: DNR   Is the patient medically ready for discharge?:     Reason for patient still in hospital (select all that apply): Pending disposition                     Shayna Haynes NP  Department of Hospital Medicine   [facility]

## 2020-10-31 NOTE — ASSESSMENT & PLAN NOTE
CT of Head notes - Chronic lacunar infarct within the right lentiform nucleus is new compared to the prior study.  Chronic lacunar infarcts within the vasquez.  Neuro checks every 4 hours, repeat CT of Head in 24 hours - Pt explains he cannot have an MRI of Brain as he has BB pellet in his head   ASA  81 mg po daily,    PT/OT/ST eval and treat  Hold STATIN due to elevated liver enzymes  Continue fenofibrate  Carotid US pending  Recent 2 D ECHO notes normal LVSF with EF 60 %, grade I diastolic dysfunction    Lab Results   Component Value Date    CHOL 115 (L) 10/19/2020     Lab Results   Component Value Date    HDL 32 (L) 10/19/2020     Lab Results   Component Value Date    LDLCALC 3.4 (L) 10/19/2020     Lab Results   Component Value Date    TRIG 398 (H) 10/19/2020     Lab Results   Component Value Date    CHOLHDL 27.8 10/19/2020   According to Neurology, Dr. Romano, the stroke is old according to previous imaging. Start ASA 81 mg daily. May hold STATIN for now  PT/OT eval and treat - SNF vs Rehab and  consulted for placement.

## 2020-10-31 NOTE — PLAN OF CARE
Reviewed POC with patient. No ss of distress or adverse reactions to meds. No SOB, chest pain, or changes in mentation. Cardiac  monitor in place. VSS. Will reassess hourly and as needed.  Problem: Fall Injury Risk  Goal: Absence of Fall and Fall-Related Injury  Outcome: Ongoing, Not Progressing     Problem: Adult Inpatient Plan of Care  Goal: Plan of Care Review  Outcome: Ongoing, Not Progressing  Goal: Patient-Specific Goal (Individualization)  Outcome: Ongoing, Not Progressing  Goal: Absence of Hospital-Acquired Illness or Injury  Outcome: Ongoing, Not Progressing  Goal: Optimal Comfort and Wellbeing  Outcome: Ongoing, Not Progressing  Goal: Readiness for Transition of Care  Outcome: Ongoing, Not Progressing  Goal: Rounds/Family Conference  Outcome: Ongoing, Not Progressing     Problem: Diabetes Comorbidity  Goal: Blood Glucose Level Within Desired Range  Outcome: Ongoing, Not Progressing     Problem: Diabetes Comorbidity  Goal: Blood Glucose Level Within Desired Range  Outcome: Ongoing, Not Progressing

## 2020-10-31 NOTE — SUBJECTIVE & OBJECTIVE
Interval History: Pt has intermittent episodes of confusion. However, presently AAOx3 and to situation. No complaints currently.     Review of Systems   Constitutional: Positive for activity change and fatigue. Negative for appetite change, chills, diaphoresis, fever and unexpected weight change.   HENT: Negative.    Eyes: Negative for visual disturbance.   Respiratory: Negative for cough and shortness of breath.    Cardiovascular: Positive for leg swelling. Negative for chest pain and palpitations.   Gastrointestinal: Negative for abdominal pain, constipation, diarrhea, nausea and vomiting.   Genitourinary: Negative for difficulty urinating.        Wears incontinent brief   Musculoskeletal: Positive for arthralgias and gait problem.   Skin: Negative for rash and wound.   Neurological: Positive for weakness. Negative for dizziness, seizures, syncope, speech difficulty, light-headedness and numbness.   Psychiatric/Behavioral: Positive for confusion (intermittent).     Objective:     Vital Signs (Most Recent):  Temp: 97.2 °F (36.2 °C) (10/31/20 1123)  Pulse: 88 (10/31/20 1123)  Resp: 19 (10/31/20 1123)  BP: (!) 144/86 (10/31/20 1123)  SpO2: 97 % (10/31/20 1123) Vital Signs (24h Range):  Temp:  [97.2 °F (36.2 °C)-99.7 °F (37.6 °C)] 97.2 °F (36.2 °C)  Pulse:  [81-97] 88  Resp:  [18-22] 19  SpO2:  [92 %-98 %] 97 %  BP: (137-157)/(64-96) 144/86     Weight: 92.8 kg (204 lb 9.4 oz)  Body mass index is 29.36 kg/m².    Intake/Output Summary (Last 24 hours) at 10/31/2020 1417  Last data filed at 10/31/2020 0500  Gross per 24 hour   Intake 360 ml   Output 500 ml   Net -140 ml      Physical Exam  Vitals signs and nursing note reviewed.   Constitutional:       Appearance: Normal appearance. He is well-developed. He is obese. He is not ill-appearing.   HENT:      Head: Normocephalic and atraumatic.      Nose: Nose normal.   Eyes:      General: No scleral icterus.     Conjunctiva/sclera: Conjunctivae normal.      Pupils: Pupils are  equal, round, and reactive to light.   Neck:      Musculoskeletal: Normal range of motion and neck supple.   Cardiovascular:      Rate and Rhythm: Normal rate and regular rhythm.      Heart sounds: Normal heart sounds. No murmur. No friction rub. No gallop.    Pulmonary:      Effort: Pulmonary effort is normal.      Breath sounds: Normal breath sounds.   Abdominal:      General: Bowel sounds are normal. There is no distension.      Palpations: Abdomen is soft.      Tenderness: There is no abdominal tenderness.   Musculoskeletal: Normal range of motion.         General: No tenderness.      Right lower leg: Edema (trace) present.      Left lower leg: Edema (trace) present.   Skin:     General: Skin is warm and dry.      Coloration: Skin is pale.      Comments: Healing abrasions to right pretibial area   Neurological:      General: No focal deficit present.      Mental Status: He is alert and oriented to person, place, and time.      Motor: Weakness present.      Gait: Gait abnormal.   Psychiatric:         Behavior: Behavior normal.      Comments: Pt reports intermittent feelings of depression         Significant Labs:   CBC:   Recent Labs   Lab 10/30/20  1104 10/31/20  0642   WBC 8.30 9.17   HGB 9.7* 10.0*   HCT 30.1* 31.3*   PLT 44* 152     CMP:   Recent Labs   Lab 10/30/20  1104 10/31/20  0642    141   K 4.3 3.0*    104   CO2 25 30*   * 104   BUN 21 15   CREATININE 1.1 1.1   CALCIUM 7.8* 7.7*   PROT 4.8* 4.8*   ALBUMIN 2.5* 2.6*   BILITOT 0.9 1.1*   ALKPHOS 81 90   AST 43* 37   * 100*   ANIONGAP 8 7*   EGFRNONAA >60 >60     All pertinent labs within the past 24 hours have been reviewed.    Significant Imaging: I have reviewed all pertinent imaging results/findings within the past 24 hours.

## 2020-10-31 NOTE — ASSESSMENT & PLAN NOTE
Pt is prescribed tizanidine  He ambulates with assistance of walker  PT/OT eval and treat>>>> SNF vs Rehab

## 2020-10-31 NOTE — ASSESSMENT & PLAN NOTE
Lab Results   Component Value Date    HGBA1C 9.3 (H) 10/19/2020   accuchecks  Sliding scale insulin  Diabetic diet  NEEDS TO BE DISCHARGED HOME ON METFORMIN  Glucose 125, 103, 143

## 2020-11-01 NOTE — PT/OT/SLP PROGRESS
"Physical Therapy  Treatment    Karan Aburto   MRN: [id number]   Admitting Diagnosis: Generalized weakness    PT Received On: 11/01/20  PT Start Time: 0817     PT Stop Time: 0830    PT Total Time (min): 13 min     [id number]- ( 1 Ta)    Billable Minutes:  Gait Training 13  Therapeutic activity: 10     Treatment Type: Treatment  PT/PTA: PTA     PTA Visit Number: 1       General Precautions: Standard, fall  Orthopedic Precautions: N/A   Braces: N/A         Subjective:  Communicated with epic and the nurse: Erica,  prior to session.  Pt agreed to tx. "I know I need to have the nurse in the room. I know the rules"    Pain/Comfort  Pain Rating 1: 0/10  Pain Rating Post-Intervention 1: 0/10    Objective:   Patient found with: bed alarm, telemetry, peripheral IV    Functional Mobility:  Bed Mobility:        Transfers:    Sit to stand: Min A   Bed to chair: squat pivot, MIN A    Gait:     10 feet with a RW and MOD A due to poor balance. ( for transfers)    Knees and hip remained flexed.       Therapeutic Activities and Exercises:  Reviewed LE exercise for ROM and strengthening. Worked on standing after toiletting with MIN A>      AM-PAC 6 CLICK MOBILITY  How much help from another person does this patient currently need?   1 = Unable, Total/Dependent Assistance  2 = A lot, Maximum/Moderate Assistance  3 = A little, Minimum/Contact Guard/Supervision  4 = None, Modified [place]/Independent    Turning over in bed (including adjusting bedclothes, sheets and blankets)?: 3  Sitting down on and standing up from a chair with arms (e.g., wheelchair, bedside commode, etc.): 2  Moving from lying on back to sitting on the side of the bed?: 3  Need to walk in hospital room?: 1  Climbing 3-5 steps with a railing?: 3    AM-PAC Raw Score CMS G-Code Modifier Level of Impairment Assistance   6 % Total / Unable   7 - 9 CM 80 - 100% Maximal Assist   10 - 14 CL 60 - 80% Moderate Assist   15 - 19 CK 40 - 60% Moderate Assist   20 - 22 CJ " 20 - 40% Minimal Assist   23 CI 1-20% SBA / CGA   24 CH 0% Independent/ Mod I     Patient left up in chair with all lines intact, call button in reach and chair alarm on.    Assessment:  Karan Aburto is a 74 y.o. male with a medical diagnosis of Generalized weakness and presents with improved cognition but still seems disoriented at times. Progressing. Will benefit from ongoing therapy until he is at his PLOF.    Rehab identified problem list/impairments:      Rehab potential is fair.    Activity tolerance: Fair    Discharge recommendations: Discharge Facility/Level of Care Needs: rehabilitation facility, nursing facility, skilled     Barriers to discharge:      Equipment recommendations: Equipment Needed After Discharge: none     GOALS:   Multidisciplinary Problems     Physical Therapy Goals        Problem: Physical Therapy Goal    Goal Priority Disciplines Outcome Goal Variances Interventions   Physical Therapy Goal     PT, PT/OT      Description: 1. Patient will perform supine to/from sit mod indep  2. Patient will perform sit to/from stand with RW cga  3. Patient will ambulate 50ft x 2 RW cga no gross LOB                   PLAN:    Patient to be seen 5 x/week  to address the above listed problems via gait training, therapeutic activities, therapeutic exercises  Plan of Care expires: 11/07/20  Plan of Care reviewed with: patient         Tory Cross, PTA  11/01/2020

## 2020-11-01 NOTE — PT/OT/SLP EVAL
Physical Therapy Evaluation    Patient Name:  Karan Aburto   MRN:  [id number]    Recommendations:     Discharge Recommendations:  nursing facility, skilled   Discharge Equipment Recommendations: (tbd by next level of care)   Barriers to discharge: Decreased caregiver support/needs 24/7 spv at this point - inc falls    Assessment:     Karan Aburto is a 74 y.o. male admitted with a medical diagnosis of Generalized weakness.  He presents with the following impairments/functional limitations:  weakness, gait instability, decreased lower extremity function, impaired balance, impaired endurance, decreased safety awareness, impaired self care skills, impaired functional mobilty, decreased coordination.    Rehab Prognosis: Fair/good; patient would benefit from acute skilled PT services to address these deficits and reach maximum level of function.    Recent Surgery: * No surgery found *      Plan:     During this hospitalization, patient to be seen 5 x/week to address the identified rehab impairments via gait training, therapeutic activities, therapeutic exercises and progress toward the following goals:    · Plan of Care Expires:  11/07/20    Subjective     Chief Complaint: weakness; fatigue  Patient/Family Comments/goals: to go home; dec fall risk with walking; get stronger  Pain/Comfort:  ·      Patients cultural, spiritual, Sikh conflicts given the current situation:      Living Environment:  Lives with grandson but patient is alone at times; h/o recent falls and demos inc fall risk; no steps to enter home  Prior to admission, patients level of function was mod indep/dec safety & falls.  Equipment used at home: walker, rolling, bedside commode, bath bench.  DME owned (not currently used): none.  Upon discharge, patient will have assistance from needs snf then home assessment needs to be made r/t inc falls recently.    Objective:     Communicated with rN prior to session.  Patient found HOB elevated with telemetry   upon PT entry to room.    General Precautions: Standard, fall   Orthopedic Precautions:N/A   Braces:       Exams:  B LE rom wfl and strength R LE 4-/5 and L LE 3+/5    Functional Mobility:  · Bed Mobility - supine to/from sit cga and cues for safe, efficient technique via log-rolling  · Transfers - sit to/from stand with RW mod A and cues for safe hand placement and body mechanics  · Gt - Amb 10ft RW mod A for balance & rw management; occasional L knee buckling and L LE scissoring    Therapeutic Activities and Exercises:   PT educated patient on POC, B LE TE to prep mobility & dec stiffness and safety/fall precautions with mobility using RW    AM-PAC 6 CLICK MOBILITY  Total Score:      Patient left HOB elevated with all lines intact, call button in reach, bed alarm on and RN notified.    GOALS:   Multidisciplinary Problems     Physical Therapy Goals        Problem: Physical Therapy Goal    Goal Priority Disciplines Outcome Goal Variances Interventions   Physical Therapy Goal     PT, PT/OT      Description: 1. Patient will perform supine to/from sit mod indep  2. Patient will perform sit to/from stand with RW cga  3. Patient will ambulate 50ft x 2 RW cga no gross LOB                   History:     Past Medical History:   Diagnosis Date    Elevated PSA     Hypertension     Rosacea     Thyroid disease     hypothyroidism       No past surgical history on file.    Time Tracking:     PT Received On: 10/31/20  PT Start Time: 0950     PT Stop Time: 1030  PT Total Time (min): 40 min     Billable Minutes: Evaluation 15, Therapeutic Activity 15 and Therapeutic Exercise 10      Frederic Faulkner, PT  11/01/2020

## 2020-11-01 NOTE — PT/OT/SLP PROGRESS
"Occupational Therapy  Treatment    Karan Aburto   MRN: [id number]   Admitting Diagnosis: Generalized weakness    OT Date of Treatment: 11/01/20   OT Start Time: 0745  OT Stop Time: 0810  OT Total Time (min): 25 min    Billable Minutes:  Self Care/Home Management 25    General Precautions: Standard, fall  Orthopedic Precautions: N/A  Braces: N/A         Subjective:  Communicated with nurse and completed epic chart review prior to session.    Pain/Comfort  Pain Rating 1: 0/10    Objective:  Patient found with: bed alarm, telemetry     Therapeutic Activities and Exercises:  Pt supine in bed upon OT arrival. Pt transition sup > sit with CGA. Pt sit > stand with RW Min A and t/f to BSC with Mod A and cues for safety. Pt completed toilet hygiene with Min A for balance. Pt stood from BSC to transition to bed side chair with RW with Mod A. Pt set up in chair with chair alarm on. Pt with some periods of confusion noted throughout treatment today, but ultimately seemed to have overall improvement with confusion noted yesterday. Pt with poor safety during transfers noted. OT reviewed exercises to complete while up in chair, pt agreeable.     AM-PAC 6 CLICK ADL   How much help from another person does this patient currently need?   1 = Unable, Total/Dependent Assistance  2 = A lot, Maximum/Moderate Assistance  3 = A little, Minimum/Contact Guard/Supervision  4 = None, Modified [place]/Independent    Putting on and taking off regular lower body clothing? : 3  Bathing (including washing, rinsing, drying)?: 3  Toileting, which includes using toilet, bedpan, or urinal? : 3  Putting on and taking off regular upper body clothing?: 3  Taking care of personal grooming such as brushing teeth?: 3  Eating meals?: 3  Daily Activity Total Score: 18     AM-PAC Raw Score CMS "G-Code Modifier Level of Impairment Assistance   6 % Total / Unable   7 - 8 CM 80 - 100% Maximal Assist   9-13 CL 60 - 80% Moderate Assist   14 - 19 CK 40 - " 60% Moderate Assist   20 - 22 CJ 20 - 40% Minimal Assist   23 CI 1-20% SBA / CGA   24 CH 0% Independent/ Mod I       Patient left up in chair with all lines intact, call button in reach, chair alarm on and nurse notified    ASSESSMENT:  Karan Aburto is a 74 y.o. male with a medical diagnosis of Generalized weakness and presents with generalized weakness and decline in ADLs.    Rehab identified problem list/impairments: Rehab identified problem list/impairments: weakness, impaired balance, decreased safety awareness, impaired endurance, visual deficits, gait instability, impaired functional mobilty, impaired self care skills    Rehab potential is good.    Activity tolerance: Good    Discharge recommendations: Discharge Facility/Level of Care Needs: rehabilitation facility, nursing facility, skilled     Barriers to discharge: Barriers to Discharge: None    Equipment recommendations: none     GOALS:   Multidisciplinary Problems     Occupational Therapy Goals        Problem: Occupational Therapy Goal    Goal Priority Disciplines Outcome Interventions   Occupational Therapy Goal     OT, PT/OT Ongoing, Progressing    Description: STGS TO BE MET BY 11/7/2020  1. LB DRESS WITH MOD I  2. TOILET TRANSFER WITH MOD I  3. TOILETING WITH MOD I  4. TOLERATE 2 SETS OF 10 UB EXERCISES                    Plan:  Patient to be seen 3 x/week to address the above listed problems via self-care/home management, therapeutic activities, therapeutic exercises  Plan of Care expires: 11/07/20  Plan of Care reviewed with: patient         Joy Culver OT  11/01/2020

## 2020-11-01 NOTE — PLAN OF CARE
Reviewed POC with patient. No ss of distress or adverse reactions to meds. No chest pain , SOB, VSS. Cardiac monitor in place. Will reassess hourly and as needed.   Problem: Fall Injury Risk  Goal: Absence of Fall and Fall-Related Injury  Outcome: Ongoing, Progressing     Problem: Adult Inpatient Plan of Care  Goal: Plan of Care Review  Outcome: Ongoing, Progressing  Goal: Patient-Specific Goal (Individualization)  Outcome: Ongoing, Progressing  Goal: Absence of Hospital-Acquired Illness or Injury  Outcome: Ongoing, Progressing  Goal: Optimal Comfort and Wellbeing  Outcome: Ongoing, Progressing  Goal: Readiness for Transition of Care  Outcome: Ongoing, Progressing  Goal: Rounds/Family Conference  Outcome: Ongoing, Progressing     Problem: Diabetes Comorbidity  Goal: Blood Glucose Level Within Desired Range  Outcome: Ongoing, Progressing
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
## 2020-11-01 NOTE — NURSING
Patient is experiencing urinary urgency and frequency. AAOX2-3. Notified NP. UA ordered and collected.

## 2020-11-01 NOTE — PLAN OF CARE
SW met with pt and pt's sister at bedside to complete discharge assessment. Pt lives at home with his grandson. His help at home is his grandson, sister, and neighbor. Pt sister will pick him up when ready for discharged. SW provided a list of SNF to pt and explaiend recommendation of PT/OT. Pt is in agreement with recommendation. No other needs identified at this time. SW provided a transitional care folder, information on advanced directives, information on pharmacy bedside delivery, and discharge planning begins on admission with contact information for any needs/questions. Pt board updated with SW name and number.     Payor: [employer] MEDICARE / Plan: [employer] 65 / Product Type: Medicare Advantage /   PCP: Mickey Agrawal MD  Pharmacy: No Pharmacies Listed  MyChart: Active       11/01/20 3347   Discharge Assessment   Assessment Type Discharge Planning Assessment   Confirmed/corrected address and phone number on facesheet? Yes   Assessment information obtained from? Patient   Expected Length of Stay (days)   (TBD)   Communicated expected length of stay with patient/caregiver no   Prior to hospitilization cognitive status: Alert/Oriented   Prior to hospitalization functional status: Independent;Assistive Equipment   Current cognitive status: Alert/Oriented   Current Functional Status: Independent;Assistive Equipment   Facility Arrived From: home   Lives With grandchild(greg)   Is patient able to care for self after discharge? Yes   Who are your caregiver(s) and their phone number(s)? Kevin Aburto 9son) [phone]   Patient's perception of discharge disposition skilled nursing facility   Patient currently being followed by outpatient case management? No   Patient currently receives any other outside agency services? No   Equipment Currently Used at Home walker, rolling   Part D Coverage n/a   Do you have any problems affording any of your prescribed medications? No   Is the patient  taking medications as prescribed? yes   Does the patient have transportation home? Yes   Transportation Anticipated family or friend will provide   Dialysis Name and Scheduled days n/a   Does the patient receive services at the [facility]? No   Discharge Plan A Skilled Nursing Facility   Discharge Plan B Skilled Nursing Facility   DME Needed Upon Discharge  wheelchair   Patient/Family in Agreement with Plan yes       Satnam Clemons LMSW 11/1/2020 11:20 AM

## 2020-11-01 NOTE — SUBJECTIVE & OBJECTIVE
Interval History: Patient wanting placement at SNF - sister at bedside - she picked 3 facilities  - case management notified.     Review of Systems   Constitutional: Positive for activity change and fatigue. Negative for appetite change, chills, diaphoresis, fever and unexpected weight change.   HENT: Negative for rhinorrhea and trouble swallowing.    Eyes: Negative for visual disturbance.   Respiratory: Negative for cough and shortness of breath.    Cardiovascular: Positive for leg swelling. Negative for chest pain and palpitations.   Gastrointestinal: Negative for abdominal pain, constipation, diarrhea, nausea and vomiting.   Endocrine: Negative for cold intolerance and heat intolerance.   Genitourinary: Negative for difficulty urinating and flank pain.        Wears incontinent brief   Musculoskeletal: Positive for arthralgias and gait problem.   Skin: Negative for rash and wound.   Allergic/Immunologic: Negative.    Neurological: Positive for weakness. Negative for dizziness, seizures, syncope, speech difficulty, light-headedness and numbness.   Hematological: Negative.    Psychiatric/Behavioral: Positive for confusion (intermittent). Negative for agitation and behavioral problems. The patient is not nervous/anxious.      Objective:     Vital Signs (Most Recent):  Temp: 98.8 °F (37.1 °C) (11/01/20 1153)  Pulse: 91 (11/01/20 1153)  Resp: 18 (11/01/20 1153)  BP: (!) 143/94 (11/01/20 1153)  SpO2: 98 % (11/01/20 1153) Vital Signs (24h Range):  Temp:  [97.9 °F (36.6 °C)-99.1 °F (37.3 °C)] 98.8 °F (37.1 °C)  Pulse:  [81-98] 91  Resp:  [18-20] 18  SpO2:  [93 %-98 %] 98 %  BP: (127-169)/(73-97) 143/94     Weight: 91.8 kg (202 lb 6.1 oz)  Body mass index is 29.04 kg/m².    Intake/Output Summary (Last 24 hours) at 11/1/2020 1455  Last data filed at 11/1/2020 0700  Gross per 24 hour   Intake 1454 ml   Output 1500 ml   Net -46 ml      Physical Exam  Vitals signs and nursing note reviewed.   Constitutional:       Appearance:  Normal appearance. He is well-developed. He is obese. He is not ill-appearing.   HENT:      Head: Normocephalic and atraumatic.      Nose: Nose normal.   Eyes:      General: No scleral icterus.     Conjunctiva/sclera: Conjunctivae normal.      Pupils: Pupils are equal, round, and reactive to light.   Neck:      Musculoskeletal: Normal range of motion and neck supple.   Cardiovascular:      Rate and Rhythm: Normal rate and regular rhythm.      Heart sounds: Normal heart sounds. No murmur. No friction rub. No gallop.    Pulmonary:      Effort: Pulmonary effort is normal.      Breath sounds: Normal breath sounds.   Abdominal:      General: Bowel sounds are normal. There is no distension.      Palpations: Abdomen is soft.      Tenderness: There is no abdominal tenderness.   Genitourinary:     Comments: deferred  Musculoskeletal: Normal range of motion.         General: No tenderness.      Right lower leg: Edema (trace) present.      Left lower leg: Edema (trace) present.   Skin:     General: Skin is warm and dry.      Coloration: Skin is pale.      Comments: Healing abrasions to right pretibial area   Neurological:      General: No focal deficit present.      Mental Status: He is alert and oriented to person, place, and time.      Motor: Weakness present.      Gait: Gait abnormal.   Psychiatric:         Behavior: Behavior normal.      Comments: Pt reports intermittent feelings of depression         Significant Labs:   Recent Lab Results       11/01/20  1223   11/01/20  0711   11/01/20  0549   11/01/20  0512   10/31/20  2136        Albumin   2.5           Alkaline Phosphatase   83           ALT   92           Anion Gap   5           Appearance, UA       Clear       AST   38           Baso #   0.01           Basophil %   0.2           Bilirubin (UA)       Negative       BILIRUBIN TOTAL   1.1  Comment:  For infants and newborns, interpretation of results should be based  on gestational age, weight and in agreement with  clinical  observations.  Premature Infant recommended reference ranges:  Up to 24 hours.............<8.0 mg/dL  Up to 48 hours............<12.0 mg/dL  3-5 days..................<15.0 mg/dL  6-29 days.................<15.0 mg/dL             BUN   16           Calcium   7.8           Chloride   104           CO2   30           Color, UA       Yellow       Creatinine   0.9           Differential Method   Automated           eGFR if    >60           eGFR if non    >60  Comment:  Calculation used to obtain the estimated glomerular filtration  rate (eGFR) is the CKD-EPI equation.              Eos #   0.0           Eosinophil %   0.2           Glucose   125           Glucose, UA       1+       Gran # (ANC)   5.2           Gran %   82.3           Hematocrit   29.7           Hemoglobin   9.7           Immature Grans (Abs)   0.10  Comment:  Mild elevation in immature granulocytes is non specific and   can be seen in a variety of conditions including stress response,   acute inflammation, trauma and pregnancy. Correlation with other   laboratory and clinical findings is essential.             Immature Granulocytes   1.6           Ketones, UA       Negative       Leukocytes, UA       Negative       Lymph #   0.6           Lymph %   9.5           MCH   29.8           MCHC   32.7           MCV   91           Mono #   0.4           Mono %   6.2           MPV   9.7           NITRITE UA       Negative       nRBC   1           Occult Blood UA       Trace       pH, UA       8.0       Phosphorus               Platelets   160           POCT Glucose 208   140   170     Potassium   3.0           PROTEIN TOTAL   4.9           Protein, UA       Negative  Comment:  Recommend a 24 hour urine protein or a urine   protein/creatinine ratio if globulin induced proteinuria is  clinically suspected.         RBC   3.25           RDW   16.0           Sodium   139           Specific Gravity, UA       1.020        Specimen UA       Urine, Clean Catch       UROBILINOGEN UA       2.0-3.0       WBC   6.30                            10/31/20  1712   10/31/20  1648        Albumin   2.4     Alkaline Phosphatase         ALT         Anion Gap   8     Appearance, UA         AST         Baso #         Basophil %         Bilirubin (UA)         BILIRUBIN TOTAL         BUN   17     Calcium   7.6     Chloride   103     CO2   28     Color, UA         Creatinine   1.1     Differential Method         eGFR if    >60     eGFR if non    >60  Comment:  Calculation used to obtain the estimated glomerular filtration  rate (eGFR) is the CKD-EPI equation.        Eos #         Eosinophil %         Glucose   144     Glucose, UA         Gran # (ANC)         Gran %         Hematocrit         Hemoglobin         Immature Grans (Abs)         Immature Granulocytes         Ketones, UA         Leukocytes, UA         Lymph #         Lymph %         MCH         MCHC         MCV         Mono #         Mono %         MPV         NITRITE UA         nRBC         Occult Blood UA         pH, UA         Phosphorus   2.0     Platelets         POCT Glucose 128       Potassium   3.6     PROTEIN TOTAL         Protein, UA         RBC         RDW         Sodium   139     Specific Gravity, UA         Specimen UA         UROBILINOGEN UA         WBC             All pertinent labs within the past 24 hours have been reviewed.    Significant Imaging: I have reviewed all pertinent imaging results/findings within the past 24 hours.

## 2020-11-02 NOTE — PLAN OF CARE
11/02/20 1055   Post-Acute Status   Post-Acute Authorization Placement   Post-Acute Placement Status Referrals Sent   Discharge Plan   Discharge Plan A Skilled Nursing Facility   Discharge Plan B Skilled Nursing Facility       Satnam Clemons LMSW 11/2/2020 10:56 AM

## 2020-11-02 NOTE — PLAN OF CARE
POC reviewed with pt. Pt verbalizes understanding of POC. No questions at this time.  AAOx3. NADN.  NSR on cardiac monitor.  Sitter at bedside.  Pt remains free of falls.  No complaints at this time.  Safety measures in place. Will continue to monitor. Bed alarm on.  Informed pt to call for assistance before getting up. Pt verbalizes understanding.

## 2020-11-02 NOTE — PT/OT/SLP PROGRESS
Physical Therapy  Treatment    Karan Aburto   MRN: [id number]   Admitting Diagnosis: Generalized weakness    PT Received On: 11/02/20  PT Start Time: 1100     PT Stop Time: 1123    PT Total Time (min): 23 min       Billable Minutes:  Therapeutic Activity 15 and Therapeutic Exercise 8    Treatment Type: Treatment  PT/PTA: PT     PTA Visit Number: 0       General Precautions: Standard, fall  Orthopedic Precautions: N/A   Braces: N/A    Subjective:  Communicated with NURSE DAVID prior to session.  Pain/Comfort  Pain Rating 1: 5/10  Location 1: back    Objective:   Patient found with: bed alarm, peripheral IV, telemetry    Functional Mobility:  Therapeutic Activities and Exercises:  PT FOUND SUPINE IN BED UPON ARRIVAL, AGREEABLE TO TX, SUP>SIT WITH CGA, SEATED SCOOT TO EOB WITH CGA, SIT>STAND WITH MODA TO RW, UPRIGHT ON 3RD TRIAL, PT TOOK SEVERAL SMALL STEPS WITH RW AND MYA TO TF BED TO CHAIR, CUES FOR HAND PLACEMENT TO CHAIR, PT EDUCATED IN AND PERFORMED BLE THEREX X 20 REPS AROM WITH REST: HIP FLEX/EXT, LAQ, AP'S.  SIT<>STAND WITH MYA TO RW 1 MORE TRIAL FOR WOUND CARE NURSE TO ASSESS PT'S BOTTOM, PT LEFT SEATED IN CHAIR WITH ALL NEEDS MET, SET UP FOR LUNCH    AM-PAC 6 CLICK MOBILITY  How much help from another person does this patient currently need?   1 = Unable, Total/Dependent Assistance  2 = A lot, Maximum/Moderate Assistance  3 = A little, Minimum/Contact Guard/Supervision  4 = None, Modified [place]/Independent    Turning over in bed (including adjusting bedclothes, sheets and blankets)?: 3  Sitting down on and standing up from a chair with arms (e.g., wheelchair, bedside commode, etc.): 2  Moving from lying on back to sitting on the side of the bed?: 3  Moving to and from a bed to a chair (including a wheelchair)?: 2  Need to walk in hospital room?: 1  Climbing 3-5 steps with a railing?: 1  Basic Mobility Total Score: 12    AM-PAC Raw Score CMS G-Code Modifier Level of Impairment Assistance   6 %  Total / Unable   7 - 9 CM 80 - 100% Maximal Assist   10 - 14 CL 60 - 80% Moderate Assist   15 - 19 CK 40 - 60% Moderate Assist   20 - 22 CJ 20 - 40% Minimal Assist   23 CI 1-20% SBA / CGA   24 CH 0% Independent/ Mod I     Patient left up in chair with all lines intact, call button in reach, chair alarm on, NURSE notified and SITTER present.    Assessment:  Karan Aburto is a 74 y.o. male with a medical diagnosis of Generalized weakness and presents with IMPAIRED FUNCTIONAL MOBILITY. PT WILL BENEFIT FROM CONT. SKILLED P.T. TO ADDRESS IMPAIRMENTS    Rehab identified problem list/impairments: Rehab identified problem list/impairments: weakness, impaired endurance, impaired balance, gait instability, impaired functional mobilty, decreased coordination    Rehab potential is good.    Activity tolerance: Good    Discharge recommendations: Discharge Facility/Level of Care Needs: rehabilitation facility, nursing facility, skilled     Barriers to discharge:      Equipment recommendations: Equipment Needed After Discharge: bedside commode, bath bench, wheelchair     GOALS:   Multidisciplinary Problems     Physical Therapy Goals        Problem: Physical Therapy Goal    Goal Priority Disciplines Outcome Goal Variances Interventions   Physical Therapy Goal     PT, PT/OT Ongoing, Progressing     Description: 1. Patient will perform supine to/from sit mod indep  2. Patient will perform sit to/from stand with RW cga  3. Patient will ambulate 50ft x 2 RW cga no gross LOB                   PLAN:    Patient to be seen 5 x/week  to address the above listed problems via gait training, therapeutic activities, therapeutic exercises  Plan of Care expires: 11/07/20  Plan of Care reviewed with: patient    Lilly Bernard, PT  11/02/2020

## 2020-11-02 NOTE — PLAN OF CARE
Discussed Plan of Care with patient and verbalized understanding - Patient remains AAOx4 - remains free of falls, accidents and trauma during the day shift. Bed is in the low position and the call light is within reach. SW consulted for SNF placement. Will continue to monitor

## 2020-11-02 NOTE — ASSESSMENT & PLAN NOTE
--accuchecks and SSI  --diabetic diet  --HA1C on 10/19/20 was 9.3  --diabetic educator  --NEEDS TO BE DISCHARGED HOME ON METFORMIN

## 2020-11-02 NOTE — CONSULTS
Referrals for SNF sent to [facility], and [facility].     Satnam Clemons LMSW 11/2/2020 10:58 AM
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
## 2020-11-02 NOTE — SIGNIFICANT EVENT
Nurse called to bedside by PCT. Patient on floor. Assisted patient back to bed. Patient says he was attempting to go to the bathroom. Nurse reinforced the importance of the call light and receiving assistance while toileting. No injuries post fall. BP elevated.  at bedside. Side rails up x 2. Bed alarm activated.Will continue with reassessment hourly and as needed.

## 2020-11-02 NOTE — CONSULTS
"Consulted on this 75 y/o M patient due to present on admission skin breakdown. Patient admitted with CHF exacerbation, BLE swelling. He has PMH significant for HTN. Skin assessed. Bilateral heels with blanchable redness noted. Present on admission Stage 3 pressure injury noted to coccyx measuring 2x2x0.1cm, wound bed moist red and yellow subcutaneous tissue. Abhilash wound erythema noted. Foam dressing in place, recommend critic aide paste. Recommend waffle overlay to bed, heel offloading boots for further pressure injury prevention. Please see below:     Stage 3 pressure injury coccyx:  1. Cleanse with saline or bath wipes  2. Pat dry  3. Apply thin layer of critic aide paste  4. Perform twice daily and with hygiene care    Skin Care Precautions / Pressure Injury Prevention:  1. Follow "Guidelines for Prevention of Pressure Ulcers in At Risk Patients"  These guidelines can be found on the [facility] Intranet by searching "Wound Care / Ostomy Resources"  2. Document wound assessment in [facility] using guidelines in [place]'s "Assessment : Wound" procedure  3. Limit the amount of linen/underpad between patient and mattress surface to ONE fitted sheet and ONE covidien underpad - NO DIAPERS  4. Obtain Bath Wipes for providing abhilash care - avoid the use of wash cloths to areas affected by IAD.  5. Apply Moisture Barrier Paste to perineal / perirectal areas in a thin even layer to clean dry skin BID and after each episode of pericare  6. Apply sween 24 moisturizer cream to all dry skin after daily bath and prn  7. Obtain foam wedge from materials management to assist with maintaining proper position changes at least q 2hours and document actual position in EPIC q 2hours  8. Elevate heels off mattress on 2 separate pillows placed lengthwise under each leg supporting the leg from knee to ankle.  Document in EPIC flow sheet every 2 hours.  9. .Do NOT elevate HOB greater than 30 degrees unless contraindicated.  10. Remove SCD/Plexi " Pulses/FADI's every 12 hours for 30 minutes and assess skin underneath these devices for breakdown

## 2020-11-02 NOTE — SUBJECTIVE & OBJECTIVE
Interval History: Patient had fall last night - stressed importance of calling for assistance before getting out of bed. Chidi Fernando submitted for insurance auth - diabetes educator consulted for Hgb 9.3    Review of Systems   Constitutional: Positive for activity change and fatigue. Negative for appetite change, chills, diaphoresis, fever and unexpected weight change.   HENT: Negative for rhinorrhea and trouble swallowing.    Eyes: Negative for visual disturbance.   Respiratory: Negative for cough and shortness of breath.    Cardiovascular: Positive for leg swelling. Negative for chest pain and palpitations.   Gastrointestinal: Negative for abdominal pain, constipation, diarrhea, nausea and vomiting.   Endocrine: Negative for cold intolerance and heat intolerance.   Genitourinary: Negative for difficulty urinating and flank pain.        Wears incontinent brief   Musculoskeletal: Positive for arthralgias and gait problem.   Skin: Negative for rash and wound.   Allergic/Immunologic: Negative.    Neurological: Positive for weakness. Negative for dizziness, seizures, syncope, speech difficulty, light-headedness and numbness.   Hematological: Negative.    Psychiatric/Behavioral: Positive for confusion (intermittent). Negative for agitation and behavioral problems. The patient is not nervous/anxious.      Objective:     Vital Signs (Most Recent):  Temp: 97.9 °F (36.6 °C) (11/02/20 1158)  Pulse: 89 (11/02/20 1158)  Resp: 18 (11/02/20 1158)  BP: 102/64 (11/02/20 1158)  SpO2: (!) 94 % (11/02/20 1158) Vital Signs (24h Range):  Temp:  [97.9 °F (36.6 °C)-98.6 °F (37 °C)] 97.9 °F (36.6 °C)  Pulse:  [] 89  Resp:  [18-20] 18  SpO2:  [94 %-97 %] 94 %  BP: (102-199)/() 102/64     Weight: 91.1 kg (200 lb 13.4 oz)  Body mass index is 28.82 kg/m².    Intake/Output Summary (Last 24 hours) at 11/2/2020 1330  Last data filed at 11/2/2020 0600  Gross per 24 hour   Intake 1509 ml   Output 875 ml   Net 634 ml      Physical  Exam  Vitals signs and nursing note reviewed.   Constitutional:       Appearance: Normal appearance. He is well-developed. He is obese. He is not ill-appearing.   HENT:      Head: Normocephalic and atraumatic.      Nose: Nose normal.   Eyes:      General: No scleral icterus.     Conjunctiva/sclera: Conjunctivae normal.      Pupils: Pupils are equal, round, and reactive to light.   Neck:      Musculoskeletal: Normal range of motion and neck supple.   Cardiovascular:      Rate and Rhythm: Normal rate and regular rhythm.      Heart sounds: Normal heart sounds. No murmur. No friction rub. No gallop.    Pulmonary:      Effort: Pulmonary effort is normal.      Breath sounds: Normal breath sounds.   Abdominal:      General: Bowel sounds are normal. There is no distension.      Palpations: Abdomen is soft.      Tenderness: There is no abdominal tenderness.   Genitourinary:     Comments: deferred  Musculoskeletal: Normal range of motion.         General: No tenderness.      Right lower leg: Edema (trace) present.      Left lower leg: Edema (trace) present.   Skin:     General: Skin is warm and dry.      Coloration: Skin is pale.      Comments: Healing abrasions to right pretibial area   Neurological:      General: No focal deficit present.      Mental Status: He is alert and oriented to person, place, and time.      Motor: Weakness present.      Gait: Gait abnormal.   Psychiatric:         Behavior: Behavior normal.      Comments: Pt reports intermittent feelings of depression         Significant Labs:   Recent Lab Results       11/02/20  1028   11/02/20  0655   11/02/20  0625   11/01/20  2109   11/01/20  1636        Albumin   2.6           Alkaline Phosphatase   85           ALT   92           Anion Gap   8           AST   33           Baso #   0.03           Basophil %   0.4           BILIRUBIN TOTAL   0.9  Comment:  For infants and newborns, interpretation of results should be based  on gestational age, weight and in  agreement with clinical  observations.  Premature Infant recommended reference ranges:  Up to 24 hours.............<8.0 mg/dL  Up to 48 hours............<12.0 mg/dL  3-5 days..................<15.0 mg/dL  6-29 days.................<15.0 mg/dL             BUN   16           Calcium   7.9           Chloride   104           CO2   29           Creatinine   0.9           Differential Method   Automated           eGFR if    >60           eGFR if non    >60  Comment:  Calculation used to obtain the estimated glomerular filtration  rate (eGFR) is the CKD-EPI equation.              Eos #   0.0           Eosinophil %   0.0           Glucose   150           Gran # (ANC)   6.1           Gran %   80.6           Hematocrit   31.3           Hemoglobin   9.8           Immature Grans (Abs)   0.15  Comment:  Mild elevation in immature granulocytes is non specific and   can be seen in a variety of conditions including stress response,   acute inflammation, trauma and pregnancy. Correlation with other   laboratory and clinical findings is essential.             Immature Granulocytes   2.0           Lymph #   0.7           Lymph %   9.2           Magnesium   2.0           MCH   28.7           MCHC   31.3           MCV   92           Mono #   0.6           Mono %   7.8           MPV   9.6           nRBC   0           Phosphorus   1.7           Platelets   183           POCT Glucose 223   160 213 234     Potassium   3.2           PROTEIN TOTAL   5.2           RBC   3.41           RDW   16.3           Sodium   141           WBC   7.58                              All pertinent labs within the past 24 hours have been reviewed.    Significant Imaging: I have reviewed all pertinent imaging results/findings within the past 24 hours.

## 2020-11-02 NOTE — ASSESSMENT & PLAN NOTE
Pt is prescribed tizanidine  He ambulates with assistance of walker  --PT/OT following  --case management working on SNF placement

## 2020-11-02 NOTE — NURSING
Patient disoriented to situation. No ss of distress or adverse reactions to meds. Blood glucose elevated. VSS. Will reassess hourly and as needed.

## 2020-11-02 NOTE — PLAN OF CARE
WAYNE spoke with Ofelia at [facility]. They will submit for insurance auth.     Satnam Clemons LMSW 11/2/2020 10:54 AM
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
## 2020-11-02 NOTE — PLAN OF CARE
Patient asleep.  at bedside. Cardiac monitor in place. VSS. Skin reassessed. Dressing applied to buttocks. Handoff given to oncoming nurse.     Problem: Fall Injury Risk  Goal: Absence of Fall and Fall-Related Injury  Outcome: Ongoing, Progressing     Problem: Adult Inpatient Plan of Care  Goal: Absence of Hospital-Acquired Illness or Injury  Outcome: Ongoing, Progressing     Problem: Fall Injury Risk  Goal: Absence of Fall and Fall-Related Injury  Outcome: Ongoing, Progressing     Problem: Diabetes Comorbidity  Goal: Blood Glucose Level Within Desired Range  Outcome: Ongoing, Progressing     Problem: Wound  Goal: Optimal Wound Healing  Outcome: Ongoing, Progressing     Problem: Skin Injury Risk Increased  Goal: Skin Health and Integrity  Outcome: Ongoing, Progressing

## 2020-11-02 NOTE — SIGNIFICANT EVENT
"Notified by staff that pt fell in room attempting to go to the bathroom. Not on any ACs, ASA only. Denies head impact or any other injury. Seen and examined at bedside and discussed with primary nurse - pt is currently at baseline in terms of ROM, mobility and mental status. States "I just didn't want to bother you and I thought I could make it this time". Bed alarm activated per nursing and sitter to bedside. No indication for imaging studies or higher level of care at this time, VSS - now resting quietly in bed in no acute distress.   "

## 2020-11-03 NOTE — PLAN OF CARE
11/03/20 1307   Final Note   Assessment Type Final Discharge Note   Anticipated Discharge Disposition SNF HI   Right Care Referral Info   Post Acute Recommendation SNF / Sub-Acute Rehab   Post-Acute Status   Post-Acute Authorization Placement   Post-Acute Placement Status Set-up Complete       Satnam Clemons LMSW 11/3/2020 1:07 PM

## 2020-11-03 NOTE — PLAN OF CARE
Pt in  NAD, ic discontinued, tele discontinued. Report called to Chidi Guerrero. VSS, family at the bedside.
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
## 2020-11-03 NOTE — PLAN OF CARE
AOx4. POC reviewed; interventions implemented as appropriate.   Able to verbalize needs. Calm & cooperative at this time.  VSS; NSR.  Denies pain.   Progressive mobility level - edge of bed.  Urinal w/in reach.  Able to reposition self in bed. Sits up on edge of bed w/ no assistance.    Educated on s/sx of  DTI. Several attempts made to place green heel boots on pt. Pt refused saying pt refused saying he will wear them tomorrow & that he didn't need them. Waffle mattress in place. Wound care complete.  NADN. Resting quietly in bed.   Hourly rounding complete.   All safety measures remain in place. Bed alarm on & audible. Sitter at bedside. Free of falls. SR up x2; bed low & locked. Call light w/in reach.   Will continue to monitor throughout shift.

## 2020-11-03 NOTE — NURSING
Report called to Natasha from [facility]. Facility will call to arrange transport time.

## 2020-11-03 NOTE — PLAN OF CARE
WAYNE faxed AVS, D/C orders, and D/C summary to [facility] via [facility]. SW informed nurse of number to call in report.  time is between 2pm to 3pm.     Satnam Clemons LMSW 11/3/2020 2:03 PM

## 2020-11-03 NOTE — PT/OT/SLP PROGRESS
Physical Therapy  Treatment    Karan Aburto   MRN: [id number]   Admitting Diagnosis: Generalized weakness    PT Received On: 11/03/20  PT Start Time: 0930     PT Stop Time: 0955    PT Total Time (min): 25 min       Billable Minutes:  Gait Training 15 and Therapeutic Exercise 10    Treatment Type: Treatment  PT/PTA: PT     PTA Visit Number: 0       General Precautions: Standard, fall  Orthopedic Precautions: N/A   Braces: N/A    Subjective:  Communicated with NURSE DAVID prior to session.  Pain/Comfort  Pain Rating 1: 0/10    Objective:   Patient found with: telemetry, peripheral IV, bed alarm    Functional Mobility:  Therapeutic Activities and Exercises:  PT FOUND SUPINE IN BED UPON ARRIVAL, AGREEABLE TO TX., EAGER TO WALK, SUP>SIT WITH CGA, SEATED SCOOT TO EOB WITH CGA, REVIEW RW USE AND SAFETY DURING TF'S AND GAIT, SIT>STAND WITH MYA, PT AMB 40' X 2 TRIALS WITH RW AND MOD/MYA, SLOW PACE, UNSTEADY GAIT DUE TO B BENT KNEE'S WITH NARROW CARTER BUT NO GROSS LOB, P DYNAMIC BALANCE, CHAIR IN TOW FOR SAFETY, PT RETURN TO ROOM TO BEDSIDE CHAIR WITH MYA, PT EDUCATED IN AND PERFORMED BLE THEREX X 15 REPS AROM WITH REST: HIP FLEX/EXT, LAQ, AP'S    AM-PAC 6 CLICK MOBILITY  How much help from another person does this patient currently need?   1 = Unable, Total/Dependent Assistance  2 = A lot, Maximum/Moderate Assistance  3 = A little, Minimum/Contact Guard/Supervision  4 = None, Modified [place]/Independent    Turning over in bed (including adjusting bedclothes, sheets and blankets)?: 3  Sitting down on and standing up from a chair with arms (e.g., wheelchair, bedside commode, etc.): 3  Moving from lying on back to sitting on the side of the bed?: 3  Moving to and from a bed to a chair (including a wheelchair)?: 2  Need to walk in hospital room?: 2  Climbing 3-5 steps with a railing?: 1  Basic Mobility Total Score: 14    AM-PAC Raw Score CMS G-Code Modifier Level of Impairment Assistance   6 % Total / Unable   7 - 9  CM 80 - 100% Maximal Assist   10 - 14 CL 60 - 80% Moderate Assist   15 - 19 CK 40 - 60% Moderate Assist   20 - 22 CJ 20 - 40% Minimal Assist   23 CI 1-20% SBA / CGA   24 CH 0% Independent/ Mod I     Patient left up in chair with all lines intact, call button in reach, chair alarm on, NURSE notified and SITTER present.    Assessment:  Karan Aburto is a 74 y.o. male with a medical diagnosis of Generalized weakness and presents with IMPAIRED FUNCTIONAL MOBILITY. PT WILL BENEFIT FROM CONT. SKILLED P.T. TO ADDRESS IMPAIRMENTS    Rehab identified problem list/impairments: Rehab identified problem list/impairments: weakness, impaired endurance, impaired balance, gait instability, impaired functional mobilty, decreased coordination    Rehab potential is good.    Activity tolerance: Good    Discharge recommendations: Discharge Facility/Level of Care Needs: nursing facility, skilled     Barriers to discharge:      Equipment recommendations: Equipment Needed After Discharge: bedside commode, bath bench, wheelchair     GOALS:   Multidisciplinary Problems     Physical Therapy Goals        Problem: Physical Therapy Goal    Goal Priority Disciplines Outcome Goal Variances Interventions   Physical Therapy Goal     PT, PT/OT Ongoing, Progressing     Description: 1. Patient will perform supine to/from sit mod indep  2. Patient will perform sit to/from stand with RW cga  3. Patient will ambulate 50ft x 2 RW cga no gross LOB                   PLAN:    Patient to be seen 5 x/week  to address the above listed problems via gait training, therapeutic activities, therapeutic exercises  Plan of Care expires: 11/07/20  Plan of Care reviewed with: patient    Lilly Marco Antonio, PT  11/03/2020

## 2020-11-03 NOTE — CONSULTS
Consult received  Chart reviewed    patient was seen by this nurse on recent admit   See notes on 10/19 and 10/20/2020  Spoke to son, Milton ([phone]) regarding diabetes care and management  He reports the current plan is for patient to go to SNF and then return home  He lives with Reuben who is able to assist with his diabetes care if needed  Patient has assisted with caring for a  wife with diabetes  He has a home glucose monitor  and either he or Reuben is able to test glucose  Patient has administered insulin in the past  They have made dietary changes recently  They do not identify any diabetes educational needs at this time

## 2020-11-03 NOTE — PT/OT/SLP PROGRESS
"Occupational Therapy  Treatment    Karan Aburto   MRN: [id number]   Admitting Diagnosis: Generalized weakness    OT Date of Treatment: 11/03/20   OT Start Time: 0910  OT Stop Time: 0933  OT Total Time (min): 23 min    Billable Minutes:  Therapeutic Activity 15 min and Therapeutic Exercise 8 min    General Precautions: Standard, fall  Orthopedic Precautions: N/A  Braces: N/A         Subjective:  Communicated with Nurse Diaz and epic chart review prior to session.  Pt found supine in bed and agreeable to tx at this time.  Pain/Comfort  Pain Rating 1: 0/10    Objective:  Patient found with: bed alarm, peripheral IV, telemetry     Functional Mobility:  Therapeutic Activities and Exercises:  Pt performed supine>sit with CGA, scooted to EOB with CGA. Pt performed (B) UE Therapeutic exercises sitting EOB 1 x 10 reps: shoulder flex, chest press, bicep curls, shoulder abd/ add. Pt performed sit>stand with Min A using RW, functional mobility ~40ft x 2 using RW with Min A and chair in tow, t/f to chair in room with Min A using RW.     AM-PAC 6 CLICK ADL   How much help from another person does this patient currently need?   1 = Unable, Total/Dependent Assistance  2 = A lot, Maximum/Moderate Assistance  3 = A little, Minimum/Contact Guard/Supervision  4 = None, Modified [place]/Independent    Putting on and taking off regular lower body clothing? : 3  Bathing (including washing, rinsing, drying)?: 3  Toileting, which includes using toilet, bedpan, or urinal? : 3  Putting on and taking off regular upper body clothing?: 3  Taking care of personal grooming such as brushing teeth?: 3  Eating meals?: 3  Daily Activity Total Score: 18     AM-PAC Raw Score CMS "G-Code Modifier Level of Impairment Assistance   6 % Total / Unable   7 - 8 CM 80 - 100% Maximal Assist   9-13 CL 60 - 80% Moderate Assist   14 - 19 CK 40 - 60% Moderate Assist   20 - 22 CJ 20 - 40% Minimal Assist   23 CI 1-20% SBA / CGA   24 CH 0% Independent/ Mod I "       Patient left up in chair with all lines intact, call button in reach, chair alarm on, Nurse Emily notified and sitter present    ASSESSMENT:  Karan Aburto is a 74 y.o. male with a medical diagnosis of Generalized weakness and presents with impaired functional mobility and ADLs. Pt will benefit from continued skilled OT in order to address the listed impairments.     Rehab identified problem list/impairments: Rehab identified problem list/impairments: weakness, impaired endurance, impaired self care skills, impaired functional mobilty, gait instability, decreased coordination, decreased safety awareness, impaired coordination, impaired cardiopulmonary response to activity    Rehab potential is fair.    Activity tolerance: Fair    Discharge recommendations: Discharge Facility/Level of Care Needs: rehabilitation facility, nursing facility, skilled     Barriers to discharge:   UNKNOWN    Equipment recommendations: bedside commode, bath bench, wheelchair     GOALS:   Multidisciplinary Problems     Occupational Therapy Goals        Problem: Occupational Therapy Goal    Goal Priority Disciplines Outcome Interventions   Occupational Therapy Goal     OT, PT/OT Ongoing, Progressing    Description: STGS TO BE MET BY 11/7/2020  1. LB DRESS WITH MOD I  2. TOILET TRANSFER WITH MOD I  3. TOILETING WITH MOD I  4. TOLERATE 2 SETS OF 10 UB EXERCISES                    Plan:  Patient to be seen 3 x/week to address the above listed problems via self-care/home management, therapeutic activities, therapeutic exercises  Plan of Care expires: 11/07/20  Plan of Care reviewed with: patient         Tania Way, PT/OT  11/03/2020

## 2020-11-04 NOTE — PHYSICIAN QUERY
PT Name: Karan Aburto  MR #: [id number]    Consultant Diagnosis Clarification     CDS/: Pérez Alamo RN             Contact information: [email or web address]  This form is a permanent document in the medical record.    Query Date: November 4, 2020      By submitting this query, we are merely seeking further clarification of documentation.  Please utilize your independent clinical judgment when addressing the question(s) below.    The Medical Record reflects the following:    Clinical Information Location in Medical Record   Consulted on this 75 y/o M patient due to present on admission skin breakdown. Patient admitted with CHF exacerbation, BLE swelling. He has PMH significant for HTN. Skin assessed. Bilateral heels with blanchable redness noted    Present on admission Stage 3 pressure injury noted to coccyx measuring 2x2x0.1cm, wound bed moist red and yellow subcutaneous tissue. [place] wound erythema noted. Foam dressing in place, recommend critic aide paste. Recommend waffle overlay to bed, heel offloading boots for further pressure injury prevention. Please see below:    Stage 3 pressure injury coccyx:  1. Cleanse with saline or bath wipes  2. Pat dry  3. Apply thin layer of critic aide paste  4. Perform twice daily and with hygiene care Wound Care note 11/2       Please clarify/confirm the Consultants diagnosis of ___Present on admission Stage 3 pressure injury noted to coccyx _____________________________:     [ X ] Diagnosis ruled in   [  ] Diagnosis ruled out   [  ] Other diagnosis (please specify): _____________________________   [  ] Clinically undetermined

## 2020-11-04 NOTE — PLAN OF CARE
11/04/20 1246   Final Note   Assessment Type Final Discharge Note   Anticipated Discharge Disposition SNF   Right Care Referral Info   Post Acute Recommendation SNF / Sub-Acute Rehab   Facility Name [facility]

## 2020-11-04 NOTE — PHYSICIAN QUERY
PT Name: Karan Aburto  MR #: [id number]     Diabetic Condition Clarification     CDS/: Pérez Alamo RN           Contact information: [email or web address]  This form is a permanent document in the medical record.     Query Date: November 4, 2020    By submitting this query, we are merely seeking further clarification of documentation to reflect the severity of illness of your patient. Please utilize your independent clinical judgment when addressing the question(s) below.    The medical record reflects the following:     Indicators   Supporting Clinical Findings Location in Medical Record   x Diabetes uncontrolled documented Diabetes mellitus type 2, uncontrolled  --accuchecks and SSI  --diabetic diet  --HA1C on 10/19/20 was 9.3  --diabetic educator  --NEEDS TO BE DISCHARGED HOME ON METFORMIN   Progress note  11/2 ( Claudette/Luis Alberto)   x Lab Value(s), POCT glucose value(s) POCT 125, 234, 112 Labs 10/30, 11/1, 11/3    Beta-OHxy Butyric Acid levels      Serum Osmolarity      pH      Anion gap/ Bicarb levels      Treatment/Medication      Other       Provider, please specify the meaning of the term uncontrolled:    [  X ] Diabetes mellitus Type 2 with hyperglycemia   [   ] Other diabetes complication (please specify): ____________   [   ]  Clinically Undetermined       Please document in your progress notes daily for the duration of treatment until resolved, and include in your discharge summary.

## 2020-11-05 PROBLEM — J18.9 PNEUMONIA: Status: ACTIVE | Noted: 2020-01-01

## 2020-11-05 PROBLEM — R55 SYNCOPE: Status: ACTIVE | Noted: 2020-01-01

## 2020-11-05 PROBLEM — I50.32 CHRONIC DIASTOLIC HEART FAILURE: Status: ACTIVE | Noted: 2020-01-01

## 2020-11-05 NOTE — HPI
Pt is a 75 yo male with PMHx of Bilateral leg spastic paraplegia, HTN, diastolic heart failure, obesity and hypothyroidism who was brought to the ED due syncopal episode. Pt was discharged from [facility] 11/3/2020 to a SNF due to generalized weakness and debility. Pt has a hx of BLE spastic paraplegia and has become increasingly weak over the last few weak. Last admission, CT imaging noted subacute stroke however, Neurologist, Dr. Romano stated the stroke was not acute. He discharged to SNF to continue physical therapy. Today, pt states he was sitting in a chair then woke up lying in an ambulance. He is AAOx3. He says he remembers eating breakfast this AM. He denies other symptoms including fever, chest pain, slurred speech, asymmetrical extremity weakness, abdominal pain, vomiting, diarrhea, urine symptoms, lightheadedness, dizziness and headache.   Vital sign on arrival, Temp 97.5, pulse 65, resp 20 and B/P 107 /62. Orthostatic 164/92, pulse 78 lying and 153/92, pulse 79 sitting.  CXR notes a questionable left basilar infiltrate  CT of Head notes a subacute infarct suspected in the right cerebellum - chronic finding  Labs find  Anemia 9.5/29.9, WBC normal, gluc 249,  and troponin 0.042, lactate 2.5, procal is negative  Pt is placed on Observation for eval of Syncope and treatment for pneumonia.

## 2020-11-05 NOTE — ED PROVIDER NOTES
SCRIBE #1 NOTE: I, Aston Diaz, am scribing for, and in the presence of, Win Segal MD. I have scribed the entire note.      History      Chief Complaint   Patient presents with    Loss of Consciousness     syncopal episode at breakfast and then found unresponsive by staff in room. [facility] reports patient A&Ox3 on their arrival. pt from [facility]       Review of patient's allergies indicates:  No Known Allergies     HPI   HPI    11/5/2020, 10:16 AM   History obtained from the patient and EMS      History of Present Illness: Karan Aburto is a 75 y.o. male patient who presents to the Emergency Department for evaluation following a syncopal episode just PTA. Pt is currently staying at an inpatient rehab facility and states that he does not remember losing consciousness. EMS reports that the pt was found unresponsive in his room. Pt was admitted here on 10/30/20 for a CVA, and was discharged to the rehab facility on 11/3/20. Symptoms are episodic and moderate in severity. No mitigating or exacerbating factors reported. No associated sxs reported. Patient denies any fever, chills, n/v/d, SOB, CP, weakness, numbness, dizziness, headache, and all other sxs at this time. No prior Tx reported. No further complaints or concerns at this time.     Arrival mode: EMS    PCP: Mickey Agrawal MD       Past Medical History:  Past Medical History:   Diagnosis Date    Elevated PSA     Hypertension     Rosacea     Thyroid disease     hypothyroidism       Past Surgical History:  No past surgical history on file.      Family History:  No family history on file.    Social History:  Social History     Tobacco Use    Smoking status: Not on file   Substance and Sexual Activity    Alcohol use: Never     Frequency: Never    Drug use: Not on file    Sexual activity: Not on file       ROS   Review of Systems   Constitutional: Negative for chills and fever.   HENT: Negative for sore throat.    Respiratory:  Negative for shortness of breath.    Cardiovascular: Negative for chest pain.   Gastrointestinal: Negative for diarrhea, nausea and vomiting.   Genitourinary: Negative for dysuria.   Musculoskeletal: Negative for back pain.   Skin: Negative for rash.   Neurological: Positive for syncope. Negative for dizziness, weakness, light-headedness, numbness and headaches.   Hematological: Does not bruise/bleed easily.   All other systems reviewed and are negative.    Physical Exam      Initial Vitals [11/05/20 0955]   BP Pulse Resp Temp SpO2   107/62 65 20 97.5 °F (36.4 °C) 97 %      MAP       --          Physical Exam  Nursing Notes and Vital Signs Reviewed.  Constitutional: Patient is in no acute distress. Elderly.  Head: Atraumatic. Normocephalic.  Eyes: PERRL. EOM intact. Conjunctivae are not pale. No scleral icterus.  ENT: Mucous membranes are moist. Oropharynx is clear and symmetric.    Neck: Supple. Full ROM. No lymphadenopathy.  Cardiovascular: Regular rate. Regular rhythm. No murmurs, rubs, or gallops. Distal pulses are 2+ and symmetric.  Pulmonary/Chest: No respiratory distress. Clear to auscultation bilaterally. No wheezing or rales.  Abdominal: Soft and non-distended.  There is no tenderness.  No rebound, guarding, or rigidity.   Musculoskeletal: Moves all extremities. No obvious deformities. No edema.  Skin: Warm and dry. Pale.  Neurological:  Alert, awake, and appropriate.  Normal speech.  No acute focal neurological deficits are appreciated.  Psychiatric: Normal affect. Good eye contact. Appropriate in content.    ED Course    Critical Care    Date/Time: 11/5/2020 1:46 PM  Performed by: Win Segal MD  Authorized by: Win Segal MD   Direct patient critical care time: 10 minutes  Additional history critical care time: 5 minutes  Ordering / reviewing critical care time: 10 minutes  Documentation critical care time: 10 minutes  Consulting other physicians critical care time: 5  "minutes  Total critical care time (exclusive of procedural time) : 40 minutes  Critical care time was exclusive of separately billable procedures and treating other patients and teaching time.  Critical care was necessary to treat or prevent imminent or life-threatening deterioration of the following conditions: Syncope, pneumonia.  Critical care was time spent personally by me on the following activities: blood draw for specimens, development of treatment plan with patient or surrogate, discussions with consultants, interpretation of cardiac output measurements, evaluation of patient's response to treatment, examination of patient, obtaining history from patient or surrogate, ordering and performing treatments and interventions, ordering and review of laboratory studies, ordering and review of radiographic studies, pulse oximetry and re-evaluation of patient's condition.        ED Vital Signs:  Vitals:    11/05/20 0955 11/05/20 1002 11/05/20 1020 11/05/20 1031   BP: 107/62 113/61  116/67   Pulse: 65 62 67 68   Resp: 20   14   Temp: 97.5 °F (36.4 °C)      TempSrc: Oral      SpO2: 97% 98%  95%   Height: 5' 10" (1.778 m)       11/05/20 1041 11/05/20 1137 11/05/20 1220   BP:  139/65    Pulse: 66 69 68   Resp: 16 15 17   Temp:      TempSrc:      SpO2: 99% 99% 100%   Height:          Abnormal Lab Results:  Labs Reviewed   COMPREHENSIVE METABOLIC PANEL - Abnormal; Notable for the following components:       Result Value    Glucose 249 (*)     Calcium 7.7 (*)     Total Protein 4.8 (*)     Albumin 2.3 (*)     ALT 68 (*)     All other components within normal limits   TROPONIN I - Abnormal; Notable for the following components:    Troponin I 0.042 (*)     All other components within normal limits   URINALYSIS, REFLEX TO URINE CULTURE - Abnormal; Notable for the following components:    Appearance, UA Hazy (*)     Glucose, UA 3+ (*)     Urobilinogen, UA 4.0-6.0 (*)     All other components within normal limits    Narrative:  "    Specimen Source->Urine   CBC W/ AUTO DIFFERENTIAL - Abnormal; Notable for the following components:    RBC 3.19 (*)     Hemoglobin 9.5 (*)     Hematocrit 29.9 (*)     MCHC 31.8 (*)     RDW 16.7 (*)     Immature Granulocytes 2.4 (*)     Immature Grans (Abs) 0.21 (*)     Lymph # 0.8 (*)     Gran % 82.4 (*)     Lymph % 8.5 (*)     All other components within normal limits   B-TYPE NATRIURETIC PEPTIDE - Abnormal; Notable for the following components:     (*)     All other components within normal limits   LACTIC ACID, PLASMA - Abnormal; Notable for the following components:    Lactate (Lactic Acid) 2.5 (*)     All other components within normal limits   URINALYSIS MICROSCOPIC - Abnormal; Notable for the following components:    Bacteria Many (*)     All other components within normal limits    Narrative:     Specimen Source->Urine   CULTURE, BLOOD   CULTURE, BLOOD   SARS-COV-2 RNA AMPLIFICATION, QUAL   MAGNESIUM   PROCALCITONIN   PROCALCITONIN   TYPE & SCREEN        All Lab Results:  Results for orders placed or performed during the hospital encounter of 11/05/20   Comprehensive Metabolic Panel   Result Value Ref Range    Sodium 139 136 - 145 mmol/L    Potassium 3.6 3.5 - 5.1 mmol/L    Chloride 105 95 - 110 mmol/L    CO2 25 23 - 29 mmol/L    Glucose 249 (H) 70 - 110 mg/dL    BUN 22 8 - 23 mg/dL    Creatinine 1.0 0.5 - 1.4 mg/dL    Calcium 7.7 (L) 8.7 - 10.5 mg/dL    Total Protein 4.8 (L) 6.0 - 8.4 g/dL    Albumin 2.3 (L) 3.5 - 5.2 g/dL    Total Bilirubin 0.8 0.1 - 1.0 mg/dL    Alkaline Phosphatase 80 55 - 135 U/L    AST 29 10 - 40 U/L    ALT 68 (H) 10 - 44 U/L    Anion Gap 9 8 - 16 mmol/L    eGFR if African American >60 >60 mL/min/1.73 m^2    eGFR if non African American >60 >60 mL/min/1.73 m^2   Troponin I   Result Value Ref Range    Troponin I 0.042 (H) 0.000 - 0.026 ng/mL   COVID-19 Rapid Screening   Result Value Ref Range    SARS-CoV-2 RNA, Amplification, Qual Negative Negative   Magnesium   Result Value  Ref Range    Magnesium 2.0 1.6 - 2.6 mg/dL   Urinalysis, Reflex to Urine Culture Urine, Clean Catch    Specimen: Urine   Result Value Ref Range    Specimen UA Urine, Clean Catch     Color, UA Yellow Yellow, Straw, Rachel    Appearance, UA Hazy (A) Clear    pH, UA 6.0 5.0 - 8.0    Specific Gravity, UA 1.020 1.005 - 1.030    Protein, UA Negative Negative    Glucose, UA 3+ (A) Negative    Ketones, UA Negative Negative    Bilirubin (UA) Negative Negative    Occult Blood UA Negative Negative    Nitrite, UA Negative Negative    Urobilinogen, UA 4.0-6.0 (A) <2.0 EU/dL    Leukocytes, UA Negative Negative   CBC Auto Differential   Result Value Ref Range    WBC 8.91 3.90 - 12.70 K/uL    RBC 3.19 (L) 4.60 - 6.20 M/uL    Hemoglobin 9.5 (L) 14.0 - 18.0 g/dL    Hematocrit 29.9 (L) 40.0 - 54.0 %    MCV 94 82 - 98 fL    MCH 29.8 27.0 - 31.0 pg    MCHC 31.8 (L) 32.0 - 36.0 g/dL    RDW 16.7 (H) 11.5 - 14.5 %    Platelets 182 150 - 350 K/uL    MPV 9.7 9.2 - 12.9 fL    Immature Granulocytes 2.4 (H) 0.0 - 0.5 %    Gran # (ANC) 7.3 1.8 - 7.7 K/uL    Immature Grans (Abs) 0.21 (H) 0.00 - 0.04 K/uL    Lymph # 0.8 (L) 1.0 - 4.8 K/uL    Mono # 0.6 0.3 - 1.0 K/uL    Eos # 0.0 0.0 - 0.5 K/uL    Baso # 0.01 0.00 - 0.20 K/uL    nRBC 0 0 /100 WBC    Gran % 82.4 (H) 38.0 - 73.0 %    Lymph % 8.5 (L) 18.0 - 48.0 %    Mono % 6.6 4.0 - 15.0 %    Eosinophil % 0.0 0.0 - 8.0 %    Basophil % 0.1 0.0 - 1.9 %    Differential Method Automated    BNP   Result Value Ref Range     (H) 0 - 99 pg/mL   Lactic Acid, Plasma   Result Value Ref Range    Lactate (Lactic Acid) 2.5 (H) 0.5 - 2.2 mmol/L   Urinalysis Microscopic   Result Value Ref Range    Bacteria Many (A) None-Occ /hpf    Yeast, UA None None    Microscopic Comment SEE COMMENT      Imaging Results:  Imaging Results          CT Head Without Contrast (Final result)  Result time 11/05/20 11:40:14    Final result by Venkat Cordoba MD (11/05/20 11:40:14)                 Impression:      Subacute infarct  suspected within the right cerebellum stable compared to prior.  If there is additional clinical concern for acute infarct, MRI with diffusion weighted imaging recommended.    All CT scans at this facility use dose modulation, iterative reconstruction, and/or weight based dosing when appropriate to reduce radiation dose to as low as reasonable achievable.      Electronically signed by: Venkat Cordoba MD  Date:    11/05/2020  Time:    11:40             Narrative:    EXAMINATION:  CT HEAD WITHOUT CONTRAST    CLINICAL HISTORY:  Syncope, simple, normal neuro exam;syncope; Syncope and collapse    TECHNIQUE:  Low dose axial CT images obtained throughout the head without intravenous contrast. Sagittal and coronal reconstructions were performed.    COMPARISON:  10/31/2020    FINDINGS:  Intracranial compartment:    The brain parenchyma demonstrates areas of decreased attenuation with mild to moderate periventricular white matter consistent with chronic microvascular ischemic changes.  Subacute infarct suspected within the right cerebellum stable compared to prior.  Remote lacunar infarct seen within the right basal ganglia.  No parenchymal mass, or hemorrhage.  Vascular calcifications are noted.    Moderate prominence of the sulci and ventricles are consistent with age-related involutional changes.    No extra-axial blood or fluid collections.    Skull/extracranial contents (limited evaluation): No fracture. Mastoid air cells and paranasal sinuses are essentially clear.                               X-Ray Chest 1 View (Final result)  Result time 11/05/20 10:59:10    Final result by Butch Moeller MD (11/05/20 10:59:10)                 Impression:      Questionable left basilar infiltrate.      Electronically signed by: Butch Moeller  Date:    11/05/2020  Time:    10:59             Narrative:    EXAMINATION:  XR CHEST 1 VIEW    CLINICAL HISTORY:  syncope;    COMPARISON:  10/30/2020    FINDINGS:  The heart is normal in size.  Lungs  are underinflated.  Questionable left basilar infiltrate.                               The EKG was ordered, reviewed, and independently interpreted by the ED provider.  Interpretation time: 10:04  Rate: 67 BPM  Rhythm: Sinus rhythm with sinus arrhythmia with occasional premature ventricular complexes  Interpretation: Left axis deviation. RBBB. LVH. No STEMI.           The Emergency Provider reviewed the vital signs and test results, which are outlined above.    ED Discussion     1:42 PM: Discussed case with Shayna Haynes NP ([facility]). Dr. Sahu agrees with current care and management of pt and accepts admission.   Admitting Service: [facility]  Admitting Physician: Dr. Sahu  Admit to: Obs Tele    1:43 PM: Re-evaluated pt. I have discussed test results, shared treatment plan, and the need for admission with patient and family at bedside. Pt and family express understanding at this time and agree with all information. All questions answered. Pt and family have no further questions or concerns at this time. Pt is ready for admit.           ED Medication(s):  Medications   cefTRIAXone (ROCEPHIN) 1 g/50 mL D5W IVPB (has no administration in time range)          New Prescriptions    No medications on file         Medical Decision Making    Medical Decision Making:   Clinical Tests:   Lab Tests: Ordered and Reviewed  Radiological Study: Ordered and Reviewed  Medical Tests: Ordered and Reviewed           Scribe Attestation:   Scribe #1: I performed the above scribed service and the documentation accurately describes the services I performed. I attest to the accuracy of the note.    Attending:   Physician Attestation Statement for Scribe #1: I, Win Segal MD, personally performed the services described in this documentation, as scribed by Aston Diaz, in my presence, and it is both accurate and complete.          Clinical Impression       ICD-10-CM ICD-9-CM   1. Syncope  R55 780.2    2. Pneumonia of left lower lobe due to infectious organism  J18.9 486       Disposition:   Disposition: Placed in Observation  Condition: Fair         Win Segal MD  11/05/20 145

## 2020-11-05 NOTE — SUBJECTIVE & OBJECTIVE
Past Medical History:   Diagnosis Date    Elevated PSA     Hypertension     Rosacea     Thyroid disease     hypothyroidism       No past surgical history on file.    Review of patient's allergies indicates:  No Known Allergies    No current facility-administered medications on file prior to encounter.      Current Outpatient Medications on File Prior to Encounter   Medication Sig    aspirin (ECOTRIN) 81 MG EC tablet Take 1 tablet (81 mg total) by mouth once daily.    carvediloL (COREG) 25 MG tablet Take 1 tablet (25 mg total) by mouth 2 (two) times daily with meals.    fenofibrate 160 MG Tab Take 160 mg by mouth once daily.    hydrALAZINE (APRESOLINE) 50 MG tablet Take 1 tablet (50 mg total) by mouth every 8 (eight) hours.    isosorbide dinitrate (ISORDIL) 20 MG tablet Take 1 tablet (20 mg total) by mouth 3 (three) times daily.    lisinopriL (PRINIVIL,ZESTRIL) 40 MG tablet Take 1 tablet (40 mg total) by mouth once daily.    potassium chloride SA (K-DUR,KLOR-CON) 20 MEQ tablet Take 1 tablet (20 mEq total) by mouth 2 (two) times daily.    rosuvastatin (CRESTOR) 10 MG tablet Take 10 mg by mouth once daily.    spironolactone (ALDACTONE) 25 MG tablet Take 1 tablet (25 mg total) by mouth once daily.    tiZANidine 4 mg Cap Take by mouth.     Family History     Reviewed and not pertinent        Tobacco Use    Smoking status: Not on file   Substance and Sexual Activity    Alcohol use: Never     Frequency: Never    Drug use: Not on file    Sexual activity: Not on file     Review of Systems   Constitutional: Negative for activity change, appetite change, chills, diaphoresis, fatigue and fever.   HENT: Negative.    Eyes: Negative for visual disturbance.   Respiratory: Negative for cough and shortness of breath.    Cardiovascular: Negative for chest pain, palpitations and leg swelling.   Gastrointestinal: Negative for abdominal pain, diarrhea, nausea and vomiting.   Genitourinary: Negative for difficulty  urinating and dysuria.   Musculoskeletal: Negative for arthralgias and myalgias.   Skin: Negative for pallor, rash and wound.   Neurological: Positive for syncope. Negative for dizziness, facial asymmetry, light-headedness, numbness and headaches.   Psychiatric/Behavioral: Positive for confusion (intermittent).     Objective:     Vital Signs (Most Recent):  Temp: 98.3 °F (36.8 °C) (11/05/20 1507)  Pulse: 70 (11/05/20 1507)  Resp: 18 (11/05/20 1507)  BP: (!) 174/81 (11/05/20 1507)  SpO2: 98 % (11/05/20 1507) Vital Signs (24h Range):  Temp:  [97.5 °F (36.4 °C)-98.3 °F (36.8 °C)] 98.3 °F (36.8 °C)  Pulse:  [62-85] 70  Resp:  [14-69] 18  SpO2:  [95 %-100 %] 98 %  BP: (107-174)/(61-92) 174/81     Weight: 91.6 kg (201 lb 15.1 oz)  Body mass index is 28.98 kg/m².    Physical Exam        Significant Labs:   CBC:   Recent Labs   Lab 11/05/20  1151   WBC 8.91   HGB 9.5*   HCT 29.9*        CMP:   Recent Labs   Lab 11/05/20  1033      K 3.6      CO2 25   *   BUN 22   CREATININE 1.0   CALCIUM 7.7*   PROT 4.8*   ALBUMIN 2.3*   BILITOT 0.8   ALKPHOS 80   AST 29   ALT 68*   ANIONGAP 9   EGFRNONAA >60     All pertinent labs within the past 24 hours have been reviewed.    Significant Imaging: I have reviewed all pertinent imaging results/findings within the past 24 hours.

## 2020-11-05 NOTE — ASSESSMENT & PLAN NOTE
Syncope  Rule out vasovagal event, orthostasis or cardiac/neuro event  - Admit, Telemetry monitoring, orthostatic VS every shift, neuro checks every 4 hours, serial troponin levels  Not orthostatic from V/S in the ED - continue daily orthostatics  · 2 D ECHO -There is mild left ventricular concentric hypertrophy. - from 10/19/2020  · With normal systolic function. The estimated ejection fraction is 60%.  · Grade I diastolic dysfunction.  · Normal right ventricular systolic function.  · Mild left atrial enlargement.  · Mild mitral regurgitation.  · Normal central venous pressure (3 mmHg).  · The estimated PA systolic pressure is 15 mmHg.

## 2020-11-05 NOTE — ASSESSMENT & PLAN NOTE
Lab Results   Component Value Date    HGBA1C 9.3 (H) 10/19/2020   accuchecks  Sliding scale insulin

## 2020-11-05 NOTE — H&P
[facility] Hospital Medicine  History & Physical    Patient Name: Karan Aburto  MRN: [id number]  Admission Date: 11/5/2020  Attending Physician: Prosper Sahu MD   Primary Care Provider: Mickey Agrawal MD         Patient information was obtained from patient, past medical records and ER records.     Subjective:     Principal Problem:Syncope    Chief Complaint:   Chief Complaint   Patient presents with    Loss of Consciousness     syncopal episode at breakfast and then found unresponsive by staff in room. [facility] reports patient A&Ox3 on their arrival. pt from [facility]        HPI: Pt is a 73 yo male with PMHx of Bilateral leg spastic paraplegia, HTN, diastolic heart failure, obesity and hypothyroidism who was brought to the ED due syncopal episode. Pt was discharged from [facility] 11/3/2020 to a SNF due to generalized weakness and debility. Pt has a hx of BLE spastic paraplegia and has become increasingly weak over the last few weak. Last admission, CT imaging noted subacute stroke however, Neurologist, Dr. Romano stated the stroke was not acute. He discharged to SNF to continue physical therapy. Today, pt states he was sitting in a chair then woke up lying in an ambulance. He is AAOx3. He says he remembers eating breakfast this AM. He denies other symptoms including fever, chest pain, slurred speech, asymmetrical extremity weakness, abdominal pain, vomiting, diarrhea, urine symptoms, lightheadedness, dizziness and headache.   Vital sign on arrival, Temp 97.5, pulse 65, resp 20 and B/P 107 /62. Orthostatic 164/92, pulse 78 lying and 153/92, pulse 79 sitting.  CXR notes a questionable left basilar infiltrate  CT of Head notes a subacute infarct suspected in the right cerebellum - chronic finding  Labs find  Anemia 9.5/29.9, WBC normal, gluc 249,  and troponin 0.042, lactate 2.5, procal is negative  Pt is placed on Observation for eval of Syncope and treatment  for pneumonia.     Past Medical History:   Diagnosis Date    Elevated PSA     Hypertension     Rosacea     Thyroid disease     hypothyroidism       No past surgical history on file.    Review of patient's allergies indicates:  No Known Allergies    No current facility-administered medications on file prior to encounter.      Current Outpatient Medications on File Prior to Encounter   Medication Sig    aspirin (ECOTRIN) 81 MG EC tablet Take 1 tablet (81 mg total) by mouth once daily.    carvediloL (COREG) 25 MG tablet Take 1 tablet (25 mg total) by mouth 2 (two) times daily with meals.    fenofibrate 160 MG Tab Take 160 mg by mouth once daily.    hydrALAZINE (APRESOLINE) 50 MG tablet Take 1 tablet (50 mg total) by mouth every 8 (eight) hours.    isosorbide dinitrate (ISORDIL) 20 MG tablet Take 1 tablet (20 mg total) by mouth 3 (three) times daily.    lisinopriL (PRINIVIL,ZESTRIL) 40 MG tablet Take 1 tablet (40 mg total) by mouth once daily.    potassium chloride SA (K-DUR,KLOR-CON) 20 MEQ tablet Take 1 tablet (20 mEq total) by mouth 2 (two) times daily.    rosuvastatin (CRESTOR) 10 MG tablet Take 10 mg by mouth once daily.    spironolactone (ALDACTONE) 25 MG tablet Take 1 tablet (25 mg total) by mouth once daily.    tiZANidine 4 mg Cap Take by mouth.     Family History     Reviewed and not pertinent        Tobacco Use    Smoking status: Not on file   Substance and Sexual Activity    Alcohol use: Never     Frequency: Never    Drug use: Not on file    Sexual activity: Not on file     Review of Systems   Constitutional: Negative for activity change, appetite change, chills, diaphoresis, fatigue and fever.   HENT: Negative.    Eyes: Negative for visual disturbance.   Respiratory: Negative for cough and shortness of breath.    Cardiovascular: Negative for chest pain, palpitations and leg swelling.   Gastrointestinal: Negative for abdominal pain, diarrhea, nausea and vomiting.   Genitourinary: Negative  for difficulty urinating and dysuria.   Musculoskeletal: Negative for arthralgias and myalgias.   Skin: Negative for pallor, rash and wound.   Neurological: Positive for syncope. Negative for dizziness, facial asymmetry, light-headedness, numbness and headaches.   Psychiatric/Behavioral: Positive for confusion (intermittent).     Objective:     Vital Signs (Most Recent):  Temp: 98.3 °F (36.8 °C) (11/05/20 1507)  Pulse: 70 (11/05/20 1507)  Resp: 18 (11/05/20 1507)  BP: (!) 174/81 (11/05/20 1507)  SpO2: 98 % (11/05/20 1507) Vital Signs (24h Range):  Temp:  [97.5 °F (36.4 °C)-98.3 °F (36.8 °C)] 98.3 °F (36.8 °C)  Pulse:  [62-85] 70  Resp:  [14-69] 18  SpO2:  [95 %-100 %] 98 %  BP: (107-174)/(61-92) 174/81     Weight: 91.6 kg (201 lb 15.1 oz)  Body mass index is 28.98 kg/m².    Physical Exam     Physical Exam  Vitals and nursing note reviewed.   Constitutional:       Appearance: He is obese.      Comments: Appears chronically ill   HENT:      Head: Normocephalic and atraumatic.      Right Ear: External ear normal.      Left Ear: External ear normal.      Nose: Nose normal.   Eyes:      Conjunctiva/sclera: Conjunctivae normal.   Cardiovascular:      Rate and Rhythm: Normal rate and regular rhythm.   Pulmonary:      Effort: Pulmonary effort is normal.      Breath sounds: Normal breath sounds.   Abdominal:      General: There is no distension.      Palpations: Abdomen is soft.      Tenderness: There is no abdominal tenderness.   Musculoskeletal:         General: Swelling present.      Cervical back: Neck supple.      Comments: Generalized weakness   Skin:     General: Skin is warm and dry.   Neurological:      Mental Status: He is alert and oriented to person, place, and time.   Psychiatric:         Behavior: Behavior normal.         Thought Content: Thought content normal.          Significant Labs:   CBC:   Recent Labs   Lab 11/05/20  1151   WBC 8.91   HGB 9.5*   HCT 29.9*        CMP:   Recent Labs   Lab  11/05/20  1033      K 3.6      CO2 25   *   BUN 22   CREATININE 1.0   CALCIUM 7.7*   PROT 4.8*   ALBUMIN 2.3*   BILITOT 0.8   ALKPHOS 80   AST 29   ALT 68*   ANIONGAP 9   EGFRNONAA >60     All pertinent labs within the past 24 hours have been reviewed.    Significant Imaging: I have reviewed all pertinent imaging results/findings within the past 24 hours.    Assessment/Plan:     * Syncope  Syncope  Rule out vasovagal event, orthostasis or cardiac/neuro event  - Admit, Telemetry monitoring, orthostatic VS every shift, neuro checks every 4 hours, serial troponin levels  Not orthostatic from V/S in the ED - continue daily orthostatics  · 2 D ECHO -There is mild left ventricular concentric hypertrophy. - from 10/19/2020  · With normal systolic function. The estimated ejection fraction is 60%.  · Grade I diastolic dysfunction.  · Normal right ventricular systolic function.  · Mild left atrial enlargement.  · Mild mitral regurgitation.  · Normal central venous pressure (3 mmHg).  · The estimated PA systolic pressure is 15 mmHg.                Chronic diastolic heart failure  Compensated  Continue aldactone, lisinopril      Pneumonia  IV Azithromycin, Rocephin  Supplemental oxygen to maintain sats > 92 %  DuoNebs      Spastic paraplegia, hereditary  Continue PT/OT      Essential hypertension  Elevated  Continue antihypertensives - Coreg, hydralazine, imdur and lisinopril      Diabetes mellitus type 2, uncontrolled  Lab Results   Component Value Date    HGBA1C 9.3 (H) 10/19/2020   accuchecks  Sliding scale insulin        VTE Risk Mitigation (From admission, onward)         Ordered     enoxaparin injection 40 mg  Every 24 hours      11/05/20 1554     IP VTE HIGH RISK PATIENT  Once      11/05/20 1554     Place sequential compression device  Until discontinued      11/05/20 1554                   Shayna Haynes NP  Department of Hospital Medicine   [facility]

## 2020-11-06 NOTE — PLAN OF CARE
POC reviewed. Comfort measures and safety measures addressed. Will continue to monitor  Problem: Fall Injury Risk  Goal: Absence of Fall and Fall-Related Injury  Outcome: Ongoing, Progressing     Problem: Adult Inpatient Plan of Care  Goal: Plan of Care Review  Outcome: Ongoing, Progressing  Goal: Patient-Specific Goal (Individualization)  Outcome: Ongoing, Progressing  Goal: Absence of Hospital-Acquired Illness or Injury  Outcome: Ongoing, Progressing  Goal: Optimal Comfort and Wellbeing  Outcome: Ongoing, Progressing  Goal: Readiness for Transition of Care  Outcome: Ongoing, Progressing  Goal: Rounds/Family Conference  Outcome: Ongoing, Progressing

## 2020-11-06 NOTE — PLAN OF CARE
11/06/20 1107   Post-Acute Status   Post-Acute Authorization Placement   Post-Acute Placement Status Referrals Sent   Discharge Plan   Discharge Plan A Skilled Nursing Facility   Discharge Plan B Skilled Nursing Facility       Satnam Clemons LMSW 11/6/2020 11:07 AM

## 2020-11-06 NOTE — PT/OT/SLP EVAL
Occupational Therapy   Evaluation    Name: Karan Aburto  MRN: [id number]  Admitting Diagnosis:  Syncope      Recommendations:     Discharge Recommendations: nursing facility, skilled  Discharge Equipment Recommendations:  bedside commode  Barriers to discharge:       Assessment:     Karan Aburto is a 75 y.o. male with a medical diagnosis of Syncope.  He presents with impaired functional mobility and ADLs. Performance deficits affecting function: weakness, impaired endurance, impaired self care skills, impaired functional mobilty, gait instability, impaired balance, impaired cognition, decreased lower extremity function, decreased safety awareness, impaired cardiopulmonary response to activity.      Rehab Prognosis: Fair; patient would benefit from acute skilled OT services to address these deficits and reach maximum level of function.       Plan:     Patient to be seen 3 x/week to address the above listed problems via self-care/home management, therapeutic activities, therapeutic exercises  · Plan of Care Expires: 11/13/20  · Plan of Care Reviewed with: patient    Subjective     Chief Complaint: weakness  Patient/Family Comments/goals: increase strength    Occupational Profile:  Living Environment: lives with grandson in 1 story house with no steps; PTA was at SNF  Previous level of function: Mod I with ADLs and Ambulates with RW  Roles and Routines: UNKNOWN  Equipment Used at Home:  walker, rolling, shower chair, grab bar  Assistance upon Discharge: SNF staff    Pain/Comfort:  · Pain Rating 1: 0/10    Patients cultural, spiritual, Mandaen conflicts given the current situation:      Objective:     Communicated with: Nurse Carr and epic chart review prior to session.  Patient found supine with peripheral IV, bed alarm, telemetry upon OT entry to room.    General Precautions: Standard, fall   Orthopedic Precautions:N/A   Braces: N/A     Occupational Performance:    Bed Mobility:    · Patient completed  Rolling/Turning to Left with  minimum assistance  · Patient completed Rolling/Turning to Right with minimum assistance  · Patient completed Scooting/Bridging with minimum assistance  · Patient completed Supine to Sit with minimum assistance    Functional Mobility/Transfers:  · Patient completed Sit <> Stand Transfer with moderate assistance  with  rolling walker   · Patient completed Toilet Transfer Step Transfer technique with moderate assistance with  rolling walker  · Functional Mobility: ~10ft using RW with Mod A    Activities of Daily Living:  · Upper Body Dressing: minimum assistance .  · Lower Body Dressing: moderate assistance /  · Toileting: total assistance .    Cognitive/Visual Perceptual:  Cognitive/Psychosocial Skills:     -       Oriented to: Person, Place, Time, Situation and becomes confused at times   -       Follows Commands/attention:Follows two-step commands  -       Communication: clear/fluent  -       Memory: No Deficits noted  -       Safety awareness/insight to disability: impaired   Visual/Perceptual:      -Intact /    Physical Exam:  Dominant hand:    -       right  Upper Extremity Range of Motion:     -       Right Upper Extremity: WFL  -       Left Upper Extremity: WFL  Upper Extremity Strength:    -       Right Upper Extremity: WFL  -       Left Upper Extremity: WFL   Strength:    -       Right Upper Extremity: WFL  -       Left Upper Extremity: WFL    AMPAC 6 Click ADL:  AMPAC Total Score: 16    Treatment & Education:  OT Eval completed as listed above. Pt educated in role of OT and POC.   Education:    Patient left sitting on toilet with all lines intact, call button in reach, Nurse Lilly notifmarija and PCT Misty sitting outside of door    GOALS:   Multidisciplinary Problems     Occupational Therapy Goals        Problem: Occupational Therapy Goal    Goal Priority Disciplines Outcome Interventions   Occupational Therapy Goal     OT, PT/OT     Description: LTGs to be met by  11/13/2020  1. Pt will perform LB dressing with Min A  2. Pt will perform UB dressing with SBA  3. Pt will perform toilet t/f with Min A  4. Pt will perform (B) UE therapeutic exercises 1 x 15 reps                   History:     Past Medical History:   Diagnosis Date    Elevated PSA     Hypertension     Rosacea     Thyroid disease     hypothyroidism       History reviewed. No pertinent surgical history.    Time Tracking:     OT Date of Treatment: 11/06/20  OT Start Time: 0855  OT Stop Time: 0907  OT Total Time (min): 12 min    Billable Minutes:Evaluation 12 min    Tania Way, PT/OT  11/6/2020

## 2020-11-06 NOTE — CONSULTS
[facility]  Cardiology  Consult Note    Patient Name: Karan Aburto  MRN: [id number]  Admission Date: 11/5/2020  Hospital Length of Stay: 0 days  Code Status: Full Code   Attending Provider: Merry Villa MD   Consulting Provider: JORGE Campbell  Primary Care Physician: Mickey Agrawal MD  Principal Problem:Syncope    Patient information was obtained from patient and ER records.     Inpatient consult to Cardiology  Consult performed by: JORGE Zuñiga  Consult ordered by: Merry Villa MD  Reason for consult: syncope. elevated troponin         Subjective:     Chief Complaint:  Syncope      HPI:   Mr. Aburto is a 75 yo male with PMHx of Bilateral leg spastic paraplegia, HTN, diastolic heart failure, obesity and hypothyroidism who was brought to the ED due syncopal episode. Denies any pre syncope symptoms. No chest pain, SOB, palpitations, dizziness or diaphoresis.  Pt was discharged from [facility] 11/3/2020 to a SNF due to generalized weakness and debility. Noted to have elevated troponin at that time. Likely due to uncontrolled glucose, CKD, and acute CHF.   On recent admission,  CT noted subacute stroke however, Neurologist, Dr. Romano stated the stroke was not acute.  CXR notes a questionable left basilar infiltrate  CT of Head notes a subacute infarct suspected in the right cerebellum - chronic finding    Labs find  Anemia 9.5/29.9, 8.7/27.6.  WBC normal, gluc 249,  and troponin 0.042, 0.033, 0.039. Trending down from previous admission.   lactate 2.5, procal is negative  Echo in Oct 2020 shows normal LVF 60%      Past Medical History:   Diagnosis Date    Elevated PSA     Hypertension     Rosacea     Thyroid disease     hypothyroidism       History reviewed. No pertinent surgical history.    Review of patient's allergies indicates:  No Known Allergies    No current facility-administered medications on file prior to encounter.      Current Outpatient  Medications on File Prior to Encounter   Medication Sig    aspirin (ECOTRIN) 81 MG EC tablet Take 1 tablet (81 mg total) by mouth once daily.    carvediloL (COREG) 25 MG tablet Take 1 tablet (25 mg total) by mouth 2 (two) times daily with meals.    fenofibrate 160 MG Tab Take 160 mg by mouth once daily.    hydrALAZINE (APRESOLINE) 50 MG tablet Take 1 tablet (50 mg total) by mouth every 8 (eight) hours.    isosorbide dinitrate (ISORDIL) 20 MG tablet Take 1 tablet (20 mg total) by mouth 3 (three) times daily.    lisinopriL (PRINIVIL,ZESTRIL) 40 MG tablet Take 1 tablet (40 mg total) by mouth once daily.    potassium chloride SA (K-DUR,KLOR-CON) 20 MEQ tablet Take 1 tablet (20 mEq total) by mouth 2 (two) times daily.    rosuvastatin (CRESTOR) 10 MG tablet Take 10 mg by mouth once daily.    spironolactone (ALDACTONE) 25 MG tablet Take 1 tablet (25 mg total) by mouth once daily.    tiZANidine 4 mg Cap Take by mouth.     Family History     None        Tobacco Use    Smoking status: Never Smoker    Smokeless tobacco: Never Used   Substance and Sexual Activity    Alcohol use: Never     Frequency: Never    Drug use: Not on file    Sexual activity: Not on file     Review of Systems   Constitution: Negative for diaphoresis, malaise/fatigue, weight gain and weight loss.   HENT: Negative for congestion and nosebleeds.    Cardiovascular: Positive for syncope. Negative for chest pain, claudication, cyanosis, dyspnea on exertion, irregular heartbeat, leg swelling, near-syncope, orthopnea, palpitations and paroxysmal nocturnal dyspnea.   Respiratory: Negative for cough, hemoptysis, shortness of breath, sleep disturbances due to breathing, snoring, sputum production and wheezing.    Hematologic/Lymphatic: Negative for bleeding problem. Does not bruise/bleed easily.   Skin: Negative for rash.   Musculoskeletal: Negative for arthritis, back pain, falls, joint pain, muscle cramps and muscle weakness.   Gastrointestinal:  Negative for abdominal pain, constipation, diarrhea, heartburn, hematemesis, hematochezia, melena, nausea and vomiting.   Genitourinary: Negative for dysuria, hematuria and nocturia.   Neurological: Negative for excessive daytime sleepiness, dizziness, headaches, light-headedness, loss of balance, numbness, vertigo and weakness.        Spastic paraplegia      Objective:     Vital Signs (Most Recent):  Temp: 98.3 °F (36.8 °C) (11/06/20 0721)  Pulse: 82 (11/06/20 0805)  Resp: 20 (11/06/20 0805)  BP: (!) 185/92 (11/06/20 0721)  SpO2: 97 % (11/06/20 0805) Vital Signs (24h Range):  Temp:  [98.1 °F (36.7 °C)-98.3 °F (36.8 °C)] 98.3 °F (36.8 °C)  Pulse:  [68-87] 82  Resp:  [15-69] 20  SpO2:  [95 %-100 %] 97 %  BP: (130-193)/(65-95) 185/92     Weight: 87.4 kg (192 lb 10.9 oz)  Body mass index is 27.65 kg/m².    SpO2: 97 %  O2 Device (Oxygen Therapy): room air      Intake/Output Summary (Last 24 hours) at 11/6/2020 1041  Last data filed at 11/6/2020 0500  Gross per 24 hour   Intake 220 ml   Output 300 ml   Net -80 ml       Lines/Drains/Airways     Peripheral Intravenous Line                 Peripheral IV - Single Lumen 11/05/20 2140 20 G Anterior;Left Forearm less than 1 day                Physical Exam   Constitutional: He is oriented to person, place, and time. He appears well-developed and well-nourished.   Neck: Neck supple. No JVD present.   Cardiovascular: Normal rate, regular rhythm, normal heart sounds and normal pulses. Exam reveals no friction rub.   No murmur heard.  Pulmonary/Chest: Effort normal and breath sounds normal. No respiratory distress. He has no wheezes. He has no rales.   Abdominal: Soft. Bowel sounds are normal. He exhibits no distension.   Musculoskeletal:         General: No tenderness or edema.   Neurological: He is alert and oriented to person, place, and time.   Skin: Skin is warm and dry. No rash noted.   Psychiatric: He has a normal mood and affect. His behavior is normal.   Nursing note and  vitals reviewed.      Significant Labs:   All pertinent lab results from the last 24 hours have been reviewed. and   Recent Lab Results       11/06/20  0609   11/05/20  2343   11/05/20  1902   11/05/20  1245   11/05/20  1221        Procalcitonin       0.05  Comment:  A concentration < 0.25 ng/mL represents a low risk bacterial   infection.  Procalcitonin may not be accurate among patients with localized   infection, recent trauma or major surgery, immunosuppressed state,   invasive fungal infection, renal dysfunction. Decisions regarding   initiation or continuation of antibiotic therapy should not be based   solely on procalcitonin levels.         Albumin 2.4             Alkaline Phosphatase 83             ALT 66             Anion Gap 8             Appearance, UA               AST 26             Bacteria, UA               Baso #               Basophil % 0.0             Bilirubin (UA)               BILIRUBIN TOTAL 0.9  Comment:  For infants and newborns, interpretation of results should be based  on gestational age, weight and in agreement with clinical  observations.  Premature Infant recommended reference ranges:  Up to 24 hours.............<8.0 mg/dL  Up to 48 hours............<12.0 mg/dL  3-5 days..................<15.0 mg/dL  6-29 days.................<15.0 mg/dL               Blood Culture, Routine               BNP               BUN 22             Calcium 8.0             Chloride 105             CO2 28             Color, UA               Creatinine 0.9             Differential Method Manual             eGFR if  >60             eGFR if non  >60  Comment:  Calculation used to obtain the estimated glomerular filtration  rate (eGFR) is the CKD-EPI equation.                Eos #               Eosinophil % 0.0             Glucose 104             Glucose, UA               Gran # (ANC)               Gran % 80.0             Group & Rh               Hematocrit 27.6             Hemoglobin  8.7             Immature Grans (Abs) CANCELED  Comment:  Mild elevation in immature granulocytes is non specific and   can be seen in a variety of conditions including stress response,   acute inflammation, trauma and pregnancy. Correlation with other   laboratory and clinical findings is essential.    Result canceled by the ancillary.               Immature Granulocytes CANCELED  Comment:  Result canceled by the ancillary.             INDIRECT MAXINE               Ketones, UA               Lactate, Martin     2.5  Comment:  Falsely low lactic acid results can be found in samples   containing >=13.0 mg/dL total bilirubin and/or >=3.5 mg/dL   direct bilirubin.           Leukocytes, UA               Lymph #               Lymph % 19.0             Magnesium 2.0             MCH 29.5             MCHC 31.5             MCV 94             Microscopic Comment               Mono #               Mono % 1.0             MPV 9.4             NITRITE UA               nRBC 1             Occult Blood UA               pH, UA               Platelet Estimate Appears normal             Platelets 171             Potassium 3.4             PROTEIN TOTAL 5.0             Protein, UA               RBC 2.95             RDW 16.7             SARS-CoV-2 RNA, Amplification, Qual         Negative  Comment:  This test utilizes isothermal nucleic acid amplification   technology to detect the SARS-CoV-2 RdRp nucleic acid segment.   The analytical sensitivity (limit of detection) is 125 genome   equivalents/mL.   A POSITIVE result implies infection with the SARS-CoV-2 virus;  the patient is presumed to be contagious.    A NEGATIVE result means that SARS-CoV-2 nucleic acids are not  present above the limit of detection. A NEGATIVE result should be   treated as presumptive. It does not rule out the possibility of   COVID-19 and should not be the sole basis for treatment decisions.   If COVID-19 is strongly suspected based on clinical and exposure   history,  re-testing using an alternate molecular assay should be   considered.   This test is only for use under the Food and Drug   Administration s Emergency Use Authorization (EUA).   Commercial kits are provided by [employer].   Performance characteristics of the EUA have been independently  verified by [facility] Department of  Pathology and Laboratory Medicine.   _________________________________________________________________  The ID NOW COVID-19 Letter of Authorization, along with the   authorized Fact Sheet for Healthcare Providers, the authorized Fact  Sheet for Patients, and authorized labeling are available on the FDA   website:  www.fda.gov/MedicalDevices/Safety/EmergencySituations/xwk618390.htm       Sodium 141             Specific [place], UA               Specimen UA               Troponin I   0.039  Comment:  The reference interval for Troponin I represents the 99th percentile   cutoff   for our facility and is consistent with 3rd generation assay   performance.   0.033  Comment:  The reference interval for Troponin I represents the 99th percentile   cutoff   for our facility and is consistent with 3rd generation assay   performance.           UROBILINOGEN UA               WBC 8.03             Yeast, UA                                11/05/20  1218   11/05/20  1203   11/05/20  1151        Procalcitonin           Albumin           Alkaline Phosphatase           ALT           Anion Gap           Appearance, UA   Hazy       AST           Bacteria, UA   Many       Baso #     0.01     Basophil %     0.1     Bilirubin (UA)   Negative       BILIRUBIN TOTAL           Blood Culture, Routine   No Growth to date[P] No Growth to date[P]     BNP     130  Comment:  Values of less than 100 pg/ml are consistent with non-CHF populations.     BUN           Calcium           Chloride           CO2           Color, UA   Yellow       Creatinine           Differential Method     Automated     eGFR if   American           eGFR if non            Eos #     0.0     Eosinophil %     0.0     Glucose           Glucose, UA   3+       Gran # (ANC)     7.3     Gran %     82.4     Group & Rh O POS         Hematocrit     29.9     Hemoglobin     9.5     Immature Grans (Abs)     0.21  Comment:  Mild elevation in immature granulocytes is non specific and   can be seen in a variety of conditions including stress response,   acute inflammation, trauma and pregnancy. Correlation with other   laboratory and clinical findings is essential.       Immature Granulocytes     2.4     INDIRECT MAXINE NEG         Ketones, UA   Negative       Lactate, Martin   2.5  Comment:  Falsely low lactic acid results can be found in samples   containing >=13.0 mg/dL total bilirubin and/or >=3.5 mg/dL   direct bilirubin.         Leukocytes, UA   Negative       Lymph #     0.8     Lymph %     8.5     Magnesium           MCH     29.8     MCHC     31.8     MCV     94     Microscopic Comment   SEE COMMENT  Comment:  Other formed elements not mentioned in the report are not   present in the microscopic examination.          Mono #     0.6     Mono %     6.6     MPV     9.7     NITRITE UA   Negative       nRBC     0     Occult Blood UA   Negative       pH, UA   6.0       Platelet Estimate           Platelets     182     Potassium           PROTEIN TOTAL           Protein, UA   Negative  Comment:  Recommend a 24 hour urine protein or a urine   protein/creatinine ratio if globulin induced proteinuria is  clinically suspected.         RBC     3.19     RDW     16.7     SARS-CoV-2 RNA, Amplification, Qual           Sodium           Specific Gravity, UA   1.020       Specimen UA   Urine, Clean Catch       Troponin I           UROBILINOGEN UA   4.0-6.0       WBC     8.91     Yeast, UA   None             Significant Imaging:   Conclusion    · There is mild left ventricular concentric hypertrophy.  · With normal systolic function. The estimated ejection  fraction is 60%.  · Grade I diastolic dysfunction.  · Normal right ventricular systolic function.  · Mild left atrial enlargement.  · Mild mitral regurgitation.  · Normal central venous pressure (3 mmHg).  · The estimated PA systolic pressure is 15 mmHg.         Assessment and Plan:     * Syncope  See plan for elevated troponin     Elevated troponin  troponin 0.042, 0.033, 0.039. Trending down from previous admission.    Given his troponin leak and recurrent syncope, will plan for stress test to rule out ischemia.   Recommend OP ILR vs. Zio and/ro Tilt table  Patient follows with outside Cardiologist and can follow up after DC for further cardiac workup pending stress test results   Continue ASA, Statin, BB      Essential hypertension  HTN meds have been adjusted by primary team. Will assess response and further titrate as needed         VTE Risk Mitigation (From admission, onward)         Ordered     enoxaparin injection 40 mg  Every 24 hours      11/05/20 1554     IP VTE HIGH RISK PATIENT  Once      11/05/20 1554     Place sequential compression device  Until discontinued      11/05/20 1554              Chart reviewed. Patient examined by Dr. Brumfield and agrees with plan that has been outlined.     Thank you for your consult. I will follow-up with patient. Please contact us if you have any additional questions.    Mere Hernandez, JORGE  Cardiology   [facility]

## 2020-11-06 NOTE — ASSESSMENT & PLAN NOTE
troponin 0.042, 0.033, 0.039. Trending down from previous admission.    Given his troponin leak and recurrent syncope, will plan for stress test to rule out ischemia.   Recommend OP ILR vs. Zio and/ro Tilt table  Patient follows with outside Cardiologist and can follow up after DC for further cardiac workup pending stress test results   Continue ASA, Statin, BB

## 2020-11-06 NOTE — SUBJECTIVE & OBJECTIVE
Past Medical History:   Diagnosis Date    Elevated PSA     Hypertension     Rosacea     Thyroid disease     hypothyroidism       History reviewed. No pertinent surgical history.    Review of patient's allergies indicates:  No Known Allergies    No current facility-administered medications on file prior to encounter.      Current Outpatient Medications on File Prior to Encounter   Medication Sig    aspirin (ECOTRIN) 81 MG EC tablet Take 1 tablet (81 mg total) by mouth once daily.    carvediloL (COREG) 25 MG tablet Take 1 tablet (25 mg total) by mouth 2 (two) times daily with meals.    fenofibrate 160 MG Tab Take 160 mg by mouth once daily.    hydrALAZINE (APRESOLINE) 50 MG tablet Take 1 tablet (50 mg total) by mouth every 8 (eight) hours.    isosorbide dinitrate (ISORDIL) 20 MG tablet Take 1 tablet (20 mg total) by mouth 3 (three) times daily.    lisinopriL (PRINIVIL,ZESTRIL) 40 MG tablet Take 1 tablet (40 mg total) by mouth once daily.    potassium chloride SA (K-DUR,KLOR-CON) 20 MEQ tablet Take 1 tablet (20 mEq total) by mouth 2 (two) times daily.    rosuvastatin (CRESTOR) 10 MG tablet Take 10 mg by mouth once daily.    spironolactone (ALDACTONE) 25 MG tablet Take 1 tablet (25 mg total) by mouth once daily.    tiZANidine 4 mg Cap Take by mouth.     Family History     None        Tobacco Use    Smoking status: Never Smoker    Smokeless tobacco: Never Used   Substance and Sexual Activity    Alcohol use: Never     Frequency: Never    Drug use: Not on file    Sexual activity: Not on file     Review of Systems   Constitution: Negative for diaphoresis, malaise/fatigue, weight gain and weight loss.   HENT: Negative for congestion and nosebleeds.    Cardiovascular: Positive for syncope. Negative for chest pain, claudication, cyanosis, dyspnea on exertion, irregular heartbeat, leg swelling, near-syncope, orthopnea, palpitations and paroxysmal nocturnal dyspnea.   Respiratory: Negative for cough,  hemoptysis, shortness of breath, sleep disturbances due to breathing, snoring, sputum production and wheezing.    Hematologic/Lymphatic: Negative for bleeding problem. Does not bruise/bleed easily.   Skin: Negative for rash.   Musculoskeletal: Negative for arthritis, back pain, falls, joint pain, muscle cramps and muscle weakness.   Gastrointestinal: Negative for abdominal pain, constipation, diarrhea, heartburn, hematemesis, hematochezia, melena, nausea and vomiting.   Genitourinary: Negative for dysuria, hematuria and nocturia.   Neurological: Negative for excessive daytime sleepiness, dizziness, headaches, light-headedness, loss of balance, numbness, vertigo and weakness.        Spastic paraplegia      Objective:     Vital Signs (Most Recent):  Temp: 98.3 °F (36.8 °C) (11/06/20 0721)  Pulse: 82 (11/06/20 0805)  Resp: 20 (11/06/20 0805)  BP: (!) 185/92 (11/06/20 0721)  SpO2: 97 % (11/06/20 0805) Vital Signs (24h Range):  Temp:  [98.1 °F (36.7 °C)-98.3 °F (36.8 °C)] 98.3 °F (36.8 °C)  Pulse:  [68-87] 82  Resp:  [15-69] 20  SpO2:  [95 %-100 %] 97 %  BP: (130-193)/(65-95) 185/92     Weight: 87.4 kg (192 lb 10.9 oz)  Body mass index is 27.65 kg/m².    SpO2: 97 %  O2 Device (Oxygen Therapy): room air      Intake/Output Summary (Last 24 hours) at 11/6/2020 1041  Last data filed at 11/6/2020 0500  Gross per 24 hour   Intake 220 ml   Output 300 ml   Net -80 ml       Lines/Drains/Airways     Peripheral Intravenous Line                 Peripheral IV - Single Lumen 11/05/20 2140 20 G Anterior;Left Forearm less than 1 day                Physical Exam   Constitutional: He is oriented to person, place, and time. He appears well-developed and well-nourished.   Neck: Neck supple. No JVD present.   Cardiovascular: Normal rate, regular rhythm, normal heart sounds and normal pulses. Exam reveals no friction rub.   No murmur heard.  Pulmonary/Chest: Effort normal and breath sounds normal. No respiratory distress. He has no wheezes.  He has no rales.   Abdominal: Soft. Bowel sounds are normal. He exhibits no distension.   Musculoskeletal:         General: No tenderness or edema.   Neurological: He is alert and oriented to person, place, and time.   Skin: Skin is warm and dry. No rash noted.   Psychiatric: He has a normal mood and affect. His behavior is normal.   Nursing note and vitals reviewed.      Significant Labs:   All pertinent lab results from the last 24 hours have been reviewed. and   Recent Lab Results       11/06/20  0609   11/05/20  2343   11/05/20  1902   11/05/20  1245   11/05/20  1221        Procalcitonin       0.05  Comment:  A concentration < 0.25 ng/mL represents a low risk bacterial   infection.  Procalcitonin may not be accurate among patients with localized   infection, recent trauma or major surgery, immunosuppressed state,   invasive fungal infection, renal dysfunction. Decisions regarding   initiation or continuation of antibiotic therapy should not be based   solely on procalcitonin levels.         Albumin 2.4             Alkaline Phosphatase 83             ALT 66             Anion Gap 8             Appearance, UA               AST 26             Bacteria, UA               Baso #               Basophil % 0.0             Bilirubin (UA)               BILIRUBIN TOTAL 0.9  Comment:  For infants and newborns, interpretation of results should be based  on gestational age, weight and in agreement with clinical  observations.  Premature Infant recommended reference ranges:  Up to 24 hours.............<8.0 mg/dL  Up to 48 hours............<12.0 mg/dL  3-5 days..................<15.0 mg/dL  6-29 days.................<15.0 mg/dL               Blood Culture, Routine               BNP               BUN 22             Calcium 8.0             Chloride 105             CO2 28             Color, UA               Creatinine 0.9             Differential Method Manual             eGFR if  >60             eGFR if non   American >60  Comment:  Calculation used to obtain the estimated glomerular filtration  rate (eGFR) is the CKD-EPI equation.                Eos #               Eosinophil % 0.0             Glucose 104             Glucose, UA               Gran # (ANC)               Gran % 80.0             Group & Rh               Hematocrit 27.6             Hemoglobin 8.7             Immature Grans (Abs) CANCELED  Comment:  Mild elevation in immature granulocytes is non specific and   can be seen in a variety of conditions including stress response,   acute inflammation, trauma and pregnancy. Correlation with other   laboratory and clinical findings is essential.    Result canceled by the ancillary.               Immature Granulocytes CANCELED  Comment:  Result canceled by the ancillary.             INDIRECT MAXINE               Ketones, UA               Lactate, Martin     2.5  Comment:  Falsely low lactic acid results can be found in samples   containing >=13.0 mg/dL total bilirubin and/or >=3.5 mg/dL   direct bilirubin.           Leukocytes, UA               Lymph #               Lymph % 19.0             Magnesium 2.0             MCH 29.5             MCHC 31.5             MCV 94             Microscopic Comment               Mono #               Mono % 1.0             MPV 9.4             NITRITE UA               nRBC 1             Occult Blood UA               pH, UA               Platelet Estimate Appears normal             Platelets 171             Potassium 3.4             PROTEIN TOTAL 5.0             Protein, UA               RBC 2.95             RDW 16.7             SARS-CoV-2 RNA, Amplification, Qual         Negative  Comment:  This test utilizes isothermal nucleic acid amplification   technology to detect the SARS-CoV-2 RdRp nucleic acid segment.   The analytical sensitivity (limit of detection) is 125 genome   equivalents/mL.   A POSITIVE result implies infection with the SARS-CoV-2 virus;  the patient is presumed to be  contagious.    A NEGATIVE result means that SARS-CoV-2 nucleic acids are not  present above the limit of detection. A NEGATIVE result should be   treated as presumptive. It does not rule out the possibility of   COVID-19 and should not be the sole basis for treatment decisions.   If COVID-19 is strongly suspected based on clinical and exposure   history, re-testing using an alternate molecular assay should be   considered.   This test is only for use under the Food and Drug   Administration s Emergency Use Authorization (EUA).   Commercial kits are provided by [employer].   Performance characteristics of the EUA have been independently  verified by [facility] Department of  Pathology and Laboratory Medicine.   _________________________________________________________________  The ID NOW COVID-19 Letter of Authorization, along with the   authorized Fact Sheet for Healthcare Providers, the authorized Fact  Sheet for Patients, and authorized labeling are available on the FDA   website:  www.fda.gov/MedicalDevices/Safety/EmergencySituations/mun592476.htm       Sodium 141             Specific [place], UA               Specimen UA               Troponin I   0.039  Comment:  The reference interval for Troponin I represents the 99th percentile   cutoff   for our facility and is consistent with 3rd generation assay   performance.   0.033  Comment:  The reference interval for Troponin I represents the 99th percentile   cutoff   for our facility and is consistent with 3rd generation assay   performance.           UROBILINOGEN UA               WBC 8.03             Yeast, UA                                11/05/20  1218   11/05/20  1203   11/05/20  1151        Procalcitonin           Albumin           Alkaline Phosphatase           ALT           Anion Gap           Appearance, UA   Hazy       AST           Bacteria, UA   Many       Baso #     0.01     Basophil %     0.1     Bilirubin (UA)   Negative        BILIRUBIN TOTAL           Blood Culture, Routine   No Growth to date[P] No Growth to date[P]     BNP     130  Comment:  Values of less than 100 pg/ml are consistent with non-CHF populations.     BUN           Calcium           Chloride           CO2           Color, UA   Yellow       Creatinine           Differential Method     Automated     eGFR if            eGFR if non            Eos #     0.0     Eosinophil %     0.0     Glucose           Glucose, UA   3+       Gran # (ANC)     7.3     Gran %     82.4     Group & Rh O POS         Hematocrit     29.9     Hemoglobin     9.5     Immature Grans (Abs)     0.21  Comment:  Mild elevation in immature granulocytes is non specific and   can be seen in a variety of conditions including stress response,   acute inflammation, trauma and pregnancy. Correlation with other   laboratory and clinical findings is essential.       Immature Granulocytes     2.4     INDIRECT MAXINE NEG         Ketones, UA   Negative       Lactate, Martin   2.5  Comment:  Falsely low lactic acid results can be found in samples   containing >=13.0 mg/dL total bilirubin and/or >=3.5 mg/dL   direct bilirubin.         Leukocytes, UA   Negative       Lymph #     0.8     Lymph %     8.5     Magnesium           MCH     29.8     MCHC     31.8     MCV     94     Microscopic Comment   SEE COMMENT  Comment:  Other formed elements not mentioned in the report are not   present in the microscopic examination.          Mono #     0.6     Mono %     6.6     MPV     9.7     NITRITE UA   Negative       nRBC     0     Occult Blood UA   Negative       pH, UA   6.0       Platelet Estimate           Platelets     182     Potassium           PROTEIN TOTAL           Protein, UA   Negative  Comment:  Recommend a 24 hour urine protein or a urine   protein/creatinine ratio if globulin induced proteinuria is  clinically suspected.         RBC     3.19     RDW     16.7     SARS-CoV-2 RNA,  Amplification, Qual           Sodium           Specific Gravity, UA   1.020       Specimen UA   Urine, Clean Catch       Troponin I           UROBILINOGEN UA   4.0-6.0       WBC     8.91     Yeast, UA   None             Significant Imaging:   Conclusion    · There is mild left ventricular concentric hypertrophy.  · With normal systolic function. The estimated ejection fraction is 60%.  · Grade I diastolic dysfunction.  · Normal right ventricular systolic function.  · Mild left atrial enlargement.  · Mild mitral regurgitation.  · Normal central venous pressure (3 mmHg).  · The estimated PA systolic pressure is 15 mmHg.

## 2020-11-06 NOTE — PLAN OF CARE
Problem: Fall Injury Risk  Goal: Absence of Fall and Fall-Related Injury  Outcome: Ongoing, Progressing     Problem: Adult Inpatient Plan of Care  Goal: Plan of Care Review  Outcome: Ongoing, Progressing  Goal: Patient-Specific Goal (Individualization)  Outcome: Ongoing, Progressing  Goal: Absence of Hospital-Acquired Illness or Injury  Outcome: Ongoing, Progressing  Goal: Optimal Comfort and Wellbeing  Outcome: Ongoing, Progressing  Goal: Readiness for Transition of Care  Outcome: Ongoing, Progressing  Goal: Rounds/Family Conference  Outcome: Ongoing, Progressing     Problem: Diabetes Comorbidity  Goal: Blood Glucose Level Within Desired Range  Outcome: Ongoing, Progressing     Problem: Fluid Imbalance (Pneumonia)  Goal: Fluid Balance  Outcome: Ongoing, Progressing     Problem: Infection (Pneumonia)  Goal: Resolution of Infection Signs/Symptoms  Outcome: Ongoing, Progressing     Problem: Respiratory Compromise (Pneumonia)  Goal: Effective Oxygenation and Ventilation  Outcome: Ongoing, Progressing     Problem: Wound  Goal: Optimal Wound Healing  Outcome: Ongoing, Progressing     Problem: Syncope  Goal: Absence of Syncopal Symptoms  Outcome: Ongoing, Progressing

## 2020-11-06 NOTE — PT/OT/SLP EVAL
Physical Therapy Evaluation    Patient Name:  Karan Aburto   MRN:  [id number]    Recommendations:     Discharge Recommendations:  nursing facility, skilled   Discharge Equipment Recommendations: bedside commode   Barriers to discharge: UNKNOWN    Assessment:     Karan Aburto is a 75 y.o. male admitted with a medical diagnosis of Syncope.  He presents with the following impairments/functional limitations:  weakness, impaired endurance, impaired self care skills, impaired functional mobilty, gait instability, impaired cognition, decreased lower extremity function, decreased safety awareness, impaired cardiopulmonary response to activity.    Rehab Prognosis: Fair; patient would benefit from acute skilled PT services to address these deficits and reach maximum level of function.    Recent Surgery: * No surgery found *      Plan:     During this hospitalization, patient to be seen 5 x/week to address the identified rehab impairments via gait training, therapeutic activities, therapeutic exercises and progress toward the following goals:    · Plan of Care Expires:  11/13/20    Subjective     Chief Complaint: weakness  Patient/Family Comments/goals: to increase strength   Pain/Comfort:  · Pain Rating 1: 0/10    Patients cultural, spiritual, Hindu conflicts given the current situation:      Living Environment:  Lives with grandson in 1 story house with no steps. Pt was at SNF PTA.   Prior to admission, patients level of function was Mod I with ADLs, ambulated with RW.  Equipment used at home: walker, rolling, shower chair, grab bar.  DME owned (not currently used): none.  Upon discharge, patient will have assistance from UNKNOWN.    Objective:     Communicated with Nurse Carr and epic chart review prior to session.  Patient found supine with peripheral IV, bed alarm, telemetry  upon PT entry to room.    General Precautions: Standard, fall   Orthopedic Precautions:N/A   Braces: N/A     Exams:  · Cognitive Exam:   Patient is oriented to Person, Place, Time, Situation and bacame confused at times  · RLE ROM: WFL  · RLE Strength: 4/5 grossly  · LLE ROM: WFL  · LLE Strength: 4/5 grossly    Functional Mobility:  · Bed Mobility:     · Rolling Left:  minimum assistance  · Rolling Right: minimum assistance  · Scooting: minimum assistance  · Supine to Sit: minimum assistance  · Transfers:     · Sit to Stand:  moderate assistance with rolling walker  · Toilet Transfer: moderate assistance with  rolling walker  using  Step Transfer  · Gait: ~10ft using RW with Mod A  · Balance: Poor    Therapeutic Activities and Exercises:   PT Eval completed as listed above. Pt educated in role of PT and POC.     AM-PAC 6 CLICK MOBILITY  Total Score:13     Patient left sitting on toilet with all lines intact, call button in reach, Nurse Lilly notified and PCT Misty outside of room.    GOALS:   Multidisciplinary Problems     Physical Therapy Goals        Problem: Physical Therapy Goal    Goal Priority Disciplines Outcome Goal Variances Interventions   Physical Therapy Goal     PT, PT/OT      Description: LTGs to be met by 11/13/2020  1. Pt will perform bed mobility with SBA  2. Pt will perform sit<>stand functional t/fs with CGA  3. Pt will ambulate ~50ft using RW with CGA  4. Pt will perform (B) LE therapeutic exercises 1 x 15 reps                   History:     Past Medical History:   Diagnosis Date    Elevated PSA     Hypertension     Rosacea     Thyroid disease     hypothyroidism       History reviewed. No pertinent surgical history.    Time Tracking:     PT Received On: 11/06/20  PT Start Time: 0907     PT Stop Time: 0920  PT Total Time (min): 13 min     Billable Minutes: Evaluation 13 min      Tania Way PT/OT  11/06/2020

## 2020-11-06 NOTE — PLAN OF CARE
WAYNE spoke with pt's son via phone to complete discharge assessment. Pt is currently at [facility]. Pt receives his help from the staff. The agency will pick him up when he is discharged. Pt is ambulatory and ADLs. No needs identified at si time. Pt son request that the nurse contact him with an update. WAYNE sent secure chat with pt's son name and number.    Payor: [employer]IC[employer] / Plan: [email or web address] CHOICES 65 / Product Type: Medicare Advantage /   PCP: Mickey Agrawal MD  Pharmacy: No Pharmacies Listed  Bedside Delivery: No  MyChart: Active       11/06/20 1030   Discharge Assessment   Assessment Type Discharge Planning Assessment   Confirmed/corrected address and phone number on facesheet? Yes   Assessment information obtained from? Other  (Violet Aburto (son) [phone])   Expected Length of Stay (days)   (TBD)   Communicated expected length of stay with patient/caregiver yes   Prior to hospitilization cognitive status: Alert/Oriented   Prior to hospitalization functional status: Independent;Assistive Equipment   Current cognitive status: Alert/Oriented   Current Functional Status: Independent;Assistive Equipment   Facility Arrived From: [facility]   Lives With facility resident   Able to Return to Prior Arrangements yes   Is patient able to care for self after discharge? No   Who are your caregiver(s) and their phone number(s)? Milton Aburto (son) [phone]   Patient's perception of discharge disposition skilled nursing facility   Readmission Within the Last 30 Days no previous admission in last 30 days   Patient currently being followed by outpatient case management? No   Patient currently receives any other outside agency services? No   Equipment Currently Used at Home walker, rolling   Part D Coverage n/a   Do you have any problems affording any of your prescribed medications? No   Is the patient taking medications as prescribed? yes   Does the patient have transportation  home? Yes   Transportation Anticipated family or friend will provide   Dialysis Name and Scheduled days n/a   Does the patient receive services at the [facility]? No   Discharge Plan A Skilled Nursing Facility   Discharge Plan B Skilled Nursing Facility   DME Needed Upon Discharge  none   Patient/Family in Agreement with Plan yes       Satnam Clemons LMSW 11/6/2020 11:05 AM

## 2020-11-06 NOTE — HPI
Mr. Aburto is a 73 yo male with PMHx of Bilateral leg spastic paraplegia, HTN, diastolic heart failure, obesity and hypothyroidism who was brought to the ED due syncopal episode. Denies any pre syncope symptoms. No chest pain, SOB, palpitations, dizziness or diaphoresis.  Pt was discharged from [facility] 11/3/2020 to a SNF due to generalized weakness and debility. Noted to have elevated troponin at that time. Likely due to uncontrolled glucose, CKD, and acute CHF.   On recent admission,  CT noted subacute stroke however, Neurologist, Dr. Romano stated the stroke was not acute.  CXR notes a questionable left basilar infiltrate  CT of Head notes a subacute infarct suspected in the right cerebellum - chronic finding    Labs find  Anemia 9.5/29.9, 8.7/27.6.  WBC normal, gluc 249,  and troponin 0.042, 0.033, 0.039. Trending down from previous admission.   lactate 2.5, procal is negative  Echo in Oct 2020 shows normal LVF 60%

## 2020-11-06 NOTE — NURSING
Pt was moved to a room closer to the nurses station and [employer] monitor system placed at bedside due to high fall risk. Pt has history of falls and is very weak, working with PT. Chair alarm currently in use.

## 2020-11-06 NOTE — PLAN OF CARE
POC reviewed with pt , verbalizes understanding   Problem: Fall Injury Risk  Goal: Absence of Fall and Fall-Related Injury  Outcome: Ongoing, Progressing     Problem: Adult Inpatient Plan of Care  Goal: Plan of Care Review  Outcome: Ongoing, Progressing  Goal: Patient-Specific Goal (Individualization)  Outcome: Ongoing, Progressing  Goal: Absence of Hospital-Acquired Illness or Injury  Outcome: Ongoing, Progressing  Goal: Optimal Comfort and Wellbeing  Outcome: Ongoing, Progressing  Goal: Readiness for Transition of Care  Outcome: Ongoing, Progressing  Goal: Rounds/Family Conference  Outcome: Ongoing, Progressing     Problem: Diabetes Comorbidity  Goal: Blood Glucose Level Within Desired Range  Outcome: Ongoing, Progressing     Problem: Fluid Imbalance (Pneumonia)  Goal: Fluid Balance  Outcome: Ongoing, Progressing     Problem: Infection (Pneumonia)  Goal: Resolution of Infection Signs/Symptoms  Outcome: Ongoing, Progressing     Problem: Respiratory Compromise (Pneumonia)  Goal: Effective Oxygenation and Ventilation  Outcome: Ongoing, Progressing     Problem: Wound  Goal: Optimal Wound Healing  Outcome: Ongoing, Progressing     Problem: Syncope  Goal: Absence of Syncopal Symptoms  Outcome: Ongoing, Progressing     Problem: Skin Injury Risk Increased  Goal: Skin Health and Integrity  Outcome: Ongoing, Progressing

## 2020-11-06 NOTE — NURSING
Pt admitted to unit from ER. Oriented to room , fall risk explained - verbalizes understanding , bed alarm on

## 2020-11-07 NOTE — ASSESSMENT & PLAN NOTE
Signs of early pneumonia with left basilar infiltrate noted in CXR.   IV Azithromycin, Rocephin  Supplemental oxygen to maintain sats > 92 %  Stacy

## 2020-11-07 NOTE — NURSING
Report called to Genie. AVS will be sent with patient. Transportation to be set up by facility. Callback number given in case transportation cannot be arranged. Will remove IV and telemetry when transportation arrives.

## 2020-11-07 NOTE — SUBJECTIVE & OBJECTIVE
Review of Systems   Constitution: Negative for diaphoresis, malaise/fatigue, weight gain and weight loss.   HENT: Negative for congestion and nosebleeds.    Cardiovascular: Positive for syncope. Negative for chest pain, claudication, cyanosis, dyspnea on exertion, irregular heartbeat, leg swelling, near-syncope, orthopnea, palpitations and paroxysmal nocturnal dyspnea.   Respiratory: Negative for cough, hemoptysis, shortness of breath, sleep disturbances due to breathing, snoring, sputum production and wheezing.    Hematologic/Lymphatic: Negative for bleeding problem. Does not bruise/bleed easily.   Skin: Negative for rash.   Musculoskeletal: Negative for arthritis, back pain, falls, joint pain, muscle cramps and muscle weakness.   Gastrointestinal: Negative for abdominal pain, constipation, diarrhea, heartburn, hematemesis, hematochezia, melena, nausea and vomiting.   Genitourinary: Negative for dysuria, hematuria and nocturia.   Neurological: Negative for excessive daytime sleepiness, dizziness, headaches, light-headedness, loss of balance, numbness, vertigo and weakness.        Spastic paraplegia      Objective:     Vital Signs (Most Recent):  Temp: 97.8 °F (36.6 °C) (11/07/20 0422)  Pulse: 86 (11/07/20 0754)  Resp: 20 (11/07/20 0754)  BP: (!) 176/87 (11/07/20 0422)  SpO2: 96 % (11/07/20 0754) Vital Signs (24h Range):  Temp:  [97.8 °F (36.6 °C)-98.4 °F (36.9 °C)] 97.8 °F (36.6 °C)  Pulse:  [] 86  Resp:  [16-20] 20  SpO2:  [92 %-99 %] 96 %  BP: (104-176)/(66-95) 176/87     Weight: 90 kg (198 lb 6.6 oz)  Body mass index is 28.47 kg/m².     SpO2: 96 %  O2 Device (Oxygen Therapy): room air      Intake/Output Summary (Last 24 hours) at 11/7/2020 0757  Last data filed at 11/7/2020 0300  Gross per 24 hour   Intake 404 ml   Output --   Net 404 ml       Lines/Drains/Airways     Peripheral Intravenous Line                 Peripheral IV - Single Lumen 11/05/20 2140 20 G Anterior;Left Forearm 1 day                 Physical Exam   Constitutional: He is oriented to person, place, and time. He appears well-developed and well-nourished.   Neck: Neck supple. No JVD present.   Cardiovascular: Normal rate, regular rhythm, normal heart sounds and normal pulses. Exam reveals no friction rub.   No murmur heard.  Pulmonary/Chest: Effort normal and breath sounds normal. No respiratory distress. He has no wheezes. He has no rales.   Abdominal: Soft. Bowel sounds are normal. He exhibits no distension.   Musculoskeletal:         General: No tenderness or edema.   Neurological: He is alert and oriented to person, place, and time.   Skin: Skin is warm and dry. No rash noted.   Psychiatric: He has a normal mood and affect. His behavior is normal.   Nursing note and vitals reviewed.      Significant Labs:   All pertinent lab results from the last 24 hours have been reviewed. and   Recent Lab Results       11/07/20  0610   11/07/20  0542   11/06/20  2105   11/06/20  1647   11/06/20  1545        Systolic blood pressure         148.0     Diastolic blood pressure         95.0     Albumin 2.3             Alkaline Phosphatase 83             ALT 65             Anion Gap 6             AST 26             BILIRUBIN TOTAL 0.9  Comment:  For infants and newborns, interpretation of results should be based  on gestational age, weight and in agreement with clinical  observations.  Premature Infant recommended reference ranges:  Up to 24 hours.............<8.0 mg/dL  Up to 48 hours............<12.0 mg/dL  3-5 days..................<15.0 mg/dL  6-29 days.................<15.0 mg/dL               BUN 21             Calcium 8.0             Chloride 105             CO2 27             Creatinine 0.9             HR at rest         81.0     eGFR if  >60             eGFR if non  >60  Comment:  Calculation used to obtain the estimated glomerular filtration  rate (eGFR) is the CKD-EPI equation.                Folate                Glucose 122             Hematocrit 25.5             Hemoglobin 8.3             Iron               Magnesium 2.0             MCH 29.9             MCHC 32.5             MCV 92             MPV 9.5             Nuc Rest Diastolic Volume Index         100              98     Nuc Rest Systolic Volume Index         41              39     Nuc Rest EF         59     Nuc Stress EF         60     85% Max Predicted HR         123     Max Predicted HR         145     OHS CV CPX PATIENT IS FEMALE         0     OHS CV CPX PATIENT IS MALE         1     Peak Diatolic BP         95.0     Peak HR         88.0     Peak RPP         13,024     Peak Systolic BP         148.0     % Max HR Achieved         61     RPP         11,988     Phosphorus 2.2             Platelets 174             POCT Glucose   117 174 252       Potassium 3.6             PROTEIN TOTAL 4.9             RBC 2.78             RDW 16.7             Saturated Iron               Sodium 138             TIBC               Transferrin               Vitamin B-12               WBC 6.81                              11/06/20  1141   11/06/20  0844        Systolic blood pressure         Diastolic blood pressure         Albumin         Alkaline Phosphatase         ALT         Anion Gap         AST         BILIRUBIN TOTAL         BUN         Calcium         Chloride         CO2         Creatinine         HR at rest         eGFR if          eGFR if non African American         Folate   5.5     Glucose         Hematocrit         Hemoglobin         Iron   38     Magnesium         MCH         MCHC         MCV         MPV         Nuc Rest Diastolic Volume Index         Nuc Rest Systolic Volume Index         Nuc Rest EF         Nuc Stress EF         85% Max Predicted HR         Max Predicted HR         OHS CV CPX PATIENT IS FEMALE         OHS CV CPX PATIENT IS MALE         Peak Diatolic BP         Peak HR         Peak RPP         Peak Systolic BP         % Max HR Achieved          RPP         Phosphorus         Platelets         POCT Glucose 205       Potassium         PROTEIN TOTAL         RBC         RDW         Saturated Iron   14     Sodium         TIBC   265     Transferrin   179     Vitamin B-12   452     WBC               Significant Imaging: Echocardiogram:   2D echo with color flow doppler: No results found for this or any previous visit. and Transthoracic echo (TTE) complete (Cupid Only):   Results for orders placed or performed during the hospital encounter of 10/18/20   Echo Color Flow Doppler? Yes; Bubble Contrast? No   Result Value Ref Range    Ascending aorta 2.97 cm    STJ 2.57 cm    AV mean gradient 7 mmHg    Ao peak lewis 1.79 m/s    Ao VTI 27.91 cm    IVS 1.39 (A) 0.6 - 1.1 cm    LA size 4.21 cm    Left Atrium Major Axis 4.62 cm    LVIDd 3.82 3.5 - 6.0 cm    LVIDs 2.29 2.1 - 4.0 cm    LVOT diameter 2.39 cm    LVOT peak VTI 21.64 cm    Posterior Wall 1.08 0.6 - 1.1 cm    MV Peak A Lewis 1.10 m/s    E wave decelartion time 190.97 msec    MV Peak E Lewis 0.79 m/s    RA Major Axis 4.32 cm    RA Width 2.24 cm    TAPSE 2.81 cm    TR Max Lewis 1.74 m/s    TDI LATERAL 0.07 m/s    TDI SEPTAL 0.06 m/s    LA WIDTH 4.11 cm    Ao root annulus 3.66 cm    MV stenosis pressure 1/2 time 55.38 ms    LV Diastolic Volume 62.69 mL    LV Systolic Volume 17.96 mL    LVOT peak lewis 1.28 m/s    LV LATERAL E/E' RATIO 11.29 m/s    LV SEPTAL E/E' RATIO 13.17 m/s    FS 40 %    LV mass 161.27 g    Left Ventricle Relative Wall Thickness 0.57 cm    AV valve area 3.48 cm2    AV Velocity Ratio 0.72     AV index (prosthetic) 0.78     MV valve area p 1/2 method 3.97 cm2    E/A ratio 0.72     Mean e' 0.07 m/s    LVOT area 4.5 cm2    LVOT stroke volume 97.03 cm3    AV peak gradient 13 mmHg    E/E' ratio 12.15 m/s    LV Systolic Volume Index 8.1 mL/m2    LV Diastolic Volume Index 28.40 mL/m2    LV Mass Index 73 g/m2    Triscuspid Valve Regurgitation Peak Gradient 12 mmHg    Right Atrial Pressure (from IVC) 3 mmHg     TV rest pulmonary artery pressure 15 mmHg    Narrative    · There is mild left ventricular concentric hypertrophy.  · With normal systolic function. The estimated ejection fraction is 60%.  · Grade I diastolic dysfunction.  · Normal right ventricular systolic function.  · Mild left atrial enlargement.  · Mild mitral regurgitation.  · Normal central venous pressure (3 mmHg).  · The estimated PA systolic pressure is 15 mmHg.

## 2020-11-07 NOTE — ASSESSMENT & PLAN NOTE
Syncope  Rule out vasovagal event, orthostasis or cardiac/neuro event  - Admit, Telemetry monitoring, orthostatic VS every shift, neuro checks every 4 hours, serial troponin levels  Not orthostatic from V/S in the ED - continue daily orthostatics  · 2 D ECHO -There is mild left ventricular concentric hypertrophy. - from 10/19/2020  · With normal systolic function. The estimated ejection fraction is 60%.  · Grade I diastolic dysfunction.  · Normal right ventricular systolic function.  · Mild left atrial enlargement.  · Mild mitral regurgitation.  · Normal central venous pressure (3 mmHg).  · The estimated PA systolic pressure is 15 mmHg.      11/6-   Cardiology is consulted for further evaluation and pt underwent MPI stress today which was reported to be normal with no evidence of ischemia . Repeat EKG resulted NSR with RBBB.

## 2020-11-07 NOTE — ASSESSMENT & PLAN NOTE
troponin 0.042, 0.033, 0.039. Trending down from previous admission.    Given his troponin leak and recurrent syncope, will plan for stress test to rule out ischemia.   Recommend OP ILR vs. Zio and/ro Tilt table  Patient follows with outside Cardiologist and can follow up after DC for further cardiac workup pending stress test results   Continue ASA, Statin, BB    11/7/2020  -no ischemia noted on stress test   -Continue ASA, Statin, BB  -OP  ILR vs. Zio and/ro Tilt table

## 2020-11-07 NOTE — HOSPITAL COURSE
11/7/2020-BP up some this morning. Stress test negative for ischemia. No recurrent syncope. No angina or equivalent

## 2020-11-07 NOTE — PT/OT/SLP PROGRESS
Physical Therapy  Treatment    Karan Aburto   MRN: [id number]   Admitting Diagnosis: Syncope    PT Received On: 11/07/20  PT Start Time: 0912     PT Stop Time: 0930    PT Total Time (min): 18 min       Billable Minutes:  Gait Training 10 and Therapeutic Exercise 8    Treatment Type: Treatment  PT/PTA: PTA     PTA Visit Number: 1       General Precautions: Standard,    Orthopedic Precautions:     Braces:           Subjective:  Communicated with epic and nurse Chris prior to session.      Pain/Comfort  Pain Rating 1: 0/10  Pain Rating Post-Intervention 1: 0/10    Objective:   Patient found with: telemetry    Functional Mobility:  Bed Mobility:        Transfers:       Gait:        Stairs:          Balance:   Static Sit: POOR+: Needs MINIMAL assist to maintain  Dynamic Sit: FAIR: Cannot move trunk without losing balance  Static Stand: POOR: Needs MODERATE assist to maintain  Dynamic stand: POOR: Needs MOD (moderate) assist during gait       Therapeutic Activities and Exercises:  Pt sat EOB and performed exercises and ambulated into flores with RW and Mod x2 for 40 feet x2 trials.      AM-PAC 6 CLICK MOBILITY  How much help from another person does this patient currently need?   1 = Unable, Total/Dependent Assistance  2 = A lot, Maximum/Moderate Assistance  3 = A little, Minimum/Contact Guard/Supervision  4 = None, Modified [place]/Independent    Turning over in bed (including adjusting bedclothes, sheets and blankets)?: 3  Sitting down on and standing up from a chair with arms (e.g., wheelchair, bedside commode, etc.): 3  Moving from lying on back to sitting on the side of the bed?: 3  Moving to and from a bed to a chair (including a wheelchair)?: 3  Need to walk in hospital room?: 1(NT)    AM-PAC Raw Score CMS G-Code Modifier Level of Impairment Assistance   6 % Total / Unable   7 - 9 CM 80 - 100% Maximal Assist   10 - 14 CL 60 - 80% Moderate Assist   15 - 19 CK 40 - 60% Moderate Assist   20 - 22 CJ 20 - 40%  Minimal Assist   23 CI 1-20% SBA / CGA   24 CH 0% Independent/ Mod I     Patient left supine with all lines intact, call button in reach and bed alarm on.    Assessment:  Karan Aburto is a 75 y.o. male with a medical diagnosis of Syncope and presents with decreased endurance and strength.    Rehab identified problem list/impairments: Rehab identified problem list/impairments: weakness, impaired endurance, impaired functional mobilty, gait instability, impaired balance, decreased safety awareness    Rehab potential is good.    Activity tolerance: Good    Discharge recommendations: Discharge Facility/Level of Care Needs: rehabilitation facility     Barriers to discharge:      Equipment recommendations:       GOALS:   Multidisciplinary Problems     Physical Therapy Goals        Problem: Physical Therapy Goal    Goal Priority Disciplines Outcome Goal Variances Interventions   Physical Therapy Goal     PT, PT/OT Ongoing, Progressing     Description: LTGs to be met by 11/13/2020  1. Pt will perform bed mobility with SBA  2. Pt will perform sit<>stand functional t/fs with CGA  3. Pt will ambulate ~50ft using RW with CGA  4. Pt will perform (B) LE therapeutic exercises 1 x 15 reps                   PLAN:    Patient to be seen 5 x/week  to address the above listed problems via gait training, therapeutic activities, therapeutic exercises  Plan of Care expires:    Plan of Care reviewed with: patient         Venkat Menchaca, PT  11/07/2020

## 2020-11-07 NOTE — PLAN OF CARE
Pt in bed and agreeable to PT. Pt sat EOB and performed seated exercises x15 in all available planes. Pt ambulated in flores with RW and Mod x2 for 40 feet x2 trials. Pt was returned to bed and left with all needs with in reach and bed alarm on.

## 2020-11-07 NOTE — ASSESSMENT & PLAN NOTE
11/6- Cardiology is consulted for further evaluation and pt underwent MPI stress today which was reported to be normal with no evidence of ischemia

## 2020-11-07 NOTE — PLAN OF CARE
"Plan of care reviewed with patient, pt verbalized understanding.  Pt remains free from falls this shift, fall precautions in place.bed alarm set.  Pt remains free from skin breakdown, pt educated on the importance of frequent weight shift to decrease the risk of pressure injury. Pt verbalized understanding.   AAOx4,NAD noted at this time.  BP (!) 176/87 (BP Location: Right arm, Patient Position: Lying)   Pulse 85   Temp 97.8 °F (36.6 °C) (Axillary)   Resp 20   Ht 5' 10" (1.778 m)   Wt 90 kg (198 lb 6.6 oz)   SpO2 (!) 94%   BMI 28.47 kg/m²    PIV 20 G to L FA , Saline locked  Pt remained afebrile.  NSR on the tele monitor  Blood glucose monitoring AC/HS.  Pt admitted for Syncope. Neurochecks q4hrs. IV abx for PNA.  Pt currently resting comfortably in bed.  Hourly rounding complete. Bed in lowest position, side rails up, call light in reach.  Telesitter remains at the bedside.  Will continue to monitor.    Problem: Syncope  Goal: Absence of Syncopal Symptoms  Outcome: Ongoing, Progressing     Problem: Skin Injury Risk Increased  Goal: Skin Health and Integrity  Outcome: Ongoing, Progressing     Problem: Respiratory Compromise (Pneumonia)  Goal: Effective Oxygenation and Ventilation  Outcome: Ongoing, Progressing     "

## 2020-11-07 NOTE — PROGRESS NOTES
[facility] Hospital Medicine  Progress Note    Patient Name: Karan Aburto  MRN: [id number]  Patient Class: OP- Observation   Admission Date: 11/5/2020  Length of Stay: 0 days  Attending Physician: Merry Villa MD  Primary Care Provider: Mickey Agrawal MD        Subjective:     Principal Problem:Syncope        HPI:  Pt is a 75 yo male with PMHx of Bilateral leg spastic paraplegia, HTN, diastolic heart failure, obesity and hypothyroidism who was brought to the ED due syncopal episode. Pt was discharged from [facility] 11/3/2020 to a SNF due to generalized weakness and debility. Pt has a hx of BLE spastic paraplegia and has become increasingly weak over the last few weak. Last admission, CT imaging noted subacute stroke however, Neurologist, Dr. Romano stated the stroke was not acute. He discharged to SNF to continue physical therapy. Today, pt states he was sitting in a chair then woke up lying in an ambulance. He is AAOx3. He says he remembers eating breakfast this AM. He denies other symptoms including fever, chest pain, slurred speech, asymmetrical extremity weakness, abdominal pain, vomiting, diarrhea, urine symptoms, lightheadedness, dizziness and headache.   Vital sign on arrival, Temp 97.5, pulse 65, resp 20 and B/P 107 /62. Orthostatic 164/92, pulse 78 lying and 153/92, pulse 79 sitting.  CXR notes a questionable left basilar infiltrate  CT of Head notes a subacute infarct suspected in the right cerebellum - chronic finding  Labs find  Anemia 9.5/29.9, WBC normal, gluc 249,  and troponin 0.042, lactate 2.5, procal is negative  Pt is placed on Observation for eval of Syncope and treatment for pneumonia.     Overview/Hospital Course:  11/6- Pt was admitted yesterday  for evaluation following an episode of syncope while eating breakfast . Denies chest pain, SOB, palpitation , diaphoresis  , lightheadedness or dizziness prior to the event . Initial EKG resulted sinus arhythmia  with PVC, RBBB and non specific T wave abnormality in lat lead and troponin 0.042. Cardiology is consulted for further evaluation and pt underwent MPI stress today which was reported to be normal with no evidence of ischemia . Repeat EKG resulted NSR with RBBB. Noted pt has labile BP ranges from high to normal and is on multiple antihypertensives including isosorbide dinitrate 20 mg TID  to manage blood pressure . Mild systolic BP drop noted from lying to sitting yesterday. Pt also recently diagnosed with diabetes mellitus type 2 with HgA1c 9.3 . In his previous admission , he was evaluated by Neurologist Dr. 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 And also given H/O Diabetes some autonomic dysfunction with orthostatic hypotension won't be unusual. Additionally Isordil could cause transient hypotension. Will change Isordil to extended release Imdur. Pt was noted carol have left basilar infiltrate and antibiotic therapy is initiated on admission. Labs resulted normal WBC count, procalcitonin 0.05(nl), Hgb 8.7, K 3.4 and creatinine 0.9.        Interval History:    Cardiology is consulted for further evaluation and pt underwent MPI stress today which was reported to be normal with no evidence of ischemia . Repeat EKG resulted NSR with RBBB.       Review of Systems   Constitutional: Positive for activity change. Negative for appetite change and fever.   HENT: Negative for sore throat.    Eyes: Negative for visual disturbance.   Respiratory: Negative for cough, chest tightness and shortness of breath.    Cardiovascular: Negative for chest pain, palpitations and leg swelling.   Gastrointestinal: Negative for abdominal distention, abdominal pain, constipation, diarrhea, nausea and vomiting.   Endocrine: Negative for polyuria.   Genitourinary: Negative for decreased urine volume, dysuria, flank pain, frequency and hematuria.    Musculoskeletal: Positive for gait problem. Negative for back pain.   Skin: Negative for rash.   Neurological: Positive for weakness (loretta lower ext). Negative for syncope, speech difficulty, light-headedness and headaches.   Psychiatric/Behavioral: Negative for confusion, hallucinations and sleep disturbance.     Objective:     Vital Signs (Most Recent):  Temp: 98.3 °F (36.8 °C) (11/06/20 1942)  Pulse: 80 (11/06/20 1943)  Resp: 18 (11/06/20 1943)  BP: 138/66 (11/06/20 1942)  SpO2: 97 % (11/06/20 1943) Vital Signs (24h Range):  Temp:  [98.1 °F (36.7 °C)-98.4 °F (36.9 °C)] 98.3 °F (36.8 °C)  Pulse:  [] 80  Resp:  [16-20] 18  SpO2:  [92 %-99 %] 97 %  BP: (104-193)/(66-95) 138/66     Weight: 87.1 kg (192 lb)  Body mass index is 27.55 kg/m².    Intake/Output Summary (Last 24 hours) at 11/6/2020 2212  Last data filed at 11/6/2020 0500  Gross per 24 hour   Intake 220 ml   Output --   Net 220 ml      Physical Exam  Constitutional:       General: He is not in acute distress.     Appearance: He is well-developed. He is not diaphoretic.   HENT:      Head: Normocephalic and atraumatic.      Mouth/Throat:      Pharynx: No oropharyngeal exudate.   Eyes:      Conjunctiva/sclera: Conjunctivae normal.      Pupils: Pupils are equal, round, and reactive to light.   Neck:      Musculoskeletal: Normal range of motion and neck supple.      Thyroid: No thyromegaly.      Vascular: No JVD.   Cardiovascular:      Rate and Rhythm: Normal rate and regular rhythm.      Heart sounds: Normal heart sounds. No murmur.   Pulmonary:      Effort: Pulmonary effort is normal. No respiratory distress.      Breath sounds: Normal breath sounds. No wheezing or rales.   Chest:      Chest wall: No tenderness.   Abdominal:      General: Bowel sounds are normal. There is no distension.      Palpations: Abdomen is soft.      Tenderness: There is no abdominal tenderness. There is no guarding or rebound.   Musculoskeletal: Normal range of motion.       Right lower leg: Edema (trace) present.      Left lower leg: Edema (trace) present.   Lymphadenopathy:      Cervical: No cervical adenopathy.   Skin:     General: Skin is warm and dry.      Findings: No rash.   Neurological:      Mental Status: He is alert and oriented to person, place, and time.      Cranial Nerves: No cranial nerve deficit.      Motor: Weakness (loretta LE 3/5, (+) Spacicity ) present.      Gait: Gait abnormal.      Deep Tendon Reflexes: Reflexes abnormal.         Significant Labs:   CBC:   Recent Labs   Lab 11/05/20  1151 11/06/20  0609   WBC 8.91 8.03   HGB 9.5* 8.7*   HCT 29.9* 27.6*    171     CMP:   Recent Labs   Lab 11/05/20  1033 11/06/20  0609    141   K 3.6 3.4*    105   CO2 25 28   * 104   BUN 22 22   CREATININE 1.0 0.9   CALCIUM 7.7* 8.0*   PROT 4.8* 5.0*   ALBUMIN 2.3* 2.4*   BILITOT 0.8 0.9   ALKPHOS 80 83   AST 29 26   ALT 68* 66*   ANIONGAP 9 8   EGFRNONAA >60 >60       Significant Imaging:       Assessment/Plan:      * Syncope  Syncope  Rule out vasovagal event, orthostasis or cardiac/neuro event  - Admit, Telemetry monitoring, orthostatic VS every shift, neuro checks every 4 hours, serial troponin levels  Not orthostatic from V/S in the ED - continue daily orthostatics  · 2 D ECHO -There is mild left ventricular concentric hypertrophy. - from 10/19/2020  · With normal systolic function. The estimated ejection fraction is 60%.  · Grade I diastolic dysfunction.  · Normal right ventricular systolic function.  · Mild left atrial enlargement.  · Mild mitral regurgitation.  · Normal central venous pressure (3 mmHg).  · The estimated PA systolic pressure is 15 mmHg.      11/6-   Cardiology is consulted for further evaluation and pt underwent MPI stress today which was reported to be normal with no evidence of ischemia . Repeat EKG resulted NSR with RBBB.            Chronic diastolic heart failure  Compensated  Continue aldactone, lisinopril      Pneumonia  Signs of  early pneumonia with left basilar infiltrate noted in CXR.   IV Azithromycin, Rocephin  Supplemental oxygen to maintain sats > 92 %  DuoNebs      Spastic paraplegia, hereditary  Continue PT/OT  11/6-  In his previous admission( 10/31/20),  he was evaluated by Neurologist Dr. 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      Elevated troponin  11/6- Cardiology is consulted for further evaluation and pt underwent MPI stress today which was reported to be normal with no evidence of ischemia      Essential hypertension  Elevated  Continue antihypertensives - Coreg, hydralazine, imdur and lisinopril  11/6-  Noted pt has labile BP ranges from high to normal and is on multiple antihypertensives including isosorbide dinitrate 20 mg TID  to manage blood pressure . Mild systolic BP drop noted from lying to sitting yesterday. Immediate release Isordil could cause transient hypotension . Will change Isordil to extended release Imdur         Diabetes mellitus type 2, uncontrolled  Lab Results   Component Value Date    HGBA1C 9.3 (H) 10/19/2020   accuchecks  Sliding scale insulin  Add Basal insulin         VTE Risk Mitigation (From admission, onward)         Ordered     enoxaparin injection 40 mg  Every 24 hours      11/05/20 1554     IP VTE HIGH RISK PATIENT  Once      11/05/20 1554     Place sequential compression device  Until discontinued      11/05/20 1554                Discharge Planning   ADAM:      Code Status: Full Code   Is the patient medically ready for discharge?:     Reason for patient still in hospital (select all that apply): Patient trending condition  Discharge Plan A: Skilled Nursing Facility                  Merry Villa MD  Department of Hospital Medicine   [facility]

## 2020-11-07 NOTE — HOSPITAL COURSE
11/6- Pt was admitted yesterday  for evaluation following an episode of syncope while eating breakfast . Denies chest pain, SOB, palpitation , diaphoresis  , lightheadedness or dizziness prior to the event . Initial EKG resulted sinus arhythmia with PVC, RBBB and non specific T wave abnormality in lat lead and troponin 0.042. Cardiology is consulted for further evaluation and pt underwent MPI stress today which was reported to be normal with no evidence of ischemia . Repeat EKG resulted NSR with RBBB. Noted pt has labile BP ranges from high to normal and is on multiple antihypertensives including isosorbide dinitrate 20 mg TID  to manage blood pressure . Mild systolic BP drop noted from lying to sitting yesterday. Pt also recently diagnosed with diabetes mellitus type 2 with HgA1c 9.3 . In his previous admission , he was evaluated by Neurologist Dr. 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 And also given H/O Diabetes some autonomic dysfunction with orthostatic hypotension won't be unusual. Additionally Isordil could cause transient hypotension. Will change Isordil to extended release Imdur. Pt was noted carol have left basilar infiltrate and antibiotic therapy is initiated on admission. Labs resulted normal WBC count, procalcitonin 0.05(nl), Hgb 8.7, K 3.4 and creatinine 0.9.     11/7- No further syncope since admission. Cards recommended outpatient  ILR vs. Zio and/or  Tilt table with Primary Cardiologist . BP fluctuates highs to normal. Rt upper ext noted to have swelling and ecchymoses . U/S is neg for DVT. Hgb 8.3 with iron def noted. No signs of active bleed. Replace iron parenterally while in house along with B12/Folic acid. Disposition - Return to [facility].      Pt remains overall stable  with no acute events. Pt accepted back to [facility] and discharge order placed. Pt is examined before discharge and deemed  stable.

## 2020-11-07 NOTE — ASSESSMENT & PLAN NOTE
Elevated  Continue antihypertensives - Coreg, hydralazine, imdur and lisinopril  11/6-  Noted pt has labile BP ranges from high to normal and is on multiple antihypertensives including isosorbide dinitrate 20 mg TID  to manage blood pressure . Mild systolic BP drop noted from lying to sitting yesterday. Immediate release Isordil could cause transient hypotension . Will change Isordil to extended release Imdur

## 2020-11-07 NOTE — CONSULTS
Nurse ERIK Sherwood LPN notified to call report to nurse Prescott once d/c orders placed.    Chidi Guerrero [phone]

## 2020-11-07 NOTE — ASSESSMENT & PLAN NOTE
Continue PT/OT  11/6-  In his previous admission( 10/31/20),  he was evaluated by Neurologist Dr. 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

## 2020-11-07 NOTE — SUBJECTIVE & OBJECTIVE
Interval History:   No further syncope since admission. Cards recommended OP  ILR vs. Zio and/or  Tilt table with Primary Cardiologist      Review of Systems   Constitutional: Positive for activity change. Negative for appetite change and fever.   HENT: Negative for sore throat.    Eyes: Negative for visual disturbance.   Respiratory: Negative for cough, chest tightness and shortness of breath.    Cardiovascular: Positive for leg swelling (better currently ). Negative for chest pain and palpitations.   Gastrointestinal: Negative for abdominal distention, abdominal pain, constipation, diarrhea, nausea and vomiting.   Endocrine: Negative for polyuria.   Genitourinary: Negative for decreased urine volume, dysuria, flank pain, frequency and hematuria.   Musculoskeletal: Positive for gait problem. Negative for back pain.   Skin: Negative for rash.   Neurological: Positive for syncope. Negative for speech difficulty, weakness, light-headedness and headaches.   Psychiatric/Behavioral: Negative for confusion, hallucinations and sleep disturbance.     Objective:     Vital Signs (Most Recent):  Temp: 97.9 °F (36.6 °C) (11/07/20 1141)  Pulse: 90 (11/07/20 1320)  Resp: 16 (11/07/20 1141)  BP: 120/61 (11/07/20 1141)  SpO2: (!) 94 % (11/07/20 1141) Vital Signs (24h Range):  Temp:  [97.8 °F (36.6 °C)-98.6 °F (37 °C)] 97.9 °F (36.6 °C)  Pulse:  [] 90  Resp:  [16-20] 16  SpO2:  [92 %-98 %] 94 %  BP: (120-176)/(61-87) 120/61     Weight: 90 kg (198 lb 6.6 oz)  Body mass index is 28.47 kg/m².    Intake/Output Summary (Last 24 hours) at 11/7/2020 1501  Last data filed at 11/7/2020 0300  Gross per 24 hour   Intake 404 ml   Output --   Net 404 ml      Physical Exam  Constitutional:       General: He is not in acute distress.     Appearance: He is well-developed. He is not diaphoretic.   HENT:      Head: Normocephalic and atraumatic.      Mouth/Throat:      Pharynx: No oropharyngeal exudate.   Eyes:      Conjunctiva/sclera:  Conjunctivae normal.      Pupils: Pupils are equal, round, and reactive to light.   Neck:      Musculoskeletal: Normal range of motion and neck supple.      Thyroid: No thyromegaly.      Vascular: No JVD.   Cardiovascular:      Rate and Rhythm: Normal rate and regular rhythm.      Heart sounds: Normal heart sounds. No murmur.   Pulmonary:      Effort: Pulmonary effort is normal. No respiratory distress.      Breath sounds: Normal breath sounds. No wheezing or rales.   Chest:      Chest wall: No tenderness.   Abdominal:      General: Bowel sounds are normal. There is no distension.      Palpations: Abdomen is soft.      Tenderness: There is no abdominal tenderness. There is no guarding or rebound.   Musculoskeletal: Normal range of motion.   Lymphadenopathy:      Cervical: No cervical adenopathy.   Skin:     General: Skin is warm and dry.      Findings: No rash.   Neurological:      Mental Status: He is alert and oriented to person, place, and time.      Cranial Nerves: No cranial nerve deficit.      Sensory: No sensory deficit.      Motor: Weakness (3/5 loretta lower ext ) present.      Deep Tendon Reflexes: Reflexes abnormal.      Comments: (+) spasticity          Significant Labs:   CBC:   Recent Labs   Lab 11/06/20  0609 11/07/20  0610   WBC 8.03 6.81   HGB 8.7* 8.3*   HCT 27.6* 25.5*    174     CMP:   Recent Labs   Lab 11/06/20  0609 11/07/20  0610    138   K 3.4* 3.6    105   CO2 28 27    122*   BUN 22 21   CREATININE 0.9 0.9   CALCIUM 8.0* 8.0*   PROT 5.0* 4.9*   ALBUMIN 2.4* 2.3*   BILITOT 0.9 0.9   ALKPHOS 83 83   AST 26 26   ALT 66* 65*   ANIONGAP 8 6*   EGFRNONAA >60 >60       Significant Imaging:

## 2020-11-07 NOTE — ASSESSMENT & PLAN NOTE
Syncope  Rule out vasovagal event, orthostasis or cardiac/neuro event  - Admit, Telemetry monitoring, orthostatic VS every shift, neuro checks every 4 hours, serial troponin levels  Not orthostatic from V/S in the ED - continue daily orthostatics  · 2 D ECHO -There is mild left ventricular concentric hypertrophy. - from 10/19/2020  · With normal systolic function. The estimated ejection fraction is 60%.  · Grade I diastolic dysfunction.  · Normal right ventricular systolic function.  · Mild left atrial enlargement.  · Mild mitral regurgitation.  · Normal central venous pressure (3 mmHg).  · The estimated PA systolic pressure is 15 mmHg.      11/6-   Cardiology is consulted for further evaluation and pt underwent MPI stress today which was reported to be normal with no evidence of ischemia . Repeat EKG resulted NSR with RBBB.     11/7 -  No further syncope since admission. Cards recommended OP  ILR vs. Zio and/or  Tilt table with Primary Cardiologist. Carotid U/S on 10/30/20- No significant stenosis.

## 2020-11-07 NOTE — SUBJECTIVE & OBJECTIVE
Interval History:    Cardiology is consulted for further evaluation and pt underwent MPI stress today which was reported to be normal with no evidence of ischemia . Repeat EKG resulted NSR with RBBB.       Review of Systems   Constitutional: Positive for activity change. Negative for appetite change and fever.   HENT: Negative for sore throat.    Eyes: Negative for visual disturbance.   Respiratory: Negative for cough, chest tightness and shortness of breath.    Cardiovascular: Negative for chest pain, palpitations and leg swelling.   Gastrointestinal: Negative for abdominal distention, abdominal pain, constipation, diarrhea, nausea and vomiting.   Endocrine: Negative for polyuria.   Genitourinary: Negative for decreased urine volume, dysuria, flank pain, frequency and hematuria.   Musculoskeletal: Positive for gait problem. Negative for back pain.   Skin: Negative for rash.   Neurological: Positive for weakness (loretta lower ext). Negative for syncope, speech difficulty, light-headedness and headaches.   Psychiatric/Behavioral: Negative for confusion, hallucinations and sleep disturbance.     Objective:     Vital Signs (Most Recent):  Temp: 98.3 °F (36.8 °C) (11/06/20 1942)  Pulse: 80 (11/06/20 1943)  Resp: 18 (11/06/20 1943)  BP: 138/66 (11/06/20 1942)  SpO2: 97 % (11/06/20 1943) Vital Signs (24h Range):  Temp:  [98.1 °F (36.7 °C)-98.4 °F (36.9 °C)] 98.3 °F (36.8 °C)  Pulse:  [] 80  Resp:  [16-20] 18  SpO2:  [92 %-99 %] 97 %  BP: (104-193)/(66-95) 138/66     Weight: 87.1 kg (192 lb)  Body mass index is 27.55 kg/m².    Intake/Output Summary (Last 24 hours) at 11/6/2020 2212  Last data filed at 11/6/2020 0500  Gross per 24 hour   Intake 220 ml   Output --   Net 220 ml      Physical Exam  Constitutional:       General: He is not in acute distress.     Appearance: He is well-developed. He is not diaphoretic.   HENT:      Head: Normocephalic and atraumatic.      Mouth/Throat:      Pharynx: No oropharyngeal exudate.    Eyes:      Conjunctiva/sclera: Conjunctivae normal.      Pupils: Pupils are equal, round, and reactive to light.   Neck:      Musculoskeletal: Normal range of motion and neck supple.      Thyroid: No thyromegaly.      Vascular: No JVD.   Cardiovascular:      Rate and Rhythm: Normal rate and regular rhythm.      Heart sounds: Normal heart sounds. No murmur.   Pulmonary:      Effort: Pulmonary effort is normal. No respiratory distress.      Breath sounds: Normal breath sounds. No wheezing or rales.   Chest:      Chest wall: No tenderness.   Abdominal:      General: Bowel sounds are normal. There is no distension.      Palpations: Abdomen is soft.      Tenderness: There is no abdominal tenderness. There is no guarding or rebound.   Musculoskeletal: Normal range of motion.      Right lower leg: Edema (trace) present.      Left lower leg: Edema (trace) present.   Lymphadenopathy:      Cervical: No cervical adenopathy.   Skin:     General: Skin is warm and dry.      Findings: No rash.   Neurological:      Mental Status: He is alert and oriented to person, place, and time.      Cranial Nerves: No cranial nerve deficit.      Motor: Weakness (loretta LE 3/5, (+) Spacicity ) present.      Gait: Gait abnormal.      Deep Tendon Reflexes: Reflexes abnormal.         Significant Labs:   CBC:   Recent Labs   Lab 11/05/20  1151 11/06/20  0609   WBC 8.91 8.03   HGB 9.5* 8.7*   HCT 29.9* 27.6*    171     CMP:   Recent Labs   Lab 11/05/20  1033 11/06/20  0609    141   K 3.6 3.4*    105   CO2 25 28   * 104   BUN 22 22   CREATININE 1.0 0.9   CALCIUM 7.7* 8.0*   PROT 4.8* 5.0*   ALBUMIN 2.3* 2.4*   BILITOT 0.8 0.9   ALKPHOS 80 83   AST 29 26   ALT 68* 66*   ANIONGAP 9 8   EGFRNONAA >60 >60       Significant Imaging:

## 2020-11-07 NOTE — PROGRESS NOTES
[facility] -   Cardiology  Progress Note    Patient Name: Karan Aburto  MRN: [id number]  Admission Date: 11/5/2020  Hospital Length of Stay: 0 days  Code Status: Full Code   Attending Physician: Merry Villa MD   Primary Care Physician: Mickey Agrawal MD  Expected Discharge Date:   Principal Problem:Syncope    Subjective:   Brief HPI:  Mr. Aburto is a 75 yo male with PMHx of Bilateral leg spastic paraplegia, HTN, diastolic heart failure, obesity and hypothyroidism who was brought to the ED due syncopal episode. Denies any pre syncope symptoms. No chest pain, SOB, palpitations, dizziness or diaphoresis.  Pt was discharged from [facility] 11/3/2020 to a SNF due to generalized weakness and debility. Noted to have elevated troponin at that time. Likely due to uncontrolled glucose, CKD, and acute CHF.   On recent admission,  CT noted subacute stroke however, Neurologist, Dr. Romano stated the stroke was not acute.  CXR notes a questionable left basilar infiltrate  CT of Head notes a subacute infarct suspected in the right cerebellum - chronic finding    Labs find  Anemia 9.5/29.9, 8.7/27.6.  WBC normal, gluc 249,  and troponin 0.042, 0.033, 0.039. Trending down from previous admission.   lactate 2.5, procal is negative  Echo in Oct 2020 shows normal LVF 60%        Hospital Course:   11/7/2020-BP up some this morning. Stress test negative for ischemia. No recurrent syncope. No angina or equivalent         Review of Systems   Constitution: Negative for diaphoresis, malaise/fatigue, weight gain and weight loss.   HENT: Negative for congestion and nosebleeds.    Cardiovascular: Positive for syncope. Negative for chest pain, claudication, cyanosis, dyspnea on exertion, irregular heartbeat, leg swelling, near-syncope, orthopnea, palpitations and paroxysmal nocturnal dyspnea.   Respiratory: Negative for cough, hemoptysis, shortness of breath, sleep disturbances due to breathing, snoring, sputum  production and wheezing.    Hematologic/Lymphatic: Negative for bleeding problem. Does not bruise/bleed easily.   Skin: Negative for rash.   Musculoskeletal: Negative for arthritis, back pain, falls, joint pain, muscle cramps and muscle weakness.   Gastrointestinal: Negative for abdominal pain, constipation, diarrhea, heartburn, hematemesis, hematochezia, melena, nausea and vomiting.   Genitourinary: Negative for dysuria, hematuria and nocturia.   Neurological: Negative for excessive daytime sleepiness, dizziness, headaches, light-headedness, loss of balance, numbness, vertigo and weakness.        Spastic paraplegia      Objective:     Vital Signs (Most Recent):  Temp: 97.8 °F (36.6 °C) (11/07/20 0422)  Pulse: 86 (11/07/20 0754)  Resp: 20 (11/07/20 0754)  BP: (!) 176/87 (11/07/20 0422)  SpO2: 96 % (11/07/20 0754) Vital Signs (24h Range):  Temp:  [97.8 °F (36.6 °C)-98.4 °F (36.9 °C)] 97.8 °F (36.6 °C)  Pulse:  [] 86  Resp:  [16-20] 20  SpO2:  [92 %-99 %] 96 %  BP: (104-176)/(66-95) 176/87     Weight: 90 kg (198 lb 6.6 oz)  Body mass index is 28.47 kg/m².     SpO2: 96 %  O2 Device (Oxygen Therapy): room air      Intake/Output Summary (Last 24 hours) at 11/7/2020 0757  Last data filed at 11/7/2020 0300  Gross per 24 hour   Intake 404 ml   Output --   Net 404 ml       Lines/Drains/Airways     Peripheral Intravenous Line                 Peripheral IV - Single Lumen 11/05/20 2140 20 G Anterior;Left Forearm 1 day                Physical Exam   Constitutional: He is oriented to person, place, and time. He appears well-developed and well-nourished.   Neck: Neck supple. No JVD present.   Cardiovascular: Normal rate, regular rhythm, normal heart sounds and normal pulses. Exam reveals no friction rub.   No murmur heard.  Pulmonary/Chest: Effort normal and breath sounds normal. No respiratory distress. He has no wheezes. He has no rales.   Abdominal: Soft. Bowel sounds are normal. He exhibits no distension.    Musculoskeletal:         General: No tenderness or edema.   Neurological: He is alert and oriented to person, place, and time.   Skin: Skin is warm and dry. No rash noted.   Psychiatric: He has a normal mood and affect. His behavior is normal.   Nursing note and vitals reviewed.      Significant Labs:   All pertinent lab results from the last 24 hours have been reviewed. and   Recent Lab Results       11/07/20  0610   11/07/20  0542   11/06/20  2105   11/06/20  1647   11/06/20  1545        Systolic blood pressure         148.0     Diastolic blood pressure         95.0     Albumin 2.3             Alkaline Phosphatase 83             ALT 65             Anion Gap 6             AST 26             BILIRUBIN TOTAL 0.9  Comment:  For infants and newborns, interpretation of results should be based  on gestational age, weight and in agreement with clinical  observations.  Premature Infant recommended reference ranges:  Up to 24 hours.............<8.0 mg/dL  Up to 48 hours............<12.0 mg/dL  3-5 days..................<15.0 mg/dL  6-29 days.................<15.0 mg/dL               BUN 21             Calcium 8.0             Chloride 105             CO2 27             Creatinine 0.9             HR at rest         81.0     eGFR if  >60             eGFR if non  >60  Comment:  Calculation used to obtain the estimated glomerular filtration  rate (eGFR) is the CKD-EPI equation.                Folate               Glucose 122             Hematocrit 25.5             Hemoglobin 8.3             Iron               Magnesium 2.0             MCH 29.9             MCHC 32.5             MCV 92             MPV 9.5             Nuc Rest Diastolic Volume Index         100              98     Nuc Rest Systolic Volume Index         41              39     Nuc Rest EF         59     Nuc Stress EF         60     85% Max Predicted HR         123     Max Predicted HR         145     OHS CV CPX PATIENT IS FEMALE          0     OHS CV CPX PATIENT IS MALE         1     Peak Diatolic BP         95.0     Peak HR         88.0     Peak RPP         13,024     Peak Systolic BP         148.0     % Max HR Achieved         61     RPP         11,988     Phosphorus 2.2             Platelets 174             POCT Glucose   117 174 252       Potassium 3.6             PROTEIN TOTAL 4.9             RBC 2.78             RDW 16.7             Saturated Iron               Sodium 138             TIBC               Transferrin               Vitamin B-12               WBC 6.81                              11/06/20  1141   11/06/20  0844        Systolic blood pressure         Diastolic blood pressure         Albumin         Alkaline Phosphatase         ALT         Anion Gap         AST         BILIRUBIN TOTAL         BUN         Calcium         Chloride         CO2         Creatinine         HR at rest         eGFR if          eGFR if non African American         Folate   5.5     Glucose         Hematocrit         Hemoglobin         Iron   38     Magnesium         MCH         MCHC         MCV         MPV         Nuc Rest Diastolic Volume Index         Nuc Rest Systolic Volume Index         Nuc Rest EF         Nuc Stress EF         85% Max Predicted HR         Max Predicted HR         OHS CV CPX PATIENT IS FEMALE         OHS CV CPX PATIENT IS MALE         Peak Diatolic BP         Peak HR         Peak RPP         Peak Systolic BP         % Max HR Achieved         RPP         Phosphorus         Platelets         POCT Glucose 205       Potassium         PROTEIN TOTAL         RBC         RDW         Saturated Iron   14     Sodium         TIBC   265     Transferrin   179     Vitamin B-12   452     WBC               Significant Imaging: Echocardiogram:   2D echo with color flow doppler: No results found for this or any previous visit. and Transthoracic echo (TTE) complete (Cupid Only):   Results for orders placed or performed during the hospital  encounter of 10/18/20   Echo Color Flow Doppler? Yes; Bubble Contrast? No   Result Value Ref Range    Ascending aorta 2.97 cm    STJ 2.57 cm    AV mean gradient 7 mmHg    Ao peak lewis 1.79 m/s    Ao VTI 27.91 cm    IVS 1.39 (A) 0.6 - 1.1 cm    LA size 4.21 cm    Left Atrium Major Axis 4.62 cm    LVIDd 3.82 3.5 - 6.0 cm    LVIDs 2.29 2.1 - 4.0 cm    LVOT diameter 2.39 cm    LVOT peak VTI 21.64 cm    Posterior Wall 1.08 0.6 - 1.1 cm    MV Peak A Lewis 1.10 m/s    E wave decelartion time 190.97 msec    MV Peak E Lewis 0.79 m/s    RA Major Axis 4.32 cm    RA Width 2.24 cm    TAPSE 2.81 cm    TR Max Lewis 1.74 m/s    TDI LATERAL 0.07 m/s    TDI SEPTAL 0.06 m/s    LA WIDTH 4.11 cm    Ao root annulus 3.66 cm    MV stenosis pressure 1/2 time 55.38 ms    LV Diastolic Volume 62.69 mL    LV Systolic Volume 17.96 mL    LVOT peak lewis 1.28 m/s    LV LATERAL E/E' RATIO 11.29 m/s    LV SEPTAL E/E' RATIO 13.17 m/s    FS 40 %    LV mass 161.27 g    Left Ventricle Relative Wall Thickness 0.57 cm    AV valve area 3.48 cm2    AV Velocity Ratio 0.72     AV index (prosthetic) 0.78     MV valve area p 1/2 method 3.97 cm2    E/A ratio 0.72     Mean e' 0.07 m/s    LVOT area 4.5 cm2    LVOT stroke volume 97.03 cm3    AV peak gradient 13 mmHg    E/E' ratio 12.15 m/s    LV Systolic Volume Index 8.1 mL/m2    LV Diastolic Volume Index 28.40 mL/m2    LV Mass Index 73 g/m2    Triscuspid Valve Regurgitation Peak Gradient 12 mmHg    Right Atrial Pressure (from IVC) 3 mmHg    TV rest pulmonary artery pressure 15 mmHg    Narrative    · There is mild left ventricular concentric hypertrophy.  · With normal systolic function. The estimated ejection fraction is 60%.  · Grade I diastolic dysfunction.  · Normal right ventricular systolic function.  · Mild left atrial enlargement.  · Mild mitral regurgitation.  · Normal central venous pressure (3 mmHg).  · The estimated PA systolic pressure is 15 mmHg.        Assessment and Plan:       * Syncope  See plan for  elevated troponin     Pneumonia  Remains on IV ABx    Elevated troponin  troponin 0.042, 0.033, 0.039. Trending down from previous admission.    Given his troponin leak and recurrent syncope, will plan for stress test to rule out ischemia.   Recommend OP ILR vs. Zio and/ro Tilt table  Patient follows with outside Cardiologist and can follow up after DC for further cardiac workup pending stress test results   Continue ASA, Statin, BB    11/7/2020  -no ischemia noted on stress test   -Continue ASA, Statin, BB  -OP  ILR vs. Zio and/ro Tilt table      Essential hypertension  HTN meds have been adjusted by primary team. Will assess response and further titrate as needed         VTE Risk Mitigation (From admission, onward)         Ordered     enoxaparin injection 40 mg  Every 24 hours      11/05/20 1554     IP VTE HIGH RISK PATIENT  Once      11/05/20 1554     Place sequential compression device  Until discontinued      11/05/20 1554            Chart reviewed. Patient examined by Dr. Brumfield and agrees with plan that has been outlined.     Mere Hernandez, FNP  Cardiology  [facility]

## 2020-11-07 NOTE — ASSESSMENT & PLAN NOTE
Lab Results   Component Value Date    HGBA1C 9.3 (H) 10/19/2020   accuchecks  Sliding scale insulin  Add Basal insulin

## 2020-11-08 NOTE — NURSING
AVS handed to transporter. Peripheral IV removed with catheter intact. Tele monitor removed and returned. Personal belongings gathered up.

## 2020-11-09 NOTE — DISCHARGE SUMMARY
[facility] Hospital Medicine  Discharge Summary      Patient Name: Karan Aburto  MRN: [id number]  Admission Date: 11/5/2020  Hospital Length of Stay: 0 days  Discharge Date and Time: 11/7/2020  6:12 PM  Attending Physician: No att. providers found   Discharging Provider: Merry Villa MD  Primary Care Provider: Mickey Agrawal MD      HPI:   Pt is a 75 yo male with PMHx of Bilateral leg spastic paraplegia, HTN, diastolic heart failure, obesity and hypothyroidism who was brought to the ED due syncopal episode. Pt was discharged from [facility] 11/3/2020 to a SNF due to generalized weakness and debility. Pt has a hx of BLE spastic paraplegia and has become increasingly weak over the last few weak. Last admission, CT imaging noted subacute stroke however, Neurologist, Dr. Romano stated the stroke was not acute. He discharged to SNF to continue physical therapy. Today, pt states he was sitting in a chair then woke up lying in an ambulance. He is AAOx3. He says he remembers eating breakfast this AM. He denies other symptoms including fever, chest pain, slurred speech, asymmetrical extremity weakness, abdominal pain, vomiting, diarrhea, urine symptoms, lightheadedness, dizziness and headache.   Vital sign on arrival, Temp 97.5, pulse 65, resp 20 and B/P 107 /62. Orthostatic 164/92, pulse 78 lying and 153/92, pulse 79 sitting.  CXR notes a questionable left basilar infiltrate  CT of Head notes a subacute infarct suspected in the right cerebellum - chronic finding  Labs find  Anemia 9.5/29.9, WBC normal, gluc 249,  and troponin 0.042, lactate 2.5, procal is negative  Pt is placed on Observation for eval of Syncope and treatment for pneumonia.     * No surgery found *      Hospital Course:   11/6- Pt was admitted yesterday  for evaluation following an episode of syncope while eating breakfast . Denies chest pain, SOB, palpitation , diaphoresis  , lightheadedness or dizziness prior to the  event . Initial EKG resulted sinus arhythmia with PVC, RBBB and non specific T wave abnormality in lat lead and troponin 0.042. Cardiology is consulted for further evaluation and pt underwent MPI stress today which was reported to be normal with no evidence of ischemia . Repeat EKG resulted NSR with RBBB. Noted pt has labile BP ranges from high to normal and is on multiple antihypertensives including isosorbide dinitrate 20 mg TID  to manage blood pressure . Mild systolic BP drop noted from lying to sitting yesterday. Pt also recently diagnosed with diabetes mellitus type 2 with HgA1c 9.3 . In his previous admission , he was evaluated by Neurologist Dr. 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 And also given H/O Diabetes some autonomic dysfunction with orthostatic hypotension won't be unusual. Additionally Isordil could cause transient hypotension. Will change Isordil to extended release Imdur. Pt was noted carol have left basilar infiltrate and antibiotic therapy is initiated on admission. Labs resulted normal WBC count, procalcitonin 0.05(nl), Hgb 8.7, K 3.4 and creatinine 0.9.     11/7- No further syncope since admission. Cards recommended outpatient  ILR vs. Zio and/or  Tilt table with Primary Cardiologist . BP fluctuates highs to normal. Rt upper ext noted to have swelling and ecchymoses . U/S is neg for DVT. Hgb 8.3 with iron def noted. No signs of active bleed. Replace iron parenterally while in house along with B12/Folic acid. Disposition - Return to [facility].      Pt remains overall stable  with no acute events. Pt accepted back to [facility] and discharge order placed. Pt is examined before discharge and deemed stable.         Consults:   Consults (From admission, onward)        Status Ordering Provider     Inpatient consult to Cardiology  Once     Provider:  Albaro Brumfield MD    Completed SONG RODRIGUES           * Syncope  Syncope  Rule out vasovagal event, orthostasis or cardiac/neuro event  - Admit, Telemetry monitoring, orthostatic VS every shift, neuro checks every 4 hours, serial troponin levels  Not orthostatic from V/S in the ED - continue daily orthostatics  · 2 D ECHO -There is mild left ventricular concentric hypertrophy. - from 10/19/2020  · With normal systolic function. The estimated ejection fraction is 60%.  · Grade I diastolic dysfunction.  · Normal right ventricular systolic function.  · Mild left atrial enlargement.  · Mild mitral regurgitation.  · Normal central venous pressure (3 mmHg).  · The estimated PA systolic pressure is 15 mmHg.      11/6-   Cardiology is consulted for further evaluation and pt underwent MPI stress today which was reported to be normal with no evidence of ischemia . Repeat EKG resulted NSR with RBBB.     11/7 -  No further syncope since admission. Cards recommended outpatient   ILR vs. Zio and/or  Tilt table with Primary Cardiologist. Carotid U/S on 10/30/20- No significant stenosis.              Final Active Diagnoses:    Diagnosis Date Noted POA    PRINCIPAL PROBLEM:  Syncope [R55] 11/05/2020 Yes    Pneumonia [J18.9] 11/05/2020 Yes    Chronic diastolic heart failure [I50.32] 11/05/2020 Yes    Spastic paraplegia, hereditary [G11.4] 10/30/2020 Yes    Elevated troponin [R77.8] 10/19/2020 Yes    Diabetes mellitus type 2, uncontrolled [E11.65] 10/18/2020 Yes    Essential hypertension [I10] 10/18/2020 Yes      Problems Resolved During this Admission:       Discharged Condition: stable    Disposition: Skilled Nursing Facility    Follow Up:  Follow-up Information     Mickey Agrawal MD. Schedule an appointment as soon as possible for a visit in 3 days.    Specialty: Family Medicine  Why: Discharge follow up   Contact information:  [address]  [facility] [id number] [phone]             Jacques Fuentes MD. Schedule an appointment as soon as possible for a  visit in 1 week.    Specialties: Cardiology, Cardiovascular Disease  Why: Need clinic visit to schedule Tilt table test , ILR/Zio   Contact information:  [address] [place] [address] [phone]                 Patient Instructions:      Diet diabetic     Activity as tolerated       Significant Diagnostic Studies: Labs:   BMP:   Recent Labs   Lab 11/07/20  0610   *      K 3.6      CO2 27   BUN 21   CREATININE 0.9   CALCIUM 8.0*   MG 2.0   , CMP   Recent Labs   Lab 11/07/20  0610      K 3.6      CO2 27   *   BUN 21   CREATININE 0.9   CALCIUM 8.0*   PROT 4.9*   ALBUMIN 2.3*   BILITOT 0.9   ALKPHOS 83   AST 26   ALT 65*   ANIONGAP 6*   ESTGFRAFRICA >60   EGFRNONAA >60    and CBC   Recent Labs   Lab 11/07/20  0610   WBC 6.81   HGB 8.3*   HCT 25.5*          Pending Diagnostic Studies:     None         Medications:  Reconciled Home Medications:      Medication List      START taking these medications    doxycycline 100 MG Cap  Commonly known as: VIBRAMYCIN  Take 1 capsule (100 mg total) by mouth every 12 (twelve) hours. for 2 days     famotidine 20 MG tablet  Commonly known as: PEPCID  Take 1 tablet (20 mg total) by mouth 2 (two) times daily.     ferrous gluconate 324 MG tablet  Commonly known as: FERGON  Take 1 tablet (324 mg total) by mouth 2 (two) times daily with meals.     folic acid-vit B6-vit B12 2.5-25-2 mg 2.5-25-2 mg Tab  Commonly known as: FOLBIC or Equiv  Take 1 tablet by mouth once daily.     furosemide 20 MG tablet  Commonly known as: LASIX  One po 3 days /week-   on Sunday , Tuesday , and Thursday     insulin aspart U-100 100 unit/mL (3 mL) Inpn pen  Commonly known as: NovoLOG  Inject 0-5 Units into the skin before meals and at bedtime as needed (Hyperglycemia).     isosorbide mononitrate 30 MG 24 hr tablet  Commonly known as: IMDUR  Take 1 tablet (30 mg total) by mouth once daily.     metFORMIN 500 MG tablet  Commonly known as: GLUCOPHAGE  Take 1  tablet (500 mg total) by mouth 2 (two) times daily with meals.        CHANGE how you take these medications    hydrALAZINE 25 MG tablet  Commonly known as: APRESOLINE  Take 1 tablet (25 mg total) by mouth 3 (three) times daily.  What changed:   · medication strength  · how much to take  · when to take this        CONTINUE taking these medications    aspirin 81 MG EC tablet  Commonly known as: ECOTRIN  Take 1 tablet (81 mg total) by mouth once daily.     carvediloL 25 MG tablet  Commonly known as: COREG  Take 1 tablet (25 mg total) by mouth 2 (two) times daily with meals.     fenofibrate 160 MG Tab  Take 160 mg by mouth once daily.     lisinopriL 40 MG tablet  Commonly known as: PRINIVIL,ZESTRIL  Take 1 tablet (40 mg total) by mouth once daily.     potassium chloride SA 20 MEQ tablet  Commonly known as: K-DUR,KLOR-CON  Take 1 tablet (20 mEq total) by mouth 2 (two) times daily.     rosuvastatin 10 MG tablet  Commonly known as: CRESTOR  Take 10 mg by mouth once daily.     spironolactone 25 MG tablet  Commonly known as: ALDACTONE  Take 1 tablet (25 mg total) by mouth once daily.     tiZANidine 4 mg Cap  Take by mouth.        STOP taking these medications    isosorbide dinitrate 20 MG tablet  Commonly known as: ISORDIL            Indwelling Lines/Drains at time of discharge:   Lines/Drains/Airways     None                 Time spent on the discharge of patient: 30  minutes  Patient was seen and examined on the date of discharge and determined to be suitable for discharge.         Merry Villa MD  Department of Hospital Medicine  [facility]

## 2020-11-09 NOTE — ASSESSMENT & PLAN NOTE
Syncope  Rule out vasovagal event, orthostasis or cardiac/neuro event  - Admit, Telemetry monitoring, orthostatic VS every shift, neuro checks every 4 hours, serial troponin levels  Not orthostatic from V/S in the ED - continue daily orthostatics  · 2 D ECHO -There is mild left ventricular concentric hypertrophy. - from 10/19/2020  · With normal systolic function. The estimated ejection fraction is 60%.  · Grade I diastolic dysfunction.  · Normal right ventricular systolic function.  · Mild left atrial enlargement.  · Mild mitral regurgitation.  · Normal central venous pressure (3 mmHg).  · The estimated PA systolic pressure is 15 mmHg.      11/6-   Cardiology is consulted for further evaluation and pt underwent MPI stress today which was reported to be normal with no evidence of ischemia . Repeat EKG resulted NSR with RBBB.     11/7 -  No further syncope since admission. Cards recommended outpatient   ILR vs. Zio and/or  Tilt table with Primary Cardiologist. Carotid U/S on 10/30/20- No significant stenosis.

## 2020-11-09 NOTE — PLAN OF CARE
11/09/20 1024   Final Note   Assessment Type Final Discharge Note   Anticipated Discharge Disposition SNF   Right Care Referral Info   Post Acute Recommendation SNF / Sub-Acute Rehab   Referral Type [facility]

## 2020-12-15 PROBLEM — R97.20 BPH WITH ELEVATED PSA: Status: ACTIVE | Noted: 2020-01-01

## 2020-12-15 PROBLEM — R09.02 HYPOXIA: Status: ACTIVE | Noted: 2020-01-01

## 2020-12-15 PROBLEM — R55 SYNCOPE: Status: RESOLVED | Noted: 2020-01-01 | Resolved: 2020-01-01

## 2020-12-15 PROBLEM — M79.89 LEG SWELLING: Status: RESOLVED | Noted: 2020-01-01 | Resolved: 2020-01-01

## 2020-12-15 PROBLEM — D72.829 LEUKOCYTOSIS: Status: RESOLVED | Noted: 2020-01-01 | Resolved: 2020-01-01

## 2020-12-15 PROBLEM — E11.9 NEW ONSET TYPE 2 DIABETES MELLITUS: Status: RESOLVED | Noted: 2020-01-01 | Resolved: 2020-01-01

## 2020-12-15 PROBLEM — J18.9 PNEUMONIA: Status: RESOLVED | Noted: 2020-01-01 | Resolved: 2020-01-01

## 2020-12-15 PROBLEM — D69.6 THROMBOCYTOPENIA: Status: RESOLVED | Noted: 2020-01-01 | Resolved: 2020-01-01

## 2020-12-15 PROBLEM — N40.0 BPH WITH ELEVATED PSA: Status: ACTIVE | Noted: 2020-01-01

## 2020-12-15 PROBLEM — R74.8 ABNORMAL LIVER ENZYMES: Status: RESOLVED | Noted: 2020-01-01 | Resolved: 2020-01-01

## 2020-12-15 PROBLEM — E87.3 METABOLIC ALKALOSIS: Status: RESOLVED | Noted: 2020-01-01 | Resolved: 2020-01-01

## 2020-12-15 PROBLEM — U07.1 COVID-19: Status: ACTIVE | Noted: 2020-01-01

## 2020-12-15 NOTE — ED PROVIDER NOTES
SCRIBE #1 NOTE: I, Claudia Ng, am scribing for, and in the presence of, Reuben Champagne MD. I have scribed the entire note.       History     Chief Complaint   Patient presents with    Respiratory Distress     Per EMS, pt with acute onset fever, dyspnea & weakness. on arrival to assisted living facility, pt O2 70% RA.      Review of patient's allergies indicates:  No Known Allergies      History of Present Illness     HPI    12/15/2020, 11:36 AM  History obtained from EMS. HPI/ROS limited by pt due to pt's moderate respiratory distress.      History of Present Illness: Karan Aburto is a 75 y.o. male patient with a h/o elevated PSA, HTN, rosacea, thyroid disease, who presents to the Emergency Department for evaluation of respiratory distress  which onset this morning. Symptoms are constant and moderate in severity. Per EMS, the pt has acute onset of fever, dyspnea, and generalized weakness. EMS reports that the pt's O2 sats were 70% on RA upon arriving on scene to the assisted living facility. The pt arrived to the ED with a non rebreather mask. HPI/ROS limited by pt due to pt's respiratory distress.      Arrival mode: EMS    PCP: Mickey Agrawal MD      Past Medical History:  Past Medical History:   Diagnosis Date    Elevated PSA     Hypertension     Rosacea     Thyroid disease     hypothyroidism       Past Surgical History:    History reviewed. No pertinent past surgical history.       Family History:  History reviewed. No pertinent family history.      Social History:   Social History     Tobacco Use    Smoking status: Never Smoker    Smokeless tobacco: Never Used   Substance and Sexual Activity    Alcohol use: Never     Frequency: Never    Drug use: Unknown drug use    Sexual activity: Unknown        Review of Systems     Review of Systems   Reason unable to perform ROS: moderate respiratory distress.   Respiratory: Positive for shortness of breath.       Physical Exam     Initial Vitals  [12/15/20 1129]   BP Pulse Resp Temp SpO2   107/63 109 (!) 30 98.5 °F (36.9 °C) (!) 93 %      MAP       --          Physical Exam  Nursing Notes and Vital Signs Reviewed.  Constitutional: Elderly and frail.  Head: Atraumatic. Normocephalic.  Eyes: EOM intact. No scleral icterus.  ENT: Mucous membranes are moist. Oropharynx is clear and symmetric.    Neck: Supple. Full ROM. No lymphadenopathy.  Cardiovascular: Tachycardic. Regular rhythm. No murmurs, rubs, or gallops. Distal pulses are 2+ and symmetric.  Pulmonary/Chest: Pt is in moderate respiratory distress. Tachypneic. Decreased breath sounds bilaterally. Accessory muscle use.   Abdominal: Soft and non-distended.  There is no tenderness.  No rebound, guarding, or rigidity.  Genitourinary: No CVA tenderness  Musculoskeletal: Moves all extremities. No obvious deformities. No calf tenderness.  Skin: Warm and dry.  Neurological:  Alert, awake, and appropriate.  Normal speech.  No acute focal neurological deficits are appreciated.  Psychiatric: Normal affect. Good eye contact. Appropriate in content.     ED Course   Critical Care    Date/Time: 12/15/2020 12:30 PM  Performed by: Reuben Champagne MD  Authorized by: Reuben Champagne MD   Direct patient critical care time: 25 minutes  Additional history critical care time: 19 minutes  Ordering / reviewing critical care time: 23 minutes  Documentation critical care time: 23 minutes  Total critical care time (exclusive of procedural time) : 90 minutes  Critical care time was exclusive of separately billable procedures and treating other patients and teaching time.  Critical care was necessary to treat or prevent imminent or life-threatening deterioration of the following conditions: hypoxia.  Critical care was time spent personally by me on the following activities: blood draw for specimens, development of treatment plan with patient or surrogate, interpretation of cardiac output measurements, evaluation of patient's  "response to treatment, examination of patient, obtaining history from patient or surrogate, ordering and performing treatments and interventions, ordering and review of laboratory studies, ordering and review of radiographic studies, pulse oximetry, re-evaluation of patient's condition and review of old charts.        ED Vital Signs:  Vitals:    12/15/20 1129 12/15/20 1131 12/15/20 1144 12/15/20 1309   BP: 107/63  124/66 (!) 146/86   Pulse: 109 108 101 101   Resp: (!) 30  (!) 40 (!) 32   Temp: 98.5 °F (36.9 °C)      TempSrc: Oral      SpO2: (!) 93%  (!) 90% (!) 91%   Weight:    95.2 kg (209 lb 14.4 oz)   Height: 5' 10" (1.778 m)       12/15/20 1431   BP: (!) 151/82   Pulse: 98   Resp: (!) 38   Temp:    TempSrc:    SpO2: (!) 91%   Weight:    Height:        Abnormal Lab Results:  Labs Reviewed   CBC W/ AUTO DIFFERENTIAL - Abnormal; Notable for the following components:       Result Value    RBC 2.98 (*)     Hemoglobin 8.9 (*)     Hematocrit 28.6 (*)     MCHC 31.1 (*)     RDW 17.9 (*)     Immature Granulocytes 0.7 (*)     Gran # (ANC) 9.0 (*)     Immature Grans (Abs) 0.07 (*)     Lymph # 0.5 (*)     nRBC 1 (*)     Gran % 91.3 (*)     Lymph % 5.4 (*)     Mono % 2.5 (*)     All other components within normal limits   COMPREHENSIVE METABOLIC PANEL - Abnormal; Notable for the following components:    Potassium 3.4 (*)     CO2 32 (*)     Glucose 163 (*)     BUN 28 (*)     Calcium 8.6 (*)     Total Protein 5.6 (*)     Albumin 2.0 (*)     All other components within normal limits   B-TYPE NATRIURETIC PEPTIDE - Abnormal; Notable for the following components:     (*)     All other components within normal limits   TROPONIN I - Abnormal; Notable for the following components:    Troponin I 0.250 (*)     All other components within normal limits   SARS-COV-2 RNA AMPLIFICATION, QUAL - Abnormal; Notable for the following components:    SARS-CoV-2 RNA, Amplification, Qual Positive (*)     All other components within normal " limits   CK   APTT   PROTIME-INR   APTT   PROTIME-INR   URINALYSIS, REFLEX TO URINE CULTURE   TROPONIN I        All Lab Results:  Results for orders placed or performed during the hospital encounter of 12/15/20   CBC Auto Differential   Result Value Ref Range    WBC 9.83 3.90 - 12.70 K/uL    RBC 2.98 (L) 4.60 - 6.20 M/uL    Hemoglobin 8.9 (L) 14.0 - 18.0 g/dL    Hematocrit 28.6 (L) 40.0 - 54.0 %    MCV 96 82 - 98 fL    MCH 29.9 27.0 - 31.0 pg    MCHC 31.1 (L) 32.0 - 36.0 g/dL    RDW 17.9 (H) 11.5 - 14.5 %    Platelets 216 150 - 350 K/uL    MPV 9.6 9.2 - 12.9 fL    Immature Granulocytes 0.7 (H) 0.0 - 0.5 %    Gran # (ANC) 9.0 (H) 1.8 - 7.7 K/uL    Immature Grans (Abs) 0.07 (H) 0.00 - 0.04 K/uL    Lymph # 0.5 (L) 1.0 - 4.8 K/uL    Mono # 0.3 0.3 - 1.0 K/uL    Eos # 0.0 0.0 - 0.5 K/uL    Baso # 0.01 0.00 - 0.20 K/uL    nRBC 1 (A) 0 /100 WBC    Gran % 91.3 (H) 38.0 - 73.0 %    Lymph % 5.4 (L) 18.0 - 48.0 %    Mono % 2.5 (L) 4.0 - 15.0 %    Eosinophil % 0.0 0.0 - 8.0 %    Basophil % 0.1 0.0 - 1.9 %    Differential Method Automated    Comprehensive Metabolic Panel   Result Value Ref Range    Sodium 143 136 - 145 mmol/L    Potassium 3.4 (L) 3.5 - 5.1 mmol/L    Chloride 101 95 - 110 mmol/L    CO2 32 (H) 23 - 29 mmol/L    Glucose 163 (H) 70 - 110 mg/dL    BUN 28 (H) 8 - 23 mg/dL    Creatinine 1.1 0.5 - 1.4 mg/dL    Calcium 8.6 (L) 8.7 - 10.5 mg/dL    Total Protein 5.6 (L) 6.0 - 8.4 g/dL    Albumin 2.0 (L) 3.5 - 5.2 g/dL    Total Bilirubin 0.8 0.1 - 1.0 mg/dL    Alkaline Phosphatase 82 55 - 135 U/L    AST 37 10 - 40 U/L    ALT 32 10 - 44 U/L    Anion Gap 10 8 - 16 mmol/L    eGFR if African American >60 >60 mL/min/1.73 m^2    eGFR if non African American >60 >60 mL/min/1.73 m^2   BNP   Result Value Ref Range     (H) 0 - 99 pg/mL   CK   Result Value Ref Range    CPK 77 20 - 200 U/L   Troponin I   Result Value Ref Range    Troponin I 0.250 (H) 0.000 - 0.026 ng/mL   COVID-19 Rapid Screening   Result Value Ref Range     SARS-CoV-2 RNA, Amplification, Qual Positive (A) Negative   APTT   Result Value Ref Range    aPTT 26.3 21.0 - 32.0 sec   Protime-INR   Result Value Ref Range    Prothrombin Time 11.4 9.0 - 12.5 sec    INR 1.1 0.8 - 1.2         Imaging Results          X-Ray Chest AP Portable (Final result)  Result time 12/15/20 12:08:24    Final result by Elba Parham MD (Timothy) (12/15/20 12:08:24)                 Impression:      Interval development of moderate bilateral patchy infiltrate.  Findings are nonspecific but could be related to atypical pneumonia.      Electronically signed by: Elba Parham MD  Date:    12/15/2020  Time:    12:08             Narrative:    EXAMINATION:  XR CHEST AP PORTABLE    CLINICAL HISTORY:  Shortness of breath, sob; rule out COVID-19 pneumonia.    COMPARISON:  11/05/2020.    FINDINGS:  Heart is normal in size.  There is been interval development of moderate patchy bilateral infiltrates.  Findings are nonspecific but could be related to atypical pneumonia                                 The EKG was ordered, reviewed, and independently interpreted by the ED provider.  Interpretation time: 11:27  Rate: 108 BPM  Rhythm: Sinus tachycardia  Interpretation: Possible left atrial enlargement. RBBB. Left anterior fascicular block. Left ventricular hypertrophy. No STEMI.      The Emergency Provider reviewed the vital signs and test results, which are outlined above.     ED Discussion     12:39 PM: Discussed pt's case with Dr. Brumfield (Cardiology) who recommends heparin drip, ASA, BB, treat for NSTEMI, and trend enzymes. He states that he will see the pt as consult and to treat pt medically for now.    12:47 PM: Discussed case with CORETTA Giles (Hospital Medicine). Dr. Zavaleta agrees with current care and management of pt and accepts admission.   Admitting Service: Hospital Medicine  Admit to: obs tele    Re-evaluated pt. I have discussed test results, shared treatment plan, and the need for  admission with patient and family at bedside. Pt and family express understanding at this time and agree with all information. All questions answered. Pt and family have no further questions or concerns at this time. Pt is ready for admit.       MDM        Medical Decision Making:   Clinical Tests:   Lab Tests: Ordered and Reviewed  Radiological Study: Ordered and Reviewed  Medical Tests: Ordered and Reviewed           ED Medication(s):  Medications   heparin 25,000 units in dextrose 5% 250 mL (100 units/mL) infusion LOW INTENSITY nomogram - [facility] (12 Units/kg/hr × 81.9 kg (Adjusted) Intravenous New Bag 12/15/20 1430)   heparin 25,000 units in dextrose 5% (100 units/ml) IV bolus from bag - ADDITIONAL PRN BOLUS - 60 units/kg (max bolus 4000 units) (has no administration in time range)   heparin 25,000 units in dextrose 5% (100 units/ml) IV bolus from bag - ADDITIONAL PRN BOLUS - 30 units/kg (max bolus 4000 units) (has no administration in time range)   carvediloL tablet 12.5 mg (has no administration in time range)   rosuvastatin tablet 10 mg (has no administration in time range)   heparin 25,000 units in dextrose 5% (100 units/ml) IV bolus from bag INITIAL BOLUS (max bolus 4000 units) (25,000 Units Intravenous Bolus from Bag 12/15/20 1426)   aspirin chewable tablet 81 mg (81 mg Oral Given 12/15/20 1424)       New Prescriptions    No medications on file               Scribe Attestation:   Scribe #1: I performed the above scribed service and the documentation accurately describes the services I performed. I attest to the accuracy of the note.     Attending:   Physician Attestation Statement for Scribe #1: I, Reuben Champagne MD, personally performed the services described in this documentation, as scribed by Claudia Ng, in my presence, and it is both accurate and complete.           Clinical Impression       ICD-10-CM ICD-9-CM   1. Hypoxia  R09.02 799.02   2. SOB (shortness of breath)  R06.02 786.05   3.  COVID-19 virus infection  U07.1 079.89   4. Elevated troponin  R77.8 790.6       Disposition:   Disposition: Placed in Observation  Condition: Serious         Reuben Champagne MD  12/15/20 [id number]

## 2020-12-15 NOTE — HPI
Karan Aburto is a 75 year old male who presented to [facility] due to respiratory distress with associated fever, dyspnea, and generalized weakness. His current medical conditions include HTN, elevated PSA, Thyroid disease, Rosacea, recent COVID +. O2 sats were 70% on RA per EMS. ED workup revealed K+ 3.4, Cr 1.1, glucose 163, H/H 8.9/28.6, plts 216, , Troponin 0.250. He was placed in observation under the care of hospital medicine. Cardiology consulted to assist with medical management. Chart extensively reviewed, patient not seen or examined due to COVID +. Will optimize medical management. Continue medical management of COVID per primary team.

## 2020-12-15 NOTE — H&P
[facility] Hospital Medicine  History & Physical    Patient Name: Karan Aburto  MRN: [id number]  Admission Date: 12/15/2020  Attending Physician: No att. providers found   Primary Care Provider: Mickey Agrawal MD         Patient information was obtained from patient, relative(s), past medical records and ER records.     Subjective:     Principal Problem:COVID-19    Chief Complaint:   Chief Complaint   Patient presents with    Respiratory Distress     Per EMS, pt with acute onset fever, dyspnea & weakness. on arrival to assisted living facility, pt O2 70% RA.         HPI: 76 y/o male with PMHx of HTN, HLD, CAD, and hypothyroidism who presented to the ED with c/o SOB that onset gradually earlier today. Symptoms are constant and moderate in severity. No mitigating or exacerbating factors reported. Associated symptoms include fever, cough, BLE edema, and weakness. Pt denies HA, dizziness, CP, palpitations, loss of smell, loss of taste, N/V/D, and all other symptoms at this time.  ED workup shows: RR 38, oxygen sat 91% on non-rebreather mask. H/H 8.9/28.6, K+ 3.4, , , , Troponin 0.250  He is a DNR and his SDM is his son, Mac.  He will be kept on OBS under the care of hospital medicine.      Past Medical History:   Diagnosis Date    Coronary artery disease     Elevated PSA     HLD (hyperlipidemia)     Hypertension     Rosacea     Thyroid disease     hypothyroidism       No past surgical history on file.    Review of patient's allergies indicates:  No Known Allergies    No current facility-administered medications on file prior to encounter.      Current Outpatient Medications on File Prior to Encounter   Medication Sig    carvediloL (COREG) 25 MG tablet Take 1 tablet (25 mg total) by mouth 2 (two) times daily with meals.    famotidine (PEPCID) 20 MG tablet Take 1 tablet (20 mg total) by mouth 2 (two) times daily.    fenofibrate 160 MG Tab Take 160 mg by mouth once daily.     hydrALAZINE (APRESOLINE) 25 MG tablet Take 1 tablet (25 mg total) by mouth 3 (three) times daily.    isosorbide mononitrate (IMDUR) 30 MG 24 hr tablet Take 1 tablet (30 mg total) by mouth once daily.    lisinopriL (PRINIVIL,ZESTRIL) 40 MG tablet Take 1 tablet (40 mg total) by mouth once daily.    metFORMIN (GLUCOPHAGE) 500 MG tablet Take 1 tablet (500 mg total) by mouth 2 (two) times daily with meals.    potassium chloride SA (K-DUR,KLOR-CON) 20 MEQ tablet Take 1 tablet (20 mEq total) by mouth 2 (two) times daily.    rosuvastatin (CRESTOR) 10 MG tablet Take 10 mg by mouth once daily.    spironolactone (ALDACTONE) 25 MG tablet Take 1 tablet (25 mg total) by mouth once daily.    tiZANidine 4 mg Cap Take 4 mg by mouth 2 (two) times a day.     [DISCONTINUED] aspirin (ECOTRIN) 81 MG EC tablet Take 1 tablet (81 mg total) by mouth once daily.    [DISCONTINUED] furosemide (LASIX) 20 MG tablet One po 3 days /week-   on Sunday , Tuesday , and Thursday    [DISCONTINUED] insulin aspart U-100 (NOVOLOG) 100 unit/mL (3 mL) InPn pen Inject 0-5 Units into the skin before meals and at bedtime as needed (Hyperglycemia).     Family History     Problem Relation (Age of Onset)    Hypertension Mother, Father    Hypothyroidism Mother        Tobacco Use    Smoking status: Never Smoker    Smokeless tobacco: Never Used   Substance and Sexual Activity    Alcohol use: Never     Frequency: Never    Drug use: Not on file    Sexual activity: Not on file     Review of Systems   Constitutional: Negative for activity change, appetite change, chills, fatigue and fever.   HENT: Negative for dental problem, ear pain, hearing loss, mouth sores, nosebleeds, sinus pressure, sore throat, tinnitus and trouble swallowing.    Eyes: Negative for pain, discharge and visual disturbance.   Respiratory: Positive for cough and shortness of breath. Negative for choking, chest tightness and wheezing.    Cardiovascular: Positive for leg swelling.  Negative for chest pain and palpitations.   Gastrointestinal: Negative for abdominal distention, abdominal pain, anal bleeding, blood in stool, constipation, diarrhea, nausea and vomiting.   Endocrine: Negative for cold intolerance and heat intolerance.   Genitourinary: Negative for decreased urine volume, difficulty urinating, flank pain, frequency, hematuria and urgency.   Musculoskeletal: Negative for arthralgias, back pain, gait problem, myalgias, neck pain and neck stiffness.   Skin: Negative for color change, rash and wound.   Allergic/Immunologic: Negative.    Neurological: Positive for weakness. Negative for dizziness, tremors, seizures, syncope, speech difficulty, light-headedness and headaches.   Hematological: Negative.    Psychiatric/Behavioral: Negative for agitation, behavioral problems, confusion and sleep disturbance. The patient is not nervous/anxious.    All other systems reviewed and are negative.    Objective:     Vital Signs (Most Recent):  Temp: 98.5 °F (36.9 °C) (12/15/20 1129)  Pulse: 98 (12/15/20 1626)  Resp: (!) 38 (12/15/20 1431)  BP: (!) 152/90 (12/15/20 1616)  SpO2: (!) 93 % (12/15/20 1626) Vital Signs (24h Range):  Temp:  [98.5 °F (36.9 °C)] 98.5 °F (36.9 °C)  Pulse:  [] 98  Resp:  [30-40] 38  SpO2:  [90 %-93 %] 93 %  BP: (107-152)/(63-90) 152/90     Weight: 95.2 kg (209 lb 14.4 oz)  Body mass index is 30.12 kg/m².    Physical Exam  Vitals signs and nursing note reviewed.   Constitutional:       General: He is not in acute distress.     Appearance: He is well-developed. He is not diaphoretic.   HENT:      Head: Normocephalic and atraumatic.      Nose: Nose normal.   Eyes:      General:         Right eye: No discharge.         Left eye: No discharge.      Conjunctiva/sclera: Conjunctivae normal.      Pupils: Pupils are equal, round, and reactive to light.   Neck:      Musculoskeletal: Normal range of motion and neck supple.      Thyroid: No thyromegaly.      Vascular: No JVD.       Trachea: No tracheal deviation.   Cardiovascular:      Rate and Rhythm: Normal rate and regular rhythm.      Heart sounds: Normal heart sounds. No murmur. No friction rub. No gallop.    Pulmonary:      Effort: Tachypnea, accessory muscle usage and respiratory distress present.      Breath sounds: Examination of the right-upper field reveals wheezing. Examination of the left-upper field reveals wheezing. Examination of the right-lower field reveals decreased breath sounds. Examination of the left-lower field reveals decreased breath sounds. Decreased breath sounds and wheezing present. No rales.   Chest:      Chest wall: No tenderness.   Abdominal:      General: Bowel sounds are normal. There is no distension.      Palpations: Abdomen is soft. There is no mass.      Tenderness: There is no abdominal tenderness.   Genitourinary:     Comments: deferred  Musculoskeletal: Normal range of motion.         General: No tenderness or deformity.      Right lower leg: Edema present.      Left lower leg: Edema present.   Skin:     General: Skin is warm and dry.      Capillary Refill: Capillary refill takes less than 2 seconds.      Findings: No erythema or rash.   Neurological:      Mental Status: He is alert and oriented to person, place, and time.      Motor: No abnormal muscle tone.      Coordination: Coordination normal.   Psychiatric:         Behavior: Behavior normal.           CRANIAL NERVES     CN III, IV, VI   Pupils are equal, round, and reactive to light.       Significant Labs:   Recent Lab Results       12/15/20  1622   12/15/20  1140        Albumin   2.0     Alkaline Phosphatase   82     ALT   32     Anion Gap   10     aPTT   26.3  Comment:  aPTT therapeutic range = 39-69 seconds     AST   37     Baso #   0.01     Basophil %   0.1     BILIRUBIN TOTAL   0.8  Comment:  For infants and newborns, interpretation of results should be based  on gestational age, weight and in agreement with  clinical  observations.  Premature Infant recommended reference ranges:  Up to 24 hours.............<8.0 mg/dL  Up to 48 hours............<12.0 mg/dL  3-5 days..................<15.0 mg/dL  6-29 days.................<15.0 mg/dL       BNP   283  Comment:  Values of less than 100 pg/ml are consistent with non-CHF populations.     BUN   28     Calcium   8.6     Chloride   101     CO2   32     CPK   77     Creatinine   1.1     .5       Differential Method   Automated     eGFR if    >60     eGFR if non    >60  Comment:  Calculation used to obtain the estimated glomerular filtration  rate (eGFR) is the CKD-EPI equation.        Eos #   0.0     Eosinophil %   0.0     Glucose   163     Gran # (ANC)   9.0     Gran %   91.3     Hematocrit   28.6     Hemoglobin   8.9     Immature Grans (Abs)   0.07  Comment:  Mild elevation in immature granulocytes is non specific and   can be seen in a variety of conditions including stress response,   acute inflammation, trauma and pregnancy. Correlation with other   laboratory and clinical findings is essential.       Immature Granulocytes   0.7     INR   1.1  Comment:  Coumadin Therapy:  2.0 - 3.0 for INR for all indicators except mechanical heart valves  and antiphospholipid syndromes which should use 2.5 - 3.5.       Lactate, Martin 1.6  Comment:  Falsely low lactic acid results can be found in samples   containing >=13.0 mg/dL total bilirubin and/or >=3.5 mg/dL   direct bilirubin.           Comment:  Results are increased in hemolyzed samples.       Lymph #   0.5     Lymph %   5.4     MCH   29.9     MCHC   31.1     MCV   96     Mono #   0.3     Mono %   2.5     MPV   9.6     nRBC   1     Platelets   216     Potassium   3.4     PROTEIN TOTAL   5.6     Protime   11.4     RBC   2.98     RDW   17.9     SARS-CoV-2 RNA, Amplification, Qual   Positive  Comment:  This test utilizes isothermal nucleic acid amplification   technology to detect the  SARS-CoV-2 RdRp nucleic acid segment.   The analytical sensitivity (limit of detection) is 125 genome   equivalents/mL.   A POSITIVE result implies infection with the SARS-CoV-2 virus;  the patient is presumed to be contagious.    A NEGATIVE result means that SARS-CoV-2 nucleic acids are not  present above the limit of detection. A NEGATIVE result should be   treated as presumptive. It does not rule out the possibility of   COVID-19 and should not be the sole basis for treatment decisions.   If COVID-19 is strongly suspected based on clinical and exposure   history, re-testing using an alternate molecular assay should be   considered.   This test is only for use under the Food and Drug   Administration s Emergency Use Authorization (EUA).   Commercial kits are provided by [employer].   Performance characteristics of the EUA have been independently  verified by [facility] Department of  Pathology and Laboratory Medicine.   _________________________________________________________________  The ID NOW COVID-19 Letter of Authorization, along with the   authorized Fact Sheet for Healthcare Providers, the authorized Fact  Sheet for Patients, and authorized labeling are available on the FDA   website:  www.fda.gov/MedicalDevices/Safety/EmergencySituations/obb380383.htm       Sodium   143     Troponin I 0.321  Comment:  The reference interval for Troponin I represents the 99th percentile   cutoff   for our facility and is consistent with 3rd generation assay   performance.   0.250  Comment:  The reference interval for Troponin I represents the 99th percentile   cutoff   for our facility and is consistent with 3rd generation assay   performance.       WBC   9.83         All pertinent labs within the past 24 hours have been reviewed.    Significant Imaging: I have reviewed all pertinent imaging results/findings within the past 24 hours.    Assessment/Plan:     * COVID-19  - COVID-19 testing   - Infection  Control notified     - Isolation:   - Airborne, Contact and Droplet Precautions  - Cohort patients into COVID units  - N95 mask, wear eye protection  - 20 second hand hygiene              - Limit visitors per hospital policy              - Consolidating lab draws, nursing care, provider visits, and interventions    - Diagnostics: (leukopenia, hyponatremia, hyperferritinemia, elevated troponin, elevated d-dimer, age, and comorbidities are significant predictors of poor clinical outcome)  CBC, CMP, Procalcitonin, Ferritin, CRP, LDH, BNP, Troponin, ECG, Rapid Flu, Blood Culture x2 and Portable CXR    - Management:  Supplemental O2 to maintain SpO2 >92%  Telemetry  Continuous/intermittent Pulse Ox  Albuterol treatment PRN   --Ipratropium inhaler with MIDI  --dexamethasone  --remdesivir  --monitor    Advance Care Planning   patient is a DNR and his SDM is his son, Mac BARRERA with elevated PSA  --continue tamsulosin      Hypoxia  --supplemental oxygen to maintain sats   --albuterol and ipratropium via MIDI inhaler        Normocytic anemia  --at baseline  --monitor      Generalized weakness  --likely 2/2 covid  --PT/OT consulted      Elevated troponin  --troponin 0.250 on admit  --cardiology consulted  --heparin gtt  --continue ASA, statin  --trend troponin    Hyperlipemia  --continue rosuvastatin  --cardiac diet      Essential hypertension  --continue carvedilol. Lisinopril, isosorbide, spironolactone  --cardiac diet      Hypokalemia  --3.4 on admit  --replace and monitor      Diabetes mellitus type 2, uncontrolled  --accuchecks with SSI  --diabetes educator consulted  --diabetic diet  --HA1C pending  --last HA1C 10/26/20 was 9.3        VTE Risk Mitigation (From admission, onward)         Ordered     IP VTE HIGH RISK PATIENT  Once      12/15/20 1538     Place sequential compression device  Until discontinued      12/15/20 1538     heparin 25,000 units in dextrose 5% (100 units/ml) IV bolus from bag - ADDITIONAL  PRN BOLUS - 30 units/kg (max bolus 4000 units)  As needed (PRN)     Question:  Heparin Infusion Adjustment (DO NOT MODIFY ANSWER)  Answer:  \\[email or web address]\epic\Images\Pharmacy\HeparinInfusions\heparin LOW INTENSITY nomogram for [facility] [id number]    12/15/20 1243     heparin 25,000 units in dextrose 5% (100 units/ml) IV bolus from bag - ADDITIONAL PRN BOLUS - 60 units/kg (max bolus 4000 units)  As needed (PRN)     Question:  Heparin Infusion Adjustment (DO NOT MODIFY ANSWER)  Answer:  \\[email or web address]\epic\Images\Pharmacy\HeparinInfusions\heparin LOW INTENSITY nomogram for [facility] [id number].pdf    12/15/20 1243     heparin 25,000 units in dextrose 5% 250 mL (100 units/mL) infusion LOW INTENSITY nomogram - [facility]  Continuous     Question Answer Comment   Heparin Infusion Adjustment (DO NOT MODIFY ANSWER) [email or web address]\epic\Images\Pharmacy\HeparinInfusions\heparin LOW INTENSITY nomogram for [facility] [id number]    Begin at (in units/kg/hr) 12        12/15/20 1243                   JORGE Jesus-MILE  Department of Hospital Medicine   [facility]

## 2020-12-15 NOTE — HPI
76 y/o male with PMHx of HTN, HLD, CAD, and hypothyroidism who presented to the ED with c/o SOB that onset gradually earlier today. Symptoms are constant and moderate in severity. No mitigating or exacerbating factors reported. Associated symptoms include fever, cough, BLE edema, and weakness. Pt denies HA, dizziness, CP, palpitations, loss of smell, loss of taste, N/V/D, and all other symptoms at this time.  ED workup shows: RR 38, oxygen sat 91% on non-rebreather mask. H/H 8.9/28.6, K+ 3.4, , , , Troponin 0.250  He is a DNR and his SDM is his son, Mac.  He will be kept on OBS under the care of hospital medicine.

## 2020-12-15 NOTE — SUBJECTIVE & OBJECTIVE
Past Medical History:   Diagnosis Date    Coronary artery disease     Elevated PSA     HLD (hyperlipidemia)     Hypertension     Rosacea     Thyroid disease     hypothyroidism       No past surgical history on file.    Review of patient's allergies indicates:  No Known Allergies    No current facility-administered medications on file prior to encounter.      Current Outpatient Medications on File Prior to Encounter   Medication Sig    carvediloL (COREG) 25 MG tablet Take 1 tablet (25 mg total) by mouth 2 (two) times daily with meals.    famotidine (PEPCID) 20 MG tablet Take 1 tablet (20 mg total) by mouth 2 (two) times daily.    fenofibrate 160 MG Tab Take 160 mg by mouth once daily.    hydrALAZINE (APRESOLINE) 25 MG tablet Take 1 tablet (25 mg total) by mouth 3 (three) times daily.    isosorbide mononitrate (IMDUR) 30 MG 24 hr tablet Take 1 tablet (30 mg total) by mouth once daily.    lisinopriL (PRINIVIL,ZESTRIL) 40 MG tablet Take 1 tablet (40 mg total) by mouth once daily.    metFORMIN (GLUCOPHAGE) 500 MG tablet Take 1 tablet (500 mg total) by mouth 2 (two) times daily with meals.    potassium chloride SA (K-DUR,KLOR-CON) 20 MEQ tablet Take 1 tablet (20 mEq total) by mouth 2 (two) times daily.    rosuvastatin (CRESTOR) 10 MG tablet Take 10 mg by mouth once daily.    spironolactone (ALDACTONE) 25 MG tablet Take 1 tablet (25 mg total) by mouth once daily.    tiZANidine 4 mg Cap Take 4 mg by mouth 2 (two) times a day.     [DISCONTINUED] aspirin (ECOTRIN) 81 MG EC tablet Take 1 tablet (81 mg total) by mouth once daily.    [DISCONTINUED] furosemide (LASIX) 20 MG tablet One po 3 days /week-   on Sunday , Tuesday , and Thursday    [DISCONTINUED] insulin aspart U-100 (NOVOLOG) 100 unit/mL (3 mL) InPn pen Inject 0-5 Units into the skin before meals and at bedtime as needed (Hyperglycemia).     Family History     Problem Relation (Age of Onset)    Hypertension Mother, Father    Hypothyroidism Mother         Tobacco Use    Smoking status: Never Smoker    Smokeless tobacco: Never Used   Substance and Sexual Activity    Alcohol use: Never     Frequency: Never    Drug use: Not on file    Sexual activity: Not on file     Review of Systems   Constitutional: Negative for activity change, appetite change, chills, fatigue and fever.   HENT: Negative for dental problem, ear pain, hearing loss, mouth sores, nosebleeds, sinus pressure, sore throat, tinnitus and trouble swallowing.    Eyes: Negative for pain, discharge and visual disturbance.   Respiratory: Positive for cough and shortness of breath. Negative for choking, chest tightness and wheezing.    Cardiovascular: Positive for leg swelling. Negative for chest pain and palpitations.   Gastrointestinal: Negative for abdominal distention, abdominal pain, anal bleeding, blood in stool, constipation, diarrhea, nausea and vomiting.   Endocrine: Negative for cold intolerance and heat intolerance.   Genitourinary: Negative for decreased urine volume, difficulty urinating, flank pain, frequency, hematuria and urgency.   Musculoskeletal: Negative for arthralgias, back pain, gait problem, myalgias, neck pain and neck stiffness.   Skin: Negative for color change, rash and wound.   Allergic/Immunologic: Negative.    Neurological: Positive for weakness. Negative for dizziness, tremors, seizures, syncope, speech difficulty, light-headedness and headaches.   Hematological: Negative.    Psychiatric/Behavioral: Negative for agitation, behavioral problems, confusion and sleep disturbance. The patient is not nervous/anxious.    All other systems reviewed and are negative.    Objective:     Vital Signs (Most Recent):  Temp: 98.5 °F (36.9 °C) (12/15/20 1129)  Pulse: 98 (12/15/20 1626)  Resp: (!) 38 (12/15/20 1431)  BP: (!) 152/90 (12/15/20 1616)  SpO2: (!) 93 % (12/15/20 1626) Vital Signs (24h Range):  Temp:  [98.5 °F (36.9 °C)] 98.5 °F (36.9 °C)  Pulse:  [] 98  Resp:  [30-40]  38  SpO2:  [90 %-93 %] 93 %  BP: (107-152)/(63-90) 152/90     Weight: 95.2 kg (209 lb 14.4 oz)  Body mass index is 30.12 kg/m².    Physical Exam  Vitals signs and nursing note reviewed.   Constitutional:       General: He is not in acute distress.     Appearance: He is well-developed. He is not diaphoretic.   HENT:      Head: Normocephalic and atraumatic.      Nose: Nose normal.   Eyes:      General:         Right eye: No discharge.         Left eye: No discharge.      Conjunctiva/sclera: Conjunctivae normal.      Pupils: Pupils are equal, round, and reactive to light.   Neck:      Musculoskeletal: Normal range of motion and neck supple.      Thyroid: No thyromegaly.      Vascular: No JVD.      Trachea: No tracheal deviation.   Cardiovascular:      Rate and Rhythm: Normal rate and regular rhythm.      Heart sounds: Normal heart sounds. No murmur. No friction rub. No gallop.    Pulmonary:      Effort: Tachypnea, accessory muscle usage and respiratory distress present.      Breath sounds: Examination of the right-upper field reveals wheezing. Examination of the left-upper field reveals wheezing. Examination of the right-lower field reveals decreased breath sounds. Examination of the left-lower field reveals decreased breath sounds. Decreased breath sounds and wheezing present. No rales.   Chest:      Chest wall: No tenderness.   Abdominal:      General: Bowel sounds are normal. There is no distension.      Palpations: Abdomen is soft. There is no mass.      Tenderness: There is no abdominal tenderness.   Genitourinary:     Comments: deferred  Musculoskeletal: Normal range of motion.         General: No tenderness or deformity.      Right lower leg: Edema present.      Left lower leg: Edema present.   Skin:     General: Skin is warm and dry.      Capillary Refill: Capillary refill takes less than 2 seconds.      Findings: No erythema or rash.   Neurological:      Mental Status: He is alert and oriented to person,  place, and time.      Motor: No abnormal muscle tone.      Coordination: Coordination normal.   Psychiatric:         Behavior: Behavior normal.           CRANIAL NERVES     CN III, IV, VI   Pupils are equal, round, and reactive to light.       Significant Labs:   Recent Lab Results       12/15/20  1622   12/15/20  1140        Albumin   2.0     Alkaline Phosphatase   82     ALT   32     Anion Gap   10     aPTT   26.3  Comment:  aPTT therapeutic range = 39-69 seconds     AST   37     Baso #   0.01     Basophil %   0.1     BILIRUBIN TOTAL   0.8  Comment:  For infants and newborns, interpretation of results should be based  on gestational age, weight and in agreement with clinical  observations.  Premature Infant recommended reference ranges:  Up to 24 hours.............<8.0 mg/dL  Up to 48 hours............<12.0 mg/dL  3-5 days..................<15.0 mg/dL  6-29 days.................<15.0 mg/dL       BNP   283  Comment:  Values of less than 100 pg/ml are consistent with non-CHF populations.     BUN   28     Calcium   8.6     Chloride   101     CO2   32     CPK   77     Creatinine   1.1     .5       Differential Method   Automated     eGFR if    >60     eGFR if non    >60  Comment:  Calculation used to obtain the estimated glomerular filtration  rate (eGFR) is the CKD-EPI equation.        Eos #   0.0     Eosinophil %   0.0     Glucose   163     Gran # (ANC)   9.0     Gran %   91.3     Hematocrit   28.6     Hemoglobin   8.9     Immature Grans (Abs)   0.07  Comment:  Mild elevation in immature granulocytes is non specific and   can be seen in a variety of conditions including stress response,   acute inflammation, trauma and pregnancy. Correlation with other   laboratory and clinical findings is essential.       Immature Granulocytes   0.7     INR   1.1  Comment:  Coumadin Therapy:  2.0 - 3.0 for INR for all indicators except mechanical heart valves  and antiphospholipid syndromes  which should use 2.5 - 3.5.       Lactate, Martin 1.6  Comment:  Falsely low lactic acid results can be found in samples   containing >=13.0 mg/dL total bilirubin and/or >=3.5 mg/dL   direct bilirubin.           Comment:  Results are increased in hemolyzed samples.       Lymph #   0.5     Lymph %   5.4     MCH   29.9     MCHC   31.1     MCV   96     Mono #   0.3     Mono %   2.5     MPV   9.6     nRBC   1     Platelets   216     Potassium   3.4     PROTEIN TOTAL   5.6     Protime   11.4     RBC   2.98     RDW   17.9     SARS-CoV-2 RNA, Amplification, Qual   Positive  Comment:  This test utilizes isothermal nucleic acid amplification   technology to detect the SARS-CoV-2 RdRp nucleic acid segment.   The analytical sensitivity (limit of detection) is 125 genome   equivalents/mL.   A POSITIVE result implies infection with the SARS-CoV-2 virus;  the patient is presumed to be contagious.    A NEGATIVE result means that SARS-CoV-2 nucleic acids are not  present above the limit of detection. A NEGATIVE result should be   treated as presumptive. It does not rule out the possibility of   COVID-19 and should not be the sole basis for treatment decisions.   If COVID-19 is strongly suspected based on clinical and exposure   history, re-testing using an alternate molecular assay should be   considered.   This test is only for use under the Food and Drug   Administration s Emergency Use Authorization (EUA).   Commercial kits are provided by [employer].   Performance characteristics of the EUA have been independently  verified by [facility] Department of  Pathology and Laboratory Medicine.   _________________________________________________________________  The ID NOW COVID-19 Letter of Authorization, along with the   authorized Fact Sheet for Healthcare Providers, the authorized Fact  Sheet for Patients, and authorized labeling are available on the FDA    website:  www.fda.gov/MedicalDevices/Safety/EmergencySituations/rpe062853.htm       Sodium   143     Troponin I 0.321  Comment:  The reference interval for Troponin I represents the 99th percentile   cutoff   for our facility and is consistent with 3rd generation assay   performance.   0.250  Comment:  The reference interval for Troponin I represents the 99th percentile   cutoff   for our facility and is consistent with 3rd generation assay   performance.       WBC   9.83         All pertinent labs within the past 24 hours have been reviewed.    Significant Imaging: I have reviewed all pertinent imaging results/findings within the past 24 hours.

## 2020-12-15 NOTE — ASSESSMENT & PLAN NOTE
--troponin 0.250 on admit  --cardiology consulted  --heparin gtt  --continue ASA, statin  --trend troponin

## 2020-12-15 NOTE — ASSESSMENT & PLAN NOTE
Troponin 0.250  Continue to trend serial troponin  Recent ECHO revealed EF 60%, DD  Recent MPI stress test normal perfusion scan  Continue IV heparin gtt  Continue ASA, Statin, BB

## 2020-12-15 NOTE — ASSESSMENT & PLAN NOTE
- COVID-19 testing   - Infection Control notified     - Isolation:   - Airborne, Contact and Droplet Precautions  - Cohort patients into COVID units  - N95 mask, wear eye protection  - 20 second hand hygiene              - Limit visitors per hospital policy              - Consolidating lab draws, nursing care, provider visits, and interventions    - Diagnostics: (leukopenia, hyponatremia, hyperferritinemia, elevated troponin, elevated d-dimer, age, and comorbidities are significant predictors of poor clinical outcome)  CBC, CMP, Procalcitonin, Ferritin, CRP, LDH, BNP, Troponin, ECG, Rapid Flu, Blood Culture x2 and Portable CXR    - Management:  Supplemental O2 to maintain SpO2 >92%  Telemetry  Continuous/intermittent Pulse Ox  Albuterol treatment PRN   --Ipratropium inhaler with MIDI  --dexamethasone  --remdesivir  --monitor    Advance Care Planning   patient is a DNR and his SDM is his son, Mac

## 2020-12-15 NOTE — ASSESSMENT & PLAN NOTE
--accuchecks with SSI  --diabetes educator consulted  --diabetic diet  --HA1C pending  --last HA1C 10/26/20 was 9.3

## 2020-12-15 NOTE — CONSULTS
[facility] -   Cardiology  Consult Note    Patient Name: Karan Abruto  MRN: [id number]  Admission Date: 12/15/2020  Hospital Length of Stay: 0 days  Code Status: Prior   Attending Provider: Reuben Champagne MD   Consulting Provider: CORETTA Ceballos  Primary Care Physician: Mickey Agrawal MD  Principal Problem:COVID-19    Patient information was obtained from patient, past medical records and ER records.     Inpatient consult to Cardiology  Consult performed by: CORETTA Lerner  Consult ordered by: Reuben Champagne MD        Subjective:     Chief Complaint:  Shortness of breath     HPI:   Karan Aburto is a 75 year old male who presented to [facility] due to respiratory distress with associated fever, dyspnea, and generalized weakness. His current medical conditions include HTN, elevated PSA, Thyroid disease, Rosacea, recent COVID +. O2 sats were 70% on RA per EMS. ED workup revealed K+ 3.4, Cr 1.1, glucose 163, H/H 8.9/28.6, plts 216, , Troponin 0.250. He was placed in observation under the care of hospital medicine. Cardiology consulted to assist with medical management. Chart extensively reviewed, patient not seen or examined due to COVID +. Will optimize medical management. Continue medical management of COVID per primary team.       Past Medical History:   Diagnosis Date    Elevated PSA     Hypertension     Rosacea     Thyroid disease     hypothyroidism       No past surgical history on file.    Review of patient's allergies indicates:  No Known Allergies    No current facility-administered medications on file prior to encounter.      Current Outpatient Medications on File Prior to Encounter   Medication Sig    carvediloL (COREG) 25 MG tablet Take 1 tablet (25 mg total) by mouth 2 (two) times daily with meals.    famotidine (PEPCID) 20 MG tablet Take 1 tablet (20 mg total) by mouth 2 (two) times daily.    fenofibrate 160 MG Tab Take 160 mg by mouth once daily.    hydrALAZINE  (APRESOLINE) 25 MG tablet Take 1 tablet (25 mg total) by mouth 3 (three) times daily.    isosorbide mononitrate (IMDUR) 30 MG 24 hr tablet Take 1 tablet (30 mg total) by mouth once daily.    lisinopriL (PRINIVIL,ZESTRIL) 40 MG tablet Take 1 tablet (40 mg total) by mouth once daily.    metFORMIN (GLUCOPHAGE) 500 MG tablet Take 1 tablet (500 mg total) by mouth 2 (two) times daily with meals.    potassium chloride SA (K-DUR,KLOR-CON) 20 MEQ tablet Take 1 tablet (20 mEq total) by mouth 2 (two) times daily.    rosuvastatin (CRESTOR) 10 MG tablet Take 10 mg by mouth once daily.    spironolactone (ALDACTONE) 25 MG tablet Take 1 tablet (25 mg total) by mouth once daily.    tiZANidine 4 mg Cap Take 4 mg by mouth 2 (two) times a day.     [DISCONTINUED] aspirin (ECOTRIN) 81 MG EC tablet Take 1 tablet (81 mg total) by mouth once daily.    [DISCONTINUED] furosemide (LASIX) 20 MG tablet One po 3 days /week-   on Sunday , Tuesday , and Thursday    [DISCONTINUED] insulin aspart U-100 (NOVOLOG) 100 unit/mL (3 mL) InPn pen Inject 0-5 Units into the skin before meals and at bedtime as needed (Hyperglycemia).     Family History     None        Tobacco Use    Smoking status: Never Smoker    Smokeless tobacco: Never Used   Substance and Sexual Activity    Alcohol use: Never     Frequency: Never    Drug use: Not on file    Sexual activity: Not on file     Review of Systems   Constitution: Positive for malaise/fatigue.   Cardiovascular: Positive for dyspnea on exertion, irregular heartbeat and palpitations.   Respiratory: Positive for cough and shortness of breath.    Endocrine: Negative for heat intolerance.   Neurological: Positive for weakness.   Allergic/Immunologic: Negative for environmental allergies.     Objective:     Vital Signs (Most Recent):  Temp: 98.5 °F (36.9 °C) (12/15/20 1129)  Pulse: 101 (12/15/20 1309)  Resp: (!) 32 (12/15/20 1309)  BP: (!) 146/86 (12/15/20 1309)  SpO2: (!) 91 % (12/15/20 1309) Vital Signs  (24h Range):  Temp:  [98.5 °F (36.9 °C)] 98.5 °F (36.9 °C)  Pulse:  [101-109] 101  Resp:  [30-40] 32  SpO2:  [90 %-93 %] 91 %  BP: (107-146)/(63-86) 146/86     Weight: 95.2 kg (209 lb 14.4 oz)  Body mass index is 30.12 kg/m².    SpO2: (!) 91 %  O2 Device (Oxygen Therapy): Non Rebreather Mask    No intake or output data in the 24 hours ending 12/15/20 1330    Lines/Drains/Airways     Peripheral Intravenous Line                 Peripheral IV - Single Lumen 12/15/20 1255 20 G Left Antecubital less than 1 day         Peripheral IV - Single Lumen 12/15/20 18 G Left Hand less than 1 day                Physical Exam   NOT EXAMINED GIVEN + COVID STATUS  Significant Labs:   CMP   Recent Labs   Lab 12/15/20  1140      K 3.4*      CO2 32*   *   BUN 28*   CREATININE 1.1   CALCIUM 8.6*   PROT 5.6*   ALBUMIN 2.0*   BILITOT 0.8   ALKPHOS 82   AST 37   ALT 32   ANIONGAP 10   ESTGFRAFRICA >60   EGFRNONAA >60   , CBC   Recent Labs   Lab 12/15/20  1140   WBC 9.83   HGB 8.9*   HCT 28.6*      , Troponin   Recent Labs   Lab 12/15/20  1140   TROPONINI 0.250*    and All pertinent lab results from the last 24 hours have been reviewed.    Significant Imaging: Echocardiogram:   Transthoracic echo (TTE) complete (Cupid Only):   Results for orders placed or performed during the hospital encounter of 10/18/20   Echo Color Flow Doppler? Yes; Bubble Contrast? No   Result Value Ref Range    Ascending aorta 2.97 cm    STJ 2.57 cm    AV mean gradient 7 mmHg    Ao peak lewis 1.79 m/s    Ao VTI 27.91 cm    IVS 1.39 (A) 0.6 - 1.1 cm    LA size 4.21 cm    Left Atrium Major Axis 4.62 cm    LVIDd 3.82 3.5 - 6.0 cm    LVIDs 2.29 2.1 - 4.0 cm    LVOT diameter 2.39 cm    LVOT peak VTI 21.64 cm    Posterior Wall 1.08 0.6 - 1.1 cm    MV Peak A Lewis 1.10 m/s    E wave decelartion time 190.97 msec    MV Peak E Lewis 0.79 m/s    RA Major Axis 4.32 cm    RA Width 2.24 cm    TAPSE 2.81 cm    TR Max Lewis 1.74 m/s    TDI LATERAL 0.07 m/s    TDI  SEPTAL 0.06 m/s    LA WIDTH 4.11 cm    Ao root annulus 3.66 cm    MV stenosis pressure 1/2 time 55.38 ms    LV Diastolic Volume 62.69 mL    LV Systolic Volume 17.96 mL    LVOT peak mercedes 1.28 m/s    LV LATERAL E/E' RATIO 11.29 m/s    LV SEPTAL E/E' RATIO 13.17 m/s    FS 40 %    LV mass 161.27 g    Left Ventricle Relative Wall Thickness 0.57 cm    AV valve area 3.48 cm2    AV Velocity Ratio 0.72     AV index (prosthetic) 0.78     MV valve area p 1/2 method 3.97 cm2    E/A ratio 0.72     Mean e' 0.07 m/s    LVOT area 4.5 cm2    LVOT stroke volume 97.03 cm3    AV peak gradient 13 mmHg    E/E' ratio 12.15 m/s    LV Systolic Volume Index 8.1 mL/m2    LV Diastolic Volume Index 28.40 mL/m2    LV Mass Index 73 g/m2    Triscuspid Valve Regurgitation Peak Gradient 12 mmHg    Right Atrial Pressure (from IVC) 3 mmHg    TV rest pulmonary artery pressure 15 mmHg    Narrative    · There is mild left ventricular concentric hypertrophy.  · With normal systolic function. The estimated ejection fraction is 60%.  · Grade I diastolic dysfunction.  · Normal right ventricular systolic function.  · Mild left atrial enlargement.  · Mild mitral regurgitation.  · Normal central venous pressure (3 mmHg).  · The estimated PA systolic pressure is 15 mmHg.        Assessment and Plan:     * COVID-19  Mgmt per primary team    Hypoxia  Continue supplemental oxygen to maintain O2 saturation  Mgmt per primary team    Elevated troponin  Troponin 0.250  Continue to trend serial troponin  Recent ECHO revealed EF 60%, DD  Recent MPI stress test normal perfusion scan  Continue IV heparin gtt  Continue ASA, Statin, BB    Diabetes mellitus type 2, uncontrolled  Mgmt per primary team        VTE Risk Mitigation (From admission, onward)         Ordered     heparin 25,000 units in dextrose 5% (100 units/ml) IV bolus from bag - ADDITIONAL PRN BOLUS - 30 units/kg (max bolus 4000 units)  As needed (PRN)     Question:  Heparin Infusion Adjustment (DO NOT MODIFY  ANSWER)  Answer:  [email or web address]\epic\Images\Pharmacy\HeparinInfusions\heparin LOW INTENSITY nomogram for [facility] [id number]    12/15/20 1243     heparin 25,000 units in dextrose 5% (100 units/ml) IV bolus from bag - ADDITIONAL PRN BOLUS - 60 units/kg (max bolus 4000 units)  As needed (PRN)     Question:  Heparin Infusion Adjustment (DO NOT MODIFY ANSWER)  Answer:  [email or web address]\Images\Pharmacy\HeparinInfusions\heparin LOW INTENSITY nomogram for [facility] [id number]    12/15/20 1243     heparin 25,000 units in dextrose 5% (100 units/ml) IV bolus from bag INITIAL BOLUS (max bolus 4000 units)  Once     Question:  Heparin Infusion Adjustment (DO NOT MODIFY ANSWER)  Answer:  [email or web address]\epic\Images\Pharmacy\HeparinInfusions\heparin LOW INTENSITY nomogram for [facility] [id number]    12/15/20 1243     heparin 25,000 units in dextrose 5% 250 mL (100 units/mL) infusion LOW INTENSITY nomogram - [facility]  Continuous     Question Answer Comment   Heparin Infusion Adjustment (DO NOT MODIFY ANSWER) [email or web address]\epic\Images\Pharmacy\HeparinInfusions\heparin LOW INTENSITY nomogram for [facility] [id number]    Begin at (in units/kg/hr) 12        12/15/20 1243                Thank you for your consult. I will follow-up with patient. Please contact us if you have any additional questions.    Zaira Oconnor, NURIAP-C  Cardiology   [facility]

## 2020-12-15 NOTE — SUBJECTIVE & OBJECTIVE
Past Medical History:   Diagnosis Date    Elevated PSA     Hypertension     Rosacea     Thyroid disease     hypothyroidism       No past surgical history on file.    Review of patient's allergies indicates:  No Known Allergies    No current facility-administered medications on file prior to encounter.      Current Outpatient Medications on File Prior to Encounter   Medication Sig    carvediloL (COREG) 25 MG tablet Take 1 tablet (25 mg total) by mouth 2 (two) times daily with meals.    famotidine (PEPCID) 20 MG tablet Take 1 tablet (20 mg total) by mouth 2 (two) times daily.    fenofibrate 160 MG Tab Take 160 mg by mouth once daily.    hydrALAZINE (APRESOLINE) 25 MG tablet Take 1 tablet (25 mg total) by mouth 3 (three) times daily.    isosorbide mononitrate (IMDUR) 30 MG 24 hr tablet Take 1 tablet (30 mg total) by mouth once daily.    lisinopriL (PRINIVIL,ZESTRIL) 40 MG tablet Take 1 tablet (40 mg total) by mouth once daily.    metFORMIN (GLUCOPHAGE) 500 MG tablet Take 1 tablet (500 mg total) by mouth 2 (two) times daily with meals.    potassium chloride SA (K-DUR,KLOR-CON) 20 MEQ tablet Take 1 tablet (20 mEq total) by mouth 2 (two) times daily.    rosuvastatin (CRESTOR) 10 MG tablet Take 10 mg by mouth once daily.    spironolactone (ALDACTONE) 25 MG tablet Take 1 tablet (25 mg total) by mouth once daily.    tiZANidine 4 mg Cap Take 4 mg by mouth 2 (two) times a day.     [DISCONTINUED] aspirin (ECOTRIN) 81 MG EC tablet Take 1 tablet (81 mg total) by mouth once daily.    [DISCONTINUED] furosemide (LASIX) 20 MG tablet One po 3 days /week-   on Sunday , Tuesday , and Thursday    [DISCONTINUED] insulin aspart U-100 (NOVOLOG) 100 unit/mL (3 mL) InPn pen Inject 0-5 Units into the skin before meals and at bedtime as needed (Hyperglycemia).     Family History     None        Tobacco Use    Smoking status: Never Smoker    Smokeless tobacco: Never Used   Substance and Sexual Activity    Alcohol use: Never      Frequency: Never    Drug use: Not on file    Sexual activity: Not on file     Review of Systems   Constitution: Positive for malaise/fatigue.   Cardiovascular: Positive for dyspnea on exertion, irregular heartbeat and palpitations.   Respiratory: Positive for cough and shortness of breath.    Endocrine: Negative for heat intolerance.   Neurological: Positive for weakness.   Allergic/Immunologic: Negative for environmental allergies.     Objective:     Vital Signs (Most Recent):  Temp: 98.5 °F (36.9 °C) (12/15/20 1129)  Pulse: 101 (12/15/20 1309)  Resp: (!) 32 (12/15/20 1309)  BP: (!) 146/86 (12/15/20 1309)  SpO2: (!) 91 % (12/15/20 1309) Vital Signs (24h Range):  Temp:  [98.5 °F (36.9 °C)] 98.5 °F (36.9 °C)  Pulse:  [101-109] 101  Resp:  [30-40] 32  SpO2:  [90 %-93 %] 91 %  BP: (107-146)/(63-86) 146/86     Weight: 95.2 kg (209 lb 14.4 oz)  Body mass index is 30.12 kg/m².    SpO2: (!) 91 %  O2 Device (Oxygen Therapy): Non Rebreather Mask    No intake or output data in the 24 hours ending 12/15/20 1330    Lines/Drains/Airways     Peripheral Intravenous Line                 Peripheral IV - Single Lumen 12/15/20 1255 20 G Left Antecubital less than 1 day         Peripheral IV - Single Lumen 12/15/20 18 G Left Hand less than 1 day                Physical Exam   NOT EXAMINED GIVEN + COVID STATUS  Significant Labs:   CMP   Recent Labs   Lab 12/15/20  1140      K 3.4*      CO2 32*   *   BUN 28*   CREATININE 1.1   CALCIUM 8.6*   PROT 5.6*   ALBUMIN 2.0*   BILITOT 0.8   ALKPHOS 82   AST 37   ALT 32   ANIONGAP 10   ESTGFRAFRICA >60   EGFRNONAA >60   , CBC   Recent Labs   Lab 12/15/20  1140   WBC 9.83   HGB 8.9*   HCT 28.6*      , Troponin   Recent Labs   Lab 12/15/20  1140   TROPONINI 0.250*    and All pertinent lab results from the last 24 hours have been reviewed.    Significant Imaging: Echocardiogram:   Transthoracic echo (TTE) complete (Cupid Only):   Results for orders placed or performed  during the hospital encounter of 10/18/20   Echo Color Flow Doppler? Yes; Bubble Contrast? No   Result Value Ref Range    Ascending aorta 2.97 cm    STJ 2.57 cm    AV mean gradient 7 mmHg    Ao peak lewis 1.79 m/s    Ao VTI 27.91 cm    IVS 1.39 (A) 0.6 - 1.1 cm    LA size 4.21 cm    Left Atrium Major Axis 4.62 cm    LVIDd 3.82 3.5 - 6.0 cm    LVIDs 2.29 2.1 - 4.0 cm    LVOT diameter 2.39 cm    LVOT peak VTI 21.64 cm    Posterior Wall 1.08 0.6 - 1.1 cm    MV Peak A Lewis 1.10 m/s    E wave decelartion time 190.97 msec    MV Peak E Lewis 0.79 m/s    RA Major Axis 4.32 cm    RA Width 2.24 cm    TAPSE 2.81 cm    TR Max Lewis 1.74 m/s    TDI LATERAL 0.07 m/s    TDI SEPTAL 0.06 m/s    LA WIDTH 4.11 cm    Ao root annulus 3.66 cm    MV stenosis pressure 1/2 time 55.38 ms    LV Diastolic Volume 62.69 mL    LV Systolic Volume 17.96 mL    LVOT peak lewis 1.28 m/s    LV LATERAL E/E' RATIO 11.29 m/s    LV SEPTAL E/E' RATIO 13.17 m/s    FS 40 %    LV mass 161.27 g    Left Ventricle Relative Wall Thickness 0.57 cm    AV valve area 3.48 cm2    AV Velocity Ratio 0.72     AV index (prosthetic) 0.78     MV valve area p 1/2 method 3.97 cm2    E/A ratio 0.72     Mean e' 0.07 m/s    LVOT area 4.5 cm2    LVOT stroke volume 97.03 cm3    AV peak gradient 13 mmHg    E/E' ratio 12.15 m/s    LV Systolic Volume Index 8.1 mL/m2    LV Diastolic Volume Index 28.40 mL/m2    LV Mass Index 73 g/m2    Triscuspid Valve Regurgitation Peak Gradient 12 mmHg    Right Atrial Pressure (from IVC) 3 mmHg    TV rest pulmonary artery pressure 15 mmHg    Narrative    · There is mild left ventricular concentric hypertrophy.  · With normal systolic function. The estimated ejection fraction is 60%.  · Grade I diastolic dysfunction.  · Normal right ventricular systolic function.  · Mild left atrial enlargement.  · Mild mitral regurgitation.  · Normal central venous pressure (3 mmHg).  · The estimated PA systolic pressure is 15 mmHg.

## 2020-12-15 NOTE — ED NOTES
Pt took nonrebreather off to eat, O2 saturation dropped to 66%. Redirected patient to place mask back on face. Pt sitting up in bed comfortably. AAO x 4. Skin warm and dry. Denies any needs or assist at this time. Will continue to monitor.

## 2020-12-15 NOTE — ED NOTES
Spoke with Shawn in lab who states she just found requisitions for add on PT/INR & aPTT so she will put them on the machine to run right now.

## 2020-12-16 NOTE — HOSPITAL COURSE
12/16/2020-Patient not seen or examined given + covid status. Remains on vapotherm with maximum support and proned per nursing notes to maintain oxygenation. Labs reviewed, H/H 7.9/25, K+ 3.4, Cr 1.1, would recommend 1 unit PRBC transfusion.     12/17/2020-Patient not seen or examined given + Covid status. On Bipap today for pulmonary support. Labs reviewed, K+ 3.2, Cr 1.6, BUN 29, Phosph 5.3, H/H 7.6/25.     12/18/2020-Patient not seen or examined given + COVID status. Labs reviewed, K+ 3.1, Cr 1.7, BUN 74, CO2 31, mag 2.4.     12/19/2020-Patient not seen or examined. Remains on continuous bipap, unable to wean. Labs reviewed, K+ 4.0, Cr 1.3, BUN 67.     12/20/2020-Patient not seen or examined given + covid status. Patient transferred to ICU yesterday. Remains on continuous bipap today.

## 2020-12-16 NOTE — PT/OT/SLP PROGRESS
Physical Therapy      Patient Name:  Karan Aburto   MRN:  [id number]    1035 P.T. COMPLETED CHART REVIEW. PT NURSE CAROLYNN REQUESTED HOLD P.T. EVAL AS PT NOT ABLE TO TOLERATE. PT IN PRONE POSITION AT THIS TIME.    Rashida Us, PT,12/16/2020

## 2020-12-16 NOTE — SUBJECTIVE & OBJECTIVE
Interval History: Patient proned and tolerating therapies. Vapotherm in place 40 lpm at 100% O2    Review of Systems   Constitutional: Positive for activity change, appetite change, fatigue and fever.   HENT: Negative.  Negative for congestion, sinus pressure and sore throat.    Eyes: Negative.  Negative for photophobia, discharge and visual disturbance.   Respiratory: Positive for cough and shortness of breath. Negative for chest tightness and wheezing.    Cardiovascular: Positive for palpitations. Negative for chest pain.   Gastrointestinal: Negative.  Negative for abdominal pain, blood in stool, constipation, diarrhea, nausea and vomiting.   Endocrine: Negative.    Genitourinary: Negative.    Musculoskeletal: Positive for myalgias. Negative for neck pain and neck stiffness.   Skin: Negative.    Allergic/Immunologic: Negative.    Neurological: Positive for weakness. Negative for seizures, syncope and headaches.   Hematological: Negative.    Psychiatric/Behavioral: Negative.  Negative for agitation, behavioral problems, hallucinations, self-injury and suicidal ideas.     Objective:     Vital Signs (Most Recent):  Temp: 98.6 °F (37 °C) (12/16/20 1530)  Pulse: 77 (12/16/20 1530)  Resp: 20 (12/16/20 1530)  BP: 97/61 (12/16/20 1530)  SpO2: (!) 87 % (12/16/20 1530) Vital Signs (24h Range):  Temp:  [97.4 °F (36.3 °C)-99.1 °F (37.3 °C)] 98.6 °F (37 °C)  Pulse:  [] 77  Resp:  [18-28] 20  SpO2:  [80 %-96 %] 87 %  BP: ()/(55-94) 97/61     Weight: 89.6 kg (197 lb 8.5 oz)  Body mass index is 28.34 kg/m².    Intake/Output Summary (Last 24 hours) at 12/16/2020 1620  Last data filed at 12/15/2020 1835  Gross per 24 hour   Intake 250 ml   Output --   Net 250 ml      Physical Exam  Vitals signs and nursing note reviewed.   Constitutional:       General: He is not in acute distress.     Appearance: He is well-developed. He is not diaphoretic.   HENT:      Head: Normocephalic and atraumatic.      Nose: Nose normal.    Eyes:      General:         Right eye: No discharge.         Left eye: No discharge.      Conjunctiva/sclera: Conjunctivae normal.      Pupils: Pupils are equal, round, and reactive to light.   Neck:      Musculoskeletal: Normal range of motion and neck supple.      Thyroid: No thyromegaly.      Vascular: No JVD.      Trachea: No tracheal deviation.   Cardiovascular:      Rate and Rhythm: Regular rhythm. Tachycardia present.      Heart sounds: Murmur present. Systolic murmur present with a grade of 3/6. No friction rub. No gallop.    Pulmonary:      Effort: Tachypnea, accessory muscle usage and respiratory distress present.      Breath sounds: Examination of the right-upper field reveals wheezing. Examination of the left-upper field reveals wheezing. Examination of the right-lower field reveals decreased breath sounds. Examination of the left-lower field reveals decreased breath sounds. Decreased breath sounds and wheezing present. No rales.   Chest:      Chest wall: No tenderness.   Abdominal:      General: Bowel sounds are normal. There is no distension.      Palpations: Abdomen is soft. There is no mass.      Tenderness: There is no abdominal tenderness.   Genitourinary:     Comments: deferred  Musculoskeletal: Normal range of motion.         General: No tenderness or deformity.      Right lower leg: Edema present.      Left lower leg: Edema present.   Skin:     General: Skin is warm and dry.      Capillary Refill: Capillary refill takes less than 2 seconds.      Findings: No erythema or rash.   Neurological:      Mental Status: He is alert and oriented to person, place, and time.      Motor: No abnormal muscle tone.      Coordination: Coordination normal.   Psychiatric:         Behavior: Behavior normal.         Significant Labs:   BMP:   Recent Labs   Lab 12/16/20  0451   *      K 3.4*   CL 99   CO2 31*   BUN 35*   CREATININE 1.1   CALCIUM 8.0*   MG 2.1     CBC:   Recent Labs   Lab 12/15/20  1140  12/16/20  0451   WBC 9.83 7.29   HGB 8.9* 7.9*   HCT 28.6* 25.8*    215     CMP:   Recent Labs   Lab 12/15/20  1140 12/16/20  0451    142   K 3.4* 3.4*    99   CO2 32* 31*   * 235*   BUN 28* 35*   CREATININE 1.1 1.1   CALCIUM 8.6* 8.0*   PROT 5.6* 5.0*   ALBUMIN 2.0* 1.7*   BILITOT 0.8 0.7   ALKPHOS 82 69   AST 37 42*   ALT 32 38   ANIONGAP 10 12   EGFRNONAA >60 >60     Urine Studies:   Recent Labs   Lab 12/15/20  1716   COLORU Yellow   APPEARANCEUA Clear   PHUR 6.0   SPECGRAV 1.025   PROTEINUA 2+*   GLUCUA Negative   KETONESU Negative   BILIRUBINUA Negative   OCCULTUA Trace*   NITRITE Negative   UROBILINOGEN Negative   LEUKOCYTESUR 1+*   RBCUA 0   WBCUA 15*   BACTERIA Many*   HYALINECASTS 0     All pertinent labs within the past 24 hours have been reviewed.    Significant Imaging: I have reviewed all pertinent imaging results/findings within the past 24 hours.

## 2020-12-16 NOTE — NURSING
Pt turned supine high fowlers for one hour oxygen saturation not maintained. Pt pronated O2 sat 91% 40L 100% vapotherm

## 2020-12-16 NOTE — ASSESSMENT & PLAN NOTE
Troponin 0.250, trending downward  Continue to trend serial troponin  Recent ECHO revealed EF 60%, DD  Recent MPI stress test normal perfusion scan  Continue IV heparin gtt  Continue ASA, Statin, BB

## 2020-12-16 NOTE — PLAN OF CARE
POC reviewed, verbalized understanding. Pt remained free from falls, fall precautions in place. Pt is SR on monitor, 8684. VSS. No other c/o at this time. PIV intact, Heparin and Remdesivir infusing. 40L 100% Vapotherm. Pt is prone. Call bell and personal belongings within reach. Hourly rounding complete. Reminded to call for assistance. Will continue to monitor.

## 2020-12-16 NOTE — PROGRESS NOTES
[facility] Hospital Medicine  Progress Note    Patient Name: Karan Aburto  MRN: [id number]  Patient Class: IP- Inpatient   Admission Date: 12/15/2020  Length of Stay: 0 days  Attending Physician: Severo Zavaleta MD  Primary Care Provider: Mickey Agrawal MD        Subjective:     Principal Problem:COVID-19        HPI:  74 y/o male with PMHx of HTN, HLD, CAD, and hypothyroidism who presented to the ED with c/o SOB that onset gradually earlier today. Symptoms are constant and moderate in severity. No mitigating or exacerbating factors reported. Associated symptoms include fever, cough, BLE edema, and weakness. Pt denies HA, dizziness, CP, palpitations, loss of smell, loss of taste, N/V/D, and all other symptoms at this time.  ED workup shows: RR 38, oxygen sat 91% on non-rebreather mask. H/H 8.9/28.6, K+ 3.4, , , , Troponin 0.250  He is a DNR and his SDM is his son, Mac.  He will be kept on OBS under the care of hospital medicine.      Overview/Hospital Course:  Patietn 74 yo male admitted to hospital with COVIFD 19 Acute Hypoxic Respiratory failure / Pneumonia. Patient initiated on Remdesivir, steroids, Vapotherm. Patient communicative and expressed he doesn't want to go through this. Patietn confirmed he is DNR. Patietn denies CP + SOB / Cough    Interval History: Patient proned and tolerating therapies. Vapotherm in place 40 lpm at 100% O2    Review of Systems   Constitutional: Positive for activity change, appetite change, fatigue and fever.   HENT: Negative.  Negative for congestion, sinus pressure and sore throat.    Eyes: Negative.  Negative for photophobia, discharge and visual disturbance.   Respiratory: Positive for cough and shortness of breath. Negative for chest tightness and wheezing.    Cardiovascular: Positive for palpitations. Negative for chest pain.   Gastrointestinal: Negative.  Negative for abdominal pain, blood in stool, constipation, diarrhea, nausea and  vomiting.   Endocrine: Negative.    Genitourinary: Negative.    Musculoskeletal: Positive for myalgias. Negative for neck pain and neck stiffness.   Skin: Negative.    Allergic/Immunologic: Negative.    Neurological: Positive for weakness. Negative for seizures, syncope and headaches.   Hematological: Negative.    Psychiatric/Behavioral: Negative.  Negative for agitation, behavioral problems, hallucinations, self-injury and suicidal ideas.     Objective:     Vital Signs (Most Recent):  Temp: 98.6 °F (37 °C) (12/16/20 1530)  Pulse: 77 (12/16/20 1530)  Resp: 20 (12/16/20 1530)  BP: 97/61 (12/16/20 1530)  SpO2: (!) 87 % (12/16/20 1530) Vital Signs (24h Range):  Temp:  [97.4 °F (36.3 °C)-99.1 °F (37.3 °C)] 98.6 °F (37 °C)  Pulse:  [] 77  Resp:  [18-28] 20  SpO2:  [80 %-96 %] 87 %  BP: ()/(55-94) 97/61     Weight: 89.6 kg (197 lb 8.5 oz)  Body mass index is 28.34 kg/m².    Intake/Output Summary (Last 24 hours) at 12/16/2020 1620  Last data filed at 12/15/2020 1835  Gross per 24 hour   Intake 250 ml   Output --   Net 250 ml      Physical Exam  Vitals signs and nursing note reviewed.   Constitutional:       General: He is not in acute distress.     Appearance: He is well-developed. He is not diaphoretic.   HENT:      Head: Normocephalic and atraumatic.      Nose: Nose normal.   Eyes:      General:         Right eye: No discharge.         Left eye: No discharge.      Conjunctiva/sclera: Conjunctivae normal.      Pupils: Pupils are equal, round, and reactive to light.   Neck:      Musculoskeletal: Normal range of motion and neck supple.      Thyroid: No thyromegaly.      Vascular: No JVD.      Trachea: No tracheal deviation.   Cardiovascular:      Rate and Rhythm: Regular rhythm. Tachycardia present.      Heart sounds: Murmur present. Systolic murmur present with a grade of 3/6. No friction rub. No gallop.    Pulmonary:      Effort: Tachypnea, accessory muscle usage and respiratory distress present.      Breath  sounds: Examination of the right-upper field reveals wheezing. Examination of the left-upper field reveals wheezing. Examination of the right-lower field reveals decreased breath sounds. Examination of the left-lower field reveals decreased breath sounds. Decreased breath sounds and wheezing present. No rales.   Chest:      Chest wall: No tenderness.   Abdominal:      General: Bowel sounds are normal. There is no distension.      Palpations: Abdomen is soft. There is no mass.      Tenderness: There is no abdominal tenderness.   Genitourinary:     Comments: deferred  Musculoskeletal: Normal range of motion.         General: No tenderness or deformity.      Right lower leg: Edema present.      Left lower leg: Edema present.   Skin:     General: Skin is warm and dry.      Capillary Refill: Capillary refill takes less than 2 seconds.      Findings: No erythema or rash.   Neurological:      Mental Status: He is alert and oriented to person, place, and time.      Motor: No abnormal muscle tone.      Coordination: Coordination normal.   Psychiatric:         Behavior: Behavior normal.         Significant Labs:   BMP:   Recent Labs   Lab 12/16/20  0451   *      K 3.4*   CL 99   CO2 31*   BUN 35*   CREATININE 1.1   CALCIUM 8.0*   MG 2.1     CBC:   Recent Labs   Lab 12/15/20  1140 12/16/20  0451   WBC 9.83 7.29   HGB 8.9* 7.9*   HCT 28.6* 25.8*    215     CMP:   Recent Labs   Lab 12/15/20  1140 12/16/20  0451    142   K 3.4* 3.4*    99   CO2 32* 31*   * 235*   BUN 28* 35*   CREATININE 1.1 1.1   CALCIUM 8.6* 8.0*   PROT 5.6* 5.0*   ALBUMIN 2.0* 1.7*   BILITOT 0.8 0.7   ALKPHOS 82 69   AST 37 42*   ALT 32 38   ANIONGAP 10 12   EGFRNONAA >60 >60     Urine Studies:   Recent Labs   Lab 12/15/20  1716   COLORU Yellow   APPEARANCEUA Clear   PHUR 6.0   SPECGRAV 1.025   PROTEINUA 2+*   GLUCUA Negative   KETONESU Negative   BILIRUBINUA Negative   OCCULTUA Trace*   NITRITE Negative   UROBILINOGEN  Negative   LEUKOCYTESUR 1+*   RBCUA 0   WBCUA 15*   BACTERIA Many*   HYALINECASTS 0     All pertinent labs within the past 24 hours have been reviewed.    Significant Imaging: I have reviewed all pertinent imaging results/findings within the past 24 hours.      Assessment/Plan:      * COVID-19  - COVID-19 testing   - Infection Control notified     - Isolation:   - Airborne, Contact and Droplet Precautions  - Cohort patients into COVID units  - N95 mask, wear eye protection  - 20 second hand hygiene              - Limit visitors per hospital policy              - Consolidating lab draws, nursing care, provider visits, and interventions    - Diagnostics: (leukopenia, hyponatremia, hyperferritinemia, elevated troponin, elevated d-dimer, age, and comorbidities are significant predictors of poor clinical outcome)  CBC, CMP, Procalcitonin, Ferritin, CRP, LDH, BNP, Troponin, ECG, Rapid Flu, Blood Culture x2 and Portable CXR    - Management:  Supplemental O2 to maintain SpO2 >92%  Telemetry  Continuous/intermittent Pulse Ox  Albuterol treatment PRN   --Ipratropium inhaler with MIDI  --dexamethasone  --remdesivir  --monitor    Advance Care Planning   patient is a DNR and his SDM is his son, Mac             BPH with elevated PSA  --continue tamsulosin      Hypoxia  --supplemental oxygen to maintain sats   --albuterol and ipratropium via MIDI inhaler        Normocytic anemia  --at baseline  --monitor      Generalized weakness  --likely 2/2 covid  --PT/OT consulted      Elevated troponin  --troponin 0.250 on admit  --cardiology consulted  --heparin gtt  --continue ASA, statin  --trend troponin    Hyperlipemia  --continue rosuvastatin  --cardiac diet      Essential hypertension  --continue carvedilol. Lisinopril, isosorbide, spironolactone  --cardiac diet      Hypokalemia  --3.4 on admit  --replace and monitor      Diabetes mellitus type 2, uncontrolled  --accuchecks with SSI  --diabetes educator consulted  --diabetic  diet  --HA1C pending  --last HA1C 10/26/20 was 9.3        VTE Risk Mitigation (From admission, onward)         Ordered     IP VTE HIGH RISK PATIENT  Once      12/15/20 1538     Place sequential compression device  Until discontinued      12/15/20 1538     heparin 25,000 units in dextrose 5% (100 units/ml) IV bolus from bag - ADDITIONAL PRN BOLUS - 30 units/kg (max bolus 4000 units)  As needed (PRN)     Question:  Heparin Infusion Adjustment (DO NOT MODIFY ANSWER)  Answer:  \[email or web address]\Images\Pharmacy\HeparinInfusions\heparin LOW INTENSITY nomogram for [facility] [id number]    12/15/20 1243     heparin 25,000 units in dextrose 5% (100 units/ml) IV bolus from bag - ADDITIONAL PRN BOLUS - 60 units/kg (max bolus 4000 units)  As needed (PRN)     Question:  Heparin Infusion Adjustment (DO NOT MODIFY ANSWER)  Answer:  \[email or web address]\Images\Pharmacy\HeparinInfusions\heparin LOW INTENSITY nomogram for [facility] [id number]    12/15/20 1243     heparin 25,000 units in dextrose 5% 250 mL (100 units/mL) infusion LOW INTENSITY nomogram - [facility]  Continuous     Question Answer Comment   Heparin Infusion Adjustment (DO NOT MODIFY ANSWER) [email or web address]\[email or web address]\Images\Pharmacy\HeparinInfusions\heparin LOW INTENSITY nomogram for [facility] [id number]    Begin at (in units/kg/hr) 12        12/15/20 1243                Discharge Planning   ADAM:      Code Status: DNR   Is the patient medically ready for discharge?:     Reason for patient still in hospital (select all that apply): Patient trending condition, Consult recommendations and Pending disposition  Discharge Plan A: Assisted Living                  Severo Zavaleta MD  Department of Hospital Medicine   [facility]

## 2020-12-16 NOTE — PLAN OF CARE
Pt AAOx4 .POC reviewed with pt. Pt verbalized understanding   Pt remains free of injuries and falls; fall precaution in place   SR to sinus tach on tele monitor.   IV intact infusing heparin 14 units/kg  No C/O of pain  Pt prone to maintain stats  Vapotherm 40L 100%; stats 96%  Bed low, side rails up x2, non slip socks in use, call light in reach   Reminded to call for assistance  Hourly rounding complete will continue to monitor

## 2020-12-16 NOTE — PT/OT/SLP PROGRESS
Occupational Therapy      Patient Name:  Karan Aburto   MRN:  [id number]    OT attempted Eval at 10:25am, pt on hold per Nurse mathieu secondary to pt being in prone position at this time. OT eval initiated through chart review, will attempt to complete when pt is medically appropriate.     Tania Way, PT/OT  12/16/2020

## 2020-12-16 NOTE — PROGRESS NOTES
[facility]  Cardiology  Progress Note    Patient Name: Karan Aburto  MRN: [id number]  Admission Date: 12/15/2020  Hospital Length of Stay: 0 days  Code Status: DNR   Attending Physician: Severo Zavaleta MD   Primary Care Physician: Mickey Agrawal MD  Expected Discharge Date:   Principal Problem:COVID-19    Subjective:   HPI      Karan Aburto is a 75 year old male who presented to [facility] due to respiratory distress with associated fever, dyspnea, and generalized weakness. His current medical conditions include HTN, elevated PSA, Thyroid disease, Rosacea, recent COVID +. O2 sats were 70% on RA per EMS. ED workup revealed K+ 3.4, Cr 1.1, glucose 163, H/H 8.9/28.6, plts 216, , Troponin 0.250. He was placed in observation under the care of hospital medicine. Cardiology consulted to assist with medical management. Chart extensively reviewed, patient not seen or examined due to COVID +. Will optimize medical management. Continue medical management of COVID per primary team.       Hospital Course:   12/16/2020-Patient not seen or examined given + covid status. Remains on vapotherm with maximum support and proned per nursing notes to maintain oxygenation. Labs reviewed, H/H 7.9/25, K+ 3.4, Cr 1.1, would recommend 1 unit PRBC transfusion.     Interval History: NOTED INCREASED OXYGENATION DEMANDS OVERNIGHT AND ISSUES MAINTAINING OXYGENATION. Recommend 1 unit PRBC given anemia.     Review of Systems   Constitution: Positive for malaise/fatigue.   Cardiovascular: Positive for dyspnea on exertion, irregular heartbeat and palpitations.   Respiratory: Positive for cough and shortness of breath.    Endocrine: Negative for heat intolerance.   Neurological: Positive for weakness.   Allergic/Immunologic: Negative for environmental allergies.     Objective:     Vital Signs (Most Recent):  Temp: 98.5 °F (36.9 °C) (12/16/20 1134)  Pulse: 73 (12/16/20 1216)  Resp: 20 (12/16/20 1216)  BP: (!) 98/55 (12/16/20  1134)  SpO2: (!) 93 % (12/16/20 1216) Vital Signs (24h Range):  Temp:  [97.4 °F (36.3 °C)-99.1 °F (37.3 °C)] 98.5 °F (36.9 °C)  Pulse:  [] 73  Resp:  [18-38] 20  SpO2:  [80 %-96 %] 93 %  BP: ()/(55-94) 98/55     Weight: 89.6 kg (197 lb 8.5 oz)  Body mass index is 28.34 kg/m².     SpO2: (!) 93 %  O2 Device (Oxygen Therapy): Vapotherm      Intake/Output Summary (Last 24 hours) at 12/16/2020 1229  Last data filed at 12/15/2020 1835  Gross per 24 hour   Intake 250 ml   Output --   Net 250 ml       Lines/Drains/Airways     Peripheral Intravenous Line                 Peripheral IV - Single Lumen 12/15/20 1255 20 G Left Antecubital less than 1 day         Peripheral IV - Single Lumen 12/15/20 1717 20 G Right Wrist less than 1 day                Physical Exam  NOT EXAMINED GIVEN + COVID STATUS  Significant Labs:   BMP:   Recent Labs   Lab 12/15/20  1140 12/16/20  0451   * 235*    142   K 3.4* 3.4*    99   CO2 32* 31*   BUN 28* 35*   CREATININE 1.1 1.1   CALCIUM 8.6* 8.0*   MG  --  2.1   , CBC   Recent Labs   Lab 12/15/20  1140 12/16/20  0451   WBC 9.83 7.29   HGB 8.9* 7.9*   HCT 28.6* 25.8*    215   , Troponin   Recent Labs   Lab 12/15/20  1140 12/15/20  1622 12/16/20  0451   TROPONINI 0.250* 0.321* 0.129*    and All pertinent lab results from the last 24 hours have been reviewed.    Significant Imaging: Echocardiogram:   Transthoracic echo (TTE) complete (Cupid Only):   Results for orders placed or performed during the hospital encounter of 10/18/20   Echo Color Flow Doppler? Yes; Bubble Contrast? No   Result Value Ref Range    Ascending aorta 2.97 cm    STJ 2.57 cm    AV mean gradient 7 mmHg    Ao peak lewis 1.79 m/s    Ao VTI 27.91 cm    IVS 1.39 (A) 0.6 - 1.1 cm    LA size 4.21 cm    Left Atrium Major Axis 4.62 cm    LVIDd 3.82 3.5 - 6.0 cm    LVIDs 2.29 2.1 - 4.0 cm    LVOT diameter 2.39 cm    LVOT peak VTI 21.64 cm    Posterior Wall 1.08 0.6 - 1.1 cm    MV Peak A Lewis 1.10 m/s    E  wave decelartion time 190.97 msec    MV Peak E Lewis 0.79 m/s    RA Major Axis 4.32 cm    RA Width 2.24 cm    TAPSE 2.81 cm    TR Max Lewis 1.74 m/s    TDI LATERAL 0.07 m/s    TDI SEPTAL 0.06 m/s    LA WIDTH 4.11 cm    Ao root annulus 3.66 cm    MV stenosis pressure 1/2 time 55.38 ms    LV Diastolic Volume 62.69 mL    LV Systolic Volume 17.96 mL    LVOT peak lewis 1.28 m/s    LV LATERAL E/E' RATIO 11.29 m/s    LV SEPTAL E/E' RATIO 13.17 m/s    FS 40 %    LV mass 161.27 g    Left Ventricle Relative Wall Thickness 0.57 cm    AV valve area 3.48 cm2    AV Velocity Ratio 0.72     AV index (prosthetic) 0.78     MV valve area p 1/2 method 3.97 cm2    E/A ratio 0.72     Mean e' 0.07 m/s    LVOT area 4.5 cm2    LVOT stroke volume 97.03 cm3    AV peak gradient 13 mmHg    E/E' ratio 12.15 m/s    LV Systolic Volume Index 8.1 mL/m2    LV Diastolic Volume Index 28.40 mL/m2    LV Mass Index 73 g/m2    Triscuspid Valve Regurgitation Peak Gradient 12 mmHg    Right Atrial Pressure (from IVC) 3 mmHg    TV rest pulmonary artery pressure 15 mmHg    Narrative    · There is mild left ventricular concentric hypertrophy.  · With normal systolic function. The estimated ejection fraction is 60%.  · Grade I diastolic dysfunction.  · Normal right ventricular systolic function.  · Mild left atrial enlargement.  · Mild mitral regurgitation.  · Normal central venous pressure (3 mmHg).  · The estimated PA systolic pressure is 15 mmHg.        Assessment and Plan:       * COVID-19  Mgmt per primary team    Hypoxia  Continue supplemental oxygen to maintain O2 saturation  Mgmt per primary team    Elevated troponin  Troponin 0.250, trending downward  Continue to trend serial troponin  Recent ECHO revealed EF 60%, DD  Recent MPI stress test normal perfusion scan  Continue IV heparin gtt  Continue ASA, Statin, BB    Hyperlipemia  Continue statin    Essential hypertension  On medical therapy  Continue Coreg, Hydralazine, ACEi, Imdur,  Aldactone    Hypokalemia  Keep K+>4    Diabetes mellitus type 2, uncontrolled  Mgmt per primary team        VTE Risk Mitigation (From admission, onward)         Ordered     IP VTE HIGH RISK PATIENT  Once      12/15/20 1538     Place sequential compression device  Until discontinued      12/15/20 1538     heparin 25,000 units in dextrose 5% (100 units/ml) IV bolus from bag - ADDITIONAL PRN BOLUS - 30 units/kg (max bolus 4000 units)  As needed (PRN)     Question:  Heparin Infusion Adjustment (DO NOT MODIFY ANSWER)  Answer:  \\[email or web address]\epic\Images\Pharmacy\HeparinInfusions\heparin LOW INTENSITY nomogram for [facility] [id number]    12/15/20 1243     heparin 25,000 units in dextrose 5% (100 units/ml) IV bolus from bag - ADDITIONAL PRN BOLUS - 60 units/kg (max bolus 4000 units)  As needed (PRN)     Question:  Heparin Infusion Adjustment (DO NOT MODIFY ANSWER)  Answer:  \\[email or web address]\epic\Images\Pharmacy\HeparinInfusions\heparin LOW INTENSITY nomogram for [facility] [id number].pdf    12/15/20 1243     heparin 25,000 units in dextrose 5% 250 mL (100 units/mL) infusion LOW INTENSITY nomogram - [facility]  Continuous     Question Answer Comment   Heparin Infusion Adjustment (DO NOT MODIFY ANSWER) \\[email or web address]\epic\Images\Pharmacy\HeparinInfusions\heparin LOW INTENSITY nomogram for [facility] [id number].pdf    Begin at (in units/kg/hr) 12        12/15/20 1243                Zaira Oconnor, NURIAP-C  Cardiology  [facility]

## 2020-12-16 NOTE — CONSULTS
"Consulted on this 76 y/o male with PMHx of HTN, HLD, CAD, and hypothyroidism who is admitted with COVID19. Skin assessed. Bilateral heels with blanchable redness noted. Present on admission Stage 3 pressure injury noted to coccyx measuring 3x3x0.1cm, wound bed moist red and yellow subcutaneous tissue. Abhilash wound erythema noted. critic aide paste in use. Unable to perform complete assessment at this time due to patient being prone and refusing to turn. Notified primary nurse that tele leads and box need to be moved to patient's back while prone. Recommend continued pressure injury prevention measures, will follow.  Recommend waffle overlay to bed, heel offloading boots for further pressure injury prevention. Please see below:     Stage 3 pressure injury coccyx:  1. Cleanse with saline or bath wipes  2. Pat dry  3. Apply thin layer of critic aide paste  4. Perform twice daily and with hygiene care     Skin Care Precautions / Pressure Injury Prevention:  1. Follow "Guidelines for Prevention of Pressure Ulcers in At Risk Patients"  These guidelines can be found on the [facility] Intr[facility]et by searching "Wound Care / Ostomy Resources"  2. Document wound assessment in [facility] using guidelines in [place]'s "Assessment : Wound" procedure  3. Limit the amount of linen/underpad between patient and mattress surface to ONE fitted sheet and ONE covidien underpad - NO DIAPERS  4. Obtain Bath Wipes for providing abhilash care - avoid the use of wash cloths to areas affected by IAD.  5. Apply Moisture Barrier Paste to perineal / perirectal areas in a thin even layer to clean dry skin BID and after each episode of pericare  6. Apply sween 24 moisturizer cream to all dry skin after daily bath and prn  7. Obtain foam wedge from materials management to assist with maintaining proper position changes at least q 2hours and document actual position in [facility] q 2hours  8. Elevate heels off mattress on 2 separate pillows placed lengthwise under each leg " supporting the leg from knee to ankle.  Document in EPIC flow sheet every 2 hours.  9. .Do NOT elevate HOB greater than 30 degrees unless contraindicated.  10. Remove SCD/Plexi Pulses/FADI's every 12 hours for 30 minutes and assess skin underneath these devices for breakdown

## 2020-12-16 NOTE — SUBJECTIVE & OBJECTIVE
Interval History: NOTED INCREASED OXYGENATION DEMANDS OVERNIGHT AND ISSUES MAINTAINING OXYGENATION. Recommend 1 unit PRBC given anemia.     Review of Systems   Constitution: Positive for malaise/fatigue.   Cardiovascular: Positive for dyspnea on exertion, irregular heartbeat and palpitations.   Respiratory: Positive for cough and shortness of breath.    Endocrine: Negative for heat intolerance.   Neurological: Positive for weakness.   Allergic/Immunologic: Negative for environmental allergies.     Objective:     Vital Signs (Most Recent):  Temp: 98.5 °F (36.9 °C) (12/16/20 1134)  Pulse: 73 (12/16/20 1216)  Resp: 20 (12/16/20 1216)  BP: (!) 98/55 (12/16/20 1134)  SpO2: (!) 93 % (12/16/20 1216) Vital Signs (24h Range):  Temp:  [97.4 °F (36.3 °C)-99.1 °F (37.3 °C)] 98.5 °F (36.9 °C)  Pulse:  [] 73  Resp:  [18-38] 20  SpO2:  [80 %-96 %] 93 %  BP: ()/(55-94) 98/55     Weight: 89.6 kg (197 lb 8.5 oz)  Body mass index is 28.34 kg/m².     SpO2: (!) 93 %  O2 Device (Oxygen Therapy): Vapotherm      Intake/Output Summary (Last 24 hours) at 12/16/2020 1229  Last data filed at 12/15/2020 1835  Gross per 24 hour   Intake 250 ml   Output --   Net 250 ml       Lines/Drains/Airways     Peripheral Intravenous Line                 Peripheral IV - Single Lumen 12/15/20 1255 20 G Left Antecubital less than 1 day         Peripheral IV - Single Lumen 12/15/20 1717 20 G Right Wrist less than 1 day                Physical Exam  NOT EXAMINED GIVEN + COVID STATUS  Significant Labs:   BMP:   Recent Labs   Lab 12/15/20  1140 12/16/20  0451   * 235*    142   K 3.4* 3.4*    99   CO2 32* 31*   BUN 28* 35*   CREATININE 1.1 1.1   CALCIUM 8.6* 8.0*   MG  --  2.1   , CBC   Recent Labs   Lab 12/15/20  1140 12/16/20  0451   WBC 9.83 7.29   HGB 8.9* 7.9*   HCT 28.6* 25.8*    215   , Troponin   Recent Labs   Lab 12/15/20  1140 12/15/20  1622 12/16/20  0451   TROPONINI 0.250* 0.321* 0.129*    and All pertinent lab  results from the last 24 hours have been reviewed.    Significant Imaging: Echocardiogram:   Transthoracic echo (TTE) complete (Cupid Only):   Results for orders placed or performed during the hospital encounter of 10/18/20   Echo Color Flow Doppler? Yes; Bubble Contrast? No   Result Value Ref Range    Ascending aorta 2.97 cm    STJ 2.57 cm    AV mean gradient 7 mmHg    Ao peak lewis 1.79 m/s    Ao VTI 27.91 cm    IVS 1.39 (A) 0.6 - 1.1 cm    LA size 4.21 cm    Left Atrium Major Axis 4.62 cm    LVIDd 3.82 3.5 - 6.0 cm    LVIDs 2.29 2.1 - 4.0 cm    LVOT diameter 2.39 cm    LVOT peak VTI 21.64 cm    Posterior Wall 1.08 0.6 - 1.1 cm    MV Peak A Lewis 1.10 m/s    E wave decelartion time 190.97 msec    MV Peak E Lewis 0.79 m/s    RA Major Axis 4.32 cm    RA Width 2.24 cm    TAPSE 2.81 cm    TR Max Lewis 1.74 m/s    TDI LATERAL 0.07 m/s    TDI SEPTAL 0.06 m/s    LA WIDTH 4.11 cm    Ao root annulus 3.66 cm    MV stenosis pressure 1/2 time 55.38 ms    LV Diastolic Volume 62.69 mL    LV Systolic Volume 17.96 mL    LVOT peak lewis 1.28 m/s    LV LATERAL E/E' RATIO 11.29 m/s    LV SEPTAL E/E' RATIO 13.17 m/s    FS 40 %    LV mass 161.27 g    Left Ventricle Relative Wall Thickness 0.57 cm    AV valve area 3.48 cm2    AV Velocity Ratio 0.72     AV index (prosthetic) 0.78     MV valve area p 1/2 method 3.97 cm2    E/A ratio 0.72     Mean e' 0.07 m/s    LVOT area 4.5 cm2    LVOT stroke volume 97.03 cm3    AV peak gradient 13 mmHg    E/E' ratio 12.15 m/s    LV Systolic Volume Index 8.1 mL/m2    LV Diastolic Volume Index 28.40 mL/m2    LV Mass Index 73 g/m2    Triscuspid Valve Regurgitation Peak Gradient 12 mmHg    Right Atrial Pressure (from IVC) 3 mmHg    TV rest pulmonary artery pressure 15 mmHg    Narrative    · There is mild left ventricular concentric hypertrophy.  · With normal systolic function. The estimated ejection fraction is 60%.  · Grade I diastolic dysfunction.  · Normal right ventricular systolic function.  · Mild left atrial  enlargement.  · Mild mitral regurgitation.  · Normal central venous pressure (3 mmHg).  · The estimated PA systolic pressure is 15 mmHg.

## 2020-12-16 NOTE — HOSPITAL COURSE
Eric 76 yo male admitted to hospital with COVIFD 19 Acute Hypoxic Respiratory failure / Pneumonia. Patient initiated on Remdesivir, steroids, Vapotherm. Patient communicative and expressed he doesn't want to go through this. Eric confirmed he is DNR. Eric denies CP + SOB / Cough  12/18- pt looks and feels better although Oxygenation very poor, unable come off bipap this am as Sats dropped to 82% on Vapotherm, he is otherwise fully alert, Renal function worsening with Bun/Cr 74/1.7, CXR diffuse infiltrates B, K 3.1- being replaced. Pt is getting Dexa and IV remdesivir without any improvement in O2, BS very high despite SSI- will start Levemir 10 BID. Will contact family and update them about his condition.   12/19- pt transferred to ICU for closer monitoring as resp status deteriorated despite being on Bipap/ 65% for three days. Pt has developed pressure ulcer on the nose due to Bipap- hence changed to Vapotherm 40 L/100% fio2 with improvement in Oxygenation, remains hemodynamically stable. H/H stable at 8/24, Ferritin still rising to 3300, while CRP down a little to 61, LDH also rising to 992. Bun?Cr improving, urine output steady. Couldn't get PT/OT due to Bipap. Condition remains critical with guarded prognosis.    12/20- looks better, comfortable, was on Bipap all night, just placed back on Vapotherm 40 L/100%, he is about 5 L fluid Positive, Bun/Cr 63/1.1- so given a dose of IV lasix 20 mg to help with Oxygenation. He has completed the course of Remdesivir but still on Decadron. Pt seen together with his son, Mac, who was updated about his condition and prognosis before entering the pt's room. Pt was AAOx3, speech clear, and asked in front of his son about resuscitation in case of deterioration or cardiac or resp arrest- he clearly said DNR, he does not want any intubation or mechanical ventilation. DNR ordered. Blood Cx  From 12/19 growing Strep and possibly Bacteroides- on Rocephin plus  Doxy.  12/21-Palliative care consult appreciated. looks the same but gradually declining. He maintained Sats of 96% on Bipap 70% overnight and was switched to Vapotherm 40L/100% this am but sats dropped to 92%, pt started getting tired- hence switched back to Bipap after a couple of hours- maintaining Sats 97 bipap 70% again. Blood Cx from 12/19- growing Klebsiella Pneumonia and Enterococcus fecalis- on Rocephin. H/H stable. Put out about 2 L of urine yesterday and lasix 20 BID continued. Getting PT/OT.   12/22-  Looks weaker, a little more work or breathing, was intermittently confused last night, pulling off his BIPAP, placed on Vaoptherm briefly for moral meds but most remained on Bipap 70%. Sips of water. Refused meals. Good urine output with Lasix, now only about 1600 cc fluid positive, D Dimer rising to 10, hence Eliquis dose increased to 5 bid. Blood Cx Positive- will get ID consult.   12/23- pt continued to decline all yesterday and this am looked very sick. He became confused and removed bipap repeatedly overnight. O2 sats would instantaneously drop to 50s-60s. This morning, he became severely agitated/restless; not following command or consolable. Ordered prn ativan and morphine to decrease agitation and reduce pain without much success. HR > 130, RR > 30, elevated BP; had to increase O2 on BIPAP from 70 to 100% to maintain O2 saturations above 88%. Palliative Chang contacted the son and daughter who arrived hurriedly and agreed with comfort focused care and withdrawal of Bipap. Pt already DNR. Pt passed away peacefully soon after withdrawal of Bipap and was pronounced dead at 1100 am on 12/23/2020. Family at bedside, condolences offered.

## 2020-12-16 NOTE — PLAN OF CARE
Spoke with patient's son, Velia.  Patient lives at [facility].  He has had multiple admits over the last several months and has progressively lost mobility.  He went from using a rolling walker to wheelchair bound.  [facility] [place] is currently sending PT and OT services.   Current discharge plan is to return to assisted living with [facility] [place]. but may need 02 weaning at an LTAC.      Transitional Care Folder, Discharge Planning Begins on Admission pamphlet, [facility] Bedside Delivery pamphlet, Advance Directive information given to patient along with the contact information given.Instructed patient or family to call with any questions or concerns.     D/c plan: Assisted Living vs LTAC or SNF  D/c transportation: son  [facility]  Bedside Delivery: declined  [facility]:  portal active  PCP:  Mickey Agrawal MD     12/16/20 2580   Discharge Assessment   Prior to hospitilization cognitive status: Alert/Oriented   Prior to hospitalization functional status: Wheelchair Bound   Current cognitive status: Alert/Oriented   Current Functional Status: Wheelchair Bound   Facility Arrived From: Assisted living facility - [facility]   Lives With facility resident   Able to Return to Prior Arrangements yes   Is patient able to care for self after discharge? No   Who are your caregiver(s) and their phone number(s)? velia Aburto, son [phone]   Patient's perception of discharge disposition assisted living   Patient currently being followed by outpatient case management? No   Patient currently receives any other outside agency services? Yes   Name and contact number of agency or person providing outside services [facility] [place]   Is it the patient/care giver preference to resume care with the current outside agency? Yes   Equipment Currently Used at Home walker, rolling;wheelchair;shower chair;grab bar   Do you have any problems  affording any of your prescribed medications? No   Is the patient taking medications as prescribed? yes   Does the patient have transportation home? Yes   Transportation Anticipated family or friend will provide   Does the patient receive services at the [facility]? No   Discharge Plan A Assisted Living   Discharge Plan B Long-term acute care facility (LTAC)   DME Needed Upon Discharge    (tbd)   Patient/Family in Agreement with Plan yes

## 2020-12-17 NOTE — PLAN OF CARE
Discharge assessment completed.   12/17/20 0848   Discharge Assessment   Assessment Type Discharge Planning Assessment

## 2020-12-17 NOTE — PROGRESS NOTES
Pharmacist Renal Dose Adjustment Note    Karan Aburto is a 75 y.o. male being treated with the medication Pepcid    Patient Data:    Vital Signs (Most Recent):  Temp: 97.9 °F (36.6 °C) (12/17/20 0842)  Pulse: 68 (12/17/20 0842)  Resp: (!) 30 (12/17/20 0842)  BP: 115/70 (12/17/20 0842)  SpO2: 100 % (12/17/20 0842)   Vital Signs (72h Range):  Temp:  [97.4 °F (36.3 °C)-99.1 °F (37.3 °C)]   Pulse:  []   Resp:  [18-40]   BP: ()/(55-94)   SpO2:  [80 %-100 %]      Recent Labs   Lab 12/15/20  1140 12/16/20  0451 12/17/20  0634   CREATININE 1.1 1.1 1.6*     Serum creatinine: 1.6 mg/dL (H) 12/17/20 0634  Estimated creatinine clearance: 45 mL/min (A)    Medication:Pepcid dose: 20 mg frequency BID will be changed to medication:Pepcid dose:20 mg frequency:daily for CrCl < 50.    Pharmacist's Name: Bere Oreilly  Pharmacist's Extension: [phone]

## 2020-12-17 NOTE — PT/OT/SLP PROGRESS
Occupational Therapy      Patient Name:  Karan Aburto   MRN:  [id number]    OT attempted eval at 10:40am, pt on hold per Nurse Yumiko secondary to not being able to come off of BiPAP at this time. OT eval initiated through chart review, will attempt to complete when pt is medically ready.     Tania Way, PT/OT  12/17/2020

## 2020-12-17 NOTE — PT/OT/SLP PROGRESS
Physical Therapy      Patient Name:  Karan Aburto   MRN:  [id number]    P.JAYNE CARDOZO INITIATED THIS AM VIA CHART REVIEW, HOLD AT THIS TIME PER NURSE CAROLYNN, PT CURRENTLY ON BIPAP, WILL ASSESS PT ONCE MEDICALLY APPROPRIATE    Lilly Bernard, PT   12/17/2020  1047

## 2020-12-17 NOTE — NURSING
Chart reviewed for diabetes  Patient has been seen by this nurse on recent admits  No further action at this time unless diabetes educational needs are identified

## 2020-12-17 NOTE — PROGRESS NOTES
RT attempted to place pt on the Vapotherm again, but the pt only  tolerated the Vapotherm for a little over 5 mins. RT will try again tomorrow.

## 2020-12-17 NOTE — PLAN OF CARE
Problem: Fall Injury Risk  Goal: Absence of Fall and Fall-Related Injury  Outcome: Ongoing, Progressing     Problem: Adult Inpatient Plan of Care  Goal: Plan of Care Review  Outcome: Ongoing, Progressing  Problem: Adult Inpatient Plan of Care  Goal: Patient-Specific Goal (Individualization)  Outcome: Ongoing, Progressing  -Patient was proned at day shift, Supinated at 2100. Due meds given. Switched to Bipap for better oxygenation. Deep breathing and coughing encouraged.  Patient ate 100% of his meal after patient was repositioned.     Problem: Skin Injury Risk Increased  Goal: Skin Health and Integrity  Outcome: Ongoing, Progressing  -Patient has a new skin tear on right knee probably due to proning     Problem: Infection (Pneumonia)  Goal: Resolution of Infection Signs/Symptoms  Outcome: Ongoing, Progressing      Problem: Wound  Goal: Optimal Wound Healing  Outcome: Ongoing, Progressing  -Wound care done on sacral area

## 2020-12-17 NOTE — PLAN OF CARE
Pt unable to be removed from BiPAP at this time. RT tried pt on Vapotherm, SATs decreased to low 70s, high 60s.

## 2020-12-17 NOTE — SUBJECTIVE & OBJECTIVE
Interval History:  He is in sinus shaunna this morning but denies having any chest pain, lightheadedness.  Currently on a BiPAP with sats on 100%    Review of Systems  Objective:     Vital Signs (Most Recent):  Temp: 97.2 °F (36.2 °C) (12/17/20 1109)  Pulse: (!) 53 (12/17/20 1109)  Resp: (!) 24 (12/17/20 1109)  BP: 107/67 (12/17/20 1109)  SpO2: 100 % (12/17/20 1109) Vital Signs (24h Range):  Temp:  [97.2 °F (36.2 °C)-98.6 °F (37 °C)] 97.2 °F (36.2 °C)  Pulse:  [53-82] 53  Resp:  [18-34] 24  SpO2:  [85 %-100 %] 100 %  BP: ()/(55-70) 107/67     Weight: 89.7 kg (197 lb 12 oz)  Body mass index is 28.37 kg/m².    Intake/Output Summary (Last 24 hours) at 12/17/2020 1239  Last data filed at 12/16/2020 1700  Gross per 24 hour   Intake 1168 ml   Output --   Net 1168 ml      Physical Exam  Constitutional:       General: He is not in acute distress.     Appearance: He is well-developed. He is not diaphoretic.   HENT:      Head: Normocephalic and atraumatic.      Nose: Nose normal.   Eyes:      General:         Right eye: No discharge.         Left eye: No discharge.      Conjunctiva/sclera: Conjunctivae normal.      Pupils: Pupils are equal, round, and reactive to light.   Neck:      Musculoskeletal: Normal range of motion and neck supple.      Thyroid: No thyromegaly.      Vascular: No JVD.      Trachea: No tracheal deviation.   Cardiovascular:      Rate and Rhythm: Regular rhythm. Tachycardia present.      Heart sounds: Murmur present. Systolic murmur present with a grade of 3/6. No friction rub. No gallop.    Pulmonary:      Effort: Tachypnea, accessory muscle usage and respiratory distress present.      Breath sounds: Examination of the right-upper field reveals wheezing. Examination of the left-upper field reveals wheezing. Examination of the right-lower field reveals decreased breath sounds. Examination of the left-lower field reveals decreased breath sounds. Decreased breath sounds and wheezing present. No rales.    Chest:      Chest wall: No tenderness.   Abdominal:      General: Bowel sounds are normal. There is no distension.      Palpations: Abdomen is soft. There is no mass.      Tenderness: There is no abdominal tenderness.   Genitourinary:     Comments: deferred  Musculoskeletal: Normal range of motion.         General: No tenderness or deformity.      Right lower leg: Edema present.      Left lower leg: Edema present.   Skin:     General: Skin is warm and dry.      Capillary Refill: Capillary refill takes less than 2 seconds.      Findings: No erythema or rash.   Neurological:      Mental Status: He is alert and oriented to person, place, and time.      Motor: No abnormal muscle tone.      Coordination: Coordination normal.   Psychiatric:         Behavior: Behavior normal.            Significant Labs: All pertinent labs within the past 24 hours have been reviewed.    Significant Imaging: I have reviewed and interpreted all pertinent imaging results/findings within the past 24 hours.

## 2020-12-17 NOTE — SUBJECTIVE & OBJECTIVE
Interval History: pulmonary support continues to increase and placed on bipap this AM. Recommendation for 1 unit PRBC transfusion given continued anemia issues.     Review of Systems   Constitution: Positive for malaise/fatigue.   Cardiovascular: Positive for dyspnea on exertion, irregular heartbeat and palpitations.   Respiratory: Positive for cough and shortness of breath.    Endocrine: Negative for heat intolerance.   Neurological: Positive for weakness.   Allergic/Immunologic: Negative for environmental allergies.     Objective:     Vital Signs (Most Recent):  Temp: 97.2 °F (36.2 °C) (12/17/20 1109)  Pulse: (!) 57 (12/17/20 1300)  Resp: (!) 24 (12/17/20 1109)  BP: 107/67 (12/17/20 1109)  SpO2: 100 % (12/17/20 1109) Vital Signs (24h Range):  Temp:  [97.2 °F (36.2 °C)-98.6 °F (37 °C)] 97.2 °F (36.2 °C)  Pulse:  [53-82] 57  Resp:  [18-34] 24  SpO2:  [85 %-100 %] 100 %  BP: ()/(55-70) 107/67     Weight: 89.7 kg (197 lb 12 oz)  Body mass index is 28.37 kg/m².     SpO2: 100 %  O2 Device (Oxygen Therapy): BiPAP      Intake/Output Summary (Last 24 hours) at 12/17/2020 1442  Last data filed at 12/16/2020 1700  Gross per 24 hour   Intake 1168 ml   Output --   Net 1168 ml       Lines/Drains/Airways     Peripheral Intravenous Line                 Peripheral IV - Single Lumen 12/15/20 1255 20 G Left Antecubital 2 days         Peripheral IV - Single Lumen 12/15/20 1717 20 G Right Wrist 1 day                Physical Exam  Not examined given + covid status  Significant Labs:   BMP:   Recent Labs   Lab 12/16/20  0451 12/17/20  0634   * 215*    137   K 3.4* 3.2*   CL 99 96   CO2 31* 29   BUN 35* 59*   CREATININE 1.1 1.6*   CALCIUM 8.0* 7.9*   MG 2.1 2.1   , CBC   Recent Labs   Lab 12/16/20  0451 12/17/20  0634   WBC 7.29 7.21   HGB 7.9* 7.6*   HCT 25.8* 25.0*    220   , Troponin   Recent Labs   Lab 12/15/20  1622 12/16/20  0451   TROPONINI 0.321* 0.129*    and All pertinent lab results from the last 24  hours have been reviewed.    Significant Imaging: Echocardiogram:   Transthoracic echo (TTE) complete (Cupid Only):   Results for orders placed or performed during the hospital encounter of 10/18/20   Echo Color Flow Doppler? Yes; Bubble Contrast? No   Result Value Ref Range    Ascending aorta 2.97 cm    STJ 2.57 cm    AV mean gradient 7 mmHg    Ao peak lewis 1.79 m/s    Ao VTI 27.91 cm    IVS 1.39 (A) 0.6 - 1.1 cm    LA size 4.21 cm    Left Atrium Major Axis 4.62 cm    LVIDd 3.82 3.5 - 6.0 cm    LVIDs 2.29 2.1 - 4.0 cm    LVOT diameter 2.39 cm    LVOT peak VTI 21.64 cm    Posterior Wall 1.08 0.6 - 1.1 cm    MV Peak A Lewis 1.10 m/s    E wave decelartion time 190.97 msec    MV Peak E Lewis 0.79 m/s    RA Major Axis 4.32 cm    RA Width 2.24 cm    TAPSE 2.81 cm    TR Max Lewis 1.74 m/s    TDI LATERAL 0.07 m/s    TDI SEPTAL 0.06 m/s    LA WIDTH 4.11 cm    Ao root annulus 3.66 cm    MV stenosis pressure 1/2 time 55.38 ms    LV Diastolic Volume 62.69 mL    LV Systolic Volume 17.96 mL    LVOT peak lewis 1.28 m/s    LV LATERAL E/E' RATIO 11.29 m/s    LV SEPTAL E/E' RATIO 13.17 m/s    FS 40 %    LV mass 161.27 g    Left Ventricle Relative Wall Thickness 0.57 cm    AV valve area 3.48 cm2    AV Velocity Ratio 0.72     AV index (prosthetic) 0.78     MV valve area p 1/2 method 3.97 cm2    E/A ratio 0.72     Mean e' 0.07 m/s    LVOT area 4.5 cm2    LVOT stroke volume 97.03 cm3    AV peak gradient 13 mmHg    E/E' ratio 12.15 m/s    LV Systolic Volume Index 8.1 mL/m2    LV Diastolic Volume Index 28.40 mL/m2    LV Mass Index 73 g/m2    Triscuspid Valve Regurgitation Peak Gradient 12 mmHg    Right Atrial Pressure (from IVC) 3 mmHg    TV rest pulmonary artery pressure 15 mmHg    Narrative    · There is mild left ventricular concentric hypertrophy.  · With normal systolic function. The estimated ejection fraction is 60%.  · Grade I diastolic dysfunction.  · Normal right ventricular systolic function.  · Mild left atrial enlargement.  · Mild  mitral regurgitation.  · Normal central venous pressure (3 mmHg).  · The estimated PA systolic pressure is 15 mmHg.

## 2020-12-17 NOTE — PROGRESS NOTES
[facility]  Cardiology  Progress Note    Patient Name: Karan Aburto  MRN: [id number]  Admission Date: 12/15/2020  Hospital Length of Stay: 1 days  Code Status: DNR   Attending Physician: Edgar Dalton MD   Primary Care Physician: Mickey Agrawal MD  Expected Discharge Date:   Principal Problem:COVID-19    Subjective:   HPI      Karan Aburto is a 75 year old male who presented to [facility] due to respiratory distress with associated fever, dyspnea, and generalized weakness. His current medical conditions include HTN, elevated PSA, Thyroid disease, Rosacea, recent COVID +. O2 sats were 70% on RA per EMS. ED workup revealed K+ 3.4, Cr 1.1, glucose 163, H/H 8.9/28.6, plts 216, , Troponin 0.250. He was placed in observation under the care of hospital medicine. Cardiology consulted to assist with medical management. Chart extensively reviewed, patient not seen or examined due to COVID +. Will optimize medical management. Continue medical management of COVID per primary team.         Hospital Course:   12/16/2020-Patient not seen or examined given + covid status. Remains on vapotherm with maximum support and proned per nursing notes to maintain oxygenation. Labs reviewed, H/H 7.9/25, K+ 3.4, Cr 1.1, would recommend 1 unit PRBC transfusion.     12/17/2020-Patient not seen or examined given + Covid status. On Bipap today for pulmonary support. Labs reviewed, K+ 3.2, Cr 1.6, BUN 29, Phosph 5.3, H/H 7.6/25.     Interval History: pulmonary support continues to increase and placed on bipap this AM. Recommendation for 1 unit PRBC transfusion given continued anemia issues.     Review of Systems   Constitution: Positive for malaise/fatigue.   Cardiovascular: Positive for dyspnea on exertion, irregular heartbeat and palpitations.   Respiratory: Positive for cough and shortness of breath.    Endocrine: Negative for heat intolerance.   Neurological: Positive for weakness.   Allergic/Immunologic: Negative for  environmental allergies.     Objective:     Vital Signs (Most Recent):  Temp: 97.2 °F (36.2 °C) (12/17/20 1109)  Pulse: (!) 57 (12/17/20 1300)  Resp: (!) 24 (12/17/20 1109)  BP: 107/67 (12/17/20 1109)  SpO2: 100 % (12/17/20 1109) Vital Signs (24h Range):  Temp:  [97.2 °F (36.2 °C)-98.6 °F (37 °C)] 97.2 °F (36.2 °C)  Pulse:  [53-82] 57  Resp:  [18-34] 24  SpO2:  [85 %-100 %] 100 %  BP: ()/(55-70) 107/67     Weight: 89.7 kg (197 lb 12 oz)  Body mass index is 28.37 kg/m².     SpO2: 100 %  O2 Device (Oxygen Therapy): BiPAP      Intake/Output Summary (Last 24 hours) at 12/17/2020 1442  Last data filed at 12/16/2020 1700  Gross per 24 hour   Intake 1168 ml   Output --   Net 1168 ml       Lines/Drains/Airways     Peripheral Intravenous Line                 Peripheral IV - Single Lumen 12/15/20 1255 20 G Left Antecubital 2 days         Peripheral IV - Single Lumen 12/15/20 1717 20 G Right Wrist 1 day                Physical Exam  Not examined given + covid status  Significant Labs:   BMP:   Recent Labs   Lab 12/16/20  0451 12/17/20  0634   * 215*    137   K 3.4* 3.2*   CL 99 96   CO2 31* 29   BUN 35* 59*   CREATININE 1.1 1.6*   CALCIUM 8.0* 7.9*   MG 2.1 2.1   , CBC   Recent Labs   Lab 12/16/20  0451 12/17/20  0634   WBC 7.29 7.21   HGB 7.9* 7.6*   HCT 25.8* 25.0*    220   , Troponin   Recent Labs   Lab 12/15/20  1622 12/16/20  0451   TROPONINI 0.321* 0.129*    and All pertinent lab results from the last 24 hours have been reviewed.    Significant Imaging: Echocardiogram:   Transthoracic echo (TTE) complete (Cupid Only):   Results for orders placed or performed during the hospital encounter of 10/18/20   Echo Color Flow Doppler? Yes; Bubble Contrast? No   Result Value Ref Range    Ascending aorta 2.97 cm    STJ 2.57 cm    AV mean gradient 7 mmHg    Ao peak mercedes 1.79 m/s    Ao VTI 27.91 cm    IVS 1.39 (A) 0.6 - 1.1 cm    LA size 4.21 cm    Left Atrium Major Axis 4.62 cm    LVIDd 3.82 3.5 - 6.0 cm     LVIDs 2.29 2.1 - 4.0 cm    LVOT diameter 2.39 cm    LVOT peak VTI 21.64 cm    Posterior Wall 1.08 0.6 - 1.1 cm    MV Peak A Lewis 1.10 m/s    E wave decelartion time 190.97 msec    MV Peak E Lewis 0.79 m/s    RA Major Axis 4.32 cm    RA Width 2.24 cm    TAPSE 2.81 cm    TR Max Lewis 1.74 m/s    TDI LATERAL 0.07 m/s    TDI SEPTAL 0.06 m/s    LA WIDTH 4.11 cm    Ao root annulus 3.66 cm    MV stenosis pressure 1/2 time 55.38 ms    LV Diastolic Volume 62.69 mL    LV Systolic Volume 17.96 mL    LVOT peak lewis 1.28 m/s    LV LATERAL E/E' RATIO 11.29 m/s    LV SEPTAL E/E' RATIO 13.17 m/s    FS 40 %    LV mass 161.27 g    Left Ventricle Relative Wall Thickness 0.57 cm    AV valve area 3.48 cm2    AV Velocity Ratio 0.72     AV index (prosthetic) 0.78     MV valve area p 1/2 method 3.97 cm2    E/A ratio 0.72     Mean e' 0.07 m/s    LVOT area 4.5 cm2    LVOT stroke volume 97.03 cm3    AV peak gradient 13 mmHg    E/E' ratio 12.15 m/s    LV Systolic Volume Index 8.1 mL/m2    LV Diastolic Volume Index 28.40 mL/m2    LV Mass Index 73 g/m2    Triscuspid Valve Regurgitation Peak Gradient 12 mmHg    Right Atrial Pressure (from IVC) 3 mmHg    TV rest pulmonary artery pressure 15 mmHg    Narrative    · There is mild left ventricular concentric hypertrophy.  · With normal systolic function. The estimated ejection fraction is 60%.  · Grade I diastolic dysfunction.  · Normal right ventricular systolic function.  · Mild left atrial enlargement.  · Mild mitral regurgitation.  · Normal central venous pressure (3 mmHg).  · The estimated PA systolic pressure is 15 mmHg.        Assessment and Plan:       * COVID-19  Mgmt per primary team    Hypoxia  Continue supplemental oxygen to maintain O2 saturation  Mgmt per primary team    Elevated troponin  Troponin 0.250, trending downward  Continue to trend serial troponin  Recent ECHO revealed EF 60%, DD  Recent MPI stress test normal perfusion scan  Continue IV heparin gtt  Continue ASA, Statin,  BB    Hyperlipemia  Continue statin    Essential hypertension  On medical therapy  Continue Coreg, Hydralazine, ACEi, Imdur, Aldactone    Hypokalemia  Keep K+>4    Diabetes mellitus type 2, uncontrolled  Mgmt per primary team        VTE Risk Mitigation (From admission, onward)         Ordered     IP VTE HIGH RISK PATIENT  Once      12/15/20 1538     Place sequential compression device  Until discontinued      12/15/20 1538     heparin 25,000 units in dextrose 5% (100 units/ml) IV bolus from bag - ADDITIONAL PRN BOLUS - 30 units/kg (max bolus 4000 units)  As needed (PRN)     Question:  Heparin Infusion Adjustment (DO NOT MODIFY ANSWER)  Answer:  \\[email or web address]\epic\Images\Pharmacy\HeparinInfusions\heparin LOW INTENSITY nomogram for [facility] [id number].pdf    12/15/20 1243     heparin 25,000 units in dextrose 5% (100 units/ml) IV bolus from bag - ADDITIONAL PRN BOLUS - 60 units/kg (max bolus 4000 units)  As needed (PRN)     Question:  Heparin Infusion Adjustment (DO NOT MODIFY ANSWER)  Answer:  \\[email or web address]\epic\Images\Pharmacy\HeparinInfusions\heparin LOW INTENSITY nomogram for [facility] [id number].pdf    12/15/20 1243     heparin 25,000 units in dextrose 5% 250 mL (100 units/mL) infusion LOW INTENSITY nomogram - [facility]  Continuous     Question Answer Comment   Heparin Infusion Adjustment (DO NOT MODIFY ANSWER) \\[email or web address]\epic\Images\Pharmacy\HeparinInfusions\heparin LOW INTENSITY nomogram for [facility] [id number].pdf    Begin at (in units/kg/hr) 12        12/15/20 1243                DEXTER CeballosC  Cardiology  [facility]

## 2020-12-17 NOTE — PLAN OF CARE
POC reviewed, verbalized understanding. Pt remained free from falls, fall precautions in place. Pt is SB-SR on monitor, 8634. VSS. No other c/o at this time. PIV intact, Heparin@ 12 units/kg. BIPAP 100%. Call bell and personal belongings within reach. Hourly rounding complete. Reminded to call for assistance. Will continue to monitor.

## 2020-12-17 NOTE — PROGRESS NOTES
Pt placed on BiPAP due to low sat with movement  Or any activity on vapoterm and per pt  Request. Will continue to monitor

## 2020-12-17 NOTE — PROGRESS NOTES
[facility] Hospital Medicine  Progress Note    Patient Name: Karan Aburto  MRN: [id number]  Patient Class: IP- Inpatient   Admission Date: 12/15/2020  Length of Stay: 1 days  Attending Physician: Edgar Dalton MD  Primary Care Provider: Mickey Agrawal MD        Subjective:     Principal Problem:COVID-19        HPI:  76 y/o male with PMHx of HTN, HLD, CAD, and hypothyroidism who presented to the ED with c/o SOB that onset gradually earlier today. Symptoms are constant and moderate in severity. No mitigating or exacerbating factors reported. Associated symptoms include fever, cough, BLE edema, and weakness. Pt denies HA, dizziness, CP, palpitations, loss of smell, loss of taste, N/V/D, and all other symptoms at this time.  ED workup shows: RR 38, oxygen sat 91% on non-rebreather mask. H/H 8.9/28.6, K+ 3.4, , , , Troponin 0.250  He is a DNR and his SDM is his son, Mac.  He will be kept on OBS under the care of hospital medicine.      Overview/Hospital Course:  Patietn 74 yo male admitted to hospital with COVIFD 19 Acute Hypoxic Respiratory failure / Pneumonia. Patient initiated on Remdesivir, steroids, Vapotherm. Patient communicative and expressed he doesn't want to go through this. Patietn confirmed he is DNR. Patietn denies CP + SOB / Cough    Interval History:  He is in sinus shaunna this morning but denies having any chest pain, lightheadedness.  Currently on a BiPAP with sats on 100%    Review of Systems  Objective:     Vital Signs (Most Recent):  Temp: 97.2 °F (36.2 °C) (12/17/20 1109)  Pulse: (!) 53 (12/17/20 1109)  Resp: (!) 24 (12/17/20 1109)  BP: 107/67 (12/17/20 1109)  SpO2: 100 % (12/17/20 1109) Vital Signs (24h Range):  Temp:  [97.2 °F (36.2 °C)-98.6 °F (37 °C)] 97.2 °F (36.2 °C)  Pulse:  [53-82] 53  Resp:  [18-34] 24  SpO2:  [85 %-100 %] 100 %  BP: ()/(55-70) 107/67     Weight: 89.7 kg (197 lb 12 oz)  Body mass index is 28.37 kg/m².    Intake/Output Summary  (Last 24 hours) at 12/17/2020 1239  Last data filed at 12/16/2020 1700  Gross per 24 hour   Intake 1168 ml   Output --   Net 1168 ml      Physical Exam  Constitutional:       General: He is not in acute distress.     Appearance: He is well-developed. He is not diaphoretic.   HENT:      Head: Normocephalic and atraumatic.      Nose: Nose normal.   Eyes:      General:         Right eye: No discharge.         Left eye: No discharge.      Conjunctiva/sclera: Conjunctivae normal.      Pupils: Pupils are equal, round, and reactive to light.   Neck:      Musculoskeletal: Normal range of motion and neck supple.      Thyroid: No thyromegaly.      Vascular: No JVD.      Trachea: No tracheal deviation.   Cardiovascular:      Rate and Rhythm: Regular rhythm. Tachycardia present.      Heart sounds: Murmur present. Systolic murmur present with a grade of 3/6. No friction rub. No gallop.    Pulmonary:      Effort: Tachypnea, accessory muscle usage and respiratory distress present.      Breath sounds: Examination of the right-upper field reveals wheezing. Examination of the left-upper field reveals wheezing. Examination of the right-lower field reveals decreased breath sounds. Examination of the left-lower field reveals decreased breath sounds. Decreased breath sounds and wheezing present. No rales.   Chest:      Chest wall: No tenderness.   Abdominal:      General: Bowel sounds are normal. There is no distension.      Palpations: Abdomen is soft. There is no mass.      Tenderness: There is no abdominal tenderness.   Genitourinary:     Comments: deferred  Musculoskeletal: Normal range of motion.         General: No tenderness or deformity.      Right lower leg: Edema present.      Left lower leg: Edema present.   Skin:     General: Skin is warm and dry.      Capillary Refill: Capillary refill takes less than 2 seconds.      Findings: No erythema or rash.   Neurological:      Mental Status: He is alert and oriented to person, place,  and time.      Motor: No abnormal muscle tone.      Coordination: Coordination normal.   Psychiatric:         Behavior: Behavior normal.            Significant Labs: All pertinent labs within the past 24 hours have been reviewed.    Significant Imaging: I have reviewed and interpreted all pertinent imaging results/findings within the past 24 hours.      Assessment/Plan:      * COVID-19  - COVID-19 testing   - Infection Control notified     - Isolation:   - Airborne, Contact and Droplet Precautions  - Cohort patients into COVID units  - N95 mask, wear eye protection  - 20 second hand hygiene              - Limit visitors per hospital policy              - Consolidating lab draws, nursing care, provider visits, and interventions    - Diagnostics: (leukopenia, hyponatremia, hyperferritinemia, elevated troponin, elevated d-dimer, age, and comorbidities are significant predictors of poor clinical outcome)  CBC, CMP, Procalcitonin, Ferritin, CRP, LDH, BNP, Troponin, ECG, Rapid Flu, Blood Culture x2 and Portable CXR    - Management:  Supplemental O2 to maintain SpO2 >92%  Telemetry  Continuous/intermittent Pulse Ox  Albuterol treatment PRN   --Ipratropium inhaler with MIDI  --dexamethasone  --remdesivir  --monitor    Advance Care Planning   patient is a DNR and his SDM is his sonMac       Sinus bradycardia  Hold Coreg      BPH with elevated PSA  --continue tamsulosin      Hypoxia  --supplemental oxygen to maintain sats   --albuterol and ipratropium via MIDI inhaler        Normocytic anemia  --at baseline  --monitor      Generalized weakness  --likely 2/2 covid  --PT/OT consulted      Elevated troponin  --troponin 0.250 on admit  --cardiology consulted  --heparin gtt  --continue ASA, statin  --trend troponin    Hyperlipemia  --continue rosuvastatin  --cardiac diet      Essential hypertension  --continue carvedilol. Lisinopril, isosorbide, spironolactone  --cardiac diet      Hypokalemia  --3.4 on admit  --replace and  monitor      Diabetes mellitus type 2, uncontrolled  --accuchecks with SSI  --diabetes educator consulted  --diabetic diet  --HA1C pending  --last HA1C 10/26/20 was 9.3        VTE Risk Mitigation (From admission, onward)         Ordered     IP VTE HIGH RISK PATIENT  Once      12/15/20 1538     Place sequential compression device  Until discontinued      12/15/20 1538     heparin 25,000 units in dextrose 5% (100 units/ml) IV bolus from bag - ADDITIONAL PRN BOLUS - 30 units/kg (max bolus 4000 units)  As needed (PRN)     Question:  Heparin Infusion Adjustment (DO NOT MODIFY ANSWER)  Answer:  \[employer]\epic\Images\Pharmacy\HeparinInfusions\heparin LOW INTENSITY nomogram for [facility] [id number].pdf    12/15/20 1243     heparin 25,000 units in dextrose 5% (100 units/ml) IV bolus from bag - ADDITIONAL PRN BOLUS - 60 units/kg (max bolus 4000 units)  As needed (PRN)     Question:  Heparin Infusion Adjustment (DO NOT MODIFY ANSWER)  Answer:  \[employer]\epic\Images\Pharmacy\HeparinInfusions\heparin LOW INTENSITY nomogram for [facility] [id number].pdf    12/15/20 1243     heparin 25,000 units in dextrose 5% 250 mL (100 units/mL) infusion LOW INTENSITY nomogram - [facility]  Continuous     Question Answer Comment   Heparin Infusion Adjustment (DO NOT MODIFY ANSWER) \[facility]\epic\Images\Pharmacy\HeparinInfusions\heparin LOW INTENSITY nomogram for [facility] [id number].pdf    Begin at (in units/kg/hr) 12        12/15/20 1243                Discharge Planning   ADAM:      Code Status: DNR   Is the patient medically ready for discharge?:     Reason for patient still in hospital (select all that apply): Patient trending condition  Discharge Plan A: Home Health, Assisted Living                  Edgar Dalton MD  Department of Hospital Medicine   [facility]

## 2020-12-17 NOTE — PLAN OF CARE
Patient current with [facility].  Preference obtained. Referral sent via [email or web address].       12/17/20 8842   Post-Acute Status   Post-Acute Authorization Home Health   Home Health Status Referrals Sent   Discharge Plan   Discharge Plan A Home Health;Assisted Living

## 2020-12-17 NOTE — CONSULTS
"Followed up on Mr. Aburto today for recheck. He is awake and alert. On Bipap with foam dressing over nasal bridge, skin intact. Patient does have open ulceration to his right knee. Wound bed is moist red and yellow. Patient reports this is old from a fall, does not appear pressure related. Foam dressing in place, reinforced. Also noted is a 2x3cm serious fluid filed blister to his right upper quadrant under his chest. No periwound redness. Painted with cavilon and covered with a foam dressing- will monitor closely. Recommend continued pressure injury prevention measures.    Skin Care Precautions / Pressure Injury Prevention:  1. Follow "Guidelines for Prevention of Pressure Ulcers in At Risk Patients"  These guidelines can be found on the [facility] Intr[employer]et by searching "Wound Care / Ostomy Resources"  2. Document wound assessment in [facility] using guidelines in [place]'s "Assessment : Wound" procedure  3. Limit the amount of linen/underpad between patient and mattress surface to ONE fitted sheet and ONE covidien underpad - NO DIAPERS  4. Obtain Bath Wipes for providing abhilash care - avoid the use of wash cloths to areas affected by IAD.  5. Apply Moisture Barrier Paste to perineal / perirectal areas in a thin even layer to clean dry skin BID and after each episode of pericare  6. Apply sween 24 moisturizer cream to all dry skin after daily bath and prn  7. Obtain foam wedge from materials management to assist with maintaining proper position changes at least q 2hours and document actual position in EPIC q 2hours  8. Elevate heels off mattress on 2 separate pillows placed lengthwise under each leg supporting the leg from knee to ankle.  Document in EPIC flow sheet every 2 hours.  9. .Do NOT elevate HOB greater than 30 degrees unless contraindicated.  10. Remove SCD/Plexi Pulses/FADI's every 12 hours for 30 minutes and assess skin underneath these devices for breakdown          "

## 2020-12-17 NOTE — NURSING
Results for STEPHANIJUSTIN (MRN [id number]) as of 12/17/2020 00:27   12/17/2020 00:27   aPTT 54.4      Heparin is within Therapeutic range. No change in dose. Currently on 12 units/kg/hr  Next aPTT draw is at 7am.

## 2020-12-18 PROBLEM — J96.01 ACUTE HYPOXEMIC RESPIRATORY FAILURE: Status: ACTIVE | Noted: 2020-01-01

## 2020-12-18 NOTE — PROGRESS NOTES
[facility] -   Cardiology  Progress Note    Patient Name: Karan Aburto  MRN: [id number]  Admission Date: 12/15/2020  Hospital Length of Stay: 2 days  Code Status: DNR   Attending Physician: Tracy Chavez MD   Primary Care Physician: Mickey Agrawal MD  Expected Discharge Date:   Principal Problem:COVID-19    Subjective:   HPI    Karan Aburto is a 75 year old male who presented to [facility] due to respiratory distress with associated fever, dyspnea, and generalized weakness. His current medical conditions include HTN, elevated PSA, Thyroid disease, Rosacea, recent COVID +. O2 sats were 70% on RA per EMS. ED workup revealed K+ 3.4, Cr 1.1, glucose 163, H/H 8.9/28.6, plts 216, , Troponin 0.250. He was placed in observation under the care of hospital medicine. Cardiology consulted to assist with medical management. Chart extensively reviewed, patient not seen or examined due to COVID +. Will optimize medical management. Continue medical management of COVID per primary team.       Hospital Course:   12/16/2020-Patient not seen or examined given + covid status. Remains on vapotherm with maximum support and proned per nursing notes to maintain oxygenation. Labs reviewed, H/H 7.9/25, K+ 3.4, Cr 1.1, would recommend 1 unit PRBC transfusion.     12/17/2020-Patient not seen or examined given + Covid status. On Bipap today for pulmonary support. Labs reviewed, K+ 3.2, Cr 1.6, BUN 29, Phosph 5.3, H/H 7.6/25.     12/18/2020-Patient not seen or examined given + COVID status. Labs reviewed, K+ 3.1, Cr 1.7, BUN 74, CO2 31, mag 2.4.     Interval History: REMAINS ON BIPAP FOR PULMONARY SUPPORT TODAY. CONTINUE MEDICAL MGMT WITH IV HEPARIN GTT FOR NOW. MGMT OF COVID PER PRIMARY TEAM.     Review of Systems   Constitution: Positive for malaise/fatigue.   Cardiovascular: Positive for dyspnea on exertion, irregular heartbeat and palpitations.   Respiratory: Positive for cough and shortness of breath.    Endocrine:  Negative for heat intolerance.   Neurological: Positive for weakness.   Allergic/Immunologic: Negative for environmental allergies.     Objective:     Vital Signs (Most Recent):  Temp: 97.4 °F (36.3 °C) (12/18/20 1124)  Pulse: 63 (12/18/20 1124)  Resp: (!) 24 (12/18/20 1124)  BP: 120/71 (12/18/20 1124)  SpO2: (!) 91 % (12/18/20 1200) Vital Signs (24h Range):  Temp:  [97.4 °F (36.3 °C)-98.3 °F (36.8 °C)] 97.4 °F (36.3 °C)  Pulse:  [56-74] 63  Resp:  [18-25] 24  SpO2:  [82 %-100 %] 91 %  BP: ()/(54-71) 120/71     Weight: 94 kg (207 lb 3.7 oz)  Body mass index is 29.73 kg/m².     SpO2: (!) 91 %  O2 Device (Oxygen Therapy): BiPAP      Intake/Output Summary (Last 24 hours) at 12/18/2020 1311  Last data filed at 12/18/2020 0700  Gross per 24 hour   Intake 1001.2 ml   Output --   Net 1001.2 ml       Lines/Drains/Airways     Peripheral Intravenous Line                 Peripheral IV - Single Lumen 12/15/20 1255 20 G Left Antecubital 3 days         Peripheral IV - Single Lumen 12/15/20 1717 20 G Right Wrist 2 days                Physical Exam  NOT EXAMINED GIVEN + COVID STATUS  Significant Labs:   BMP:   Recent Labs   Lab 12/17/20  0634 12/18/20  0634   * 293*    138   K 3.2* 3.1*   CL 96 97   CO2 29 31*   BUN 59* 74*   CREATININE 1.6* 1.7*   CALCIUM 7.9* 8.0*   MG 2.1 2.4   , CBC   Recent Labs   Lab 12/17/20  0634 12/18/20  0634   WBC 7.21 7.29   HGB 7.6* 7.9*   HCT 25.0* 25.6*    251    and All pertinent lab results from the last 24 hours have been reviewed.    Significant Imaging: Echocardiogram:   Transthoracic echo (TTE) complete (Cupid Only):   Results for orders placed or performed during the hospital encounter of 10/18/20   Echo Color Flow Doppler? Yes; Bubble Contrast? No   Result Value Ref Range    Ascending aorta 2.97 cm    STJ 2.57 cm    AV mean gradient 7 mmHg    Ao peak mercedes 1.79 m/s    Ao VTI 27.91 cm    IVS 1.39 (A) 0.6 - 1.1 cm    LA size 4.21 cm    Left Atrium Major Axis 4.62 cm     LVIDd 3.82 3.5 - 6.0 cm    LVIDs 2.29 2.1 - 4.0 cm    LVOT diameter 2.39 cm    LVOT peak VTI 21.64 cm    Posterior Wall 1.08 0.6 - 1.1 cm    MV Peak A Lewis 1.10 m/s    E wave decelartion time 190.97 msec    MV Peak E Lewis 0.79 m/s    RA Major Axis 4.32 cm    RA Width 2.24 cm    TAPSE 2.81 cm    TR Max Lewis 1.74 m/s    TDI LATERAL 0.07 m/s    TDI SEPTAL 0.06 m/s    LA WIDTH 4.11 cm    Ao root annulus 3.66 cm    MV stenosis pressure 1/2 time 55.38 ms    LV Diastolic Volume 62.69 mL    LV Systolic Volume 17.96 mL    LVOT peak lewis 1.28 m/s    LV LATERAL E/E' RATIO 11.29 m/s    LV SEPTAL E/E' RATIO 13.17 m/s    FS 40 %    LV mass 161.27 g    Left Ventricle Relative Wall Thickness 0.57 cm    AV valve area 3.48 cm2    AV Velocity Ratio 0.72     AV index (prosthetic) 0.78     MV valve area p 1/2 method 3.97 cm2    E/A ratio 0.72     Mean e' 0.07 m/s    LVOT area 4.5 cm2    LVOT stroke volume 97.03 cm3    AV peak gradient 13 mmHg    E/E' ratio 12.15 m/s    LV Systolic Volume Index 8.1 mL/m2    LV Diastolic Volume Index 28.40 mL/m2    LV Mass Index 73 g/m2    Triscuspid Valve Regurgitation Peak Gradient 12 mmHg    Right Atrial Pressure (from IVC) 3 mmHg    TV rest pulmonary artery pressure 15 mmHg    Narrative    · There is mild left ventricular concentric hypertrophy.  · With normal systolic function. The estimated ejection fraction is 60%.  · Grade I diastolic dysfunction.  · Normal right ventricular systolic function.  · Mild left atrial enlargement.  · Mild mitral regurgitation.  · Normal central venous pressure (3 mmHg).  · The estimated PA systolic pressure is 15 mmHg.        Assessment and Plan:       * COVID-19  Mgmt per primary team    Hypoxia  Continue supplemental oxygen to maintain O2 saturation  Mgmt per primary team    Elevated troponin  Troponin 0.250, trending downward  Continue to trend serial troponin  Recent ECHO revealed EF 60%, DD  Recent MPI stress test normal perfusion scan  Continue IV heparin gtt for now  until clinical condition improves  Continue ASA, Statin, BB    Hyperlipemia  Continue statin    Essential hypertension  On medical therapy  Continue Coreg, Hydralazine, ACEi, Imdur    Hypokalemia  Keep K+>4    Diabetes mellitus type 2, uncontrolled  Mgmt per primary team        VTE Risk Mitigation (From admission, onward)         Ordered     IP VTE HIGH RISK PATIENT  Once      12/15/20 1538     Place sequential compression device  Until discontinued      12/15/20 1538     heparin 25,000 units in dextrose 5% (100 units/ml) IV bolus from bag - ADDITIONAL PRN BOLUS - 30 units/kg (max bolus 4000 units)  As needed (PRN)     Question:  Heparin Infusion Adjustment (DO NOT MODIFY ANSWER)  Answer:  \[email or web address]\epic\Images\Pharmacy\HeparinInfusions\heparin LOW INTENSITY nomogram for [facility] [id number]    12/15/20 1243     heparin 25,000 units in dextrose 5% (100 units/ml) IV bolus from bag - ADDITIONAL PRN BOLUS - 60 units/kg (max bolus 4000 units)  As needed (PRN)     Question:  Heparin Infusion Adjustment (DO NOT MODIFY ANSWER)  Answer:  \\[email or web address]\epic\Images\Pharmacy\HeparinInfusions\heparin LOW INTENSITY nomogram for [facility] [id number]    12/15/20 1243     heparin 25,000 units in dextrose 5% 250 mL (100 units/mL) infusion LOW INTENSITY nomogram - [facility]  Continuous     Question Answer Comment   Heparin Infusion Adjustment (DO NOT MODIFY ANSWER) \\[email or web address]\epic\Images\Pharmacy\HeparinInfusions\heparin LOW INTENSITY nomogram for [facility] [id number].pdf    Begin at (in units/kg/hr) 12        12/15/20 1243                CORETTA Ceballos  Cardiology  [facility]

## 2020-12-18 NOTE — ASSESSMENT & PLAN NOTE
Troponin 0.250, trending downward  Continue to trend serial troponin  Recent ECHO revealed EF 60%, DD  Recent MPI stress test normal perfusion scan  Continue IV heparin gtt for now until clinical condition improves  Continue ASA, Statin, BB

## 2020-12-18 NOTE — SUBJECTIVE & OBJECTIVE
Interval History: pt looks and feels better although Oxygenation very poor, unable come off bipap this am as Sats dropped to 82% on Vapotherm, he is otherwise fully alert, Renal function worsening with Bun/Cr 74/1.7, CXR diffuse infiltrates B, K 3.1- being replaced. Pt is getting Dexa and IV remdesivir without any improvement in O2, BS very high despite SSI- will start Levemir 10 BID. Will contact family and update them about his condition.       Review of Systems   Constitutional: Positive for activity change, appetite change and fatigue. Negative for fever.   HENT: Negative.  Negative for congestion, sinus pressure and sore throat.    Eyes: Negative.  Negative for photophobia, discharge and visual disturbance.   Respiratory: Positive for cough and shortness of breath. Negative for chest tightness and wheezing.    Cardiovascular: Positive for palpitations. Negative for chest pain.   Gastrointestinal: Negative.  Negative for abdominal pain, blood in stool, constipation, diarrhea, nausea and vomiting.   Endocrine: Negative.    Genitourinary: Negative.    Musculoskeletal: Positive for myalgias. Negative for neck pain and neck stiffness.   Skin: Negative.    Allergic/Immunologic: Negative.    Neurological: Positive for weakness. Negative for seizures, syncope and headaches.   Hematological: Negative.    Psychiatric/Behavioral: Negative.  Negative for agitation, behavioral problems, hallucinations, self-injury and suicidal ideas.     Objective:     Vital Signs (Most Recent):  Temp: 97.6 °F (36.4 °C) (12/18/20 1624)  Pulse: 67 (12/18/20 1624)  Resp: (!) 22 (12/18/20 1624)  BP: 115/69 (12/18/20 1624)  SpO2: (!) 94 % (12/18/20 1624) Vital Signs (24h Range):  Temp:  [97.4 °F (36.3 °C)-98.3 °F (36.8 °C)] 97.6 °F (36.4 °C)  Pulse:  [56-70] 67  Resp:  [18-25] 22  SpO2:  [82 %-100 %] 94 %  BP: ()/(54-71) 115/69     Weight: 94 kg (207 lb 3.7 oz)  Body mass index is 29.73 kg/m².    Intake/Output Summary (Last 24 hours) at  12/18/2020 1647  Last data filed at 12/18/2020 0700  Gross per 24 hour   Intake 1001.2 ml   Output --   Net 1001.2 ml      Physical Exam  Vitals signs and nursing note reviewed.   Constitutional:       General: He is not in acute distress.     Appearance: He is well-developed. He is not diaphoretic.   HENT:      Head: Normocephalic and atraumatic.      Nose: Nose normal.   Eyes:      General:         Right eye: No discharge.         Left eye: No discharge.      Conjunctiva/sclera: Conjunctivae normal.      Pupils: Pupils are equal, round, and reactive to light.   Neck:      Musculoskeletal: Normal range of motion and neck supple.      Thyroid: No thyromegaly.      Vascular: No JVD.      Trachea: No tracheal deviation.   Cardiovascular:      Rate and Rhythm: Regular rhythm. Tachycardia present.      Heart sounds: Murmur present. Systolic murmur present with a grade of 3/6. No friction rub. No gallop.    Pulmonary:      Effort: Tachypnea, accessory muscle usage and respiratory distress present.      Breath sounds: Examination of the right-upper field reveals wheezing. Examination of the left-upper field reveals wheezing. Examination of the right-lower field reveals decreased breath sounds. Examination of the left-lower field reveals decreased breath sounds. Decreased breath sounds and wheezing present. No rales.   Chest:      Chest wall: No tenderness.   Abdominal:      General: Bowel sounds are normal. There is no distension.      Palpations: Abdomen is soft. There is no mass.      Tenderness: There is no abdominal tenderness.   Genitourinary:     Comments: deferred  Musculoskeletal: Normal range of motion.         General: No tenderness or deformity.      Right lower leg: Edema present.      Left lower leg: Edema present.   Skin:     General: Skin is warm and dry.      Capillary Refill: Capillary refill takes less than 2 seconds.      Findings: No erythema or rash.   Neurological:      Mental Status: He is alert and  oriented to person, place, and time.      Motor: No abnormal muscle tone.      Coordination: Coordination normal.   Psychiatric:         Behavior: Behavior normal.         Significant Labs:   ABGs:   Blood Culture:   BMP:   Recent Labs   Lab 12/18/20  0634   *      K 3.1*   CL 97   CO2 31*   BUN 74*   CREATININE 1.7*   CALCIUM 8.0*   MG 2.4     CBC:   Recent Labs   Lab 12/17/20  0634 12/18/20  0634   WBC 7.21 7.29   HGB 7.6* 7.9*   HCT 25.0* 25.6*    251     CMP:   Recent Labs   Lab 12/17/20  0634 12/18/20  0634    138   K 3.2* 3.1*   CL 96 97   CO2 29 31*   * 293*   BUN 59* 74*   CREATININE 1.6* 1.7*   CALCIUM 7.9* 8.0*   PROT 4.8* 4.8*   ALBUMIN 1.7* 1.7*   BILITOT 0.7 0.6   ALKPHOS 68 88   AST 53* 56*   ALT 40 42   ANIONGAP 12 10   EGFRNONAA 42* 39*     Coagulation:   Recent Labs   Lab 12/18/20  0634   APTT 58.5*     Magnesium:   Recent Labs   Lab 12/17/20  0634 12/18/20  0634   MG 2.1 2.4     Troponin:   TSH:   Recent Labs   Lab 10/19/20  1721   TSH 0.765     All pertinent labs within the past 24 hours have been reviewed.    Significant Imaging: I have reviewed all pertinent imaging results/findings within the past 24 hours.

## 2020-12-18 NOTE — PROGRESS NOTES
Pt o2 turned down per nurse Radha request. Pt wanted to stay on BiPAP at this time instead of going to vapotherm per nurse Radha request. Will continue to monitor pt.

## 2020-12-18 NOTE — SUBJECTIVE & OBJECTIVE
Interval History: REMAINS ON BIPAP FOR PULMONARY SUPPORT TODAY. CONTINUE MEDICAL MGMT WITH IV HEPARIN GTT FOR NOW. MGMT OF COVID PER PRIMARY TEAM.     Review of Systems   Constitution: Positive for malaise/fatigue.   Cardiovascular: Positive for dyspnea on exertion, irregular heartbeat and palpitations.   Respiratory: Positive for cough and shortness of breath.    Endocrine: Negative for heat intolerance.   Neurological: Positive for weakness.   Allergic/Immunologic: Negative for environmental allergies.     Objective:     Vital Signs (Most Recent):  Temp: 97.4 °F (36.3 °C) (12/18/20 1124)  Pulse: 63 (12/18/20 1124)  Resp: (!) 24 (12/18/20 1124)  BP: 120/71 (12/18/20 1124)  SpO2: (!) 91 % (12/18/20 1200) Vital Signs (24h Range):  Temp:  [97.4 °F (36.3 °C)-98.3 °F (36.8 °C)] 97.4 °F (36.3 °C)  Pulse:  [56-74] 63  Resp:  [18-25] 24  SpO2:  [82 %-100 %] 91 %  BP: ()/(54-71) 120/71     Weight: 94 kg (207 lb 3.7 oz)  Body mass index is 29.73 kg/m².     SpO2: (!) 91 %  O2 Device (Oxygen Therapy): BiPAP      Intake/Output Summary (Last 24 hours) at 12/18/2020 1311  Last data filed at 12/18/2020 0700  Gross per 24 hour   Intake 1001.2 ml   Output --   Net 1001.2 ml       Lines/Drains/Airways     Peripheral Intravenous Line                 Peripheral IV - Single Lumen 12/15/20 1255 20 G Left Antecubital 3 days         Peripheral IV - Single Lumen 12/15/20 1717 20 G Right Wrist 2 days                Physical Exam  NOT EXAMINED GIVEN + COVID STATUS  Significant Labs:   BMP:   Recent Labs   Lab 12/17/20  0634 12/18/20  0634   * 293*    138   K 3.2* 3.1*   CL 96 97   CO2 29 31*   BUN 59* 74*   CREATININE 1.6* 1.7*   CALCIUM 7.9* 8.0*   MG 2.1 2.4   , CBC   Recent Labs   Lab 12/17/20  0634 12/18/20  0634   WBC 7.21 7.29   HGB 7.6* 7.9*   HCT 25.0* 25.6*    251    and All pertinent lab results from the last 24 hours have been reviewed.    Significant Imaging: Echocardiogram:   Transthoracic echo (TTE)  complete (Cupid Only):   Results for orders placed or performed during the hospital encounter of 10/18/20   Echo Color Flow Doppler? Yes; Bubble Contrast? No   Result Value Ref Range    Ascending aorta 2.97 cm    STJ 2.57 cm    AV mean gradient 7 mmHg    Ao peak lewis 1.79 m/s    Ao VTI 27.91 cm    IVS 1.39 (A) 0.6 - 1.1 cm    LA size 4.21 cm    Left Atrium Major Axis 4.62 cm    LVIDd 3.82 3.5 - 6.0 cm    LVIDs 2.29 2.1 - 4.0 cm    LVOT diameter 2.39 cm    LVOT peak VTI 21.64 cm    Posterior Wall 1.08 0.6 - 1.1 cm    MV Peak A Lewis 1.10 m/s    E wave decelartion time 190.97 msec    MV Peak E Lewis 0.79 m/s    RA Major Axis 4.32 cm    RA Width 2.24 cm    TAPSE 2.81 cm    TR Max Lewis 1.74 m/s    TDI LATERAL 0.07 m/s    TDI SEPTAL 0.06 m/s    LA WIDTH 4.11 cm    Ao root annulus 3.66 cm    MV stenosis pressure 1/2 time 55.38 ms    LV Diastolic Volume 62.69 mL    LV Systolic Volume 17.96 mL    LVOT peak lewis 1.28 m/s    LV LATERAL E/E' RATIO 11.29 m/s    LV SEPTAL E/E' RATIO 13.17 m/s    FS 40 %    LV mass 161.27 g    Left Ventricle Relative Wall Thickness 0.57 cm    AV valve area 3.48 cm2    AV Velocity Ratio 0.72     AV index (prosthetic) 0.78     MV valve area p 1/2 method 3.97 cm2    E/A ratio 0.72     Mean e' 0.07 m/s    LVOT area 4.5 cm2    LVOT stroke volume 97.03 cm3    AV peak gradient 13 mmHg    E/E' ratio 12.15 m/s    LV Systolic Volume Index 8.1 mL/m2    LV Diastolic Volume Index 28.40 mL/m2    LV Mass Index 73 g/m2    Triscuspid Valve Regurgitation Peak Gradient 12 mmHg    Right Atrial Pressure (from IVC) 3 mmHg    TV rest pulmonary artery pressure 15 mmHg    Narrative    · There is mild left ventricular concentric hypertrophy.  · With normal systolic function. The estimated ejection fraction is 60%.  · Grade I diastolic dysfunction.  · Normal right ventricular systolic function.  · Mild left atrial enlargement.  · Mild mitral regurgitation.  · Normal central venous pressure (3 mmHg).  · The estimated PA systolic  pressure is 15 mmHg.

## 2020-12-18 NOTE — PT/OT/SLP PROGRESS
Occupational Therapy      Patient Name:  Karan Aburto   MRN:  [id number]    Patient not seen today secondary to nurse Joelle stating pt is currently not appropriate to participated in OT eval at this time, on continuous bipap and fatigued. Eval initiated via chart review  . Will follow-up once appropriate.    Didi Shannon OT  12/18/2020

## 2020-12-18 NOTE — PLAN OF CARE
Problem: Fall Injury Risk  Goal: Absence of Fall and Fall-Related Injury  12/18/2020 0648 by Radha Noel RN  Outcome: Ongoing, Progressing  12/18/2020 0554 by Radha Noel RN  Outcome: Ongoing, Progressing     Problem: Adult Inpatient Plan of Care  Goal: Plan of Care Review  12/18/2020 0649 by Radha Noel RN  Outcome: Ongoing, Progressing  12/18/2020 0648 by Radha Noel RN  Outcome: Ongoing, Progressing  Goal: Patient-Specific Goal (Individualization)  12/18/2020 0649 by Radha Noel RN  Outcome: Ongoing, Progressing  12/18/2020 0648 by Radha Noel RN  Outcome: Ongoing, Progressing  Flowsheets (Taken 12/18/2020 0648)  Patient-Specific Goals (Include Timeframe):   - Turn, cough, and deep breath exercises  - Use of incentive spirometer   - Administration of IV fluids - -Maintaining an environment conducive to rest and sleep - Oral care - Turning and repositioning schedules - Isolation with droplet and contact precautions     Goal: to taper oxygen demand, hence improve saturation.

## 2020-12-18 NOTE — PT/OT/SLP PROGRESS
Physical Therapy      Patient Name:  Karan Aburto   MRN:  [id number]    0715 P.T. COMPLETED CHART REVIEW AND SPOKE WITH NURSE [place]. PT NURSE REPORTED PT ON BIPAP AND NOT APPROPRIATE FOR SKILLED P.TJm CARDOZO AT THIS TIME.     Rashida Us, PT,12/18/2020

## 2020-12-19 PROBLEM — E87.6 HYPOKALEMIA: Status: RESOLVED | Noted: 2020-01-01 | Resolved: 2020-01-01

## 2020-12-19 PROBLEM — R79.89 ELEVATED TROPONIN: Status: RESOLVED | Noted: 2020-01-01 | Resolved: 2020-01-01

## 2020-12-19 PROBLEM — U07.1 ACUTE RESPIRATORY DISTRESS SYNDROME (ARDS) DUE TO SEVERE ACUTE RESPIRATORY SYNDROME CORONAVIRUS 2 (SARS-COV-2): Status: ACTIVE | Noted: 2020-01-01

## 2020-12-19 PROBLEM — U07.1 ACUTE HYPOXEMIC RESPIRATORY FAILURE DUE TO SEVERE ACUTE RESPIRATORY SYNDROME CORONAVIRUS 2 (SARS-COV-2) DISEASE: Status: ACTIVE | Noted: 2020-01-01

## 2020-12-19 PROBLEM — J80 ACUTE RESPIRATORY DISTRESS SYNDROME (ARDS) DUE TO SEVERE ACUTE RESPIRATORY SYNDROME CORONAVIRUS 2 (SARS-COV-2): Status: ACTIVE | Noted: 2020-01-01

## 2020-12-19 NOTE — ASSESSMENT & PLAN NOTE
--accuchecks with SSI  --diabetes educator consulted  --diabetic diet  --HA1C pending  --last HA1C 10/26/20 was 9.3    12/18- BS high, sec to Dexa  Will start Levemir 10 BID

## 2020-12-19 NOTE — CONSULTS
[facility]  Critical Care Medicine  Consult Note    Patient Name: Karan Aburto  MRN: [id number]  Admission Date: 12/15/2020  Hospital Length of Stay: 3 days  Code Status: DNR  Attending Physician: Tracy Chavez MD   Primary Care Provider: Mickey Agrawal MD   Principal Problem: COVID-19      Subjective: worsening shortness of breath      HPI:  75 year old male who presented to [facility]  On 1215/2020 due to respiratory distress with associated fever, dyspnea, and generalized weakness and COVID 19 pneumonia with hypoxemic respiraroty failure.  Transferred to ICU on 12/19/2020 due to worsening respiratory status. His current medical conditions include HTN, elevated PSA, Thyroid disease, Rosacea,O2 sats were 70% on RA on admission. history of cerebral vascular accident lacunar infarct within the right lentiform nucleus on 10/30/2020.  He is uncomfortable and has developed skin breakdown on his nose. Presently on BiPAP     Hospital/ICU Course:  12/19 Transferred to ICU     Past Medical History:   Diagnosis Date    Abnormal liver enzymes 10/18/2020    Coronary artery disease     Elevated PSA     HLD (hyperlipidemia)     Hypertension     Leg swelling 10/18/2020    Leukocytosis 10/18/2020    Metabolic alkalosis 10/20/2020    New onset type 2 diabetes mellitus 10/21/2020    Pneumonia 11/5/2020    Rosacea     Syncope 11/5/2020    Thrombocytopenia 10/30/2020    Thyroid disease     hypothyroidism       No past surgical history on file.    Review of patient's allergies indicates:  No Known Allergies    Family History     Problem Relation (Age of Onset)    Hypertension Mother, Father    Hypothyroidism Mother        Tobacco Use    Smoking status: Never Smoker    Smokeless tobacco: Never Used   Substance and Sexual Activity    Alcohol use: Never     Frequency: Never    Drug use: Not on file    Sexual activity: Not on file         Review of Systems   Constitutional: Positive for diaphoresis and  fatigue.   HENT: Negative.    Respiratory: Positive for cough, chest tightness, shortness of breath and wheezing.    Cardiovascular: Positive for chest pain.   Gastrointestinal: Negative.    Endocrine: Negative.    Genitourinary: Negative.    Musculoskeletal: Positive for arthralgias.   Allergic/Immunologic: Negative.    Neurological: Positive for weakness.   Hematological: Negative.    Psychiatric/Behavioral: The patient is nervous/anxious.      Objective:     Vital Signs (Most Recent):  Temp: 97.6 °F (36.4 °C) (12/19/20 1126)  Pulse: 67 (12/19/20 1126)  Resp: 20 (12/19/20 1126)  BP: (!) 108/53 (12/19/20 1126)  SpO2: 96 % (12/19/20 1126) Vital Signs (24h Range):  Temp:  [97.3 °F (36.3 °C)-98 °F (36.7 °C)] 97.6 °F (36.4 °C)  Pulse:  [59-81] 67  Resp:  [20-23] 20  SpO2:  [85 %-96 %] 96 %  BP: (108-136)/(53-81) 108/53     Weight: 93.6 kg (206 lb 5.6 oz)  Body mass index is 29.61 kg/m².      Intake/Output Summary (Last 24 hours) at 12/19/2020 1236  Last data filed at 12/18/2020 1800  Gross per 24 hour   Intake 690 ml   Output --   Net 690 ml       Physical Exam  Vitals signs and nursing note reviewed.   Constitutional:       Appearance: He is well-developed.   HENT:      Head: Normocephalic and atraumatic.      Nose: Nose normal.   Eyes:      Conjunctiva/sclera: Conjunctivae normal.      Pupils: Pupils are equal, round, and reactive to light.   Neck:      Musculoskeletal: Neck supple.      Thyroid: No thyromegaly.      Vascular: No JVD.      Trachea: No tracheal deviation.   Cardiovascular:      Rate and Rhythm: Normal rate and regular rhythm.      Heart sounds: Normal heart sounds.   Pulmonary:      Effort: Pulmonary effort is normal.      Breath sounds: Examination of the right-middle field reveals rales. Examination of the left-middle field reveals rales. Examination of the right-lower field reveals rales. Examination of the left-lower field reveals rales. Decreased breath sounds and rales present.   Abdominal:       Palpations: Abdomen is soft.   Musculoskeletal: Normal range of motion.   Lymphadenopathy:      Cervical: No cervical adenopathy.   Skin:     General: Skin is warm and dry.   Neurological:      Mental Status: He is alert and oriented to person, place, and time.         Vents:  Oxygen Concentration (%): 70 (12/19/20 0738)    Lines/Drains/Airways     Peripheral Intravenous Line                 Peripheral IV - Single Lumen 12/15/20 1255 20 G Left Antecubital 3 days         Peripheral IV - Single Lumen 12/15/20 1717 20 G Right Wrist 3 days                Significant Labs:    CBC/Anemia Profile:  Recent Labs   Lab 12/17/20  1541 12/18/20  0634 12/19/20  0617   WBC  --  7.29 6.12   HGB  --  7.9* 8.0*   HCT  --  25.6* 26.9*   PLT  --  251 263   MCV  --  96 97   RDW  --  17.3* 17.4*   FERRITIN 3,329*  --   --         Chemistries:  Recent Labs   Lab 12/18/20  0634 12/19/20  0617    143   K 3.1* 4.0   CL 97 102   CO2 31* 32*   BUN 74* 67*   CREATININE 1.7* 1.3   CALCIUM 8.0* 8.2*   ALBUMIN 1.7* 1.8*   PROT 4.8* 4.9*   BILITOT 0.6 0.7   ALKPHOS 88 139*   ALT 42 43   AST 56* 68*   MG 2.4 2.4   PHOS 4.2 3.2       ABGs: No results for input(s): PH, PCO2, HCO3, POCSATURATED, BE in the last 48 hours.  BMP:   Recent Labs   Lab 12/19/20  0617   *      K 4.0      CO2 32*   BUN 67*   CREATININE 1.3   CALCIUM 8.2*   MG 2.4     CMP:   Recent Labs   Lab 12/18/20  0634 12/19/20  0617    143   K 3.1* 4.0   CL 97 102   CO2 31* 32*   * 149*   BUN 74* 67*   CREATININE 1.7* 1.3   CALCIUM 8.0* 8.2*   PROT 4.8* 4.9*   ALBUMIN 1.7* 1.8*   BILITOT 0.6 0.7   ALKPHOS 88 139*   AST 56* 68*   ALT 42 43   ANIONGAP 10 9   EGFRNONAA 39* 53*     Troponin: No results for input(s): TROPONINI in the last 48 hours.  All pertinent labs within the past 24 hours have been reviewed.    Significant Imaging:   I have reviewed all pertinent imaging results/findings within the past 24 hours.  I have reviewed and interpreted all  pertinent imaging results/findings within the past 24 hours.   X-Ray Chest AP Portable  Narrative: EXAMINATION:  XR CHEST AP PORTABLE    CLINICAL HISTORY:  Shortness of breath, sob; rule out COVID-19 pneumonia.    COMPARISON:  11/05/2020.    FINDINGS:  Heart is normal in size.  There is been interval development of moderate patchy bilateral infiltrates.  Findings are nonspecific but could be related to atypical pneumonia  Impression: Interval development of moderate bilateral patchy infiltrate.  Findings are nonspecific but could be related to atypical pneumonia.    Electronically signed by: Elba Parham MD  Date:    12/15/2020  Time:    12:08          ABG  No results for input(s): PH, PO2, PCO2, HCO3, BE in the last 168 hours.  Assessment/Plan:     Pulmonary  Acute hypoxemic respiratory failure sec to Covid 19 Pneumonia  12/19 will need skin care to continue Noninvasive Positive Pressure Ventilation     Other  * Acute Viral Pneumonia COVID-19  COVID-19 Pneumonia:     - COVID-19 testing Collection Date: 12/15/2020  - Infection Control notified      - Isolation:   - Airborne, Contact and Droplet Precautions  - Cohort patients into COVID units  - N95 masks must be fit tested, wear eye protection  - 20 second hand hygiene              - Limit visitors per hospital policy              - Consolidating lab draws, nursing care, provider visits, and interventions     - Diagnostics: (leukopenia, hyponatremia, hyperferritinemia, elevated troponin, elevated d-dimer, age, and comorbidities are significant predictors of poor clinical outcome)  CBC, Procalcitonin, Ferritin, CRP, LDH and Portable CXR     - Management:     Supplemental oxygen to maintain O2 sat >90%  Noninvasive Positive Pressure Ventilation maintain SpO2 >90%  Continuous/intermittent Pulse Ox  Albuterol treatment PRN  Careful use of steroids in the absence of other indications - unless septic shock due to increased viral replication Fluid sparing  resuscitationl. Switch to IV  Empiric DOXY+ ROCEPHIN - will initiate   Remdesivir - on going    Deep Venous Thrombosis - start complete anticoagulin      ADJUNCTIVE THERAPY: ZINC, ATORVASTATIN      Continuous/intermittent Pulse Ox  Albuterol INHALER PRN (avoid nebulization of secretions)  -    Vit C 500 mg, BID  Zinc 220 mg/day     Code Status  DNR               Critical Care Daily Checklist:    A: Awake: RASS Goal/Actual Goal:    Actual:     B: Spontaneous Breathing Trial Performed?     C: SAT & SBT Coordinated?  na                      D: Delirium: CAM-ICU     E: Early Mobility Performed? No   F: Feeding Goal:    Status:     Current Diet Order   Procedures    Diet diabetic [facility]; 2000 Calorie; Cardiac (Low Na/Chol)     Add BOOST     Order Specific Question:   Indicate patient location for additional diet options:     Answer:   [facility]     Order Specific Question:   Total calories:     Answer:   2000 Calorie     Order Specific Question:   Additional Diet Options:     Answer:   Cardiac (Low Na/Chol)      AS: Analgesia/Sedation adquate   T: Thromboembolic Prophylaxis Full heparin    H: HOB > 300 Yes   U: Stress Ulcer Prophylaxis (if needed) y   G: Glucose Control adequate   B: Bowel Function Stool Occurrence: 0   I: Indwelling Catheter (Lines & Anaya) Necessity needed   D: De-escalation of Antimicrobials/Pharmacotherapies Ceftriaxone and Zithromycin added today.    Plan for the day/ETD support    Code Status:  Family/Goals of Care: DNR  Discussed with son and daughter     Critical Care Time: 50 minutes  Critical secondary to Patient has a condition that poses threat to life and bodily function: Severe Respiratory Distress     Critical care was time spent personally by me on the following activities: development of treatment plan with patient or surrogate and bedside caregivers, discussions with consultants, evaluation of patient's response to treatment, examination of patient, ordering and  performing treatments and interventions, ordering and review of laboratory studies, ordering and review of radiographic studies, pulse oximetry, re-evaluation of patient's condition. This critical care time did not overlap with that of any other provider or involve time for any procedures.    Thank you for your consult. I will follow-up with patient. Please contact us if you have any additional questions.     Kurtis Silva MD  Critical Care Medicine  [facility]

## 2020-12-19 NOTE — ASSESSMENT & PLAN NOTE
--supplemental oxygen to maintain sats   --albuterol and ipratropium via MIDI inhaler    12/18- was on Vapotherm yesterday and had to be placed on Bipap 100%, maitaining Sats around 92-94% between Bipap and vapotherm,     May need to go to ICU

## 2020-12-19 NOTE — PROGRESS NOTES
[facility]  Cardiology  Progress Note    Patient Name: Karan Aburto  MRN: [id number]  Admission Date: 12/15/2020  Hospital Length of Stay: 3 days  Code Status: DNR   Attending Physician: Tracy Chavez MD   Primary Care Physician: Mickey Agrawal MD  Expected Discharge Date:   Principal Problem:COVID-19    Subjective:   HPI    Karan Aburto is a 75 year old male who presented to [facility] due to respiratory distress with associated fever, dyspnea, and generalized weakness. His current medical conditions include HTN, elevated PSA, Thyroid disease, Rosacea, recent COVID +. O2 sats were 70% on RA per EMS. ED workup revealed K+ 3.4, Cr 1.1, glucose 163, H/H 8.9/28.6, plts 216, , Troponin 0.250. He was placed in observation under the care of hospital medicine. Cardiology consulted to assist with medical management. Chart extensively reviewed, patient not seen or examined due to COVID +. Will optimize medical management. Continue medical management of COVID per primary team.       Hospital Course:   12/16/2020-Patient not seen or examined given + covid status. Remains on vapotherm with maximum support and proned per nursing notes to maintain oxygenation. Labs reviewed, H/H 7.9/25, K+ 3.4, Cr 1.1, would recommend 1 unit PRBC transfusion.     12/17/2020-Patient not seen or examined given + Covid status. On Bipap today for pulmonary support. Labs reviewed, K+ 3.2, Cr 1.6, BUN 29, Phosph 5.3, H/H 7.6/25.     12/18/2020-Patient not seen or examined given + COVID status. Labs reviewed, K+ 3.1, Cr 1.7, BUN 74, CO2 31, mag 2.4.     12/19/2020-Patient not seen or examined. Remains on continuous bipap, unable to wean. Labs reviewed, K+ 4.0, Cr 1.3, BUN 67.     Interval History: REMAINS ON CONTINUOUS BIPAP SUPPORT TODAY, UNABLE TO WEAN. CONTINUE SAME MEDS FOR NOW AS TOLERATED.     Review of Systems   Constitution: Positive for malaise/fatigue.   Cardiovascular: Positive for dyspnea on exertion, irregular  heartbeat and palpitations.   Respiratory: Positive for cough and shortness of breath.    Endocrine: Negative for heat intolerance.   Neurological: Positive for weakness.   Allergic/Immunologic: Negative for environmental allergies.     Objective:     Vital Signs (Most Recent):  Temp: 97.3 °F (36.3 °C) (12/19/20 0732)  Pulse: 72 (12/19/20 0734)  Resp: (!) 22 (12/19/20 0734)  BP: 136/81 (12/19/20 0732)  SpO2: (S) (!) 93 % (12/19/20 0738) Vital Signs (24h Range):  Temp:  [97.3 °F (36.3 °C)-98 °F (36.7 °C)] 97.3 °F (36.3 °C)  Pulse:  [59-81] 72  Resp:  [20-23] 22  SpO2:  [85 %-96 %] 93 %  BP: (111-136)/(69-81) 136/81     Weight: 93.6 kg (206 lb 5.6 oz)  Body mass index is 29.61 kg/m².     SpO2: (S) (!) 93 %  O2 Device (Oxygen Therapy): (S) BiPAP      Intake/Output Summary (Last 24 hours) at 12/19/2020 1126  Last data filed at 12/18/2020 1800  Gross per 24 hour   Intake 690 ml   Output --   Net 690 ml       Lines/Drains/Airways     Peripheral Intravenous Line                 Peripheral IV - Single Lumen 12/15/20 1255 20 G Left Antecubital 3 days         Peripheral IV - Single Lumen 12/15/20 1717 20 G Right Wrist 3 days                Physical Exam  NOT EXAMINED GIVEN + COVID  Significant Labs:   BMP:   Recent Labs   Lab 12/18/20  0634 12/19/20  0617   * 149*    143   K 3.1* 4.0   CL 97 102   CO2 31* 32*   BUN 74* 67*   CREATININE 1.7* 1.3   CALCIUM 8.0* 8.2*   MG 2.4 2.4   , CBC   Recent Labs   Lab 12/18/20  0634 12/19/20  0617   WBC 7.29 6.12   HGB 7.9* 8.0*   HCT 25.6* 26.9*    263    and All pertinent lab results from the last 24 hours have been reviewed.    Significant Imaging: Echocardiogram:   Transthoracic echo (TTE) complete (Cupid Only):   Results for orders placed or performed during the hospital encounter of 10/18/20   Echo Color Flow Doppler? Yes; Bubble Contrast? No   Result Value Ref Range    Ascending aorta 2.97 cm    STJ 2.57 cm    AV mean gradient 7 mmHg    Ao peak mercedes 1.79 m/s    Ao  VTI 27.91 cm    IVS 1.39 (A) 0.6 - 1.1 cm    LA size 4.21 cm    Left Atrium Major Axis 4.62 cm    LVIDd 3.82 3.5 - 6.0 cm    LVIDs 2.29 2.1 - 4.0 cm    LVOT diameter 2.39 cm    LVOT peak VTI 21.64 cm    Posterior Wall 1.08 0.6 - 1.1 cm    MV Peak A Lewis 1.10 m/s    E wave decelartion time 190.97 msec    MV Peak E Lewis 0.79 m/s    RA Major Axis 4.32 cm    RA Width 2.24 cm    TAPSE 2.81 cm    TR Max Lewis 1.74 m/s    TDI LATERAL 0.07 m/s    TDI SEPTAL 0.06 m/s    LA WIDTH 4.11 cm    Ao root annulus 3.66 cm    MV stenosis pressure 1/2 time 55.38 ms    LV Diastolic Volume 62.69 mL    LV Systolic Volume 17.96 mL    LVOT peak lewis 1.28 m/s    LV LATERAL E/E' RATIO 11.29 m/s    LV SEPTAL E/E' RATIO 13.17 m/s    FS 40 %    LV mass 161.27 g    Left Ventricle Relative Wall Thickness 0.57 cm    AV valve area 3.48 cm2    AV Velocity Ratio 0.72     AV index (prosthetic) 0.78     MV valve area p 1/2 method 3.97 cm2    E/A ratio 0.72     Mean e' 0.07 m/s    LVOT area 4.5 cm2    LVOT stroke volume 97.03 cm3    AV peak gradient 13 mmHg    E/E' ratio 12.15 m/s    LV Systolic Volume Index 8.1 mL/m2    LV Diastolic Volume Index 28.40 mL/m2    LV Mass Index 73 g/m2    Triscuspid Valve Regurgitation Peak Gradient 12 mmHg    Right Atrial Pressure (from IVC) 3 mmHg    TV rest pulmonary artery pressure 15 mmHg    Narrative    · There is mild left ventricular concentric hypertrophy.  · With normal systolic function. The estimated ejection fraction is 60%.  · Grade I diastolic dysfunction.  · Normal right ventricular systolic function.  · Mild left atrial enlargement.  · Mild mitral regurgitation.  · Normal central venous pressure (3 mmHg).  · The estimated PA systolic pressure is 15 mmHg.        Assessment and Plan:       * Acute Viral Pneumonia COVID-19  Mgmt per primary team    Acute hypoxemic respiratory failure sec to Covid 19 Pneumonia  Continue supplemental oxygen to maintain O2 saturation  Mgmt per primary team    Elevated  troponin  Troponin 0.250, trending downward  Continue to trend serial troponin  Recent ECHO revealed EF 60%, DD  Recent MPI stress test normal perfusion scan  Continue IV heparin gtt for now until clinical condition improves  Continue ASA, Statin, BB    Hyperlipemia  Continue statin    Essential hypertension  On medical therapy  Continue meds as BP permits    Hypokalemia  Keep K+>4    Diabetes mellitus type 2, uncontrolled  Mgmt per primary team        VTE Risk Mitigation (From admission, onward)         Ordered     IP VTE HIGH RISK PATIENT  Once      12/15/20 1538     Place sequential compression device  Until discontinued      12/15/20 1538     heparin 25,000 units in dextrose 5% (100 units/ml) IV bolus from bag - ADDITIONAL PRN BOLUS - 30 units/kg (max bolus 4000 units)  As needed (PRN)     Question:  Heparin Infusion Adjustment (DO NOT MODIFY ANSWER)  Answer:  [email or web address]\Images\[email or web address]\HeparinInfusions\heparin LOW INTENSITY nomogram for [facility] [id number]    12/15/20 1243     heparin 25,000 units in dextrose 5% (100 units/ml) IV bolus from bag - ADDITIONAL PRN BOLUS - 60 units/kg (max bolus 4000 units)  As needed (PRN)     Question:  Heparin Infusion Adjustment (DO NOT MODIFY ANSWER)  Answer:  [email or web address]\Images\[email or web address]\HeparinInfusions\heparin LOW INTENSITY nomogram for [facility] [id number]    12/15/20 1243     heparin 25,000 units in dextrose 5% 250 mL (100 units/mL) infusion LOW INTENSITY nomogram - [facility]  Continuous     Question Answer Comment   Heparin Infusion Adjustment (DO NOT MODIFY ANSWER) \\[email or web address]\Images\[email or web address]\HeparinInfusions\heparin LOW INTENSITY nomogram for [facility] [id number]    Begin at (in units/kg/hr) 12        12/15/20 1243                Zaira Oconnor, CORETTA  Cardiology  [facility]

## 2020-12-19 NOTE — ASSESSMENT & PLAN NOTE
--troponin 0.250 on admit  --cardiology consulted  --heparin gtt  --continue ASA, statin  --trend troponin    12/18- Troponin trending down- likely demand ischemia from the Covid Pneumonia

## 2020-12-19 NOTE — PROGRESS NOTES
[facility] Hospital Medicine  Progress Note    Patient Name: Karan Aburto  MRN: [id number]  Patient Class: IP- Inpatient   Admission Date: 12/15/2020  Length of Stay: 3 days  Attending Physician: Tracy Chavez MD  Primary Care Provider: Mickey Agrawal MD        Subjective:     Principal Problem:COVID-19        HPI:  76 y/o male with PMHx of HTN, HLD, CAD, and hypothyroidism who presented to the ED with c/o SOB that onset gradually earlier today. Symptoms are constant and moderate in severity. No mitigating or exacerbating factors reported. Associated symptoms include fever, cough, BLE edema, and weakness. Pt denies HA, dizziness, CP, palpitations, loss of smell, loss of taste, N/V/D, and all other symptoms at this time.  ED workup shows: RR 38, oxygen sat 91% on non-rebreather mask. H/H 8.9/28.6, K+ 3.4, , , , Troponin 0.250  He is a DNR and his SDM is his son, Mac.  He will be kept on OBS under the care of hospital medicine.      Overview/Hospital Course:  Patietn 74 yo male admitted to hospital with COVIFD 19 Acute Hypoxic Respiratory failure / Pneumonia. Patient initiated on Remdesivir, steroids, Vapotherm. Patient communicative and expressed he doesn't want to go through this. Patietn confirmed he is DNR. Patietn denies CP + SOB / Cough  12/18- pt looks and feels better although Oxygenation very poor, unable come off bipap this am as Sats dropped to 82% on Vapotherm, he is otherwise fully alert, Renal function worsening with Bun/Cr 74/1.7, CXR diffuse infiltrates B, K 3.1- being replaced. Pt is getting Dexa and IV remdesivir without any improvement in O2, BS very high despite SSI- will start Levemir 10 BID. Will contact family and update them about his condition.   12/19- pt transferred to ICU for closer monitoring as resp status deteriorated despite being on Bipap/ 65% for three days. Pt has developed pressure ulcer on the nose due to Bipap- hence changed to Vapotherm  40 L/100% fio2 with improvement in Oxygenation, remains hemodynamically stable. H/H stable at 8/24, Ferritin still rising to 3300, while CRP down a little to 61, LDH also rising to 992. Bun?Cr improving, urine output steady. Couldn't get PT/OT due to Bipap. Condition remains critical with guarded prognosis.      Interval History: pt transferred to ICU for closer monitoring as resp status deteriorated despite being on Bipap/ 65% for three days. Pt has developed pressure ulcer on the nose due to Bipap- hence changed to Vapotherm 40 L/100% fio2 with improvement in Oxygenation, remains hemodynamically stable. H/H stable at 8/24, Ferritin still rising to 3300, while CRP down a little to 61, LDH also rising to 992. Bun?Cr improving, urine output steady. Couldn't get PT/OT due to Bipap. Condition remains critical with guarded prognosis.        Review of Systems   Constitutional: Positive for diaphoresis and fatigue.   HENT: Negative.    Respiratory: Positive for cough, chest tightness, shortness of breath and wheezing.    Cardiovascular: Positive for chest pain.   Gastrointestinal: Negative.    Endocrine: Negative.    Genitourinary: Negative.    Musculoskeletal: Positive for arthralgias.   Allergic/Immunologic: Negative.    Neurological: Positive for weakness.   Hematological: Negative.    Psychiatric/Behavioral: The patient is nervous/anxious.      Objective:     Vital Signs (Most Recent):  Temp: 98 °F (36.7 °C) (12/19/20 1530)  Pulse: 67 (12/19/20 1602)  Resp: (!) 25 (12/19/20 1602)  BP: 125/71 (12/19/20 1602)  SpO2: (!) 91 % (12/19/20 1602) Vital Signs (24h Range):  Temp:  [97.3 °F (36.3 °C)-98 °F (36.7 °C)] 98 °F (36.7 °C)  Pulse:  [54-83] 67  Resp:  [17-40] 25  SpO2:  [85 %-99 %] 91 %  BP: (108-140)/(53-81) 125/71     Weight: 93.6 kg (206 lb 5.6 oz)  Body mass index is 29.61 kg/m².    Intake/Output Summary (Last 24 hours) at 12/19/2020 1629  Last data filed at 12/19/2020 1600  Gross per 24 hour   Intake 1553.4 ml    Output 580 ml   Net 973.4 ml      Physical Exam  Vitals signs and nursing note reviewed.   Constitutional:       General: He is in acute distress.      Appearance: He is well-developed. He is ill-appearing.   HENT:      Head: Normocephalic and atraumatic.      Nose: Nose normal.   Eyes:      Conjunctiva/sclera: Conjunctivae normal.      Pupils: Pupils are equal, round, and reactive to light.   Neck:      Musculoskeletal: Neck supple.      Thyroid: No thyromegaly.      Vascular: No JVD.      Trachea: No tracheal deviation.   Cardiovascular:      Rate and Rhythm: Normal rate and regular rhythm.      Heart sounds: Normal heart sounds.   Pulmonary:      Effort: Pulmonary effort is normal.      Breath sounds: Examination of the right-middle field reveals rales. Examination of the left-middle field reveals rales. Examination of the right-lower field reveals rales. Examination of the left-lower field reveals rales. Decreased breath sounds and rales present.   Abdominal:      Palpations: Abdomen is soft.   Musculoskeletal: Normal range of motion.   Lymphadenopathy:      Cervical: No cervical adenopathy.   Skin:     General: Skin is warm and dry.   Neurological:      Mental Status: He is alert and oriented to person, place, and time.         Significant Labs:   ABGs:   Blood Culture:   BMP:   Recent Labs   Lab 12/19/20 0617   *      K 4.0      CO2 32*   BUN 67*   CREATININE 1.3   CALCIUM 8.2*   MG 2.4     CBC:   Recent Labs   Lab 12/18/20 0634 12/19/20 0617   WBC 7.29 6.12   HGB 7.9* 8.0*   HCT 25.6* 26.9*    263     CMP:   Recent Labs   Lab 12/18/20 0634 12/19/20 0617    143   K 3.1* 4.0   CL 97 102   CO2 31* 32*   * 149*   BUN 74* 67*   CREATININE 1.7* 1.3   CALCIUM 8.0* 8.2*   PROT 4.8* 4.9*   ALBUMIN 1.7* 1.8*   BILITOT 0.6 0.7   ALKPHOS 88 139*   AST 56* 68*   ALT 42 43   ANIONGAP 10 9   EGFRNONAA 39* 53*     Lactic Acid:   Magnesium:   Recent Labs   Lab 12/18/20 0634  12/19/20  0617   MG 2.4 2.4     POCT Glucose:   Recent Labs   Lab 12/19/20  0513 12/19/20  1139 12/19/20  1216   POCTGLUCOSE 156* 226* 245*     Respiratory Culture:   TSH:   Recent Labs   Lab 10/19/20  1721   TSH 0.765     All pertinent labs within the past 24 hours have been reviewed.    Significant Imaging: I have reviewed all pertinent imaging results/findings within the past 24 hours.      Assessment/Plan:      * Acute Viral Pneumonia COVID-19  - COVID-19 testing   - Infection Control notified     - Isolation:   - Airborne, Contact and Droplet Precautions  - Cohort patients into COVID units  - N95 mask, wear eye protection  - 20 second hand hygiene              - Limit visitors per hospital policy              - Consolidating lab draws, nursing care, provider visits, and interventions    - Diagnostics: (leukopenia, hyponatremia, hyperferritinemia, elevated troponin, elevated d-dimer, age, and comorbidities are significant predictors of poor clinical outcome)  CBC, CMP, Procalcitonin, Ferritin, CRP, LDH, BNP, Troponin, ECG, Rapid Flu, Blood Culture x2 and Portable CXR    - Management:  Supplemental O2 to maintain SpO2 >92%  Telemetry  Continuous/intermittent Pulse Ox  Albuterol treatment PRN   --Ipratropium inhaler with MIDI  --dexamethasone  --remdesivir  --monitor    Advance Care Planning   patient is a DNR and his SDM is his son, Mac     12/18- getting Dexa plus Remdersivir but still severely Hypoxemic-  Now on Bipap- O2 better to 92-94% still, cant wean down to Vapotherm  May go to ICU if deteriorates    12/19- transferred to ICU for closer monitoring- now on Vapotherm  Has pressure ulcer on nose from the Bipap      Acute hypoxemic respiratory failure due to severe acute respiratory syndrome coronavirus 2 (SARS-CoV-2) disease  --supplemental oxygen to maintain sats   --albuterol and ipratropium via MIDI inhaler    12/18- was on Vapotherm yesterday and had to be placed on Bipap 100%, maitaining Sats around  92-94% between Bipap and vapotherm,     May need to go to ICU    O2 a little better on Vapotherm 100%    Generalized weakness  --likely 2/2 covid  --PT/OT consulted    Appears a little better  Will give inj B12 IM plus Celebrex 100 mg    Persists- will continue with B12 IM  May need Ritalin       BPH with elevated PSA  --continue tamsulosin      Normocytic anemia  --at baseline  --monitor    Getting IV venofer but may need blood to improved O2 delivery     Hyperlipemia  --continue rosuvastatin  --cardiac diet      Essential hypertension  --continue carvedilol. Lisinopril, isosorbide, spironolactone  --cardiac diet    BP under control      Diabetes mellitus type 2, uncontrolled  --accuchecks with SSI  --diabetes educator consulted  --diabetic diet  --HA1C pending  --last HA1C 10/26/20 was 9.3    12/18- BS high, sec to Dexa  Will start Levemir 10 BID    BS improved with levemir but needs dose increased        VTE Risk Mitigation (From admission, onward)         Ordered     IP VTE HIGH RISK PATIENT  Once      12/15/20 1538     Place sequential compression device  Until discontinued      12/15/20 1538     heparin 25,000 units in dextrose 5% (100 units/ml) IV bolus from bag - ADDITIONAL PRN BOLUS - 30 units/kg (max bolus 4000 units)  As needed (PRN)     Question:  Heparin Infusion Adjustment (DO NOT MODIFY ANSWER)  Answer:  \\[email or web address]\epic\Images\Pharmacy\HeparinInfusions\heparin LOW INTENSITY nomogram for [facility] [id number]    12/15/20 1243     heparin 25,000 units in dextrose 5% (100 units/ml) IV bolus from bag - ADDITIONAL PRN BOLUS - 60 units/kg (max bolus 4000 units)  As needed (PRN)     Question:  Heparin Infusion Adjustment (DO NOT MODIFY ANSWER)  Answer:  \\[facility]\epic\Images\Pharmacy\HeparinInfusions\heparin LOW INTENSITY nomogram for [facility] [id number]    12/15/20 1243     heparin 25,000 units in dextrose 5% 250 mL (100 units/mL) infusion LOW INTENSITY nomogram - [facility]  Continuous     Question Answer Comment    Heparin Infusion Adjustment (DO NOT MODIFY ANSWER) \\[email or web address]\epic\Images\Pharmacy\HeparinInfusions\heparin LOW INTENSITY nomogram for [facility] [id number]    Begin at (in units/kg/hr) 12        12/15/20 1243                Discharge Planning   ADAM:      Code Status: DNR   Is the patient medically ready for discharge?:     Reason for patient still in hospital (select all that apply): Patient unstable, Patient trending condition, Laboratory test, Treatment, Imaging and Consult recommendations  Discharge Plan A: Home Health, Assisted Living        Seen and discussed with Dr. Silva and the ICU team  Condition: Critical  Prognosis: Guarded to poor      Critical care time spent on the evaluation and treatment of severe organ dysfunction, review of pertinent labs and imaging studies, discussions with consulting providers and discussions with patient/family: 53 minutes.      Trayc Chavez MD  Department of Hospital Medicine   [facility]

## 2020-12-19 NOTE — PLAN OF CARE
Patient is Bipap dependent and is unable to tolerate functional mobility training because he desats with mobility; defer to  for bed exs. D/C PT at this time.

## 2020-12-19 NOTE — ASSESSMENT & PLAN NOTE
--accuchecks with SSI  --diabetes educator consulted  --diabetic diet  --HA1C pending  --last HA1C 10/26/20 was 9.3    12/18- BS high, sec to Dexa  Will start Levemir 10 BID    BS improved with levemir but needs dose increased

## 2020-12-19 NOTE — ASSESSMENT & PLAN NOTE
--supplemental oxygen to maintain sats   --albuterol and ipratropium via MIDI inhaler    12/18- was on Vapotherm yesterday and had to be placed on Bipap 100%, maitaining Sats around 92-94% between Bipap and vapotherm,     May need to go to ICU    O2 a little better on Vapotherm 100%

## 2020-12-19 NOTE — ASSESSMENT & PLAN NOTE
--continue carvedilol. Lisinopril, isosorbide, spironolactone  --cardiac diet    BP under control

## 2020-12-19 NOTE — ASSESSMENT & PLAN NOTE
- COVID-19 testing   - Infection Control notified     - Isolation:   - Airborne, Contact and Droplet Precautions  - Cohort patients into COVID units  - N95 mask, wear eye protection  - 20 second hand hygiene              - Limit visitors per hospital policy              - Consolidating lab draws, nursing care, provider visits, and interventions    - Diagnostics: (leukopenia, hyponatremia, hyperferritinemia, elevated troponin, elevated d-dimer, age, and comorbidities are significant predictors of poor clinical outcome)  CBC, CMP, Procalcitonin, Ferritin, CRP, LDH, BNP, Troponin, ECG, Rapid Flu, Blood Culture x2 and Portable CXR    - Management:  Supplemental O2 to maintain SpO2 >92%  Telemetry  Continuous/intermittent Pulse Ox  Albuterol treatment PRN   --Ipratropium inhaler with MIDI  --dexamethasone  --remdesivir  --monitor    Advance Care Planning   patient is a DNR and his SDM is his son, Mac      12/18- getting Dexa plus Remdersivir but still severely Hypoxemic-  Now on Bipap- O2 better to 92-94% still, cant wean down to Vapotherm  May go to ICU if deteriorates    12/19- transferred to ICU for closer monitoring- now on Vapotherm  Has pressure ulcer on nose from the Bipap

## 2020-12-19 NOTE — SUBJECTIVE & OBJECTIVE
Interval History: pt transferred to ICU for closer monitoring as resp status deteriorated despite being on Bipap/ 65% for three days. Pt has developed pressure ulcer on the nose due to Bipap- hence changed to Vapotherm 40 L/100% fio2 with improvement in Oxygenation, remains hemodynamically stable. H/H stable at 8/24, Ferritin still rising to 3300, while CRP down a little to 61, LDH also rising to 992. Bun?Cr improving, urine output steady. Couldn't get PT/OT due to Bipap. Condition remains critical with guarded prognosis, his significant other, Ms Adan and his sisters were updated and are discussing among themselves about comfort care.       Review of Systems   Constitutional: Positive for diaphoresis and fatigue.   HENT: Negative.    Respiratory: Positive for cough, chest tightness, shortness of breath and wheezing.    Cardiovascular: Positive for chest pain.   Gastrointestinal: Negative.    Endocrine: Negative.    Genitourinary: Negative.    Musculoskeletal: Positive for arthralgias.   Allergic/Immunologic: Negative.    Neurological: Positive for weakness.   Hematological: Negative.    Psychiatric/Behavioral: The patient is nervous/anxious.      Objective:     Vital Signs (Most Recent):  Temp: 98 °F (36.7 °C) (12/19/20 1530)  Pulse: 67 (12/19/20 1602)  Resp: (!) 25 (12/19/20 1602)  BP: 125/71 (12/19/20 1602)  SpO2: (!) 91 % (12/19/20 1602) Vital Signs (24h Range):  Temp:  [97.3 °F (36.3 °C)-98 °F (36.7 °C)] 98 °F (36.7 °C)  Pulse:  [54-83] 67  Resp:  [17-40] 25  SpO2:  [85 %-99 %] 91 %  BP: (108-140)/(53-81) 125/71     Weight: 93.6 kg (206 lb 5.6 oz)  Body mass index is 29.61 kg/m².    Intake/Output Summary (Last 24 hours) at 12/19/2020 1629  Last data filed at 12/19/2020 1600  Gross per 24 hour   Intake 1553.4 ml   Output 580 ml   Net 973.4 ml      Physical Exam  Vitals signs and nursing note reviewed.   Constitutional:       General: He is in acute distress.      Appearance: He is well-developed. He is  ill-appearing.   HENT:      Head: Normocephalic and atraumatic.      Nose: Nose normal.   Eyes:      Conjunctiva/sclera: Conjunctivae normal.      Pupils: Pupils are equal, round, and reactive to light.   Neck:      Musculoskeletal: Neck supple.      Thyroid: No thyromegaly.      Vascular: No JVD.      Trachea: No tracheal deviation.   Cardiovascular:      Rate and Rhythm: Normal rate and regular rhythm.      Heart sounds: Normal heart sounds.   Pulmonary:      Effort: Pulmonary effort is normal.      Breath sounds: Examination of the right-middle field reveals rales. Examination of the left-middle field reveals rales. Examination of the right-lower field reveals rales. Examination of the left-lower field reveals rales. Decreased breath sounds and rales present.   Abdominal:      Palpations: Abdomen is soft.   Musculoskeletal: Normal range of motion.   Lymphadenopathy:      Cervical: No cervical adenopathy.   Skin:     General: Skin is warm and dry.   Neurological:      Mental Status: He is alert and oriented to person, place, and time.         Significant Labs:   ABGs:   Blood Culture:   BMP:   Recent Labs   Lab 12/19/20  0617   *      K 4.0      CO2 32*   BUN 67*   CREATININE 1.3   CALCIUM 8.2*   MG 2.4     CBC:   Recent Labs   Lab 12/18/20  0634 12/19/20  0617   WBC 7.29 6.12   HGB 7.9* 8.0*   HCT 25.6* 26.9*    263     CMP:   Recent Labs   Lab 12/18/20  0634 12/19/20  0617    143   K 3.1* 4.0   CL 97 102   CO2 31* 32*   * 149*   BUN 74* 67*   CREATININE 1.7* 1.3   CALCIUM 8.0* 8.2*   PROT 4.8* 4.9*   ALBUMIN 1.7* 1.8*   BILITOT 0.6 0.7   ALKPHOS 88 139*   AST 56* 68*   ALT 42 43   ANIONGAP 10 9   EGFRNONAA 39* 53*     Lactic Acid:   Magnesium:   Recent Labs   Lab 12/18/20  0634 12/19/20  0617   MG 2.4 2.4     POCT Glucose:   Recent Labs   Lab 12/19/20  0513 12/19/20  1139 12/19/20  1216   POCTGLUCOSE 156* 226* 245*     Respiratory Culture:   TSH:   Recent Labs   Lab  10/19/20  1721   TSH 0.765     All pertinent labs within the past 24 hours have been reviewed.    Significant Imaging: I have reviewed all pertinent imaging results/findings within the past 24 hours.

## 2020-12-19 NOTE — PLAN OF CARE
POC reviewed with pt and son; both verbalize understanding  Pt able to verbalize needs; denies pain or discomfort at this time  AAOx4; VSS; NSR on tele monitor  20g LAC; heparin infusing at 12 units/kg/hr; pt is therapeutic  20g RW; SL  Continuous BIPAP; attempted to wean to vapotherm twice today and pt SpO2 dropped into high 70's to low 80's  Accuchecks AC/HS  Pt free of falls  Safety precautions maintained

## 2020-12-19 NOTE — PROGRESS NOTES
Patient did not tolerate coming off bipap to be placed on vapotherm. While on vapo spo2 dropped to 82% with patient saying he was having a hard time breathing. Placed back on bipap spo2 increased to 93 % .  aware of patient not being able to come off bipap. Will be contacting wound care for sore on patient nose from bipap. Bipap was changed to the under nose mask to help with wound on nose .

## 2020-12-19 NOTE — NURSING
Vitals signs closely monitored. Fall precautions in place. Patient turned every two hours. Pain assessed every two hours. Safety promoted. Infection risks reduced.     Transfer from tele. On bipap upon arrival. NAD. SPO2 100%

## 2020-12-19 NOTE — ASSESSMENT & PLAN NOTE
--likely 2/2 covid  --PT/OT consulted    Appears a little better  Will give inj B12 IM plus Celebrex 100 mg

## 2020-12-19 NOTE — PT/OT/SLP PROGRESS
Occupational Therapy      Patient Name:  Karan Aburto   MRN:  [id number]    EVAL INITIATED VIA CHART REVIEW AND SPOKE WITH NURSE. PT BEING TRANSFERRED TO ICU . WILL RE-ASSESS WHEN MEDICALLY APPROPRIATE. FULL REPORT TO FOLLOW    Chanel Escalante OT  12/19/2020   [id number]

## 2020-12-19 NOTE — SUBJECTIVE & OBJECTIVE
Past Medical History:   Diagnosis Date    Abnormal liver enzymes 10/18/2020    Coronary artery disease     Elevated PSA     HLD (hyperlipidemia)     Hypertension     Leg swelling 10/18/2020    Leukocytosis 10/18/2020    Metabolic alkalosis 10/20/2020    New onset type 2 diabetes mellitus 10/21/2020    Pneumonia 11/5/2020    Rosacea     Syncope 11/5/2020    Thrombocytopenia 10/30/2020    Thyroid disease     hypothyroidism       No past surgical history on file.    Review of patient's allergies indicates:  No Known Allergies    Family History     Problem Relation (Age of Onset)    Hypertension Mother, Father    Hypothyroidism Mother        Tobacco Use    Smoking status: Never Smoker    Smokeless tobacco: Never Used   Substance and Sexual Activity    Alcohol use: Never     Frequency: Never    Drug use: Not on file    Sexual activity: Not on file         Review of Systems   Constitutional: Positive for diaphoresis and fatigue.   HENT: Negative.    Respiratory: Positive for cough, chest tightness, shortness of breath and wheezing.    Cardiovascular: Positive for chest pain.   Gastrointestinal: Negative.    Endocrine: Negative.    Genitourinary: Negative.    Musculoskeletal: Positive for arthralgias.   Allergic/Immunologic: Negative.    Neurological: Positive for weakness.   Hematological: Negative.    Psychiatric/Behavioral: The patient is nervous/anxious.      Objective:     Vital Signs (Most Recent):  Temp: 97.6 °F (36.4 °C) (12/19/20 1126)  Pulse: 67 (12/19/20 1126)  Resp: 20 (12/19/20 1126)  BP: (!) 108/53 (12/19/20 1126)  SpO2: 96 % (12/19/20 1126) Vital Signs (24h Range):  Temp:  [97.3 °F (36.3 °C)-98 °F (36.7 °C)] 97.6 °F (36.4 °C)  Pulse:  [59-81] 67  Resp:  [20-23] 20  SpO2:  [85 %-96 %] 96 %  BP: (108-136)/(53-81) 108/53     Weight: 93.6 kg (206 lb 5.6 oz)  Body mass index is 29.61 kg/m².      Intake/Output Summary (Last 24 hours) at 12/19/2020 1236  Last data filed at 12/18/2020 1800  Gross  per 24 hour   Intake 690 ml   Output --   Net 690 ml       Physical Exam  Vitals signs and nursing note reviewed.   Constitutional:       Appearance: He is well-developed.   HENT:      Head: Normocephalic and atraumatic.      Nose: Nose normal.   Eyes:      Conjunctiva/sclera: Conjunctivae normal.      Pupils: Pupils are equal, round, and reactive to light.   Neck:      Musculoskeletal: Neck supple.      Thyroid: No thyromegaly.      Vascular: No JVD.      Trachea: No tracheal deviation.   Cardiovascular:      Rate and Rhythm: Normal rate and regular rhythm.      Heart sounds: Normal heart sounds.   Pulmonary:      Effort: Pulmonary effort is normal.      Breath sounds: Examination of the right-middle field reveals rales. Examination of the left-middle field reveals rales. Examination of the right-lower field reveals rales. Examination of the left-lower field reveals rales. Decreased breath sounds and rales present.   Abdominal:      Palpations: Abdomen is soft.   Musculoskeletal: Normal range of motion.   Lymphadenopathy:      Cervical: No cervical adenopathy.   Skin:     General: Skin is warm and dry.   Neurological:      Mental Status: He is alert and oriented to person, place, and time.         Vents:  Oxygen Concentration (%): 70 (12/19/20 0738)    Lines/Drains/Airways     Peripheral Intravenous Line                 Peripheral IV - Single Lumen 12/15/20 1255 20 G Left Antecubital 3 days         Peripheral IV - Single Lumen 12/15/20 1717 20 G Right Wrist 3 days                Significant Labs:    CBC/Anemia Profile:  Recent Labs   Lab 12/17/20  1541 12/18/20  0634 12/19/20  0617   WBC  --  7.29 6.12   HGB  --  7.9* 8.0*   HCT  --  25.6* 26.9*   PLT  --  251 263   MCV  --  96 97   RDW  --  17.3* 17.4*   FERRITIN 3,329*  --   --         Chemistries:  Recent Labs   Lab 12/18/20  0634 12/19/20  0617    143   K 3.1* 4.0   CL 97 102   CO2 31* 32*   BUN 74* 67*   CREATININE 1.7* 1.3   CALCIUM 8.0* 8.2*   ALBUMIN  1.7* 1.8*   PROT 4.8* 4.9*   BILITOT 0.6 0.7   ALKPHOS 88 139*   ALT 42 43   AST 56* 68*   MG 2.4 2.4   PHOS 4.2 3.2       ABGs: No results for input(s): PH, PCO2, HCO3, POCSATURATED, BE in the last 48 hours.  BMP:   Recent Labs   Lab 12/19/20  0617   *      K 4.0      CO2 32*   BUN 67*   CREATININE 1.3   CALCIUM 8.2*   MG 2.4     CMP:   Recent Labs   Lab 12/18/20  0634 12/19/20  0617    143   K 3.1* 4.0   CL 97 102   CO2 31* 32*   * 149*   BUN 74* 67*   CREATININE 1.7* 1.3   CALCIUM 8.0* 8.2*   PROT 4.8* 4.9*   ALBUMIN 1.7* 1.8*   BILITOT 0.6 0.7   ALKPHOS 88 139*   AST 56* 68*   ALT 42 43   ANIONGAP 10 9   EGFRNONAA 39* 53*     Troponin: No results for input(s): TROPONINI in the last 48 hours.  All pertinent labs within the past 24 hours have been reviewed.    Significant Imaging:   I have reviewed all pertinent imaging results/findings within the past 24 hours.  I have reviewed and interpreted all pertinent imaging results/findings within the past 24 hours.   X-Ray Chest AP Portable  Narrative: EXAMINATION:  XR CHEST AP PORTABLE    CLINICAL HISTORY:  Shortness of breath, sob; rule out COVID-19 pneumonia.    COMPARISON:  11/05/2020.    FINDINGS:  Heart is normal in size.  There is been interval development of moderate patchy bilateral infiltrates.  Findings are nonspecific but could be related to atypical pneumonia  Impression: Interval development of moderate bilateral patchy infiltrate.  Findings are nonspecific but could be related to atypical pneumonia.    Electronically signed by: Elba Parham MD  Date:    12/15/2020  Time:    12:08

## 2020-12-19 NOTE — ASSESSMENT & PLAN NOTE
COVID-19 Pneumonia:     - COVID-19 testing Collection Date: 12/15/2020  - Infection Control notified      - Isolation:   - Airborne, Contact and Droplet Precautions  - Cohort patients into COVID units  - N95 masks must be fit tested, wear eye protection  - 20 second hand hygiene              - Limit visitors per hospital policy              - Consolidating lab draws, nursing care, provider visits, and interventions     - Diagnostics: (leukopenia, hyponatremia, hyperferritinemia, elevated troponin, elevated d-dimer, age, and comorbidities are significant predictors of poor clinical outcome)  CBC, Procalcitonin, Ferritin, CRP, LDH and Portable CXR     - Management:     Supplemental oxygen to maintain O2 sat >90%  Noninvasive Positive Pressure Ventilation maintain SpO2 >90%  Continuous/intermittent Pulse Ox  Albuterol treatment PRN  Careful use of steroids in the absence of other indications - unless septic shock due to increased viral replication Fluid sparing resuscitationl. Switch to IV  Empiric AZITHROMYCIN + ROCEPHIN - will initiate   Remdesivir - on going    Deep Venous Thrombosis - start complete anticoagulin      ADJUNCTIVE THERAPY: ZINC, ATORVASTATIN      Continuous/intermittent Pulse Ox  Albuterol INHALER PRN (avoid nebulization of secretions)  -    Vit C 500 mg, BID  Zinc 220 mg/day     Code Status  DNR

## 2020-12-19 NOTE — PT/OT/SLP EVAL
Physical Therapy Evaluation and Discharge Note    Patient Name:  Karan Aburto   MRN:  [id number]    Recommendations:     Discharge Recommendations:  (tbd pending progress)   Discharge Equipment Recommendations: none   Barriers to discharge: not medically appopriate to d/c from acute care services    Assessment:     Karan Aburto is a 75 y.o. male admitted with a medical diagnosis of COVID-19. .  At this time, patient is functioning at their prior level of function and does not require further acute PT services.     Recent Surgery: * No surgery found *      Plan:     During this hospitalization, patient does not require further acute PT services.  Please re-consult if situation changes.      Subjective     Chief Complaint: sob with exertion  Patient/Family Comments/goals: to get better and go back to [facility]  Pain/Comfort:  · Pain Rating 1: 0/10    Patients cultural, spiritual, Baptism conflicts given the current situation:      Living Environment:  Lives at [facility]; has 24/7 staff  Prior to admission, patients level of function was assist with all mobility and adl's.  Equipment used at home: walker, rolling, wheelchair, shower chair, grab bar, raised toilet.  DME owned (not currently used): none.  Upon discharge, patient will have assistance from staff at [facility], but if/when his condition improves he will most likely need ltach.    Objective:     Communicated with RN prior to session.  Patient found HOB elevated with telemetry, BIPAP, peripheral IV, pressure relief boots, pulse ox (continuous), oxygen upon PT entry to room.    General Precautions: Standard, respiratory, airborne, contact, droplet   Orthopedic Precautions:N/A   Braces: N/A     Exams:  · B LE ROM WFL and strength grossly 2-/5    Functional Mobility:  · Unable to perform any functional mobility r/t bipap dependent and desats quickly with mobility training    AM-PAC 6 CLICK MOBILITY  Total Score:6       Therapeutic Activities and Exercises:   PT educated patient  on POC for eval only/defer to  for bed exs until he is ready to begin mobility training, B LE TE to dec stiffness and safety/O2 use precautions with mobility.    AM-PAC 6 CLICK MOBILITY  Total Score:6     Patient left HOB elevated with all lines intact, call button in reach, bed alarm on and RN notified.    GOALS:   Multidisciplinary Problems     Physical Therapy Goals     Not on file                History:     Past Medical History:   Diagnosis Date    Abnormal liver enzymes 10/18/2020    Coronary artery disease     Elevated PSA     HLD (hyperlipidemia)     Hypertension     Leg swelling 10/18/2020    Leukocytosis 10/18/2020    Metabolic alkalosis 10/20/2020    New onset type 2 diabetes mellitus 10/21/2020    Pneumonia 11/5/2020    Rosacea     Syncope 11/5/2020    Thrombocytopenia 10/30/2020    Thyroid disease     hypothyroidism       No past surgical history on file.    Time Tracking:     PT Received On: 12/15/20  PT Start Time: 1000     PT Stop Time: 1025  PT Total Time (min): 25 min     Billable Minutes: Evaluation 15 and Therapeutic Exercise 10      Frederic Faulkner, PT  12/19/2020

## 2020-12-19 NOTE — ASSESSMENT & PLAN NOTE
--likely 2/2 covid  --PT/OT consulted    Appears a little better  Will give inj B12 IM plus Celebrex 100 mg    Persists- will continue with B12 IM  May need Ritalin

## 2020-12-19 NOTE — SUBJECTIVE & OBJECTIVE
Interval History: REMAINS ON CONTINUOUS BIPAP SUPPORT TODAY, UNABLE TO WEAN. CONTINUE SAME MEDS FOR NOW AS TOLERATED.     Review of Systems   Constitution: Positive for malaise/fatigue.   Cardiovascular: Positive for dyspnea on exertion, irregular heartbeat and palpitations.   Respiratory: Positive for cough and shortness of breath.    Endocrine: Negative for heat intolerance.   Neurological: Positive for weakness.   Allergic/Immunologic: Negative for environmental allergies.     Objective:     Vital Signs (Most Recent):  Temp: 97.3 °F (36.3 °C) (12/19/20 0732)  Pulse: 72 (12/19/20 0734)  Resp: (!) 22 (12/19/20 0734)  BP: 136/81 (12/19/20 0732)  SpO2: (S) (!) 93 % (12/19/20 0738) Vital Signs (24h Range):  Temp:  [97.3 °F (36.3 °C)-98 °F (36.7 °C)] 97.3 °F (36.3 °C)  Pulse:  [59-81] 72  Resp:  [20-23] 22  SpO2:  [85 %-96 %] 93 %  BP: (111-136)/(69-81) 136/81     Weight: 93.6 kg (206 lb 5.6 oz)  Body mass index is 29.61 kg/m².     SpO2: (S) (!) 93 %  O2 Device (Oxygen Therapy): (S) BiPAP      Intake/Output Summary (Last 24 hours) at 12/19/2020 1126  Last data filed at 12/18/2020 1800  Gross per 24 hour   Intake 690 ml   Output --   Net 690 ml       Lines/Drains/Airways     Peripheral Intravenous Line                 Peripheral IV - Single Lumen 12/15/20 1255 20 G Left Antecubital 3 days         Peripheral IV - Single Lumen 12/15/20 1717 20 G Right Wrist 3 days                Physical Exam  NOT EXAMINED GIVEN + COVID  Significant Labs:   BMP:   Recent Labs   Lab 12/18/20  0634 12/19/20  0617   * 149*    143   K 3.1* 4.0   CL 97 102   CO2 31* 32*   BUN 74* 67*   CREATININE 1.7* 1.3   CALCIUM 8.0* 8.2*   MG 2.4 2.4   , CBC   Recent Labs   Lab 12/18/20  0634 12/19/20  0617   WBC 7.29 6.12   HGB 7.9* 8.0*   HCT 25.6* 26.9*    263    and All pertinent lab results from the last 24 hours have been reviewed.    Significant Imaging: Echocardiogram:   Transthoracic echo (TTE) complete (Cupid Only):   Results  for orders placed or performed during the hospital encounter of 10/18/20   Echo Color Flow Doppler? Yes; Bubble Contrast? No   Result Value Ref Range    Ascending aorta 2.97 cm    STJ 2.57 cm    AV mean gradient 7 mmHg    Ao peak lewis 1.79 m/s    Ao VTI 27.91 cm    IVS 1.39 (A) 0.6 - 1.1 cm    LA size 4.21 cm    Left Atrium Major Axis 4.62 cm    LVIDd 3.82 3.5 - 6.0 cm    LVIDs 2.29 2.1 - 4.0 cm    LVOT diameter 2.39 cm    LVOT peak VTI 21.64 cm    Posterior Wall 1.08 0.6 - 1.1 cm    MV Peak A Lewis 1.10 m/s    E wave decelartion time 190.97 msec    MV Peak E Lewis 0.79 m/s    RA Major Axis 4.32 cm    RA Width 2.24 cm    TAPSE 2.81 cm    TR Max Lewis 1.74 m/s    TDI LATERAL 0.07 m/s    TDI SEPTAL 0.06 m/s    LA WIDTH 4.11 cm    Ao root annulus 3.66 cm    MV stenosis pressure 1/2 time 55.38 ms    LV Diastolic Volume 62.69 mL    LV Systolic Volume 17.96 mL    LVOT peak lewis 1.28 m/s    LV LATERAL E/E' RATIO 11.29 m/s    LV SEPTAL E/E' RATIO 13.17 m/s    FS 40 %    LV mass 161.27 g    Left Ventricle Relative Wall Thickness 0.57 cm    AV valve area 3.48 cm2    AV Velocity Ratio 0.72     AV index (prosthetic) 0.78     MV valve area p 1/2 method 3.97 cm2    E/A ratio 0.72     Mean e' 0.07 m/s    LVOT area 4.5 cm2    LVOT stroke volume 97.03 cm3    AV peak gradient 13 mmHg    E/E' ratio 12.15 m/s    LV Systolic Volume Index 8.1 mL/m2    LV Diastolic Volume Index 28.40 mL/m2    LV Mass Index 73 g/m2    Triscuspid Valve Regurgitation Peak Gradient 12 mmHg    Right Atrial Pressure (from IVC) 3 mmHg    TV rest pulmonary artery pressure 15 mmHg    Narrative    · There is mild left ventricular concentric hypertrophy.  · With normal systolic function. The estimated ejection fraction is 60%.  · Grade I diastolic dysfunction.  · Normal right ventricular systolic function.  · Mild left atrial enlargement.  · Mild mitral regurgitation.  · Normal central venous pressure (3 mmHg).  · The estimated PA systolic pressure is 15 mmHg.

## 2020-12-19 NOTE — HOSPITAL COURSE
12/19 Transferred to ICU   12/20 Blood cultures positive for gram positive cocci and gram negative rods - awaiting final report. Desaturation while eating  12/21 ON BIPAP FIO2 70% O2 SAT 96%.  Afebrile positive 3 L since admission.  Chest x-ray reviewed.  12/22 on BiPAP O2 sat 97% on 70% O2.  Afebrile.  Negative1.6 L yesterday.  12/23 on BiPAP more agitated distressed.  Receiving Ativan and morphine p.r.n. family to be present at bedside.  Spoke with son

## 2020-12-19 NOTE — HPI
75 year old male who presented to [facility]  On 1215/2020 due to respiratory distress with associated fever, dyspnea, and generalized weakness and COVID 19 pneumonia with hypoxemic respiraroty failure.  Transferred to ICU on 12/19/2020 due to worsening respiratory status. His current medical conditions include HTN, elevated PSA, Thyroid disease, Rosacea,O2 sats were 70% on RA on admission. history of cerebral vascular accident lacunar infarct within the right lentiform nucleus on 10/30/2020.  He is uncomfortable and has developed skin breakdown on his nose. Presently on BiPAP

## 2020-12-19 NOTE — ASSESSMENT & PLAN NOTE
- COVID-19 testing   - Infection Control notified     - Isolation:   - Airborne, Contact and Droplet Precautions  - Cohort patients into COVID units  - N95 mask, wear eye protection  - 20 second hand hygiene              - Limit visitors per hospital policy              - Consolidating lab draws, nursing care, provider visits, and interventions    - Diagnostics: (leukopenia, hyponatremia, hyperferritinemia, elevated troponin, elevated d-dimer, age, and comorbidities are significant predictors of poor clinical outcome)  CBC, CMP, Procalcitonin, Ferritin, CRP, LDH, BNP, Troponin, ECG, Rapid Flu, Blood Culture x2 and Portable CXR    - Management:  Supplemental O2 to maintain SpO2 >92%  Telemetry  Continuous/intermittent Pulse Ox  Albuterol treatment PRN   --Ipratropium inhaler with MIDI  --dexamethasone  --remdesivir  --monitor    Advance Care Planning   patient is a DNR and his SDM is his son, Mca      12/18- getting Dexa plus Remdersivir but still severely Hypoxemic-  Now on Bipap- O2 better to 92-94% still, cant wean down to Vapotherm  May go to ICU if deteriorates

## 2020-12-19 NOTE — PROGRESS NOTES
[facility] Hospital Medicine  Progress Note    Patient Name: Karan Aburto  MRN: [id number]  Patient Class: IP- Inpatient   Admission Date: 12/15/2020  Length of Stay: 2 days  Attending Physician: Tracy Chavez MD  Primary Care Provider: Mickey Agrawal MD        Subjective:     Principal Problem:COVID-19        HPI:  74 y/o male with PMHx of HTN, HLD, CAD, and hypothyroidism who presented to the ED with c/o SOB that onset gradually earlier today. Symptoms are constant and moderate in severity. No mitigating or exacerbating factors reported. Associated symptoms include fever, cough, BLE edema, and weakness. Pt denies HA, dizziness, CP, palpitations, loss of smell, loss of taste, N/V/D, and all other symptoms at this time.  ED workup shows: RR 38, oxygen sat 91% on non-rebreather mask. H/H 8.9/28.6, K+ 3.4, , , , Troponin 0.250  He is a DNR and his SDM is his son, Mac.  He will be kept on OBS under the care of hospital medicine.      Overview/Hospital Course:  Maryetn 76 yo male admitted to hospital with COVIFD 19 Acute Hypoxic Respiratory failure / Pneumonia. Patient initiated on Remdesivir, steroids, Vapotherm. Patient communicative and expressed he doesn't want to go through this. Patietn confirmed he is DNR. Johnn denies CP + SOB / Cough  12/18- pt looks and feels better although Oxygenation very poor, unable come off bipap this am as Sats dropped to 82% on Vapotherm, he is otherwise fully alert, Renal function worsening with Bun/Cr 74/1.7, CXR diffuse infiltrates B, K 3.1- being replaced. Pt is getting Dexa and IV remdesivir without any improvement in O2, BS very high despite SSI- will start Levemir 10 BID. Will contact family and update them about his condition.     Interval History: pt looks and feels better although Oxygenation very poor, unable come off bipap this am as Sats dropped to 82% on Vapotherm, he is otherwise fully alert, Renal function worsening with  Bun/Cr 74/1.7, CXR diffuse infiltrates B, K 3.1- being replaced. Pt is getting Dexa and IV remdesivir without any improvement in O2, BS very high despite SSI- will start Levemir 10 BID. Will contact family and update them about his condition.       Review of Systems   Constitutional: Positive for activity change, appetite change and fatigue. Negative for fever.   HENT: Negative.  Negative for congestion, sinus pressure and sore throat.    Eyes: Negative.  Negative for photophobia, discharge and visual disturbance.   Respiratory: Positive for cough and shortness of breath. Negative for chest tightness and wheezing.    Cardiovascular: Positive for palpitations. Negative for chest pain.   Gastrointestinal: Negative.  Negative for abdominal pain, blood in stool, constipation, diarrhea, nausea and vomiting.   Endocrine: Negative.    Genitourinary: Negative.    Musculoskeletal: Positive for myalgias. Negative for neck pain and neck stiffness.   Skin: Negative.    Allergic/Immunologic: Negative.    Neurological: Positive for weakness. Negative for seizures, syncope and headaches.   Hematological: Negative.    Psychiatric/Behavioral: Negative.  Negative for agitation, behavioral problems, hallucinations, self-injury and suicidal ideas.     Objective:     Vital Signs (Most Recent):  Temp: 97.6 °F (36.4 °C) (12/18/20 1624)  Pulse: 67 (12/18/20 1624)  Resp: (!) 22 (12/18/20 1624)  BP: 115/69 (12/18/20 1624)  SpO2: (!) 94 % (12/18/20 1624) Vital Signs (24h Range):  Temp:  [97.4 °F (36.3 °C)-98.3 °F (36.8 °C)] 97.6 °F (36.4 °C)  Pulse:  [56-70] 67  Resp:  [18-25] 22  SpO2:  [82 %-100 %] 94 %  BP: ()/(54-71) 115/69     Weight: 94 kg (207 lb 3.7 oz)  Body mass index is 29.73 kg/m².    Intake/Output Summary (Last 24 hours) at 12/18/2020 1647  Last data filed at 12/18/2020 0700  Gross per 24 hour   Intake 1001.2 ml   Output --   Net 1001.2 ml      Physical Exam  Vitals signs and nursing note reviewed.   Constitutional:        General: He is not in acute distress.     Appearance: He is well-developed. He is not diaphoretic.   HENT:      Head: Normocephalic and atraumatic.      Nose: Nose normal.   Eyes:      General:         Right eye: No discharge.         Left eye: No discharge.      Conjunctiva/sclera: Conjunctivae normal.      Pupils: Pupils are equal, round, and reactive to light.   Neck:      Musculoskeletal: Normal range of motion and neck supple.      Thyroid: No thyromegaly.      Vascular: No JVD.      Trachea: No tracheal deviation.   Cardiovascular:      Rate and Rhythm: Regular rhythm. Tachycardia present.      Heart sounds: Murmur present. Systolic murmur present with a grade of 3/6. No friction rub. No gallop.    Pulmonary:      Effort: Tachypnea, accessory muscle usage and respiratory distress present.      Breath sounds: Examination of the right-upper field reveals wheezing. Examination of the left-upper field reveals wheezing. Examination of the right-lower field reveals decreased breath sounds. Examination of the left-lower field reveals decreased breath sounds. Decreased breath sounds and wheezing present. No rales.   Chest:      Chest wall: No tenderness.   Abdominal:      General: Bowel sounds are normal. There is no distension.      Palpations: Abdomen is soft. There is no mass.      Tenderness: There is no abdominal tenderness.   Genitourinary:     Comments: deferred  Musculoskeletal: Normal range of motion.         General: No tenderness or deformity.      Right lower leg: Edema present.      Left lower leg: Edema present.   Skin:     General: Skin is warm and dry.      Capillary Refill: Capillary refill takes less than 2 seconds.      Findings: No erythema or rash.   Neurological:      Mental Status: He is alert and oriented to person, place, and time.      Motor: No abnormal muscle tone.      Coordination: Coordination normal.   Psychiatric:         Behavior: Behavior normal.         Significant Labs:   ABGs:    Blood Culture:   BMP:   Recent Labs   Lab 12/18/20  0634   *      K 3.1*   CL 97   CO2 31*   BUN 74*   CREATININE 1.7*   CALCIUM 8.0*   MG 2.4     CBC:   Recent Labs   Lab 12/17/20  0634 12/18/20  0634   WBC 7.21 7.29   HGB 7.6* 7.9*   HCT 25.0* 25.6*    251     CMP:   Recent Labs   Lab 12/17/20  0634 12/18/20  0634    138   K 3.2* 3.1*   CL 96 97   CO2 29 31*   * 293*   BUN 59* 74*   CREATININE 1.6* 1.7*   CALCIUM 7.9* 8.0*   PROT 4.8* 4.8*   ALBUMIN 1.7* 1.7*   BILITOT 0.7 0.6   ALKPHOS 68 88   AST 53* 56*   ALT 40 42   ANIONGAP 12 10   EGFRNONAA 42* 39*     Coagulation:   Recent Labs   Lab 12/18/20  0634   APTT 58.5*     Magnesium:   Recent Labs   Lab 12/17/20  0634 12/18/20  0634   MG 2.1 2.4     Troponin:   TSH:   Recent Labs   Lab 10/19/20  1721   TSH 0.765     All pertinent labs within the past 24 hours have been reviewed.    Significant Imaging: I have reviewed all pertinent imaging results/findings within the past 24 hours.      Assessment/Plan:      * Acute Viral Pneumonia COVID-19  - COVID-19 testing   - Infection Control notified     - Isolation:   - Airborne, Contact and Droplet Precautions  - Cohort patients into COVID units  - N95 mask, wear eye protection  - 20 second hand hygiene              - Limit visitors per hospital policy              - Consolidating lab draws, nursing care, provider visits, and interventions    - Diagnostics: (leukopenia, hyponatremia, hyperferritinemia, elevated troponin, elevated d-dimer, age, and comorbidities are significant predictors of poor clinical outcome)  CBC, CMP, Procalcitonin, Ferritin, CRP, LDH, BNP, Troponin, ECG, Rapid Flu, Blood Culture x2 and Portable CXR    - Management:  Supplemental O2 to maintain SpO2 >92%  Telemetry  Continuous/intermittent Pulse Ox  Albuterol treatment PRN   --Ipratropium inhaler with MIDI  --dexamethasone  --remdesivir  --monitor    Advance Care Planning   patient is a DNR and his SDM is his son,  Mac     12/18- getting Dexa plus Remdersivir but still severely Hypoxemic-  Now on Bipap- O2 better to 92-94% still, cant wean down to Vapotherm  May go to ICU if deteriorates        Acute hypoxemic respiratory failure sec to Covid 19 Pneumonia  --supplemental oxygen to maintain sats   --albuterol and ipratropium via MIDI inhaler    12/18- was on Vapotherm yesterday and had to be placed on Bipap 100%, maitaining Sats around 92-94% between Bipap and vapotherm,     May need to go to ICU      Hypokalemia  --3.4 on admit  --replace and monitor    K 3.1, getting oral KCL 40 BID x 3 doses    Generalized weakness  --likely 2/2 covid  --PT/OT consulted    Appears a little better  Will give inj B12 IM plus Celebrex 100 mg      Elevated troponin  --troponin 0.250 on admit  --cardiology consulted  --heparin gtt  --continue ASA, statin  --trend troponin    12/18- Troponin trending down- likely demand ischemia from the Covid Pneumonia    BPH with elevated PSA  --continue tamsulosin      Normocytic anemia  --at baseline  --monitor      Hyperlipemia  --continue rosuvastatin  --cardiac diet      Essential hypertension  --continue carvedilol. Lisinopril, isosorbide, spironolactone  --cardiac diet      Diabetes mellitus type 2, uncontrolled  --accuchecks with SSI  --diabetes educator consulted  --diabetic diet  --HA1C pending  --last HA1C 10/26/20 was 9.3    12/18- BS high, sec to Dexa  Will start Levemir 10 BID        VTE Risk Mitigation (From admission, onward)         Ordered     IP VTE HIGH RISK PATIENT  Once      12/15/20 1538     Place sequential compression device  Until discontinued      12/15/20 1538     heparin 25,000 units in dextrose 5% (100 units/ml) IV bolus from bag - ADDITIONAL PRN BOLUS - 30 units/kg (max bolus 4000 units)  As needed (PRN)     Question:  Heparin Infusion Adjustment (DO NOT MODIFY ANSWER)  Answer:  [email or web address]\Images\Pharmacy\HeparinInfusions\heparin LOW INTENSITY nomogram for [facility] [id number]     12/15/20 1243     heparin 25,000 units in dextrose 5% (100 units/ml) IV bolus from bag - ADDITIONAL PRN BOLUS - 60 units/kg (max bolus 4000 units)  As needed (PRN)     Question:  Heparin Infusion Adjustment (DO NOT MODIFY ANSWER)  Answer:  \\"[email or web address]\Images\Pharmacy\HeparinInfusions\heparin LOW INTENSITY nomogram for [facility] [email or web address]    12/15/20 1243     heparin 25,000 units in dextrose 5% 250 mL (100 units/mL) infusion LOW INTENSITY nomogram - [facility]  Continuous     Question Answer Comment   Heparin Infusion Adjustment (DO NOT MODIFY ANSWER) \\"[email or web address]\Images\Pharmacy\HeparinInfusions\heparin LOW INTENSITY nomogram for [facility] [id number]    Begin at (in units/kg/hr) 12        12/15/20 1243                Discharge Planning   ADAM:      Code Status: DNR   Is the patient medically ready for discharge?:     Reason for patient still in hospital (select all that apply): Patient trending condition, Laboratory test, Treatment, Imaging and PT / OT recommendations  Discharge Plan A: Home Health, Assisted Living                  Tracy Chavez MD  Department of Hospital Medicine   [facility]

## 2020-12-20 PROBLEM — R79.89 BLOOD CULTURE POSITIVE FOR MICROORGANISM: Status: ACTIVE | Noted: 2020-01-01

## 2020-12-20 NOTE — PROGRESS NOTES
[facility] Hospital Medicine  Progress Note    Patient Name: Karan Aburto  MRN: [id number]  Patient Class: IP- Inpatient   Admission Date: 12/15/2020  Length of Stay: 4 days  Attending Physician: Tracy Chavez MD  Primary Care Provider: Mickey Agrawal MD        Subjective:     Principal Problem:COVID-19        HPI:  76 y/o male with PMHx of HTN, HLD, CAD, and hypothyroidism who presented to the ED with c/o SOB that onset gradually earlier today. Symptoms are constant and moderate in severity. No mitigating or exacerbating factors reported. Associated symptoms include fever, cough, BLE edema, and weakness. Pt denies HA, dizziness, CP, palpitations, loss of smell, loss of taste, N/V/D, and all other symptoms at this time.  ED workup shows: RR 38, oxygen sat 91% on non-rebreather mask. H/H 8.9/28.6, K+ 3.4, , , , Troponin 0.250  He is a DNR and his SDM is his son, Mac.  He will be kept on OBS under the care of hospital medicine.      Overview/Hospital Course:  Patietn 74 yo male admitted to hospital with COVIFD 19 Acute Hypoxic Respiratory failure / Pneumonia. Patient initiated on Remdesivir, steroids, Vapotherm. Patient communicative and expressed he doesn't want to go through this. Patietn confirmed he is DNR. Patietn denies CP + SOB / Cough  12/18- pt looks and feels better although Oxygenation very poor, unable come off bipap this am as Sats dropped to 82% on Vapotherm, he is otherwise fully alert, Renal function worsening with Bun/Cr 74/1.7, CXR diffuse infiltrates B, K 3.1- being replaced. Pt is getting Dexa and IV remdesivir without any improvement in O2, BS very high despite SSI- will start Levemir 10 BID. Will contact family and update them about his condition.   12/19- pt transferred to ICU for closer monitoring as resp status deteriorated despite being on Bipap/ 65% for three days. Pt has developed pressure ulcer on the nose due to Bipap- hence changed to Vapotherm  40 L/100% fio2 with improvement in Oxygenation, remains hemodynamically stable. H/H stable at 8/24, Ferritin still rising to 3300, while CRP down a little to 61, LDH also rising to 992. Bun?Cr improving, urine output steady. Couldn't get PT/OT due to Bipap. Condition remains critical with guarded prognosis.    12/20- looks better, comfortable, was on Bipap all night, just placed back on Vapotherm 40 L/100%, he is about 5 L fluid Positive, Bun/Cr 63/1.1- so given a dose of IV lasix 20 mg to help with Oxygenation. He has completed the course of Remdesivir but still on Decadron. Pt seen together with his son, Mac, who was updated about his condition and prognosis before entering the pt's room. Pt was AAOx3, speech clear, and asked in front of his son about resuscitation in case of deterioration or cardiac or resp arrest- he clearly said DNR, he does not want any intubation or mechanical ventilation. DNR ordered. Blood Cx  From 12/19 growing Strep and possibly Bacteroides- on Rocephin plus Doxy.    Interval History: looks better, comfortable, no CP or SOB, was on Bipap all night, just placed back on Vapotherm 40 L/100%, he is about 5 L fluid Positive, Bun/Cr 63/1.1- so given a dose of IV lasix 20 mg to help with Oxygenation. He has completed the course of Remdesivir but still on Decadron. Pt seen together with his son, Mac, who was updated about his condition and prognosis before entering the pt's room. Pt was AAOx3, speech clear, and asked in front of his son about resuscitation in case of deterioration or cardiac or resp arrest- he clearly said DNR, he does not want any intubation or mechanical ventilation. DNR ordered.     Review of Systems   Constitutional: Positive for activity change, appetite change and fatigue. Negative for diaphoresis.   HENT: Negative.    Respiratory: Positive for cough and shortness of breath. Negative for chest tightness and wheezing.    Cardiovascular: Negative for chest pain.    Gastrointestinal: Negative.    Endocrine: Negative.    Genitourinary: Negative.    Musculoskeletal: Positive for arthralgias.   Allergic/Immunologic: Negative.    Neurological: Positive for weakness.   Hematological: Negative.    Psychiatric/Behavioral: The patient is nervous/anxious.      Objective:     Vital Signs (Most Recent):  Temp: 97.1 °F (36.2 °C) (12/20/20 1515)  Pulse: 93 (12/20/20 1615)  Resp: (!) 21 (12/20/20 1615)  BP: 117/74 (12/20/20 1600)  SpO2: 100 % (12/20/20 1615) Vital Signs (24h Range):  Temp:  [97.1 °F (36.2 °C)-98.3 °F (36.8 °C)] 97.1 °F (36.2 °C)  Pulse:  [55-99] 93  Resp:  [18-55] 21  SpO2:  [88 %-100 %] 100 %  BP: (117-193)/() 117/74     Weight: 87.2 kg (192 lb 3.9 oz)  Body mass index is 27.58 kg/m².    Intake/Output Summary (Last 24 hours) at 12/20/2020 1701  Last data filed at 12/20/2020 1600  Gross per 24 hour   Intake 2363.4 ml   Output 1190 ml   Net 1173.4 ml      Physical Exam  Vitals signs and nursing note reviewed.   Constitutional:       General: He is in acute distress.      Appearance: He is well-developed. He is ill-appearing.      Interventions: Nasal cannula in place.      Comments: Was on Bipap, just switched to Vapotherm   HENT:      Head: Normocephalic and atraumatic.      Nose: Nose normal.   Eyes:      Conjunctiva/sclera: Conjunctivae normal.      Pupils: Pupils are equal, round, and reactive to light.   Neck:      Musculoskeletal: Neck supple.      Thyroid: No thyromegaly.      Vascular: No JVD.      Trachea: No tracheal deviation.   Cardiovascular:      Rate and Rhythm: Normal rate and regular rhythm.      Heart sounds: Normal heart sounds.   Pulmonary:      Effort: Pulmonary effort is normal.      Breath sounds: Examination of the right-middle field reveals rales. Examination of the left-middle field reveals rales. Examination of the right-lower field reveals rales. Examination of the left-lower field reveals rales. Decreased breath sounds and rales present.    Abdominal:      Palpations: Abdomen is soft.   Musculoskeletal: Normal range of motion.   Lymphadenopathy:      Cervical: No cervical adenopathy.   Skin:     General: Skin is warm and dry.   Neurological:      Mental Status: He is alert and oriented to person, place, and time.         Significant Labs:   ABGs:   BMP:   Recent Labs   Lab 12/20/20  0405   *      K 4.0      CO2 33*   BUN 63*   CREATININE 1.1   CALCIUM 8.0*   MG 2.4     CBC:   Recent Labs   Lab 12/19/20  0617 12/20/20  0405   WBC 6.12 5.47   HGB 8.0* 8.2*   HCT 26.9* 27.7*    239     CMP:   Recent Labs   Lab 12/19/20 0617 12/20/20  0405    140   K 4.0 4.0    101   CO2 32* 33*   * 161*   BUN 67* 63*   CREATININE 1.3 1.1   CALCIUM 8.2* 8.0*   PROT 4.9* 4.5*   ALBUMIN 1.8* 1.7*   BILITOT 0.7 0.5   ALKPHOS 139* 218*   AST 68* 77*   ALT 43 46*   ANIONGAP 9 6*   EGFRNONAA 53* >60     Magnesium:   Recent Labs   Lab 12/19/20 0617 12/20/20  0405   MG 2.4 2.4     All pertinent labs within the past 24 hours have been reviewed.    Significant Imaging: I have reviewed all pertinent imaging results/findings within the past 24 hours.      Assessment/Plan:      * Acute Viral Pneumonia COVID-19  - COVID-19 testing   - Infection Control notified     - Isolation:   - Airborne, Contact and Droplet Precautions  - Cohort patients into COVID units  - N95 mask, wear eye protection  - 20 second hand hygiene              - Limit visitors per hospital policy              - Consolidating lab draws, nursing care, provider visits, and interventions    - Diagnostics: (leukopenia, hyponatremia, hyperferritinemia, elevated troponin, elevated d-dimer, age, and comorbidities are significant predictors of poor clinical outcome)  CBC, CMP, Procalcitonin, Ferritin, CRP, LDH, BNP, Troponin, ECG, Rapid Flu, Blood Culture x2 and Portable CXR    - Management:  Supplemental O2 to maintain SpO2 >92%  Telemetry  Continuous/intermittent Pulse  Ox  Albuterol treatment PRN   --Ipratropium inhaler with MIDI  --dexamethasone  --remdesivir  --monitor    Advance Care Planning   patient is a DNR and his SDM is his son, Mac     12/18- getting Dexa plus Remdersivir but still severely Hypoxemic-  Now on Bipap- O2 better to 92-94% still, cant wean down to Vapotherm  May go to ICU if deteriorates    12/19- transferred to ICU for closer monitoring- now on Vapotherm  Has pressure ulcer on nose from the Bipap  12/20- continued on Vapotherm and Bipap- will try some lasix      Acute hypoxemic respiratory failure due to severe acute respiratory syndrome coronavirus 2 (SARS-CoV-2) disease  --supplemental oxygen to maintain sats   --albuterol and ipratropium via MIDI inhaler    12/18- was on Vapotherm yesterday and had to be placed on Bipap 100%, maitaining Sats around 92-94% between Bipap and vapotherm,     May need to go to ICU    O2 a little better on Vapotherm 100%  12/20- alternating btw Bipap and Vapotherm, try some Lasix    Generalized weakness  --likely 2/2 covid  --PT/OT consulted    Appears a little better  Will give inj B12 IM plus Celebrex 100 mg    Persists- will continue with B12 IM  May need Ritalin     Very weak, will resume PT/OT tomorrow  Try Inj B12 IM plus Venofer and      Blood culture positive for microorganism  Preliminary results noted  On Rocephin plus Doxycycline already     Microbiology Results (last 7 days)     Procedure Component Value Units Date/Time    Blood culture [[id number]] Collected: 12/19/20 1331    Order Status: Completed Specimen: Blood from Peripheral, Left Hand Updated: 12/20/20 1044     Blood Culture, Routine Gram stain tyler bottle: Gram positive cocci in chains resembling Strep       Gram stain tyler bottle: Gram negative rods 12/20/2020  10:43        Positive results previously called    Blood culture [[id number]] Collected: 12/19/20 1337    Order Status: Completed Specimen: Blood from Peripheral, Right Hand Updated: 12/20/20 1023      Blood Culture, Routine Gram stain aer bottle: Gram positive cocci in chains resembling Strep      Gram stain aer bottle: Gram negative rods      Gram stain tyler bottle: Gram positive cocci in chains resembling Strep      Gram stain tyler bottle: Gram negative rods       Results called to and read back by: Mona Mason RN 12/20/2020  10:22    Urine culture [[id number]] Collected: 12/15/20 1716    Order Status: Completed Specimen: Urine Updated: 12/17/20 0801     Urine Culture, Routine Multiple organisms isolated. None in predominance.  Repeat if      clinically necessary.    Narrative:      Specimen Source->Urine        Antibiotics (From admission, onward)    Start     Stop Route Frequency Ordered    12/19/20 2100  mupirocin 2 % ointment      12/24 2059 Nasl 2 times daily 12/19/20 1221    12/19/20 1445  doxycycline (VIBRAMYCIN) 100mg in dextrose 5% 250 mL IVPB (ready to mix)      12/24 1444 IV Every 12 hours (non-standard times) 12/19/20 1344    12/19/20 1430  cefTRIAXone (ROCEPHIN) 2 g/50 mL D5W IVPB      12/24 1429 IV Every 24 hours (non-standard times) 12/19/20 1249          BPH with elevated PSA  --continue tamsulosin      Normocytic anemia  --at baseline  --monitor    Getting IV venofer but may need blood to improved O2 delivery     Hyperlipemia  --continue rosuvastatin  --cardiac diet      Essential hypertension  --continue carvedilol. Lisinopril, isosorbide, spironolactone  --cardiac diet    BP under control      Diabetes mellitus type 2, uncontrolled  --accuchecks with SSI  --diabetes educator consulted  --diabetic diet  --HA1C pending  --last HA1C 10/26/20 was 9.3    12/18- BS high, sec to Dexa  Will start Levemir 10 BID    BS improved with levemir but needs dose increased        VTE Risk Mitigation (From admission, onward)         Ordered     IP VTE HIGH RISK PATIENT  Once      12/15/20 1538     Place sequential compression device  Until discontinued      12/15/20 1538     heparin 25,000 units in dextrose 5%  (100 units/ml) IV bolus from bag - ADDITIONAL PRN BOLUS - 30 units/kg (max bolus 4000 units)  As needed (PRN)     Question:  Heparin Infusion Adjustment (DO NOT MODIFY ANSWER)  Answer:  [email or web address]\Images\Pharmacy\HeparinInfusions\heparin LOW INTENSITY nomogram for [facility] [id number]    12/15/20 1243     heparin 25,000 units in dextrose 5% (100 units/ml) IV bolus from bag - ADDITIONAL PRN BOLUS - 60 units/kg (max bolus 4000 units)  As needed (PRN)     Question:  Heparin Infusion Adjustment (DO NOT MODIFY ANSWER)  Answer:  [email or web address]\Images\Pharmacy\HeparinInfusions\heparin LOW INTENSITY nomogram for [facility] [id number]    12/15/20 1243     heparin 25,000 units in dextrose 5% 250 mL (100 units/mL) infusion LOW INTENSITY nomogram - [facility]  Continuous     Question Answer Comment   Heparin Infusion Adjustment (DO NOT MODIFY ANSWER) [email or web address]\[email or web address]\Images\Pharmacy\HeparinInfusions\heparin LOW INTENSITY nomogram for [facility] [id number]    Begin at (in units/kg/hr) 12        12/15/20 1243                Discharge Planning   ADAM:      Code Status: DNR   Is the patient medically ready for discharge?:     Reason for patient still in hospital (select all that apply): Patient unstable, Patient new problem, Patient trending condition, Laboratory test, Treatment, Imaging, Consult recommendations, PT / OT recommendations and Pending disposition  Discharge Plan A: Home Health, Assisted Living      Seen and discussed with Dr. Silva and the ICU team  Condition: Critical  Prognosis: Guarded to poor        Critical care time spent on the evaluation and treatment of severe organ dysfunction, review of pertinent labs and imaging studies, discussions with consulting providers and discussions with patient/family: 55 minutes.      Tracy Chavez MD  Department of Hospital Medicine   [facility]

## 2020-12-20 NOTE — PROGRESS NOTES
[facility]  Critical Care Medicine  Progress Note    Patient Name: Karan Aburto  MRN: [id number]  Admission Date: 12/15/2020  Hospital Length of Stay: 4 days  Code Status: DNR  Attending Provider: Tracy Chavez MD  Primary Care Provider: Mickey Agrawal MD   Principal Problem: COVID-19    Subjective:     HPI:  75 year old male who presented to [facility]  On 1215/2020 due to respiratory distress with associated fever, dyspnea, and generalized weakness and COVID 19 pneumonia with hypoxemic respiraroty failure.  Transferred to ICU on 12/19/2020 due to worsening respiratory status. His current medical conditions include HTN, elevated PSA, Thyroid disease, Rosacea,O2 sats were 70% on RA on admission. history of cerebral vascular accident lacunar infarct within the right lentiform nucleus on 10/30/2020.  He is uncomfortable and has developed skin breakdown on his nose. Presently on BiPAP     Hospital/ICU Course:  12/19 Transferred to ICU   12/20 Blood cultures positive for gram positive cocci and gram negative rods - awaiting final report. Desaturation while eating    No new subjective & objective note has been filed under this hospital service since the last note was generated.      ABG  No results for input(s): PH, PO2, PCO2, HCO3, BE in the last 168 hours.  Assessment/Plan:     Other  * Acute Viral Pneumonia COVID-19  COVID-19 Pneumonia:     - COVID-19 testing Collection Date: 12/15/2020  - Infection Control notified      - Isolation:   - Airborne, Contact and Droplet Precautions  - Cohort patients into COVID units  - N95 masks must be fit tested, wear eye protection  - 20 second hand hygiene              - Limit visitors per hospital policy              - Consolidating lab draws, nursing care, provider visits, and interventions     - Diagnostics: (leukopenia, hyponatremia, hyperferritinemia, elevated troponin, elevated d-dimer, age, and comorbidities are significant predictors of poor clinical  outcome)  CBC, Procalcitonin, Ferritin, CRP, LDH and Portable CXR     - Management:     Supplemental oxygen to maintain O2 sat >90%  Noninvasive Positive Pressure Ventilation maintain SpO2 >90%  Continuous/intermittent Pulse Ox  Albuterol treatment PRN  Careful use of steroids in the absence of other indications - unless septic shock due to increased viral replication Fluid sparing resuscitationl. Switch to IV  Empiric AZITHROMYCIN + ROCEPHIN - will initiate   Remdesivir - on going    Deep Venous Thrombosis - start complete anticoagulin      ADJUNCTIVE THERAPY: ZINC, ATORVASTATIN      Continuous/intermittent Pulse Ox  Albuterol INHALER PRN (avoid nebulization of secretions)  -    Vit C 500 mg, BID  Zinc 220 mg/day     Code Status  DNR           Blood culture positive for microorganism  12/20 WBC normal , awaiting final report , does not clinically appear septic, may be contaminant. Continue ceftriaxone    Acute hypoxemic respiratory failure due to severe acute respiratory syndrome coronavirus 2 (SARS-CoV-2) disease  12/19 will need skin care to continue Noninvasive Positive Pressure Ventilation   12/20 tolerating vapotherm and face mask     Critical Care Daily Checklist:    A: Awake: RASS Goal/Actual Goal:    Actual: Claire Agitation Sedation Scale (RASS): Alert and calm   B: Spontaneous Breathing Trial Performed?     C: SAT & SBT Coordinated?  na                      D: Delirium: CAM-ICU Overall CAM-ICU: Negative   E: Early Mobility Performed? No   F: Feeding Goal:    Status:     Current Diet Order   Procedures    Diet diabetic [facility]; 2000 Calorie; Cardiac (Low Na/Chol)     Add BOOST     Order Specific Question:   Indicate patient location for additional diet options:     Answer:   [facility]     Order Specific Question:   Total calories:     Answer:   2000 Calorie     Order Specific Question:   Additional Diet Options:     Answer:   Cardiac (Low Na/Chol)      AS: Analgesia/Sedation  Inadequate  Ativan for prn agitation    T: Thromboembolic Prophylaxis yes   H: HOB > 300 Yes   U: Stress Ulcer Prophylaxis (if needed) y   G: Glucose Control adequate   B: Bowel Function Stool Occurrence: 0   I: Indwelling Catheter (Lines & Anaya) Necessity needed   D: De-escalation of Antimicrobials/Pharmacotherapies na    Plan for the day/ETD Support, monito oxygen    Code Status:  Family/Goals of Care: DNR  No one available       Critical Care Time: 40 minutes  Critical secondary to Patient has a condition that poses threat to life and bodily function: Severe Respiratory Distress      Critical care was time spent personally by me on the following activities: development of treatment plan with patient or surrogate and bedside caregivers, discussions with consultants, evaluation of patient's response to treatment, examination of patient, ordering and performing treatments and interventions, ordering and review of laboratory studies, ordering and review of radiographic studies, pulse oximetry, re-evaluation of patient's condition. This critical care time did not overlap with that of any other provider or involve time for any procedures.     Kurtis Silva MD  Critical Care Medicine  [facility]

## 2020-12-20 NOTE — ASSESSMENT & PLAN NOTE
--likely 2/2 covid  --PT/OT consulted    Appears a little better  Will give inj B12 IM plus Celebrex 100 mg    Persists- will continue with B12 IM  May need Ritalin     Very weak, will resume PT/OT tomorrow  Try Inj B12 IM plus Venofer and

## 2020-12-20 NOTE — SUBJECTIVE & OBJECTIVE
Past Medical History:   Diagnosis Date    Abnormal liver enzymes 10/18/2020    Coronary artery disease     Elevated PSA     HLD (hyperlipidemia)     Hypertension     Leg swelling 10/18/2020    Leukocytosis 10/18/2020    Metabolic alkalosis 10/20/2020    New onset type 2 diabetes mellitus 10/21/2020    Pneumonia 11/5/2020    Rosacea     Syncope 11/5/2020    Thrombocytopenia 10/30/2020    Thyroid disease     hypothyroidism       No past surgical history on file.    Review of patient's allergies indicates:  No Known Allergies    Family History     Problem Relation (Age of Onset)    Hypertension Mother, Father    Hypothyroidism Mother        Tobacco Use    Smoking status: Never Smoker    Smokeless tobacco: Never Used   Substance and Sexual Activity    Alcohol use: Never     Frequency: Never    Drug use: Not on file    Sexual activity: Not on file         Review of Systems   Constitutional: Positive for diaphoresis and fatigue.   HENT: Negative.    Respiratory: Positive for cough, chest tightness, shortness of breath and wheezing.    Cardiovascular: Positive for chest pain.   Gastrointestinal: Negative.    Endocrine: Negative.    Genitourinary: Negative.    Musculoskeletal: Positive for arthralgias.   Allergic/Immunologic: Negative.    Neurological: Positive for weakness.   Hematological: Negative.    Psychiatric/Behavioral: The patient is nervous/anxious.      Objective:     Vital Signs (Most Recent):  Temp: 97.5 °F (36.4 °C) (12/20/20 0730)  Pulse: 83 (12/20/20 1000)  Resp: (!) 24 (12/20/20 1000)  BP: (!) 168/98 (12/20/20 1000)  SpO2: 100 % (12/20/20 1000) Vital Signs (24h Range):  Temp:  [97.5 °F (36.4 °C)-98.3 °F (36.8 °C)] 97.5 °F (36.4 °C)  Pulse:  [54-85] 83  Resp:  [17-46] 24  SpO2:  [91 %-100 %] 100 %  BP: (108-180)/(53-98) 168/98     Weight: 87.2 kg (192 lb 3.9 oz)  Body mass index is 27.58 kg/m².      Intake/Output Summary (Last 24 hours) at 12/20/2020 1023  Last data filed at 12/20/2020  1000  Gross per 24 hour   Intake 2342.2 ml   Output 1575 ml   Net 767.2 ml       Physical Exam  Vitals signs and nursing note reviewed.   Constitutional:       Appearance: He is well-developed.   HENT:      Head: Normocephalic and atraumatic.      Nose: Nose normal.   Eyes:      Conjunctiva/sclera: Conjunctivae normal.      Pupils: Pupils are equal, round, and reactive to light.   Neck:      Musculoskeletal: Neck supple.      Thyroid: No thyromegaly.      Vascular: No JVD.      Trachea: No tracheal deviation.   Cardiovascular:      Rate and Rhythm: Normal rate and regular rhythm.      Heart sounds: Normal heart sounds.   Pulmonary:      Effort: Pulmonary effort is normal.      Breath sounds: Examination of the right-middle field reveals rales. Examination of the left-middle field reveals rales. Examination of the right-lower field reveals rales. Examination of the left-lower field reveals rales. Decreased breath sounds and rales present.   Abdominal:      Palpations: Abdomen is soft.   Musculoskeletal: Normal range of motion.   Lymphadenopathy:      Cervical: No cervical adenopathy.   Skin:     General: Skin is warm and dry.   Neurological:      Mental Status: He is alert and oriented to person, place, and time.         Vents:  Oxygen Concentration (%): 100 (12/20/20 0855)    Lines/Drains/Airways     Drain                 Urethral Catheter 12/19/20 1215 less than 1 day          Peripheral Intravenous Line                 Peripheral IV - Single Lumen 12/15/20 1255 20 G Left Antecubital 4 days         Peripheral IV - Single Lumen 12/15/20 1717 20 G Right Wrist 4 days                Significant Labs:    CBC/Anemia Profile:  Recent Labs   Lab 12/19/20  0617 12/19/20  1543 12/20/20  0405   WBC 6.12  --  5.47   HGB 8.0*  --  8.2*   HCT 26.9*  --  27.7*     --  239   MCV 97  --  100*   RDW 17.4*  --  17.3*   FERRITIN 3,314* 3,174*  --         Chemistries:  Recent Labs   Lab 12/19/20  0617 12/20/20  0405    140    K 4.0 4.0    101   CO2 32* 33*   BUN 67* 63*   CREATININE 1.3 1.1   CALCIUM 8.2* 8.0*   ALBUMIN 1.8* 1.7*   PROT 4.9* 4.5*   BILITOT 0.7 0.5   ALKPHOS 139* 218*   ALT 43 46*   AST 68* 77*   MG 2.4 2.4   PHOS 3.2 2.6*       ABGs: No results for input(s): PH, PCO2, HCO3, POCSATURATED, BE in the last 48 hours.  BMP:   Recent Labs   Lab 12/20/20  0405   *      K 4.0      CO2 33*   BUN 63*   CREATININE 1.1   CALCIUM 8.0*   MG 2.4     CMP:   Recent Labs   Lab 12/19/20  0617 12/20/20  0405    140   K 4.0 4.0    101   CO2 32* 33*   * 161*   BUN 67* 63*   CREATININE 1.3 1.1   CALCIUM 8.2* 8.0*   PROT 4.9* 4.5*   ALBUMIN 1.8* 1.7*   BILITOT 0.7 0.5   ALKPHOS 139* 218*   AST 68* 77*   ALT 43 46*   ANIONGAP 9 6*   EGFRNONAA 53* >60     Troponin: No results for input(s): TROPONINI in the last 48 hours.  All pertinent labs within the past 24 hours have been reviewed.    Significant Imaging:   I have reviewed all pertinent imaging results/findings within the past 24 hours.  I have reviewed and interpreted all pertinent imaging results/findings within the past 24 hours.   X-Ray Chest AP Portable  Narrative: EXAMINATION:  XR CHEST AP PORTABLE    CLINICAL HISTORY:  Shortness of breath    COMPARISON:  12/15/2020    FINDINGS:  Diffuse bilateral pulmonary infiltrates noted most pronounced in the upper lung zones.  These findings may have worsened somewhat from 12/15/2020.  Impression: Bilateral pulmonary infiltrates which appear to have worsened somewhat from 12/15/2020.    Electronically signed by: Butch Moeller  Date:    12/19/2020  Time:    13:17

## 2020-12-20 NOTE — ASSESSMENT & PLAN NOTE
Preliminary results noted  On Rocephin plus Doxycycline already     Microbiology Results (last 7 days)     Procedure Component Value Units Date/Time    Blood culture [[id number]] Collected: 12/19/20 1331    Order Status: Completed Specimen: Blood from Peripheral, Left Hand Updated: 12/20/20 1044     Blood Culture, Routine Gram stain tyler bottle: Gram positive cocci in chains resembling Strep       Gram stain tyler bottle: Gram negative rods 12/20/2020  10:43        Positive results previously called    Blood culture [[id number]] Collected: 12/19/20 1337    Order Status: Completed Specimen: Blood from Peripheral, Right Hand Updated: 12/20/20 1023     Blood Culture, Routine Gram stain aer bottle: Gram positive cocci in chains resembling Strep      Gram stain aer bottle: Gram negative rods      Gram stain tyler bottle: Gram positive cocci in chains resembling Strep      Gram stain tyler bottle: Gram negative rods       Results called to and read back by: Mona Mason RN 12/20/2020  10:22    Urine culture [[id number]] Collected: 12/15/20 1716    Order Status: Completed Specimen: Urine Updated: 12/17/20 0801     Urine Culture, Routine Multiple organisms isolated. None in predominance.  Repeat if      clinically necessary.    Narrative:      Specimen Source->Urine        Antibiotics (From admission, onward)    Start     Stop Route Frequency Ordered    12/19/20 2100  mupirocin 2 % ointment      12/24 2059 Nasl 2 times daily 12/19/20 1221    12/19/20 1445  doxycycline (VIBRAMYCIN) 100mg in dextrose 5% 250 mL IVPB (ready to mix)      12/24 1444 IV Every 12 hours (non-standard times) 12/19/20 1344    12/19/20 1430  cefTRIAXone (ROCEPHIN) 2 g/50 mL D5W IVPB      12/24 1429 IV Every 24 hours (non-standard times) 12/19/20 1249

## 2020-12-20 NOTE — PLAN OF CARE
Pt remained on BiPAP overnight, but was able to come off for very short periods of time to take meds and have sips of water. Pt will desat to the 70-80s when off BiPAP, but recovers quickly once mask is back on. Pt oriented all night, but had one episode of confusion where pt woke up from sleep and was disassembling his BiPAP mask. Reassembled mask and placed back on pt with no issues. Heparin gtt infusing. APTT was high this AM. Heparin nomogram followed. Turned and repositioned Q2H. Heel boots on. Wound care provided.

## 2020-12-20 NOTE — ASSESSMENT & PLAN NOTE
--supplemental oxygen to maintain sats   --albuterol and ipratropium via MIDI inhaler    12/18- was on Vapotherm yesterday and had to be placed on Bipap 100%, maitaining Sats around 92-94% between Bipap and vapotherm,     May need to go to ICU    O2 a little better on Vapotherm 100%  12/20- alternating btw Bipap and Vapotherm, try some Lasix

## 2020-12-20 NOTE — PROGRESS NOTES
[facility] -   Cardiology  Progress Note    Patient Name: Karan Aburto  MRN: [id number]  Admission Date: 12/15/2020  Hospital Length of Stay: 4 days  Code Status: DNR   Attending Physician: Tracy Chavez MD   Primary Care Physician: Mickey Agrawal MD  Expected Discharge Date:   Principal Problem:COVID-19    Subjective:   HPI    Karan Aburto is a 75 year old male who presented to [facility] due to respiratory distress with associated fever, dyspnea, and generalized weakness. His current medical conditions include HTN, elevated PSA, Thyroid disease, Rosacea, recent COVID +. O2 sats were 70% on RA per EMS. ED workup revealed K+ 3.4, Cr 1.1, glucose 163, H/H 8.9/28.6, plts 216, , Troponin 0.250. He was placed in observation under the care of hospital medicine. Cardiology consulted to assist with medical management. Chart extensively reviewed, patient not seen or examined due to COVID +. Will optimize medical management. Continue medical management of COVID per primary team.           Hospital Course:   12/16/2020-Patient not seen or examined given + covid status. Remains on vapotherm with maximum support and proned per nursing notes to maintain oxygenation. Labs reviewed, H/H 7.9/25, K+ 3.4, Cr 1.1, would recommend 1 unit PRBC transfusion.     12/17/2020-Patient not seen or examined given + Covid status. On Bipap today for pulmonary support. Labs reviewed, K+ 3.2, Cr 1.6, BUN 29, Phosph 5.3, H/H 7.6/25.     12/18/2020-Patient not seen or examined given + COVID status. Labs reviewed, K+ 3.1, Cr 1.7, BUN 74, CO2 31, mag 2.4.     12/19/2020-Patient not seen or examined. Remains on continuous bipap, unable to wean. Labs reviewed, K+ 4.0, Cr 1.3, BUN 67.     12/20/2020-Patient not seen or examined given + covid status. Patient transferred to ICU yesterday. Remains on continuous bipap today.    Interval History: HAS BEEN TRANSFERRED TO ICU FOR HIGHER LEVEL OF CARE. Attempt to wean to vapotherm today in  place. Mgmt per critical care.     Review of Systems   Constitution: Positive for malaise/fatigue.   Cardiovascular: Positive for dyspnea on exertion, irregular heartbeat and palpitations.   Respiratory: Positive for cough and shortness of breath.    Endocrine: Negative for heat intolerance.   Neurological: Positive for weakness.   Allergic/Immunologic: Negative for environmental allergies.     Objective:     Vital Signs (Most Recent):  Temp: 97.5 °F (36.4 °C) (12/20/20 0730)  Pulse: 85 (12/20/20 1045)  Resp: (!) 31 (12/20/20 1045)  BP: (!) 168/98 (12/20/20 1000)  SpO2: 98 % (12/20/20 1045) Vital Signs (24h Range):  Temp:  [97.5 °F (36.4 °C)-98.3 °F (36.8 °C)] 97.5 °F (36.4 °C)  Pulse:  [54-86] 85  Resp:  [17-46] 31  SpO2:  [91 %-100 %] 98 %  BP: (108-180)/(53-98) 168/98     Weight: 87.2 kg (192 lb 3.9 oz)  Body mass index is 27.58 kg/m².     SpO2: 98 %  O2 Device (Oxygen Therapy): Vapotherm(nrb over vapotherm)      Intake/Output Summary (Last 24 hours) at 12/20/2020 1122  Last data filed at 12/20/2020 1000  Gross per 24 hour   Intake 2342.2 ml   Output 1575 ml   Net 767.2 ml       Lines/Drains/Airways     Drain                 Urethral Catheter 12/19/20 1215 less than 1 day          Peripheral Intravenous Line                 Peripheral IV - Single Lumen 12/15/20 1255 20 G Left Antecubital 4 days         Peripheral IV - Single Lumen 12/15/20 1717 20 G Right Wrist 4 days                Physical Exam  NOT EXAMINED GIVEN + COVID STATUS  Significant Labs:   CMP   Recent Labs   Lab 12/19/20  0617 12/20/20  0405    140   K 4.0 4.0    101   CO2 32* 33*   * 161*   BUN 67* 63*   CREATININE 1.3 1.1   CALCIUM 8.2* 8.0*   PROT 4.9* 4.5*   ALBUMIN 1.8* 1.7*   BILITOT 0.7 0.5   ALKPHOS 139* 218*   AST 68* 77*   ALT 43 46*   ANIONGAP 9 6*   ESTGFRAFRICA >60 >60   EGFRNONAA 53* >60   , CBC   Recent Labs   Lab 12/19/20  0617 12/20/20  0405   WBC 6.12 5.47   HGB 8.0* 8.2*   HCT 26.9* 27.7*    239   ,  Troponin No results for input(s): TROPONINI in the last 48 hours. and All pertinent lab results from the last 24 hours have been reviewed.    Significant Imaging: Echocardiogram:   Transthoracic echo (TTE) complete (Cupid Only):   Results for orders placed or performed during the hospital encounter of 10/18/20   Echo Color Flow Doppler? Yes; Bubble Contrast? No   Result Value Ref Range    Ascending aorta 2.97 cm    STJ 2.57 cm    AV mean gradient 7 mmHg    Ao peak lewis 1.79 m/s    Ao VTI 27.91 cm    IVS 1.39 (A) 0.6 - 1.1 cm    LA size 4.21 cm    Left Atrium Major Axis 4.62 cm    LVIDd 3.82 3.5 - 6.0 cm    LVIDs 2.29 2.1 - 4.0 cm    LVOT diameter 2.39 cm    LVOT peak VTI 21.64 cm    Posterior Wall 1.08 0.6 - 1.1 cm    MV Peak A Lewis 1.10 m/s    E wave decelartion time 190.97 msec    MV Peak E Lewis 0.79 m/s    RA Major Axis 4.32 cm    RA Width 2.24 cm    TAPSE 2.81 cm    TR Max Lewis 1.74 m/s    TDI LATERAL 0.07 m/s    TDI SEPTAL 0.06 m/s    LA WIDTH 4.11 cm    Ao root annulus 3.66 cm    MV stenosis pressure 1/2 time 55.38 ms    LV Diastolic Volume 62.69 mL    LV Systolic Volume 17.96 mL    LVOT peak lewis 1.28 m/s    LV LATERAL E/E' RATIO 11.29 m/s    LV SEPTAL E/E' RATIO 13.17 m/s    FS 40 %    LV mass 161.27 g    Left Ventricle Relative Wall Thickness 0.57 cm    AV valve area 3.48 cm2    AV Velocity Ratio 0.72     AV index (prosthetic) 0.78     MV valve area p 1/2 method 3.97 cm2    E/A ratio 0.72     Mean e' 0.07 m/s    LVOT area 4.5 cm2    LVOT stroke volume 97.03 cm3    AV peak gradient 13 mmHg    E/E' ratio 12.15 m/s    LV Systolic Volume Index 8.1 mL/m2    LV Diastolic Volume Index 28.40 mL/m2    LV Mass Index 73 g/m2    Triscuspid Valve Regurgitation Peak Gradient 12 mmHg    Right Atrial Pressure (from IVC) 3 mmHg    TV rest pulmonary artery pressure 15 mmHg    Narrative    · There is mild left ventricular concentric hypertrophy.  · With normal systolic function. The estimated ejection fraction is 60%.  · Grade I  diastolic dysfunction.  · Normal right ventricular systolic function.  · Mild left atrial enlargement.  · Mild mitral regurgitation.  · Normal central venous pressure (3 mmHg).  · The estimated PA systolic pressure is 15 mmHg.        Assessment and Plan:       * Acute Viral Pneumonia COVID-19  Mgmt per primary team    Blood culture positive for microorganism  On IV ABX  Mgmt per critical care    Acute hypoxemic respiratory failure due to severe acute respiratory syndrome coronavirus 2 (SARS-CoV-2) disease  Continue supplemental oxygen to maintain O2 saturation  Mgmt per primary team    Hyperlipemia  Continue statin    Essential hypertension  On medical therapy  Continue meds as BP permits    Diabetes mellitus type 2, uncontrolled  Mgmt per primary team        VTE Risk Mitigation (From admission, onward)         Ordered     IP VTE HIGH RISK PATIENT  Once      12/15/20 1538     Place sequential compression device  Until discontinued      12/15/20 1538     heparin 25,000 units in dextrose 5% (100 units/ml) IV bolus from bag - ADDITIONAL PRN BOLUS - 30 units/kg (max bolus 4000 units)  As needed (PRN)     Question:  Heparin Infusion Adjustment (DO NOT MODIFY ANSWER)  Answer:  [email or web address]\Images\Pharmacy\HeparinInfusions\heparin LOW INTENSITY nomogram for [facility] [id number]    12/15/20 1243     heparin 25,000 units in dextrose 5% (100 units/ml) IV bolus from bag - ADDITIONAL PRN BOLUS - 60 units/kg (max bolus 4000 units)  As needed (PRN)     Question:  Heparin Infusion Adjustment (DO NOT MODIFY ANSWER)  Answer:  [email or web address]\Images\Pharmacy\HeparinInfusions\heparin LOW INTENSITY nomogram for [facility] [id number]    12/15/20 1243     heparin 25,000 units in dextrose 5% 250 mL (100 units/mL) infusion LOW INTENSITY nomogram - [facility]  Continuous     Question Answer Comment   Heparin Infusion Adjustment (DO NOT MODIFY ANSWER) [email or web address]\[email or web address]\Images\Pharmacy\HeparinInfusions\heparin LOW INTENSITY nomogram for [facility] [id number]     Begin at (in units/kg/hr) 12        12/15/20 1243                JORGE Ceballos-C  Cardiology  [facility]

## 2020-12-20 NOTE — SUBJECTIVE & OBJECTIVE
Interval History: looks better, comfortable, no CP or SOB, was on Bipap all night, just placed back on Vapotherm 40 L/100%, he is about 5 L fluid Positive, Bun/Cr 63/1.1- so given a dose of IV lasix 20 mg to help with Oxygenation. He has completed the course of Remdesivir but still on Decadron. Pt seen together with his son, Mac, who was updated about his condition and prognosis before entering the pt's room. Pt was AAOx3, speech clear, and asked in front of his son about resuscitation in case of deterioration or cardiac or resp arrest- he clearly said DNR, he does not want any intubation or mechanical ventilation. DNR ordered.     Review of Systems   Constitutional: Positive for activity change, appetite change and fatigue. Negative for diaphoresis.   HENT: Negative.    Respiratory: Positive for cough and shortness of breath. Negative for chest tightness and wheezing.    Cardiovascular: Negative for chest pain.   Gastrointestinal: Negative.    Endocrine: Negative.    Genitourinary: Negative.    Musculoskeletal: Positive for arthralgias.   Allergic/Immunologic: Negative.    Neurological: Positive for weakness.   Hematological: Negative.    Psychiatric/Behavioral: The patient is nervous/anxious.      Objective:     Vital Signs (Most Recent):  Temp: 97.1 °F (36.2 °C) (12/20/20 1515)  Pulse: 93 (12/20/20 1615)  Resp: (!) 21 (12/20/20 1615)  BP: 117/74 (12/20/20 1600)  SpO2: 100 % (12/20/20 1615) Vital Signs (24h Range):  Temp:  [97.1 °F (36.2 °C)-98.3 °F (36.8 °C)] 97.1 °F (36.2 °C)  Pulse:  [55-99] 93  Resp:  [18-55] 21  SpO2:  [88 %-100 %] 100 %  BP: (117-193)/() 117/74     Weight: 87.2 kg (192 lb 3.9 oz)  Body mass index is 27.58 kg/m².    Intake/Output Summary (Last 24 hours) at 12/20/2020 1701  Last data filed at 12/20/2020 1600  Gross per 24 hour   Intake 2363.4 ml   Output 1190 ml   Net 1173.4 ml      Physical Exam  Vitals signs and nursing note reviewed.   Constitutional:       General: He is in acute  distress.      Appearance: He is well-developed. He is ill-appearing.      Interventions: Nasal cannula in place.      Comments: Was on Bipap, just switched to Vapotherm   HENT:      Head: Normocephalic and atraumatic.      Nose: Nose normal.   Eyes:      Conjunctiva/sclera: Conjunctivae normal.      Pupils: Pupils are equal, round, and reactive to light.   Neck:      Musculoskeletal: Neck supple.      Thyroid: No thyromegaly.      Vascular: No JVD.      Trachea: No tracheal deviation.   Cardiovascular:      Rate and Rhythm: Normal rate and regular rhythm.      Heart sounds: Normal heart sounds.   Pulmonary:      Effort: Pulmonary effort is normal.      Breath sounds: Examination of the right-middle field reveals rales. Examination of the left-middle field reveals rales. Examination of the right-lower field reveals rales. Examination of the left-lower field reveals rales. Decreased breath sounds and rales present.   Abdominal:      Palpations: Abdomen is soft.   Musculoskeletal: Normal range of motion.   Lymphadenopathy:      Cervical: No cervical adenopathy.   Skin:     General: Skin is warm and dry.   Neurological:      Mental Status: He is alert and oriented to person, place, and time.         Significant Labs:   ABGs:   BMP:   Recent Labs   Lab 12/20/20  0405   *      K 4.0      CO2 33*   BUN 63*   CREATININE 1.1   CALCIUM 8.0*   MG 2.4     CBC:   Recent Labs   Lab 12/19/20  0617 12/20/20  0405   WBC 6.12 5.47   HGB 8.0* 8.2*   HCT 26.9* 27.7*    239     CMP:   Recent Labs   Lab 12/19/20  0617 12/20/20  0405    140   K 4.0 4.0    101   CO2 32* 33*   * 161*   BUN 67* 63*   CREATININE 1.3 1.1   CALCIUM 8.2* 8.0*   PROT 4.9* 4.5*   ALBUMIN 1.8* 1.7*   BILITOT 0.7 0.5   ALKPHOS 139* 218*   AST 68* 77*   ALT 43 46*   ANIONGAP 9 6*   EGFRNONAA 53* >60     Magnesium:   Recent Labs   Lab 12/19/20  0617 12/20/20  0405   MG 2.4 2.4     All pertinent labs within the past 24  hours have been reviewed.    Significant Imaging: I have reviewed all pertinent imaging results/findings within the past 24 hours.

## 2020-12-20 NOTE — ASSESSMENT & PLAN NOTE
12/19 will need skin care to continue Noninvasive Positive Pressure Ventilation   12/20 tolerating vapotherm and face mask

## 2020-12-20 NOTE — ASSESSMENT & PLAN NOTE
12/20 WBC normal , awaiting final report , does not clinically appear septic, may be contaminant. Continue ceftriaxone

## 2020-12-20 NOTE — SUBJECTIVE & OBJECTIVE
Interval History: HAS BEEN TRANSFERRED TO ICU FOR HIGHER LEVEL OF CARE. Attempt to wean to vapotherm today in place. Mgmt per critical care.     Review of Systems   Constitution: Positive for malaise/fatigue.   Cardiovascular: Positive for dyspnea on exertion, irregular heartbeat and palpitations.   Respiratory: Positive for cough and shortness of breath.    Endocrine: Negative for heat intolerance.   Neurological: Positive for weakness.   Allergic/Immunologic: Negative for environmental allergies.     Objective:     Vital Signs (Most Recent):  Temp: 97.5 °F (36.4 °C) (12/20/20 0730)  Pulse: 85 (12/20/20 1045)  Resp: (!) 31 (12/20/20 1045)  BP: (!) 168/98 (12/20/20 1000)  SpO2: 98 % (12/20/20 1045) Vital Signs (24h Range):  Temp:  [97.5 °F (36.4 °C)-98.3 °F (36.8 °C)] 97.5 °F (36.4 °C)  Pulse:  [54-86] 85  Resp:  [17-46] 31  SpO2:  [91 %-100 %] 98 %  BP: (108-180)/(53-98) 168/98     Weight: 87.2 kg (192 lb 3.9 oz)  Body mass index is 27.58 kg/m².     SpO2: 98 %  O2 Device (Oxygen Therapy): Vapotherm(nrb over vapotherm)      Intake/Output Summary (Last 24 hours) at 12/20/2020 1122  Last data filed at 12/20/2020 1000  Gross per 24 hour   Intake 2342.2 ml   Output 1575 ml   Net 767.2 ml       Lines/Drains/Airways     Drain                 Urethral Catheter 12/19/20 1215 less than 1 day          Peripheral Intravenous Line                 Peripheral IV - Single Lumen 12/15/20 1255 20 G Left Antecubital 4 days         Peripheral IV - Single Lumen 12/15/20 1717 20 G Right Wrist 4 days                Physical Exam  NOT EXAMINED GIVEN + COVID STATUS  Significant Labs:   CMP   Recent Labs   Lab 12/19/20  0617 12/20/20  0405    140   K 4.0 4.0    101   CO2 32* 33*   * 161*   BUN 67* 63*   CREATININE 1.3 1.1   CALCIUM 8.2* 8.0*   PROT 4.9* 4.5*   ALBUMIN 1.8* 1.7*   BILITOT 0.7 0.5   ALKPHOS 139* 218*   AST 68* 77*   ALT 43 46*   ANIONGAP 9 6*   ESTGFRAFRICA >60 >60   EGFRNONAA 53* >60   , CBC   Recent Labs    Lab 12/19/20  0617 12/20/20  0405   WBC 6.12 5.47   HGB 8.0* 8.2*   HCT 26.9* 27.7*    239   , Troponin No results for input(s): TROPONINI in the last 48 hours. and All pertinent lab results from the last 24 hours have been reviewed.    Significant Imaging: Echocardiogram:   Transthoracic echo (TTE) complete (Cupid Only):   Results for orders placed or performed during the hospital encounter of 10/18/20   Echo Color Flow Doppler? Yes; Bubble Contrast? No   Result Value Ref Range    Ascending aorta 2.97 cm    STJ 2.57 cm    AV mean gradient 7 mmHg    Ao peak lewis 1.79 m/s    Ao VTI 27.91 cm    IVS 1.39 (A) 0.6 - 1.1 cm    LA size 4.21 cm    Left Atrium Major Axis 4.62 cm    LVIDd 3.82 3.5 - 6.0 cm    LVIDs 2.29 2.1 - 4.0 cm    LVOT diameter 2.39 cm    LVOT peak VTI 21.64 cm    Posterior Wall 1.08 0.6 - 1.1 cm    MV Peak A Lewis 1.10 m/s    E wave decelartion time 190.97 msec    MV Peak E Lewis 0.79 m/s    RA Major Axis 4.32 cm    RA Width 2.24 cm    TAPSE 2.81 cm    TR Max Lewis 1.74 m/s    TDI LATERAL 0.07 m/s    TDI SEPTAL 0.06 m/s    LA WIDTH 4.11 cm    Ao root annulus 3.66 cm    MV stenosis pressure 1/2 time 55.38 ms    LV Diastolic Volume 62.69 mL    LV Systolic Volume 17.96 mL    LVOT peak lewis 1.28 m/s    LV LATERAL E/E' RATIO 11.29 m/s    LV SEPTAL E/E' RATIO 13.17 m/s    FS 40 %    LV mass 161.27 g    Left Ventricle Relative Wall Thickness 0.57 cm    AV valve area 3.48 cm2    AV Velocity Ratio 0.72     AV index (prosthetic) 0.78     MV valve area p 1/2 method 3.97 cm2    E/A ratio 0.72     Mean e' 0.07 m/s    LVOT area 4.5 cm2    LVOT stroke volume 97.03 cm3    AV peak gradient 13 mmHg    E/E' ratio 12.15 m/s    LV Systolic Volume Index 8.1 mL/m2    LV Diastolic Volume Index 28.40 mL/m2    LV Mass Index 73 g/m2    Triscuspid Valve Regurgitation Peak Gradient 12 mmHg    Right Atrial Pressure (from IVC) 3 mmHg    TV rest pulmonary artery pressure 15 mmHg    Narrative    · There is mild left ventricular  concentric hypertrophy.  · With normal systolic function. The estimated ejection fraction is 60%.  · Grade I diastolic dysfunction.  · Normal right ventricular systolic function.  · Mild left atrial enlargement.  · Mild mitral regurgitation.  · Normal central venous pressure (3 mmHg).  · The estimated PA systolic pressure is 15 mmHg.

## 2020-12-20 NOTE — ASSESSMENT & PLAN NOTE
- COVID-19 testing   - Infection Control notified     - Isolation:   - Airborne, Contact and Droplet Precautions  - Cohort patients into COVID units  - N95 mask, wear eye protection  - 20 second hand hygiene              - Limit visitors per hospital policy              - Consolidating lab draws, nursing care, provider visits, and interventions    - Diagnostics: (leukopenia, hyponatremia, hyperferritinemia, elevated troponin, elevated d-dimer, age, and comorbidities are significant predictors of poor clinical outcome)  CBC, CMP, Procalcitonin, Ferritin, CRP, LDH, BNP, Troponin, ECG, Rapid Flu, Blood Culture x2 and Portable CXR    - Management:  Supplemental O2 to maintain SpO2 >92%  Telemetry  Continuous/intermittent Pulse Ox  Albuterol treatment PRN   --Ipratropium inhaler with MIDI  --dexamethasone  --remdesivir  --monitor    Advance Care Planning   patient is a DNR and his SDM is his son, Mac      12/18- getting Dexa plus Remdersivir but still severely Hypoxemic-  Now on Bipap- O2 better to 92-94% still, cant wean down to Vapotherm  May go to ICU if deteriorates    12/19- transferred to ICU for closer monitoring- now on Vapotherm  Has pressure ulcer on nose from the Bipap  12/20- continued on Vapotherm and Bipap- will try some lasix

## 2020-12-21 PROBLEM — Z51.5 ENCOUNTER FOR PALLIATIVE CARE: Status: ACTIVE | Noted: 2020-01-01

## 2020-12-21 NOTE — SUBJECTIVE & OBJECTIVE
Interval History: -Palliative care consult appreciated. looks the same but gradually declining. He maintained Sats of 96% on Bipap 70% overnight and was switched to Vapotherm 40L/100% this am but sats dropped to 92%, pt started getting tired- hence switched back to Bipap after a couple of hours- maintaining Sats 97 bipap 70% again. Blood Cx from 12/19- growing Klebsiella Pneumonia and Enterococcus fecalis- on Rocephin. H/H stable. Put out about 2 L of urine yesterday and lasix 20 BID continued. Getting PT/OT.     Review of Systems  Objective:     Vital Signs (Most Recent):  Temp: 98.3 °F (36.8 °C) (12/21/20 1200)  Pulse: 86 (12/21/20 1439)  Resp: 16 (12/21/20 1439)  BP: (!) 140/88 (12/21/20 1400)  SpO2: 95 % (12/21/20 1439) Vital Signs (24h Range):  Temp:  [97.2 °F (36.2 °C)-98.5 °F (36.9 °C)] 98.3 °F (36.8 °C)  Pulse:  [70-97] 86  Resp:  [16-33] 16  SpO2:  [88 %-100 %] 95 %  BP: (114-163)/() 140/88     Weight: 87.1 kg (192 lb 0.3 oz)  Body mass index is 27.55 kg/m².    Intake/Output Summary (Last 24 hours) at 12/21/2020 1743  Last data filed at 12/21/2020 1400  Gross per 24 hour   Intake 861.1 ml   Output 2095 ml   Net -1233.9 ml      Physical Exam    Significant Labs:   Blood Culture:   BMP:   Recent Labs   Lab 12/21/20  0337   GLU 77      K 3.6      CO2 33*   BUN 49*   CREATININE 1.0   CALCIUM 7.7*   MG 2.2     CBC:   Recent Labs   Lab 12/20/20 0405 12/21/20 0337   WBC 5.47 4.61   HGB 8.2* 8.2*   HCT 27.7* 27.4*    234     CMP:   Recent Labs   Lab 12/20/20 0405 12/21/20 0337    142   K 4.0 3.6    101   CO2 33* 33*   * 77   BUN 63* 49*   CREATININE 1.1 1.0   CALCIUM 8.0* 7.7*   PROT 4.5*  --    ALBUMIN 1.7*  --    BILITOT 0.5  --    ALKPHOS 218*  --    AST 77*  --    ALT 46*  --    ANIONGAP 6* 8   EGFRNONAA >60 >60     Pathology Results  (Last 10 years)    None        POCT Glucose:   Recent Labs   Lab 12/20/20  1748 12/20/20  2131 12/21/20  1142   POCTGLUCOSE 191*  139* 134*     All pertinent labs within the past 24 hours have been reviewed.    Significant Imaging: I have reviewed all pertinent imaging results/findings within the past 24 hours.

## 2020-12-21 NOTE — PROGRESS NOTES
Pharmacist Renal Dose Adjustment Note    Karan Aburto is a 75 y.o. male being treated with the medication Pepcid    Patient Data:    Vital Signs (Most Recent):  Temp: 97.4 °F (36.3 °C) (12/21/20 0315)  Pulse: 91 (12/21/20 1000)  Resp: 19 (12/21/20 1000)  BP: 132/85 (12/21/20 0500)  SpO2: 96 % (12/21/20 1000) Vital Signs (72h Range):  Temp:  [97.1 °F (36.2 °C)-98.5 °F (36.9 °C)]   Pulse:  [54-99]   Resp:  [16-55]   BP: (108-193)/()   SpO2:  [85 %-100 %]      Recent Labs   Lab 12/19/20  0617 12/20/20  0405 12/21/20  0337   CREATININE 1.3 1.1 1.0     Serum creatinine: 1 mg/dL 12/21/20 0337  Estimated creatinine clearance: 65.9 mL/min    Medication:pepcid dose: 20mg frequency qd will be changed to medication:Pepcid dose:20mg frequency:BID    Pharmacist's Name: Anamaria Alcala  Pharmacist's Extension: [phone]

## 2020-12-21 NOTE — PT/OT/SLP EVAL
Occupational Therapy   Evaluation and Discharge Note    Name: Karan Aburto  MRN: [id number]  Admitting Diagnosis:  COVID-19      Recommendations:     Discharge Recommendations: nursing facility, basic  Discharge Equipment Recommendations:  (TBD)  Barriers to discharge:  None    Assessment:     Karan Aburto is a 75 y.o. male with a medical diagnosis of COVID-19. At this time, patient is functioning at their prior level of function and does not require further acute OT services.     Plan:     During this hospitalization, patient does not require further acute OT services.  Please re-consult if situation changes.    · Plan of Care Reviewed with: patient    Subjective     Chief Complaint: DEBILITY AND GENERALIZED WEAKNESS. PLOF  Patient/Family Comments/goals:     Occupational Profile:  Living Environment: LIVES IN ASSISTED LIVING WITH TOTAL CARE  Previous level of function: TOTAL A  Roles and Routines: OCCUPATIONAL THERAPY  Equipment Used at home:  walker, rolling(lives in nursing facility/ assisted living)  Assistance upon Discharge:     Pain/Comfort:  · Pain Rating 1: 0/10    Patients cultural, spiritual, Confucianist conflicts given the current situation:      Objective:     Communicated with: NURSE DOBSON AND Epic CHART REVIEW prior to session.  Patient found HOB elevated with telemetry, BIPAP, peripheral IV, oxygen(venti mask) upon OT entry to room.    General Precautions: Standard, airborne, contact, droplet, fall   Orthopedic Precautions:N/A   Braces: N/A     Occupational Performance:    Bed Mobility:    · Patient completed Rolling/Turning to Left with  total assistance  · Patient completed Rolling/Turning to Right with total assistance  · Patient completed Scooting/Bridging with total assistance  · Patient completed Supine to Sit with total assistance  · Patient completed Sit to Supine with total assistance      Activities of Daily Living:  · Lower Body Dressing: total assistance .  · Toileting: total assistance  MORENO PLACEMENT    Cognitive/Visual Perceptual:  Cognitive/Psychosocial Skills:     -       Oriented to: Person, Place and .   -       Follows Commands/attention:Follows one-step commands  -       Communication: clear/fluent  -       Memory: UNABLE TO ACCURATELY ASSESS  -       Safety awareness/insight to disability: impaired     Physical Exam:  Upper Extremity Range of Motion:     -       Right Upper Extremity: WFL  -       Left Upper Extremity: WFL  Upper Extremity Strength:    -       Right Upper Extremity: MMT: 4/5 GROSSLY  -       Left Upper Extremity: MMT: 4/5 GROSSLY   Strength:    -       Right Upper Extremity: WFL  -       Left Upper Extremity: WFL    AMPAC 6 Click ADL:  AMPAC Total Score: 10    Treatment & Education:  O.T. EVAL COMPLETED. PT EDUCATED ON O.T. POC. PT REQ TOTAL A WITH FUNCTIONAL MOBILITY AND ADL'S PT DISCHARGED FROM SKILLED O.T. AND PLACED ON PEOPLE 'S PROGRAM.   Education:    Patient left HOB elevated with all lines intact, call button in reach and NURSE notified    GOALS:   Multidisciplinary Problems     Occupational Therapy Goals     Not on file                History:     Past Medical History:   Diagnosis Date    Abnormal liver enzymes 10/18/2020    Coronary artery disease     Elevated PSA     HLD (hyperlipidemia)     Hypertension     Leg swelling 10/18/2020    Leukocytosis 10/18/2020    Metabolic alkalosis 10/20/2020    New onset type 2 diabetes mellitus 10/21/2020    Pneumonia 11/5/2020    Rosacea     Syncope 11/5/2020    Thrombocytopenia 10/30/2020    Thyroid disease     hypothyroidism       No past surgical history on file.    Time Tracking:     OT Date of Treatment: 12/20/20  OT Start Time: 1710  OT Stop Time: 1740  OT Total Time (min): 30 min    Billable Minutes:Evaluation 10 MINUTES  Therapeutic Activity 20 MINUTES    Chanel Escalante OT  12/20/2020

## 2020-12-21 NOTE — PROGRESS NOTES
[facility]  Critical Care Medicine  Progress Note    Patient Name: Karan Aburto  MRN: [id number]  Admission Date: 12/15/2020  Hospital Length of Stay: 5 days  Code Status: DNR  Attending Provider: Tracy Chavez MD  Primary Care Provider: Mickey Agrawal MD   Principal Problem: COVID-19    Subjective:     HPI:  75 year old male who presented to [facility]  On 1215/2020 due to respiratory distress with associated fever, dyspnea, and generalized weakness and COVID 19 pneumonia with hypoxemic respiraroty failure.  Transferred to ICU on 12/19/2020 due to worsening respiratory status. His current medical conditions include HTN, elevated PSA, Thyroid disease, Rosacea,O2 sats were 70% on RA on admission. history of cerebral vascular accident lacunar infarct within the right lentiform nucleus on 10/30/2020.  He is uncomfortable and has developed skin breakdown on his nose. Presently on BiPAP     Hospital/ICU Course:  12/19 Transferred to ICU   12/20 Blood cultures positive for gram positive cocci and gram negative rods - awaiting final report. Desaturation while eating  12/21 ON BIPAP FIO2 70% O2 SAT 96%.  Afebrile positive 3 L since admission.  Chest x-ray reviewed.    Interval History:   12/21 ON BIPAP FIO2 70% O2 SAT 96%.  Afebrile positive 3 L since admission.  Chest x-ray reviewed.    Review of Systems   Unable to perform ROS: Acuity of condition         Objective:     Vital Signs (Most Recent):  Temp: 98.3 °F (36.8 °C) (12/21/20 0701)  Pulse: 91 (12/21/20 1000)  Resp: 19 (12/21/20 1000)  BP: (!) 144/84 (12/21/20 1000)  SpO2: 96 % (12/21/20 1000) Vital Signs (24h Range):  Temp:  [97.1 °F (36.2 °C)-98.5 °F (36.9 °C)] 98.3 °F (36.8 °C)  Pulse:  [70-99] 91  Resp:  [16-55] 19  SpO2:  [88 %-100 %] 96 %  BP: (115-193)/() 144/84     Weight: 87.1 kg (192 lb 0.3 oz)  Body mass index is 27.55 kg/m².      Intake/Output Summary (Last 24 hours) at 12/21/2020 1040  Last data filed at 12/21/2020 1000  Gross per 24  hour   Intake 1812.9 ml   Output 1815 ml   Net -2.1 ml       Physical Exam  Vitals signs and nursing note reviewed.   Constitutional:       General: He is in acute distress.      Appearance: He is well-developed. He is ill-appearing and toxic-appearing.   HENT:      Head: Normocephalic and atraumatic.   Neck:      Musculoskeletal: Neck supple.   Cardiovascular:      Rate and Rhythm: Normal rate.   Pulmonary:      Effort: Respiratory distress present.      Breath sounds: No stridor.   Abdominal:      Palpations: Abdomen is soft.      Tenderness: There is no abdominal tenderness.   Genitourinary:     Comments: Anaya in place  Musculoskeletal:         General: No deformity.   Skin:     General: Skin is warm.      Findings: Bruising present.   Neurological:      General: No focal deficit present.      Mental Status: He is alert.         Vents:  Oxygen Concentration (%): 70 (12/21/20 1000)    Lines/Drains/Airways     Drain                 Urethral Catheter 12/19/20 1215 1 day          Peripheral Intravenous Line                 Peripheral IV - Single Lumen 12/15/20 1717 20 G Right Wrist 5 days         Peripheral IV - Single Lumen 12/20/20 1830 20 G Anterior;Right;Proximal Forearm less than 1 day                Significant Labs:    CBC/Anemia Profile:  Recent Labs   Lab 12/19/20  1543 12/20/20  0405 12/21/20  0337   WBC  --  5.47 4.61   HGB  --  8.2* 8.2*   HCT  --  27.7* 27.4*   PLT  --  239 234   MCV  --  100* 97   RDW  --  17.3* 17.6*   FERRITIN 3,174*  --   --         Chemistries:  Recent Labs   Lab 12/20/20  0405 12/21/20  0337    142   K 4.0 3.6    101   CO2 33* 33*   BUN 63* 49*   CREATININE 1.1 1.0   CALCIUM 8.0* 7.7*   ALBUMIN 1.7*  --    PROT 4.5*  --    BILITOT 0.5  --    ALKPHOS 218*  --    ALT 46*  --    AST 77*  --    MG 2.4 2.2   PHOS 2.6*  --      12/19/2020 blood culture x2    GRAM NEGATIVE JOSEPH   Identification pending   For susceptibility see order #[id number]   Abnormal        ENTEROCOCCUS  SPECIES   Identification pending   For susceptibility see order #[id number]       Chest x-ray 12/19/2020    Diffuse bilateral pulmonary infiltrates noted most pronounced in the upper lung zones.  These findings may have worsened somewhat from 12/15/2020.     Nuclear stress test November 2020      This is a normal myocardioal perfusion scan    Gated perfusion images showed an ejection fraction of 59% at rest and 60% post stress.    The EKG portion of this study is negative for ischemia          ABG  No results for input(s): PH, PO2, PCO2, HCO3, BE in the last 168 hours.  Assessment/Plan:     Endocrine  Diabetes mellitus type 2, uncontrolled  12/21 fingerstick q.6 hours.  SSI.    Other  * Acute Viral Pneumonia COVID-19  COVID-19 Pneumonia:     - COVID-19 testing Collection Date: 12/15/2020  - Infection Control notified      - Isolation:   - Airborne, Contact and Droplet Precautions  - Cohort patients into COVID units  - N95 masks must be fit tested, wear eye protection  - 20 second hand hygiene              - Limit visitors per hospital policy              - Consolidating lab draws, nursing care, provider visits, and interventions     - Diagnostics: (leukopenia, hyponatremia, hyperferritinemia, elevated troponin, elevated d-dimer, age, and comorbidities are significant predictors of poor clinical outcome)  CBC, Procalcitonin, Ferritin, CRP, LDH and Portable CXR     - Management:     Supplemental oxygen to maintain O2 sat >90%  Noninvasive Positive Pressure Ventilation maintain SpO2 >90%  Continuous/intermittent Pulse Ox  Albuterol treatment PRN  Careful use of steroids in the absence of other indications - unless septic shock due to increased viral replication Fluid sparing resuscitationl. Switch to IV  Empiric AZITHROMYCIN + ROCEPHIN - will initiate   Remdesivir - on going    Deep Venous Thrombosis - start complete anticoagulin      ADJUNCTIVE THERAPY: ZINC, ATORVASTATIN      Continuous/intermittent Pulse  Ox  Albuterol INHALER PRN (avoid nebulization of secretions)  -    Vit C 500 mg, BID  Zinc 220 mg/day     Code Status  DNR           Blood culture positive for microorganism  12/20 WBC normal , awaiting final report , does not clinically appear septic, may be contaminant. Continue ceftriaxone  1221 awaiting identification and sensitivity.  Continue Rocephin and doxycycline.  Afebrile.  WBC stable     Acute hypoxemic respiratory failure due to severe acute respiratory syndrome coronavirus 2 (SARS-CoV-2) disease  12/19 will need skin care to continue Noninvasive Positive Pressure Ventilation   12/20 tolerating vapotherm and face mask  12/21 alternate Vapotherm and BiPAP.  Keep O2 sat above 90%.  Avoid fluid overload.  Continue IV Rocephin and doxycycline IV Decadron.  Continue airborne isolation precaution.       Critical Care Daily Checklist:    A: Awake: RASS Goal/Actual Goal:    Actual: Claire Agitation Sedation Scale (RASS): Alert and calm   B: Spontaneous Breathing Trial Performed?     C: SAT & SBT Coordinated?  Not intubated                      D: Delirium: CAM-ICU Overall CAM-ICU: Negative   E: Early Mobility Performed? No   F: Feeding Goal:    Status:     Current Diet Order   Procedures    Diet diabetic [facility]; 2000 Calorie; Cardiac (Low Na/Chol)     Add BOOST     Order Specific Question:   Indicate patient location for additional diet options:     Answer:   [facility]     Order Specific Question:   Total calories:     Answer:   2000 Calorie     Order Specific Question:   Additional Diet Options:     Answer:   Cardiac (Low Na/Chol)      AS: Analgesia/Sedation Not sedated   T: Thromboembolic Prophylaxis On p.o. apixaban   H: HOB > 300 Yes   U: Stress Ulcer Prophylaxis (if needed) On famotidine   G: Glucose Control Fingerstick q.6 hours.  SSI   B: Bowel Function Stool Occurrence: 0   I: Indwelling Catheter (Lines & Anaya) Necessity Anaya critically ill.   D: De-escalation of  Antimicrobials/Pharmacotherapies Rocephin and doxycycline.  Awaiting blood culture id and sensitivity.    Plan for the day/ETD Y    Code Status:  Family/Goals of Care: DNR       Critical Care Time: 35 minutes  Critical secondary to Patient has a condition that poses threat to life and bodily function:  COVID-19 PNEUMONIA/RESPIRATORY FAILURE      Critical care was time spent personally by me on the following activities: development of treatment plan with patient or surrogate and bedside caregivers, discussions with consultants, evaluation of patient's response to treatment, examination of patient, ordering and performing treatments and interventions, ordering and review of laboratory studies, ordering and review of radiographic studies, pulse oximetry, re-evaluation of patient's condition. This critical care time did not overlap with that of any other provider or involve time for any procedures.     Radha Richards MD  Critical Care Medicine  [facility]

## 2020-12-21 NOTE — ASSESSMENT & PLAN NOTE
12/19 will need skin care to continue Noninvasive Positive Pressure Ventilation   12/20 tolerating vapotherm and face mask  12/21 alternate Vapotherm and BiPAP.  Keep O2 sat above 90%.  Avoid fluid overload.  Continue IV Rocephin and doxycycline IV Decadron.  Continue airborne isolation precaution.

## 2020-12-21 NOTE — CONSULTS
Advance Care Planning    Consult Note  Palliative Medicine      Consult Requested By: Dr. Silva  Reason for Consult: Goals of care    SUBJECTIVE:     History of Present Illness:  Karan Aburto is a 75 y.o. year old with a history of HTN, CAD, and hypothyroidism who presented to the emergency department complaining of shortness of breath.  Initial workup revealed tachypnea, borderline oxygenation with non rebreather mask, and COVID infection.  He was admitted for closer monitoring.  He was alternating between Vapotherm and BiPAP however began to have episodes of hypoxia with Vapotherm so transferred to the ICU for closer monitoring.  There have been multiple code status conversations with Mr. Aburto and his son present yesterday when it was again discussed. Mr. Kan has continued to express his desire to not be intubated or resuscitated in the event of cardiopulmonary arrest or decline.  He remains in the ICU alternating Vapotherm and BiPAP however the time he is able to tolerate Vapotherm has began to slowly decrease.  Over the weekend the family expressed their desire to discuss options going forward so Palliative Medicine was consulted to discuss goals of care conversation.  I spoke to Mr. Aburto's son Mac by phone this morning.  He was very direct and to the point and understood that his father's code status indicated that we would not escalate to intubation should his respiratory status decline.  He understood that he is remaining relatively stable on Vapotherm and BiPAP with no other end-organ damage at this time.  We discussed the possible trajectories that accompany COVID pneumonia which include improvement and slow weaning of oxygen requirements verses continue decline leading to BiPAP dependence.  In light of his father's code status we discussed how comfort care with defer from critical care should his respiratory status declined further.  We also discussed the possibility of multiorgan failure requiring  hemodialysis however did not recommend initiating this measure if his respiratory status remained tenuous given the poor prognostic indicator.  We also discussed how comfort care would be delivered including symptomatic management and BiPAP removal with the family at bedside to say their goodbyes.  Mac expressed understanding of these options and had no other questions at this time.  He is planning to continue to assess day-by-day and will await direction from the ICU and primary teams as to if a decision needs to be made.  He is open to continue Palliative Medicine follow up and support.      Past Medical History:   Diagnosis Date    Abnormal liver enzymes 10/18/2020    Coronary artery disease     Elevated PSA     HLD (hyperlipidemia)     Hypertension     Leg swelling 10/18/2020    Leukocytosis 10/18/2020    Metabolic alkalosis 10/20/2020    New onset type 2 diabetes mellitus 10/21/2020    Pneumonia 11/5/2020    Rosacea     Syncope 11/5/2020    Thrombocytopenia 10/30/2020    Thyroid disease     hypothyroidism     No past surgical history on file.  Family History   Problem Relation Age of Onset    Hypertension Mother     Hypothyroidism Mother     Hypertension Father      Social History     Socioeconomic History    Marital status:      Spouse name: Not on file    Number of children: Not on file    Years of education: Not on file    Highest education level: Not on file   Occupational History    Not on file   Social Needs    Financial resource strain: Not on file    Food insecurity     Worry: Not on file     Inability: Not on file    Transportation needs     Medical: Not on file     Non-medical: Not on file   Tobacco Use    Smoking status: Never Smoker    Smokeless tobacco: Never Used   Substance and Sexual Activity    Alcohol use: Never     Frequency: Never    Drug use: Not on file    Sexual activity: Not on file   Lifestyle    Physical activity     Days per week: Not on file      Minutes per session: Not on file    Stress: Not on file   Relationships    Social connections     Talks on phone: Not on file     Gets together: Not on file     Attends Hoahaoism service: Not on file     Active member of club or organization: Not on file     Attends meetings of clubs or organizations: Not on file     Relationship status: Not on file   Other Topics Concern    Not on file   Social History Narrative    Not on file      Review of patient's allergies indicates:  No Known Allergies    Medications:    Current Facility-Administered Medications:     acetaminophen tablet 650 mg, 650 mg, Oral, Q4H PRN, NURIA GilesP-C, 650 mg at 12/16/20 2322    acetaminophen tablet 650 mg, 650 mg, Oral, Q8H PRN, NURIA GilesP-C    albuterol-ipratropium 2.5 mg-0.5 mg/3 mL nebulizer solution 3 mL, 3 mL, Nebulization, Q6H PRN, JAIDEN WooP-BC, 3 mL at 12/21/20 0030    apixaban tablet 2.5 mg, 2.5 mg, Oral, BID, Tracy Chavez MD, 2.5 mg at 12/21/20 0834    ascorbic acid (vitamin C) tablet 500 mg, 500 mg, Oral, BID, JORGE Giles-C, 500 mg at 12/21/20 0834    aspirin chewable tablet 81 mg, 81 mg, Oral, Daily, Tracy Chavez MD, 81 mg at 12/21/20 0834    cefTRIAXone (ROCEPHIN) 2 g/50 mL D5W IVPB, 2 g, Intravenous, Q24H, Kurtis Silva MD, Last Rate: 50 mL/hr at 12/20/20 1410, 2 g at 12/20/20 1410    dexamethasone injection 4 mg, 4 mg, Intravenous, Q24H, Kurtis Silva MD, 4 mg at 12/21/20 0834    dextrose 50% injection 12.5 g, 12.5 g, Intravenous, PRN, JORGE Giles-C    dextrose 50% injection 25 g, 25 g, Intravenous, PRN, CORETTA Giles    docusate 50 mg/5 mL liquid 100 mg, 100 mg, Oral, Daily, Kurtis Silva MD, 100 mg at 12/20/20 1410    doxycycline (VIBRAMYCIN) 100mg in dextrose 5% 250 mL IVPB (ready to mix), 100 mg, Intravenous, Q12H, Kurtis Silva MD, Last Rate: 250 mL/hr at 12/21/20 0350, 100 mg at 12/21/20 0350    famotidine tablet 20 mg,  20 mg, Oral, BID, Tracy Chavez MD    fenofibrate tablet 160 mg, 160 mg, Oral, Daily, JORGE Giles-C, 160 mg at 12/21/20 0835    furosemide injection 20 mg, 20 mg, Intravenous, BID, Tracy Chavez MD, 20 mg at 12/21/20 0835    glucagon (human recombinant) injection 1 mg, 1 mg, Intramuscular, PRN, JORGE Giles-MILE    glucose chewable tablet 16 g, 16 g, Oral, PRN, NURIA GilesP-C, 16 g at 12/20/20 1117    glucose chewable tablet 24 g, 24 g, Oral, PRN, JORGE Giles-C    hydrALAZINE tablet 50 mg, 50 mg, Oral, TID, Kurtis Silva MD, 50 mg at 12/21/20 0834    insulin aspart U-100 pen 1-10 Units, 1-10 Units, Subcutaneous, QID (AC + HS) PRN, Tracy Chavez MD, 2 Units at 12/19/20 2121    ipratropium-albuteroL inhaler 2 puff, 2 puff, Inhalation, Q6H, 2 puff at 12/21/20 0825 **AND** MDI Q6H, , , Q6H, Tracy Chavez MD    isosorbide mononitrate 24 hr tablet 30 mg, 30 mg, Oral, Daily, JORGE Giles-MILE, 30 mg at 12/21/20 0835    lisinopriL tablet 2.5 mg, 2.5 mg, Oral, Daily, Tracy Chavez MD, 2.5 mg at 12/21/20 0834    lorazepam (ATIVAN) injection 1 mg, 1 mg, Intravenous, Q4H PRN, Kurtis Silva MD    melatonin tablet 6 mg, 6 mg, Oral, Nightly PRN, JORGE Giles-C, 6 mg at 12/19/20 2121    morphine injection 2 mg, 2 mg, Intravenous, Q2H PRN, Kurtis Silva MD    multivitamin tablet, 1 tablet, Oral, Daily, CORETTA Giles, 1 tablet at 12/21/20 0835    mupirocin 2 % ointment, , Nasal, BID, Tracy Chavez MD    ondansetron disintegrating tablet 8 mg, 8 mg, Oral, Q8H PRN, CORETTA Giles    rosuvastatin tablet 10 mg, 10 mg, Oral, QHS, Tracy Chavez MD, 10 mg at 12/20/20 2029    sodium chloride 0.9% flush 10 mL, 10 mL, Intravenous, PRN, CORETTA Giles    tiZANidine tablet 4 mg, 4 mg, Oral, BID, CORETTA Giles, 4 mg at 12/21/20 0834    vitamin D 1000 units tablet 1,000 Units, 1,000 Units,  Oral, Daily, Shila Wilkins, FNP-C, 1,000 Units at 12/21/20 0834    ROS:  Review of Systems   Unable to perform ROS: Severe respiratory distress       OBJECTIVE:     Physical Exam:  Vitals: Temp: 98.3 °F (36.8 °C) (12/21/20 0701)  Pulse: 89 (12/21/20 1100)  Resp: (!) 24 (12/21/20 1100)  BP: 114/75 (12/21/20 1100)  SpO2: (!) 92 % (12/21/20 1100)    Patient not physically examined due to COVID + status but conducted thorough chart review and discussion with team. I did monitor him through the ICU window and noted accessory muscle usage after being placed back on BiPAP but otherwise calm and stable VS.    Review of Symptoms              Living Arrangements:  Lives in assisted living    Advance Directives:   Living Will: No    [place]: No    Do Not Resuscitate Status: Yes        Oral Declaration: Yes  Witnesses:  Dr. Chavez   Agent's Name:  Son Mac Aburto   Agent's Contact Number:  [phone]    Decision Making:  Family answered questions and Patient unable to communicate due to disease severity/cognitive impairment      Labs:  WBC   Date Value Ref Range Status   12/21/2020 4.61 3.90 - 12.70 K/uL Final     Hemoglobin   Date Value Ref Range Status   12/21/2020 8.2 (L) 14.0 - 18.0 g/dL Final     Hematocrit   Date Value Ref Range Status   12/21/2020 27.4 (L) 40.0 - 54.0 % Final     MCV   Date Value Ref Range Status   12/21/2020 97 82 - 98 fL Final     Platelets   Date Value Ref Range Status   12/21/2020 234 150 - 350 K/uL Final       Lab Results   Component Value Date     12/21/2020    K 3.6 12/21/2020     12/21/2020    CO2 33 (H) 12/21/2020    BUN 49 (H) 12/21/2020    CREATININE 1.0 12/21/2020    CALCIUM 7.7 (L) 12/21/2020    ANIONGAP 8 12/21/2020    ESTGFRAFRICA >60 12/21/2020    EGFRNONAA >60 12/21/2020       Lab Results   Component Value Date    AST 77 (H) 12/20/2020    ALKPHOS 218 (H) 12/20/2020    BILITOT 0.5 12/20/2020       Albumin   Date Value Ref Range Status   12/20/2020 1.7 (L) 3.5 - 5.2 g/dL  Final       Radiology:I have reviewed all pertinent imaging results/findings within the past 24 hours.  - CXR 12/15/20:  Interval development of moderate bilateral patchy infiltrate.  Findings are nonspecific but could be related to atypical pneumonia.    - CXR 12/19/20:  Bilateral pulmonary infiltrates which appear to have worsened somewhat from 12/15/2020.    ASSESSMENT   Karan Aburto is a 75 y.o. year old with a history of HTN, CAD, and hypothyroidism who presented to the emergency department complaining of shortness of breath.  Initial workup revealed tachypnea, borderline oxygenation with non rebreather mask, and COVID infection.  He was admitted for closer monitoring.  He was alternating between Vapotherm and BiPAP however began to have episodes of hypoxia with Vapotherm so transferred to the ICU for closer monitoring.  There have been multiple code status conversations with Mr. Aburto and his son present yesterday when it was again discussed. Mr. Kan has continued to express his desire to not be intubated or resuscitated in the event of cardiopulmonary arrest or decline.  He remains in the ICU alternating Vapotherm and BiPAP however the time he is able to tolerate Vapotherm has began to slowly decrease.  Over the weekend the family expressed their desire to discuss options going forward so Palliative Medicine was consulted to discuss goals of care conversation.    PLAN   1. Encounter for Palliative Care  - Code status: DNR/ DNI  - Surrogate: yenny Aburto per conversations with patient and attending yesterday  - Details of meeting in [facility]  - Primary outcome of meeting was to discuss options going forward. We discussed the hope of improvement and oxygen weaning as well as worse case scenario of worsening respiratory failure. I explained how comfort care differs from his current care and how symptomatic management would be delivered. We also discussed timing which is likely minutes to hours if BiPAP removal and  redirection were the selected.  - I will continue to follow along    2.  Acute hypoxic respiratory failure secondary to COVID pneumonia  - Alternating between Vapotherm and BiPAP, the time he is able to remain on Vapotherm is starting to decrease which is not a good prognostic indicator  - Defer to ICU team      Discussed case and visit details with Dr. Silva, Zaira Hay NP, Dr. Chavez.     Thank you for allowing Palliative Medicine to be involved in the care of Karan Aburto.         Medical decision making: HIGH based on high risk of death, untreated symptoms, high risk medications, poor prognosis, deescalating treatments, management of more than one chronic illness in exacerbation     16 min ACP time spent discussing: code status, assessed patient specific goals and addressed the best way to achieve them, coordination of care and emotional support, formulating and communicating prognosis, exploring burden/ benefit of various approaches of treatment [id number]      Maya Rodriguez PA-C  Palliative Medicine

## 2020-12-21 NOTE — PROGRESS NOTES
Care assumed.. Pt awake and follows commands. VSS. Denies lora. Oral care done. Call light in hand. Oral care and medications given. bipap off with vapo therm in place. Assisted with breakfast. POC with pt.

## 2020-12-21 NOTE — SUBJECTIVE & OBJECTIVE
Interval History:   12/21 ON BIPAP FIO2 70% O2 SAT 96%.  Afebrile positive 3 L since admission.  Chest x-ray reviewed.    Review of Systems   Unable to perform ROS: Acuity of condition         Objective:     Vital Signs (Most Recent):  Temp: 98.3 °F (36.8 °C) (12/21/20 0701)  Pulse: 91 (12/21/20 1000)  Resp: 19 (12/21/20 1000)  BP: (!) 144/84 (12/21/20 1000)  SpO2: 96 % (12/21/20 1000) Vital Signs (24h Range):  Temp:  [97.1 °F (36.2 °C)-98.5 °F (36.9 °C)] 98.3 °F (36.8 °C)  Pulse:  [70-99] 91  Resp:  [16-55] 19  SpO2:  [88 %-100 %] 96 %  BP: (115-193)/() 144/84     Weight: 87.1 kg (192 lb 0.3 oz)  Body mass index is 27.55 kg/m².      Intake/Output Summary (Last 24 hours) at 12/21/2020 1040  Last data filed at 12/21/2020 1000  Gross per 24 hour   Intake 1812.9 ml   Output 1815 ml   Net -2.1 ml       Physical Exam  Vitals signs and nursing note reviewed.   Constitutional:       General: He is in acute distress.      Appearance: He is well-developed. He is ill-appearing and toxic-appearing.   HENT:      Head: Normocephalic and atraumatic.   Neck:      Musculoskeletal: Neck supple.   Cardiovascular:      Rate and Rhythm: Normal rate.   Pulmonary:      Effort: Respiratory distress present.      Breath sounds: No stridor.   Abdominal:      Palpations: Abdomen is soft.      Tenderness: There is no abdominal tenderness.   Genitourinary:     Comments: Anaya in place  Musculoskeletal:         General: No deformity.   Skin:     General: Skin is warm.      Findings: Bruising present.   Neurological:      General: No focal deficit present.      Mental Status: He is alert.         Vents:  Oxygen Concentration (%): 70 (12/21/20 1000)    Lines/Drains/Airways     Drain                 Urethral Catheter 12/19/20 1215 1 day          Peripheral Intravenous Line                 Peripheral IV - Single Lumen 12/15/20 1717 20 G Right Wrist 5 days         Peripheral IV - Single Lumen 12/20/20 1830 20 G Anterior;Right;Proximal Forearm  less than 1 day                Significant Labs:    CBC/Anemia Profile:  Recent Labs   Lab 12/19/20  1543 12/20/20  0405 12/21/20  0337   WBC  --  5.47 4.61   HGB  --  8.2* 8.2*   HCT  --  27.7* 27.4*   PLT  --  239 234   MCV  --  100* 97   RDW  --  17.3* 17.6*   FERRITIN 3,174*  --   --         Chemistries:  Recent Labs   Lab 12/20/20  0405 12/21/20  0337    142   K 4.0 3.6    101   CO2 33* 33*   BUN 63* 49*   CREATININE 1.1 1.0   CALCIUM 8.0* 7.7*   ALBUMIN 1.7*  --    PROT 4.5*  --    BILITOT 0.5  --    ALKPHOS 218*  --    ALT 46*  --    AST 77*  --    MG 2.4 2.2   PHOS 2.6*  --      12/19/2020 blood culture x2    GRAM NEGATIVE JOSEPH   Identification pending   For susceptibility see order #[id number]   Abnormal        ENTEROCOCCUS SPECIES   Identification pending   For susceptibility see order #[id number]       Chest x-ray 12/19/2020    Diffuse bilateral pulmonary infiltrates noted most pronounced in the upper lung zones.  These findings may have worsened somewhat from 12/15/2020.     Nuclear stress test November 2020      This is a normal myocardioal perfusion scan    Gated perfusion images showed an ejection fraction of 59% at rest and 60% post stress.    The EKG portion of this study is negative for ischemia

## 2020-12-21 NOTE — ASSESSMENT & PLAN NOTE
--likely 2/2 covid  --PT/OT consulted    Appears a little better  Will give inj B12 IM plus Celebrex 100 mg    Persists- will continue with B12 IM  May need Ritalin     Very weak, will resume PT/OT tomorrow  Try Inj B12 IM plus Venofer and    12/21- continue PT/OT

## 2020-12-21 NOTE — PLAN OF CARE
AAO patient on 70% bipap. O2 sats <80%  quickly upon removal of bipap mask. Patient tolerated PO meds with sips of water overnight. VSS. Patient refused Boost/juice overnight with labile BG. BG 130s overnight. 70s-80s this am. Anaya in place with  cc hrly. Foam dressings in place. Positioned/repositioned self independently in bed. Generalized weakness noted. Heel boots in place.

## 2020-12-21 NOTE — PROGRESS NOTES
"New consult noted on this 76 y/o M patient due to skin breakdown to nose. Patient has been on BIPAP due to COVID pneumonia. On assessment, he is currently on "under nose" BIPAP mask. Blanchable redness noted to nasal bridge, no breakdown. Painted with cavilon and left KAITLIN. Recommend continue under nose mask. Turned to left side. Stage 3 pressure injury again noted to coccyx with moist red and yellow subcutaneous tissue to wound bed, foam dressing maintained. Heels intact with no redness. Turned with foam wedge on left side at this time. Recommend continued care per orders, will follow.   "

## 2020-12-21 NOTE — ASSESSMENT & PLAN NOTE
12/20 WBC normal , awaiting final report , does not clinically appear septic, may be contaminant. Continue ceftriaxone  1221 awaiting identification and sensitivity.  Continue Rocephin and doxycycline.  Afebrile.  WBC stable

## 2020-12-21 NOTE — PROGRESS NOTES
[facility] Hospital Medicine  Progress Note    Patient Name: Karan Aburto  MRN: [id number]  Patient Class: IP- Inpatient   Admission Date: 12/15/2020  Length of Stay: 5 days  Attending Physician: Tracy Chavez MD  Primary Care Provider: Mickey Agrawal MD        Subjective:     Principal Problem:COVID-19        HPI:  76 y/o male with PMHx of HTN, HLD, CAD, and hypothyroidism who presented to the ED with c/o SOB that onset gradually earlier today. Symptoms are constant and moderate in severity. No mitigating or exacerbating factors reported. Associated symptoms include fever, cough, BLE edema, and weakness. Pt denies HA, dizziness, CP, palpitations, loss of smell, loss of taste, N/V/D, and all other symptoms at this time.  ED workup shows: RR 38, oxygen sat 91% on non-rebreather mask. H/H 8.9/28.6, K+ 3.4, , , , Troponin 0.250  He is a DNR and his SDM is his son, Mac.  He will be kept on OBS under the care of hospital medicine.      Overview/Hospital Course:  Patietn 76 yo male admitted to hospital with COVIFD 19 Acute Hypoxic Respiratory failure / Pneumonia. Patient initiated on Remdesivir, steroids, Vapotherm. Patient communicative and expressed he doesn't want to go through this. Patietn confirmed he is DNR. Patietn denies CP + SOB / Cough  12/18- pt looks and feels better although Oxygenation very poor, unable come off bipap this am as Sats dropped to 82% on Vapotherm, he is otherwise fully alert, Renal function worsening with Bun/Cr 74/1.7, CXR diffuse infiltrates B, K 3.1- being replaced. Pt is getting Dexa and IV remdesivir without any improvement in O2, BS very high despite SSI- will start Levemir 10 BID. Will contact family and update them about his condition.   12/19- pt transferred to ICU for closer monitoring as resp status deteriorated despite being on Bipap/ 65% for three days. Pt has developed pressure ulcer on the nose due to Bipap- hence changed to Vapotherm  40 L/100% fio2 with improvement in Oxygenation, remains hemodynamically stable. H/H stable at 8/24, Ferritin still rising to 3300, while CRP down a little to 61, LDH also rising to 992. Bun?Cr improving, urine output steady. Couldn't get PT/OT due to Bipap. Condition remains critical with guarded prognosis.    12/20- looks better, comfortable, was on Bipap all night, just placed back on Vapotherm 40 L/100%, he is about 5 L fluid Positive, Bun/Cr 63/1.1- so given a dose of IV lasix 20 mg to help with Oxygenation. He has completed the course of Remdesivir but still on Decadron. Pt seen together with his son, Mac, who was updated about his condition and prognosis before entering the pt's room. Pt was AAOx3, speech clear, and asked in front of his son about resuscitation in case of deterioration or cardiac or resp arrest- he clearly said DNR, he does not want any intubation or mechanical ventilation. DNR ordered. Blood Cx  From 12/19 growing Strep and possibly Bacteroides- on Rocephin plus Doxy.  12/21-Palliative care consult appreciated. looks the same but gradually declining. He maintained Sats of 96% on Bipap 70% overnight and was switched to Vapotherm 40L/100% this am but sats dropped to 92%, pt started getting tired- hence switched back to Bipap after a couple of hours- maintaining Sats 97 bipap 70% again. Blood Cx from 12/19- growing Klebsiella Pneumonia and Enterococcus fecalis- on Rocephin. H/H stable. Put out about 2 L of urine yesterday and lasix 20 BID continued. Getting PT/OT.     Interval History: -Palliative care consult appreciated. looks the same but gradually declining. He maintained Sats of 96% on Bipap 70% overnight and was switched to Vapotherm 40L/100% this am but sats dropped to 92%, pt started getting tired- hence switched back to Bipap after a couple of hours- maintaining Sats 97 bipap 70% again. Blood Cx from 12/19- growing Klebsiella Pneumonia and Enterococcus fecalis- on Rocephin. H/H stable.  Put out about 2 L of urine yesterday and lasix 20 BID continued. Getting PT/OT.     Review of Systems  Objective:     Vital Signs (Most Recent):  Temp: 98.3 °F (36.8 °C) (12/21/20 1200)  Pulse: 86 (12/21/20 1439)  Resp: 16 (12/21/20 1439)  BP: (!) 140/88 (12/21/20 1400)  SpO2: 95 % (12/21/20 1439) Vital Signs (24h Range):  Temp:  [97.2 °F (36.2 °C)-98.5 °F (36.9 °C)] 98.3 °F (36.8 °C)  Pulse:  [70-97] 86  Resp:  [16-33] 16  SpO2:  [88 %-100 %] 95 %  BP: (114-163)/() 140/88     Weight: 87.1 kg (192 lb 0.3 oz)  Body mass index is 27.55 kg/m².    Intake/Output Summary (Last 24 hours) at 12/21/2020 1743  Last data filed at 12/21/2020 1400  Gross per 24 hour   Intake 861.1 ml   Output 2095 ml   Net -1233.9 ml      Physical Exam    Significant Labs:   Blood Culture:   BMP:   Recent Labs   Lab 12/21/20  0337   GLU 77      K 3.6      CO2 33*   BUN 49*   CREATININE 1.0   CALCIUM 7.7*   MG 2.2     CBC:   Recent Labs   Lab 12/20/20  0405 12/21/20  0337   WBC 5.47 4.61   HGB 8.2* 8.2*   HCT 27.7* 27.4*    234     CMP:   Recent Labs   Lab 12/20/20  0405 12/21/20  0337    142   K 4.0 3.6    101   CO2 33* 33*   * 77   BUN 63* 49*   CREATININE 1.1 1.0   CALCIUM 8.0* 7.7*   PROT 4.5*  --    ALBUMIN 1.7*  --    BILITOT 0.5  --    ALKPHOS 218*  --    AST 77*  --    ALT 46*  --    ANIONGAP 6* 8   EGFRNONAA >60 >60     Pathology Results  (Last 10 years)    None        POCT Glucose:   Recent Labs   Lab 12/20/20  1748 12/20/20  2131 12/21/20  1142   POCTGLUCOSE 191* 139* 134*     All pertinent labs within the past 24 hours have been reviewed.    Significant Imaging: I have reviewed all pertinent imaging results/findings within the past 24 hours.      Assessment/Plan:      * Acute Viral Pneumonia COVID-19  - COVID-19 testing   - Infection Control notified     - Isolation:   - Airborne, Contact and Droplet Precautions  - Cohort patients into COVID units  - N95 mask, wear eye protection  - 20  second hand hygiene              - Limit visitors per hospital policy              - Consolidating lab draws, nursing care, provider visits, and interventions    - Diagnostics: (leukopenia, hyponatremia, hyperferritinemia, elevated troponin, elevated d-dimer, age, and comorbidities are significant predictors of poor clinical outcome)  CBC, CMP, Procalcitonin, Ferritin, CRP, LDH, BNP, Troponin, ECG, Rapid Flu, Blood Culture x2 and Portable CXR    - Management:  Supplemental O2 to maintain SpO2 >92%  Telemetry  Continuous/intermittent Pulse Ox  Albuterol treatment PRN   --Ipratropium inhaler with MIDI  --dexamethasone  --remdesivir  --monitor    Advance Care Planning   patient is a DNR and his SDM is his son, Mac     12/18- getting Dexa plus Remdersivir but still severely Hypoxemic-  Now on Bipap- O2 better to 92-94% still, cant wean down to Vapotherm  May go to ICU if deteriorates    12/19- transferred to ICU for closer monitoring- now on Vapotherm  Has pressure ulcer on nose from the Bipap  12/20- continued on Vapotherm and Bipap- will try some lasix  12/21- good urine output with lasix but Oxygenation has not really improved      Acute hypoxemic respiratory failure due to severe acute respiratory syndrome coronavirus 2 (SARS-CoV-2) disease  --supplemental oxygen to maintain sats   --albuterol and ipratropium via MIDI inhaler    12/18- was on Vapotherm yesterday and had to be placed on Bipap 100%, maitaining Sats around 92-94% between Bipap and vapotherm,     May need to go to ICU    O2 a little better on Vapotherm 100%  12/20- alternating btw Bipap and Vapotherm, try some Lasix  12/21- still struggling between Bipap and Vapotherm despite Lasix             Prognosis remains guarded to poor, getting weaker    Generalized weakness  --likely 2/2 covid  --PT/OT consulted    Appears a little better  Will give inj B12 IM plus Celebrex 100 mg    Persists- will continue with B12 IM  May need Ritalin     Very weak, will  resume PT/OT tomorrow  Try Inj B12 IM plus Venofer and    12/21- continue PT/OT      Blood culture positive for microorganism  Preliminary results noted  On Rocephin plus Doxycycline already     Microbiology Results (last 7 days)     Procedure Component Value Units Date/Time    Blood culture [[id number]]  (Abnormal) Collected: 12/19/20 1337    Order Status: Completed Specimen: Blood from Peripheral, Right Hand Updated: 12/21/20 1445     Blood Culture, Routine Gram stain aer bottle: Gram positive cocci in chains resembling Strep      Gram stain aer bottle: Gram negative rods      Gram stain tyler bottle: Gram positive cocci in chains resembling Strep      Gram stain tyler bottle: Gram negative rods       Results called to and read back by: Mona Mason RN 12/20/2020  10:22      KLEBSIELLA PNEUMONIAE  For susceptibility see order #[id number]        ENTEROCOCCUS FAECALIS  For susceptibility see order #[id number]      Blood culture [[id number]]  (Abnormal) Collected: 12/19/20 1331    Order Status: Completed Specimen: Blood from Peripheral, Left Hand Updated: 12/21/20 1444     Blood Culture, Routine Gram stain tyler bottle: Gram positive cocci in chains resembling Strep       Gram stain tyler bottle: Gram negative rods 12/20/2020  10:43        Positive results previously called      Gram stain aer bottle: Gram negative rods       Positive results previously called 12/21/2020  12:33      KLEBSIELLA PNEUMONIAE  Susceptibility pending        ENTEROCOCCUS FAECALIS  Susceptibility pending      Blood culture [[id number]] Collected: 12/21/20 0939    Order Status: Sent Specimen: Blood Updated: 12/21/20 0939    Urine culture [[id number]] Collected: 12/15/20 1716    Order Status: Completed Specimen: Urine Updated: 12/17/20 0801     Urine Culture, Routine Multiple organisms isolated. None in predominance.  Repeat if      clinically necessary.    Narrative:      Specimen Source->Urine        Antibiotics (From admission, onward)    Start      Stop Route Frequency Ordered    12/19/20 2100  mupirocin 2 % ointment      12/24 2059 Nasl 2 times daily 12/19/20 1221    12/19/20 1445  doxycycline (VIBRAMYCIN) 100mg in dextrose 5% 250 mL IVPB (ready to mix)      12/24 1444 IV Every 12 hours (non-standard times) 12/19/20 1344    12/19/20 1430  cefTRIAXone (ROCEPHIN) 2 g/50 mL D5W IVPB      12/24 1429 IV Every 24 hours (non-standard times) 12/19/20 1249          BPH with elevated PSA  --continue tamsulosin      Normocytic anemia  --at baseline  --monitor    Getting IV venofer but may need blood to improved O2 delivery     Hyperlipemia  --continue rosuvastatin  --cardiac diet      Essential hypertension  --continue carvedilol. Lisinopril, isosorbide, spironolactone  --cardiac diet    BP under control      Diabetes mellitus type 2, uncontrolled  --accuchecks with SSI  --diabetes educator consulted  --diabetic diet  --HA1C pending  --last HA1C 10/26/20 was 9.3    12/18- BS high, sec to Dexa  Will start Levemir 10 BID    BS improved with levemir but needs dose increased        VTE Risk Mitigation (From admission, onward)         Ordered     apixaban tablet 2.5 mg  2 times daily      12/20/20 1821     IP VTE HIGH RISK PATIENT  Once      12/15/20 1538     Place sequential compression device  Until discontinued      12/15/20 1538                Discharge Planning   ADAM:      Code Status: DNR   Is the patient medically ready for discharge?:     Reason for patient still in hospital (select all that apply): Patient unstable, Patient new problem, Patient trending condition, Laboratory test, Treatment, Consult recommendations and PT / OT recommendations  Discharge Plan A: Home Health, Assisted Living        Seen and discussed with Dr. Richards and the ICU team/ Palliative Care  Condition: Critical  Prognosis: Guarded to poor      Critical care time spent on the evaluation and treatment of severe organ dysfunction, review of pertinent labs and imaging studies, discussions with  consulting providers and discussions with patient/family: 47 minutes.      Tracy Chavez MD  Department of Hospital Medicine   [facility]

## 2020-12-21 NOTE — ASSESSMENT & PLAN NOTE
Preliminary results noted  On Rocephin plus Doxycycline already     Microbiology Results (last 7 days)     Procedure Component Value Units Date/Time    Blood culture [[id number]]  (Abnormal) Collected: 12/19/20 1337    Order Status: Completed Specimen: Blood from Peripheral, Right Hand Updated: 12/21/20 1445     Blood Culture, Routine Gram stain aer bottle: Gram positive cocci in chains resembling Strep      Gram stain aer bottle: Gram negative rods      Gram stain tyler bottle: Gram positive cocci in chains resembling Strep      Gram stain tyler bottle: Gram negative rods       Results called to and read back by: Mona Mason RN 12/20/2020  10:22      KLEBSIELLA PNEUMONIAE  For susceptibility see order #[id number]        ENTEROCOCCUS FAECALIS  For susceptibility see order #[id number]      Blood culture [[id number]]  (Abnormal) Collected: 12/19/20 1331    Order Status: Completed Specimen: Blood from Peripheral, Left Hand Updated: 12/21/20 1444     Blood Culture, Routine Gram stain tyler bottle: Gram positive cocci in chains resembling Strep       Gram stain tyler bottle: Gram negative rods 12/20/2020  10:43        Positive results previously called      Gram stain aer bottle: Gram negative rods       Positive results previously called 12/21/2020  12:33      KLEBSIELLA PNEUMONIAE  Susceptibility pending        ENTEROCOCCUS FAECALIS  Susceptibility pending      Blood culture [[id number]] Collected: 12/21/20 0939    Order Status: Sent Specimen: Blood Updated: 12/21/20 0939    Urine culture [[id number]] Collected: 12/15/20 1716    Order Status: Completed Specimen: Urine Updated: 12/17/20 0801     Urine Culture, Routine Multiple organisms isolated. None in predominance.  Repeat if      clinically necessary.    Narrative:      Specimen Source->Urine        Antibiotics (From admission, onward)    Start     Stop Route Frequency Ordered    12/19/20 2100  mupirocin 2 % ointment      12/24 2059 Nasl 2 times daily 12/19/20 1221     12/19/20 1445  doxycycline (VIBRAMYCIN) 100mg in dextrose 5% 250 mL IVPB (ready to mix)      12/24 1444 IV Every 12 hours (non-standard times) 12/19/20 1344    12/19/20 1430  cefTRIAXone (ROCEPHIN) 2 g/50 mL D5W IVPB      12/24 1429 IV Every 24 hours (non-standard times) 12/19/20 1249

## 2020-12-21 NOTE — ASSESSMENT & PLAN NOTE
--supplemental oxygen to maintain sats   --albuterol and ipratropium via MIDI inhaler    12/18- was on Vapotherm yesterday and had to be placed on Bipap 100%, maitaining Sats around 92-94% between Bipap and vapotherm,     May need to go to ICU    O2 a little better on Vapotherm 100%  12/20- alternating btw Bipap and Vapotherm, try some Lasix  12/21- still struggling between Bipap and Vapotherm despite Lasix             Prognosis remains guarded to poor, getting weaker

## 2020-12-22 NOTE — ASSESSMENT & PLAN NOTE
--accuchecks with SSI  --diabetes educator consulted  --diabetic diet  --HA1C pending  --last HA1C 10/26/20 was 9.3    12/18- BS high, sec to Dexa  Will start Levemir 10 BID    BS improved with levemir but needs dose increased    Poor oral intake

## 2020-12-22 NOTE — ASSESSMENT & PLAN NOTE
Preliminary results noted  On Rocephin plus Doxycycline already     Microbiology Results (last 7 days)     Procedure Component Value Units Date/Time    Blood culture [[id number]]  (Abnormal) Collected: 12/19/20 1337    Order Status: Completed Specimen: Blood from Peripheral, Right Hand Updated: 12/22/20 1401     Blood Culture, Routine Gram stain aer bottle: Gram positive cocci in chains resembling Strep      Gram stain aer bottle: Gram negative rods      Gram stain tyler bottle: Gram positive cocci in chains resembling Strep      Gram stain tyler bottle: Gram negative rods       Results called to and read back by: Mona Mason RN 12/20/2020  10:22      KLEBSIELLA PNEUMONIAE  For susceptibility see order #[id number]        ENTEROCOCCUS FAECALIS  For susceptibility see order #[id number]      Blood culture [[id number]]  (Abnormal)  (Susceptibility) Collected: 12/19/20 1331    Order Status: Completed Specimen: Blood from Peripheral, Left Hand Updated: 12/22/20 1401     Blood Culture, Routine Gram stain tyler bottle: Gram positive cocci in chains resembling Strep       Gram stain tyler bottle: Gram negative rods 12/20/2020  10:43        Positive results previously called      Gram stain aer bottle: Gram negative rods       Positive results previously called 12/21/2020  12:33      KLEBSIELLA PNEUMONIAE      ENTEROCOCCUS FAECALIS    Blood culture [[id number]] Collected: 12/21/20 0939    Order Status: Completed Specimen: Blood Updated: 12/22/20 0315     Blood Culture, Routine No Growth to date    Urine culture [[id number]] Collected: 12/15/20 1716    Order Status: Completed Specimen: Urine Updated: 12/17/20 0801     Urine Culture, Routine Multiple organisms isolated. None in predominance.  Repeat if      clinically necessary.    Narrative:      Specimen Source->Urine        Antibiotics (From admission, onward)    Start     Stop Route Frequency Ordered    12/22/20 1700  ampicillin-sulbactam 3 g in sodium chloride 0.9 % 100 mL IVPB  (ready to mix system)      -- IV Every 6 hours (non-standard times) 12/22/20 1423    12/19/20 2100  mupirocin 2 % ointment      12/24 2059 Nasl 2 times daily 12/19/20 1221        12/22- Afebrile, No Leukocytosis             Await ID input

## 2020-12-22 NOTE — PROGRESS NOTES
Bilateral soft wrist restraints ordered for pt safety per RN request. Video review done; d/w RN

## 2020-12-22 NOTE — ASSESSMENT & PLAN NOTE
--supplemental oxygen to maintain sats   --albuterol and ipratropium via MIDI inhaler    12/18- was on Vapotherm yesterday and had to be placed on Bipap 100%, maitaining Sats around 92-94% between Bipap and vapotherm,     May need to go to ICU    O2 a little better on Vapotherm 100%  12/20- alternating btw Bipap and Vapotherm, try some Lasix  12/21- still struggling between Bipap and Vapotherm despite Lasix             Prognosis remains guarded to poor, getting weaker  12/22- looks weaker, remains on Bipap

## 2020-12-22 NOTE — PROGRESS NOTES
[facility] Hospital Medicine  Progress Note    Patient Name: Karan Aburto  MRN: [id number]  Patient Class: IP- Inpatient   Admission Date: 12/15/2020  Length of Stay: 6 days  Attending Physician: Tracy Chavez MD  Primary Care Provider: Mickey Agrawal MD        Subjective:     Principal Problem:COVID-19        HPI:  74 y/o male with PMHx of HTN, HLD, CAD, and hypothyroidism who presented to the ED with c/o SOB that onset gradually earlier today. Symptoms are constant and moderate in severity. No mitigating or exacerbating factors reported. Associated symptoms include fever, cough, BLE edema, and weakness. Pt denies HA, dizziness, CP, palpitations, loss of smell, loss of taste, N/V/D, and all other symptoms at this time.  ED workup shows: RR 38, oxygen sat 91% on non-rebreather mask. H/H 8.9/28.6, K+ 3.4, , , , Troponin 0.250  He is a DNR and his SDM is his son, Mac.  He will be kept on OBS under the care of hospital medicine.      Overview/Hospital Course:  Patietn 76 yo male admitted to hospital with COVIFD 19 Acute Hypoxic Respiratory failure / Pneumonia. Patient initiated on Remdesivir, steroids, Vapotherm. Patient communicative and expressed he doesn't want to go through this. Patietn confirmed he is DNR. Patietn denies CP + SOB / Cough  12/18- pt looks and feels better although Oxygenation very poor, unable come off bipap this am as Sats dropped to 82% on Vapotherm, he is otherwise fully alert, Renal function worsening with Bun/Cr 74/1.7, CXR diffuse infiltrates B, K 3.1- being replaced. Pt is getting Dexa and IV remdesivir without any improvement in O2, BS very high despite SSI- will start Levemir 10 BID. Will contact family and update them about his condition.   12/19- pt transferred to ICU for closer monitoring as resp status deteriorated despite being on Bipap/ 65% for three days. Pt has developed pressure ulcer on the nose due to Bipap- hence changed to Vapotherm  40 L/100% fio2 with improvement in Oxygenation, remains hemodynamically stable. H/H stable at 8/24, Ferritin still rising to 3300, while CRP down a little to 61, LDH also rising to 992. Bun?Cr improving, urine output steady. Couldn't get PT/OT due to Bipap. Condition remains critical with guarded prognosis.    12/20- looks better, comfortable, was on Bipap all night, just placed back on Vapotherm 40 L/100%, he is about 5 L fluid Positive, Bun/Cr 63/1.1- so given a dose of IV lasix 20 mg to help with Oxygenation. He has completed the course of Remdesivir but still on Decadron. Pt seen together with his son, Mac, who was updated about his condition and prognosis before entering the pt's room. Pt was AAOx3, speech clear, and asked in front of his son about resuscitation in case of deterioration or cardiac or resp arrest- he clearly said DNR, he does not want any intubation or mechanical ventilation. DNR ordered. Blood Cx  From 12/19 growing Strep and possibly Bacteroides- on Rocephin plus Doxy.  12/21-Palliative care consult appreciated. looks the same but gradually declining. He maintained Sats of 96% on Bipap 70% overnight and was switched to Vapotherm 40L/100% this am but sats dropped to 92%, pt started getting tired- hence switched back to Bipap after a couple of hours- maintaining Sats 97 bipap 70% again. Blood Cx from 12/19- growing Klebsiella Pneumonia and Enterococcus fecalis- on Rocephin. H/H stable. Put out about 2 L of urine yesterday and lasix 20 BID continued. Getting PT/OT.   12/22-  Looks weaker, a little more work or breathing, was intermittently confused last night, pulling off his BIPAP, placed on Vaoptherm briefly for moral meds but most remained on Bipap 70%. Sips of water. Refused meals. Good urine output with Lasix, now only about 1600 cc fluid positive, D Dimer rising to 10, hence Eliquis dose increased to 5 bid. Blood Cx Positive- will get ID consult.     Interval History: Looks weaker, a  little more work or breathing, was intermittently confused last night, pulling off his BIPAP, placed on Vaoptherm briefly for moral meds but most remained on Bipap 70%. Sips of water. Refused meals. Good urine output with Lasix, now only about 1600 cc fluid positive, D Dimer rising to 10, hence Eliquis dose increased to 5 bid. Blood Cx Positive- will get ID consult.     Review of Systems   Constitutional: Positive for activity change, appetite change and fatigue. Negative for diaphoresis.   HENT: Negative.    Respiratory: Positive for cough and shortness of breath. Negative for chest tightness and wheezing.    Cardiovascular: Negative for chest pain.   Gastrointestinal: Negative.    Endocrine: Negative.    Genitourinary: Negative.    Musculoskeletal: Positive for arthralgias.   Allergic/Immunologic: Negative.    Neurological: Positive for weakness.   Hematological: Negative.    Psychiatric/Behavioral: The patient is nervous/anxious.      Objective:     Vital Signs (Most Recent):  Temp: 96.7 °F (35.9 °C) (12/22/20 1600)  Pulse: 107 (12/22/20 1700)  Resp: (!) 26 (12/22/20 1700)  BP: (!) 154/79 (12/22/20 1700)  SpO2: (!) 90 % (12/22/20 1700) Vital Signs (24h Range):  Temp:  [96.7 °F (35.9 °C)-97.7 °F (36.5 °C)] 96.7 °F (35.9 °C)  Pulse:  [] 107  Resp:  [14-37] 26  SpO2:  [76 %-97 %] 90 %  BP: ()/() 154/79     Weight: 87.2 kg (192 lb 3.9 oz)  Body mass index is 27.58 kg/m².    Intake/Output Summary (Last 24 hours) at 12/22/2020 1739  Last data filed at 12/22/2020 1700  Gross per 24 hour   Intake 960 ml   Output 2834 ml   Net -1874 ml      Physical Exam  Vitals signs and nursing note reviewed.   Constitutional:       General: He is in acute distress.      Appearance: He is well-developed. He is ill-appearing and toxic-appearing.   HENT:      Head: Normocephalic and atraumatic.   Neck:      Musculoskeletal: Neck supple.   Cardiovascular:      Rate and Rhythm: Normal rate.   Pulmonary:      Effort:  Respiratory distress present.      Breath sounds: No stridor.   Abdominal:      Palpations: Abdomen is soft.      Tenderness: There is no abdominal tenderness.   Genitourinary:     Comments: Anaya in place  Musculoskeletal:         General: No deformity.   Skin:     General: Skin is warm.      Findings: Bruising present.   Neurological:      General: No focal deficit present.      Mental Status: He is alert.         Significant Labs:   ABGs:   Blood Culture:   Recent Labs   Lab 12/21/20  0939   LABBLOO No Growth to date     BMP:   Recent Labs   Lab 12/22/20  0411   *      K 3.7      CO2 32*   BUN 40*   CREATININE 1.0   CALCIUM 7.7*   MG 2.1     CBC:   Recent Labs   Lab 12/21/20  0337 12/22/20  0411   WBC 4.61 4.20   HGB 8.2* 8.2*   HCT 27.4* 26.4*    193     CMP:   Recent Labs   Lab 12/21/20  0337 12/22/20  0411    144   K 3.6 3.7    103   CO2 33* 32*   GLU 77 172*   BUN 49* 40*   CREATININE 1.0 1.0   CALCIUM 7.7* 7.7*   ANIONGAP 8 9   EGFRNONAA >60 >60     Coagulation:   Magnesium:   Recent Labs   Lab 12/21/20  0337 12/22/20  0411   MG 2.2 2.1     POCT Glucose:   Recent Labs   Lab 12/22/20  0858 12/22/20  1148 12/22/20  1547   POCTGLUCOSE 160* 140* 196*     All pertinent labs within the past 24 hours have been reviewed.    Significant Imaging: I have reviewed all pertinent imaging results/findings within the past 24 hours.      Assessment/Plan:      * Acute Viral Pneumonia COVID-19  - COVID-19 testing   - Infection Control notified     - Isolation:   - Airborne, Contact and Droplet Precautions  - Cohort patients into COVID units  - N95 mask, wear eye protection  - 20 second hand hygiene              - Limit visitors per hospital policy              - Consolidating lab draws, nursing care, provider visits, and interventions    - Diagnostics: (leukopenia, hyponatremia, hyperferritinemia, elevated troponin, elevated d-dimer, age, and comorbidities are significant predictors of  poor clinical outcome)  CBC, CMP, Procalcitonin, Ferritin, CRP, LDH, BNP, Troponin, ECG, Rapid Flu, Blood Culture x2 and Portable CXR    - Management:  Supplemental O2 to maintain SpO2 >92%  Telemetry  Continuous/intermittent Pulse Ox  Albuterol treatment PRN   --Ipratropium inhaler with MIDI  --dexamethasone  --remdesivir  --monitor    Advance Care Planning   patient is a DNR and his SDM is his son, Mac     12/18- getting Dexa plus Remdersivir but still severely Hypoxemic-  Now on Bipap- O2 better to 92-94% still, cant wean down to Vapotherm  May go to ICU if deteriorates    12/19- transferred to ICU for closer monitoring- now on Vapotherm  Has pressure ulcer on nose from the Bipap  12/20- continued on Vapotherm and Bipap- will try some lasix  12/21- good urine output with lasix but Oxygenation has not really improved  12/22- cont Bipap, Lasix    Acute hypoxemic respiratory failure due to severe acute respiratory syndrome coronavirus 2 (SARS-CoV-2) disease  --supplemental oxygen to maintain sats   --albuterol and ipratropium via MIDI inhaler    12/18- was on Vapotherm yesterday and had to be placed on Bipap 100%, maitaining Sats around 92-94% between Bipap and vapotherm,     May need to go to ICU    O2 a little better on Vapotherm 100%  12/20- alternating btw Bipap and Vapotherm, try some Lasix  12/21- still struggling between Bipap and Vapotherm despite Lasix             Prognosis remains guarded to poor, getting weaker  12/22- looks weaker, remains on Bipap    Generalized weakness  --likely 2/2 covid  --PT/OT consulted    Appears a little better  Will give inj B12 IM plus Celebrex 100 mg    Persists- will continue with B12 IM  May need Ritalin     Very weak, will resume PT/OT tomorrow  Try Inj B12 IM plus Venofer and    12/21- continue PT/OT  12/22- too weak and sick for PT/OT      Encounter for palliative care  See Palliative note  He is DNR      Blood culture positive for microorganism  Preliminary results  noted  On Rocephin plus Doxycycline already     Microbiology Results (last 7 days)     Procedure Component Value Units Date/Time    Blood culture [[id number]]  (Abnormal) Collected: 12/19/20 1337    Order Status: Completed Specimen: Blood from Peripheral, Right Hand Updated: 12/22/20 1401     Blood Culture, Routine Gram stain aer bottle: Gram positive cocci in chains resembling Strep      Gram stain aer bottle: Gram negative rods      Gram stain tyler bottle: Gram positive cocci in chains resembling Strep      Gram stain tyler bottle: Gram negative rods       Results called to and read back by: Mona Mason RN 12/20/2020  10:22      KLEBSIELLA PNEUMONIAE  For susceptibility see order #[id number]        ENTEROCOCCUS FAECALIS  For susceptibility see order #[id number]      Blood culture [[id number]]  (Abnormal)  (Susceptibility) Collected: 12/19/20 1331    Order Status: Completed Specimen: Blood from Peripheral, Left Hand Updated: 12/22/20 1401     Blood Culture, Routine Gram stain tyler bottle: Gram positive cocci in chains resembling Strep       Gram stain tyler bottle: Gram negative rods 12/20/2020  10:43        Positive results previously called      Gram stain aer bottle: Gram negative rods       Positive results previously called 12/21/2020  12:33      KLEBSIELLA PNEUMONIAE      ENTEROCOCCUS FAECALIS    Blood culture [[id number]] Collected: 12/21/20 0939    Order Status: Completed Specimen: Blood Updated: 12/22/20 0315     Blood Culture, Routine No Growth to date    Urine culture [[id number]] Collected: 12/15/20 1716    Order Status: Completed Specimen: Urine Updated: 12/17/20 0801     Urine Culture, Routine Multiple organisms isolated. None in predominance.  Repeat if      clinically necessary.    Narrative:      Specimen Source->Urine        Antibiotics (From admission, onward)    Start     Stop Route Frequency Ordered    12/22/20 1700  ampicillin-sulbactam 3 g in sodium chloride 0.9 % 100 mL IVPB (ready to mix system)       -- IV Every 6 hours (non-standard times) 12/22/20 1423    12/19/20 2100  mupirocin 2 % ointment      12/24 2059 Nasl 2 times daily 12/19/20 1221        12/22- Afebrile, No Leukocytosis             Await ID input    BPH with elevated PSA  --continue tamsulosin      Normocytic anemia  --at baseline  --monitor    Getting IV venofer but may need blood to improved O2 delivery     Hyperlipemia  --continue rosuvastatin  --cardiac diet      Essential hypertension  --continue carvedilol. Lisinopril, isosorbide, spironolactone  --cardiac diet    BP under control      Diabetes mellitus type 2, uncontrolled  --accuchecks with SSI  --diabetes educator consulted  --diabetic diet  --HA1C pending  --last HA1C 10/26/20 was 9.3    12/18- BS high, sec to Dexa  Will start Levemir 10 BID    BS improved with levemir but needs dose increased    Poor oral intake        VTE Risk Mitigation (From admission, onward)         Ordered     apixaban tablet 5 mg  2 times daily      12/22/20 1736     IP VTE HIGH RISK PATIENT  Once      12/15/20 1538     Place sequential compression device  Until discontinued      12/15/20 1538                Discharge Planning   ADAM:      Code Status: DNR   Is the patient medically ready for discharge?:     Reason for patient still in hospital (select all that apply): Patient unstable, Patient trending condition, Laboratory test, Treatment and Consult recommendations  Discharge Plan A: Home Health, Assisted Living        Seen and discussed with Dr. ANASTACIO Richards and the ICU team  Condition: Critical  Prognosis: Guarded to poor      Critical care time spent on the evaluation and treatment of severe organ dysfunction, review of pertinent labs and imaging studies, discussions with consulting providers and discussions with patient/family: 43 minutes.      Tracy Chavez MD  Department of Hospital Medicine   [facility]

## 2020-12-22 NOTE — PROGRESS NOTES
Patient in bed with bipap off and in pieces. o2 sat 60%. Pt AAO. Educated patient on necessity for oxygen devices.  Pt expressed understanding. Notified eICU.

## 2020-12-22 NOTE — PROGRESS NOTES
[facility]  Critical Care Medicine  Progress Note    Patient Name: Karan Aburto  MRN: [id number]  Admission Date: 12/15/2020  Hospital Length of Stay: 6 days  Code Status: DNR  Attending Provider: Tracy Chavez MD  Primary Care Provider: Mickey Agrawal MD   Principal Problem: COVID-19    Subjective:     HPI:  75 year old male who presented to [facility]  On 1215/2020 due to respiratory distress with associated fever, dyspnea, and generalized weakness and COVID 19 pneumonia with hypoxemic respiraroty failure.  Transferred to ICU on 12/19/2020 due to worsening respiratory status. His current medical conditions include HTN, elevated PSA, Thyroid disease, Rosacea,O2 sats were 70% on RA on admission. history of cerebral vascular accident lacunar infarct within the right lentiform nucleus on 10/30/2020.  He is uncomfortable and has developed skin breakdown on his nose. Presently on BiPAP     Hospital/ICU Course:  12/19 Transferred to ICU   12/20 Blood cultures positive for gram positive cocci and gram negative rods - awaiting final report. Desaturation while eating  12/21 ON BIPAP FIO2 70% O2 SAT 96%.  Afebrile positive 3 L since admission.  Chest x-ray reviewed.  12/22 on BiPAP O2 sat 97% on 70% O2.  Afebrile.  Negative1.6 L yesterday.    Interval History:     12/22 on BiPAP O2 sat 97% on 70% O2.  Afebrile.  Negative1.6 L yesterday.  Review of Systems   Unable to perform ROS: Acuity of condition         Objective:     Vital Signs (Most Recent):  Temp: 97.2 °F (36.2 °C) (12/22/20 1200)  Pulse: 95 (12/22/20 1200)  Resp: 19 (12/22/20 1200)  BP: 135/79 (12/22/20 1200)  SpO2: (!) 93 % (12/22/20 1200) Vital Signs (24h Range):  Temp:  [96.8 °F (36 °C)-97.7 °F (36.5 °C)] 97.2 °F (36.2 °C)  Pulse:  [] 95  Resp:  [14-37] 19  SpO2:  [82 %-97 %] 93 %  BP: (133-216)/() 135/79     Weight: 87.2 kg (192 lb 3.9 oz)  Body mass index is 27.58 kg/m².      Intake/Output Summary (Last 24 hours) at 12/22/2020  1339  Last data filed at 12/22/2020 1200  Gross per 24 hour   Intake 750 ml   Output 2679 ml   Net -1929 ml       Physical Exam  Vitals signs and nursing note reviewed.   Constitutional:       General: He is in acute distress.      Appearance: He is well-developed. He is ill-appearing and toxic-appearing.   HENT:      Head: Normocephalic and atraumatic.   Neck:      Musculoskeletal: Neck supple.   Cardiovascular:      Rate and Rhythm: Normal rate.   Pulmonary:      Effort: Respiratory distress present.      Breath sounds: No stridor.   Abdominal:      Palpations: Abdomen is soft.      Tenderness: There is no abdominal tenderness.   Genitourinary:     Comments: Anaya in place  Musculoskeletal:         General: No deformity.   Skin:     General: Skin is warm.      Findings: Bruising present.   Neurological:      General: No focal deficit present.      Mental Status: He is alert.         Vents:  Oxygen Concentration (%): 70 (12/22/20 0903)    Lines/Drains/Airways     Drain                 Urethral Catheter 12/19/20 1215 3 days          Peripheral Intravenous Line                 Peripheral IV - Single Lumen 12/20/20 1830 20 G Anterior;Right;Proximal Forearm 1 day                Significant Labs:    CBC/Anemia Profile:  Recent Labs   Lab 12/21/20  0337 12/21/20  1616 12/22/20  0411   WBC 4.61  --  4.20   HGB 8.2*  --  8.2*   HCT 27.4*  --  26.4*     --  193   MCV 97  --  96   RDW 17.6*  --  17.5*   FERRITIN  --  2,413*  --         Chemistries:  Recent Labs   Lab 12/21/20  0337 12/22/20  0411    144   K 3.6 3.7    103   CO2 33* 32*   BUN 49* 40*   CREATININE 1.0 1.0   CALCIUM 7.7* 7.7*   MG 2.2 2.1     12/19/2020 blood culture x2    GRAM NEGATIVE JOSEPH   Identification pending   For susceptibility see order #[id number]   Abnormal        ENTEROCOCCUS SPECIES   Identification pending   For susceptibility see order #[id number]      KLEBSIELLA PNEUMONIAEAbnormal       ENTEROCOCCUS FAECALIS       REPEAT BLOOD  CULTURE 12/20 1-SO FAR    KLEBSIELLA PANSENSITIVE      Chest x-ray 12/19/2020    Diffuse bilateral pulmonary infiltrates noted most pronounced in the upper lung zones.  These findings may have worsened somewhat from 12/15/2020.     Nuclear stress test November 2020      This is a normal myocardioal perfusion scan    Gated perfusion images showed an ejection fraction of 59% at rest and 60% post stress.    The EKG portion of this study is negative for ischemia          ABG  No results for input(s): PH, PO2, PCO2, HCO3, BE in the last 168 hours.  Assessment/Plan:     Endocrine  Diabetes mellitus type 2, uncontrolled  12/21 fingerstick q.6 hours.  SSI.  12/20 continue same    Other  * Acute Viral Pneumonia COVID-19  COVID-19 Pneumonia:     - COVID-19 testing Collection Date: 12/15/2020  - Infection Control notified      - Isolation:   - Airborne, Contact and Droplet Precautions  - Cohort patients into COVID units  - N95 masks must be fit tested, wear eye protection  - 20 second hand hygiene              - Limit visitors per hospital policy              - Consolidating lab draws, nursing care, provider visits, and interventions     - Diagnostics: (leukopenia, hyponatremia, hyperferritinemia, elevated troponin, elevated d-dimer, age, and comorbidities are significant predictors of poor clinical outcome)  CBC, Procalcitonin, Ferritin, CRP, LDH and Portable CXR     - Management:     Supplemental oxygen to maintain O2 sat >90%  Noninvasive Positive Pressure Ventilation maintain SpO2 >90%  Continuous/intermittent Pulse Ox  Albuterol treatment PRN  Careful use of steroids in the absence of other indications - unless septic shock due to increased viral replication Fluid sparing resuscitationl. Switch to IV  Empiric AZITHROMYCIN + ROCEPHIN - will initiate   Remdesivir - on going    Deep Venous Thrombosis - start complete anticoagulin      ADJUNCTIVE THERAPY: ZINC, ATORVASTATIN      Continuous/intermittent Pulse Ox  Albuterol  INHALER PRN (avoid nebulization of secretions)  -    Vit C 500 mg, BID  Zinc 220 mg/day     Code Status  DNR           Blood culture positive for microorganism  12/20 WBC normal , awaiting final report , does not clinically appear septic, may be contaminant. Continue ceftriaxone  1221 awaiting identification and sensitivity.  Continue Rocephin and doxycycline.  Afebrile.  WBC stable   12/22 Klebsiella pneumoniae sensitive to Rocephin.  Awaiting Enterococcus sensitivity    Acute hypoxemic respiratory failure due to severe acute respiratory syndrome coronavirus 2 (SARS-CoV-2) disease  12/19 will need skin care to continue Noninvasive Positive Pressure Ventilation   12/20 tolerating vapotherm and face mask  12/21 alternate Vapotherm and BiPAP.  Keep O2 sat above 90%.  Avoid fluid overload.  Continue IV Rocephin and doxycycline IV Decadron.  Continue airborne isolation precaution.  12/22 continue Lasix for volume contraction.  Continue Rocephin and doxycycline.  Await final sensitivity on Enterococcus .  BiPAP with break to Vapotherm     Critical Care Daily Checklist:    A: Awake: RASS Goal/Actual Goal:    Actual: Claire Agitation Sedation Scale (RASS): Restless   B: Spontaneous Breathing Trial Performed?     C: SAT & SBT Coordinated?  Not intubated                      D: Delirium: CAM-ICU Overall CAM-ICU: Negative   E: Early Mobility Performed? No   F: Feeding Goal:    Status:     Current Diet Order   Procedures    Diet diabetic [facility]; 2000 Calorie; Cardiac (Low Na/Chol)     Add BOOST     Order Specific Question:   Indicate patient location for additional diet options:     Answer:   [facility]     Order Specific Question:   Total calories:     Answer:   2000 Calorie     Order Specific Question:   Additional Diet Options:     Answer:   Cardiac (Low Na/Chol)      AS: Analgesia/Sedation Not sedated   T: Thromboembolic Prophylaxis On p.o. apixaban   H: HOB > 300 Yes   U: Stress Ulcer Prophylaxis (if  needed) On famotidine   G: Glucose Control Fingerstick q.6 hours.  SSI   B: Bowel Function Stool Occurrence: 0   I: Indwelling Catheter (Lines & Anaya) Necessity Anaya critically ill.   D: De-escalation of Antimicrobials/Pharmacotherapies Rocephin and doxycycline.  Awaiting blood culture id and sensitivity.    Plan for the day/ETD Y    Code Status:  Family/Goals of Care: DNR  Spoke to son , possible comfort measures in am      Critical Care Time: 35 minutes  Critical secondary to Patient has a condition that poses threat to life and bodily function:  COVID-19 PNEUMONIA/RESPIRATORY FAILURE      Critical care was time spent personally by me on the following activities: development of treatment plan with patient or surrogate and bedside caregivers, discussions with consultants, evaluation of patient's response to treatment, examination of patient, ordering and performing treatments and interventions, ordering and review of laboratory studies, ordering and review of radiographic studies, pulse oximetry, re-evaluation of patient's condition. This critical care time did not overlap with that of any other provider or involve time for any procedures.     Radha Richards MD  Critical Care Medicine  [facility]

## 2020-12-22 NOTE — ASSESSMENT & PLAN NOTE
12/19 will need skin care to continue Noninvasive Positive Pressure Ventilation   12/20 tolerating vapotherm and face mask  12/21 alternate Vapotherm and BiPAP.  Keep O2 sat above 90%.  Avoid fluid overload.  Continue IV Rocephin and doxycycline IV Decadron.  Continue airborne isolation precaution.  12/22 continue Lasix for volume contraction.  Continue Rocephin and doxycycline.  Await final sensitivity on Enterococcus .  BiPAP with break to Vapotherm

## 2020-12-22 NOTE — ASSESSMENT & PLAN NOTE
- COVID-19 testing   - Infection Control notified     - Isolation:   - Airborne, Contact and Droplet Precautions  - Cohort patients into COVID units  - N95 mask, wear eye protection  - 20 second hand hygiene              - Limit visitors per hospital policy              - Consolidating lab draws, nursing care, provider visits, and interventions    - Diagnostics: (leukopenia, hyponatremia, hyperferritinemia, elevated troponin, elevated d-dimer, age, and comorbidities are significant predictors of poor clinical outcome)  CBC, CMP, Procalcitonin, Ferritin, CRP, LDH, BNP, Troponin, ECG, Rapid Flu, Blood Culture x2 and Portable CXR    - Management:  Supplemental O2 to maintain SpO2 >92%  Telemetry  Continuous/intermittent Pulse Ox  Albuterol treatment PRN   --Ipratropium inhaler with MIDI  --dexamethasone  --remdesivir  --monitor    Advance Care Planning   patient is a DNR and his SDM is his son, Mac      12/18- getting Dexa plus Remdersivir but still severely Hypoxemic-  Now on Bipap- O2 better to 92-94% still, cant wean down to Vapotherm  May go to ICU if deteriorates    12/19- transferred to ICU for closer monitoring- now on Vapotherm  Has pressure ulcer on nose from the Bipap  12/20- continued on Vapotherm and Bipap- will try some lasix  12/21- good urine output with lasix but Oxygenation has not really improved  12/22- cont Bipap, Lasix

## 2020-12-22 NOTE — ASSESSMENT & PLAN NOTE
12/20 WBC normal , awaiting final report , does not clinically appear septic, may be contaminant. Continue ceftriaxone  1221 awaiting identification and sensitivity.  Continue Rocephin and doxycycline.  Afebrile.  WBC stable   12/22 Klebsiella pneumoniae sensitive to Rocephin.  Awaiting Enterococcus sensitivity

## 2020-12-22 NOTE — SUBJECTIVE & OBJECTIVE
Interval History: Looks weaker, a little more work or breathing, was intermittently confused last night, pulling off his BIPAP, placed on Vaoptherm briefly for moral meds but most remained on Bipap 70%. Sips of water. Refused meals. Good urine output with Lasix, now only about 1600 cc fluid positive, D Dimer rising to 10, hence Eliquis dose increased to 5 bid. Blood Cx Positive- will get ID consult.     Review of Systems   Constitutional: Positive for activity change, appetite change and fatigue. Negative for diaphoresis.   HENT: Negative.    Respiratory: Positive for cough and shortness of breath. Negative for chest tightness and wheezing.    Cardiovascular: Negative for chest pain.   Gastrointestinal: Negative.    Endocrine: Negative.    Genitourinary: Negative.    Musculoskeletal: Positive for arthralgias.   Allergic/Immunologic: Negative.    Neurological: Positive for weakness.   Hematological: Negative.    Psychiatric/Behavioral: The patient is nervous/anxious.      Objective:     Vital Signs (Most Recent):  Temp: 96.7 °F (35.9 °C) (12/22/20 1600)  Pulse: 107 (12/22/20 1700)  Resp: (!) 26 (12/22/20 1700)  BP: (!) 154/79 (12/22/20 1700)  SpO2: (!) 90 % (12/22/20 1700) Vital Signs (24h Range):  Temp:  [96.7 °F (35.9 °C)-97.7 °F (36.5 °C)] 96.7 °F (35.9 °C)  Pulse:  [] 107  Resp:  [14-37] 26  SpO2:  [76 %-97 %] 90 %  BP: ()/() 154/79     Weight: 87.2 kg (192 lb 3.9 oz)  Body mass index is 27.58 kg/m².    Intake/Output Summary (Last 24 hours) at 12/22/2020 1739  Last data filed at 12/22/2020 1700  Gross per 24 hour   Intake 960 ml   Output 2834 ml   Net -1874 ml      Physical Exam  Vitals signs and nursing note reviewed.   Constitutional:       General: He is in acute distress.      Appearance: He is well-developed. He is ill-appearing and toxic-appearing.   HENT:      Head: Normocephalic and atraumatic.   Neck:      Musculoskeletal: Neck supple.   Cardiovascular:      Rate and Rhythm: Normal  rate.   Pulmonary:      Effort: Respiratory distress present.      Breath sounds: No stridor.   Abdominal:      Palpations: Abdomen is soft.      Tenderness: There is no abdominal tenderness.   Genitourinary:     Comments: Anaya in place  Musculoskeletal:         General: No deformity.   Skin:     General: Skin is warm.      Findings: Bruising present.   Neurological:      General: No focal deficit present.      Mental Status: He is alert.         Significant Labs:   ABGs:   Blood Culture:   Recent Labs   Lab 12/21/20  0939   LABBLOO No Growth to date     BMP:   Recent Labs   Lab 12/22/20  0411   *      K 3.7      CO2 32*   BUN 40*   CREATININE 1.0   CALCIUM 7.7*   MG 2.1     CBC:   Recent Labs   Lab 12/21/20  0337 12/22/20  0411   WBC 4.61 4.20   HGB 8.2* 8.2*   HCT 27.4* 26.4*    193     CMP:   Recent Labs   Lab 12/21/20  0337 12/22/20  0411    144   K 3.6 3.7    103   CO2 33* 32*   GLU 77 172*   BUN 49* 40*   CREATININE 1.0 1.0   CALCIUM 7.7* 7.7*   ANIONGAP 8 9   EGFRNONAA >60 >60     Coagulation:   Magnesium:   Recent Labs   Lab 12/21/20  0337 12/22/20  0411   MG 2.2 2.1     POCT Glucose:   Recent Labs   Lab 12/22/20  0858 12/22/20  1148 12/22/20  1547   POCTGLUCOSE 160* 140* 196*     All pertinent labs within the past 24 hours have been reviewed.    Significant Imaging: I have reviewed all pertinent imaging results/findings within the past 24 hours.

## 2020-12-22 NOTE — SUBJECTIVE & OBJECTIVE
Interval History:     12/22 on BiPAP O2 sat 97% on 70% O2.  Afebrile.  Negative1.6 L yesterday.  Review of Systems   Unable to perform ROS: Acuity of condition         Objective:     Vital Signs (Most Recent):  Temp: 97.2 °F (36.2 °C) (12/22/20 1200)  Pulse: 95 (12/22/20 1200)  Resp: 19 (12/22/20 1200)  BP: 135/79 (12/22/20 1200)  SpO2: (!) 93 % (12/22/20 1200) Vital Signs (24h Range):  Temp:  [96.8 °F (36 °C)-97.7 °F (36.5 °C)] 97.2 °F (36.2 °C)  Pulse:  [] 95  Resp:  [14-37] 19  SpO2:  [82 %-97 %] 93 %  BP: (133-216)/() 135/79     Weight: 87.2 kg (192 lb 3.9 oz)  Body mass index is 27.58 kg/m².      Intake/Output Summary (Last 24 hours) at 12/22/2020 1339  Last data filed at 12/22/2020 1200  Gross per 24 hour   Intake 750 ml   Output 2679 ml   Net -1929 ml       Physical Exam  Vitals signs and nursing note reviewed.   Constitutional:       General: He is in acute distress.      Appearance: He is well-developed. He is ill-appearing and toxic-appearing.   HENT:      Head: Normocephalic and atraumatic.   Neck:      Musculoskeletal: Neck supple.   Cardiovascular:      Rate and Rhythm: Normal rate.   Pulmonary:      Effort: Respiratory distress present.      Breath sounds: No stridor.   Abdominal:      Palpations: Abdomen is soft.      Tenderness: There is no abdominal tenderness.   Genitourinary:     Comments: Anaya in place  Musculoskeletal:         General: No deformity.   Skin:     General: Skin is warm.      Findings: Bruising present.   Neurological:      General: No focal deficit present.      Mental Status: He is alert.         Vents:  Oxygen Concentration (%): 70 (12/22/20 0903)    Lines/Drains/Airways     Drain                 Urethral Catheter 12/19/20 1215 3 days          Peripheral Intravenous Line                 Peripheral IV - Single Lumen 12/20/20 1830 20 G Anterior;Right;Proximal Forearm 1 day                Significant Labs:    CBC/Anemia Profile:  Recent Labs   Lab 12/21/20  033  12/21/20  1616 12/22/20  0411   WBC 4.61  --  4.20   HGB 8.2*  --  8.2*   HCT 27.4*  --  26.4*     --  193   MCV 97  --  96   RDW 17.6*  --  17.5*   FERRITIN  --  2,413*  --         Chemistries:  Recent Labs   Lab 12/21/20  0337 12/22/20  0411    144   K 3.6 3.7    103   CO2 33* 32*   BUN 49* 40*   CREATININE 1.0 1.0   CALCIUM 7.7* 7.7*   MG 2.2 2.1     12/19/2020 blood culture x2    GRAM NEGATIVE JOSEPH   Identification pending   For susceptibility see order #[id number]   Abnormal        ENTEROCOCCUS SPECIES   Identification pending   For susceptibility see order #[id number]      KLEBSIELLA PNEUMONIAEAbnormal       ENTEROCOCCUS FAECALIS       REPEAT BLOOD CULTURE 12/20 1-SO FAR    KLEBSIELLA PANSENSITIVE      Chest x-ray 12/19/2020    Diffuse bilateral pulmonary infiltrates noted most pronounced in the upper lung zones.  These findings may have worsened somewhat from 12/15/2020.     Nuclear stress test November 2020      This is a normal myocardioal perfusion scan    Gated perfusion images showed an ejection fraction of 59% at rest and 60% post stress.    The EKG portion of this study is negative for ischemia

## 2020-12-22 NOTE — ASSESSMENT & PLAN NOTE
--likely 2/2 covid  --PT/OT consulted    Appears a little better  Will give inj B12 IM plus Celebrex 100 mg    Persists- will continue with B12 IM  May need Ritalin     Very weak, will resume PT/OT tomorrow  Try Inj B12 IM plus Venofer and    12/21- continue PT/OT  12/22- too weak and sick for PT/OT

## 2020-12-22 NOTE — PLAN OF CARE
Bipap/vapotherm as tolerated. Help with meals . Pain controlled. Turned q 2hrs with pillows support. Family at side for update.

## 2020-12-22 NOTE — PLAN OF CARE
Patient remains on bipap. Tolerated PO meds on vapotherm. Sips of water. Refused meals. BG monitoring. Patient repositions self. SR on monitor. Hypertensive. Anaya in place. UOP >150 overnight. Repeatedly removed bipap while asleep. Patient intermittently confused overnight. Patient removed PIV overnight. 1 PIV remains in place for IV antibiotics and diuresis. BSWR ordered and in place. Foam dressings in place. Heel boots in place.

## 2020-12-23 PROBLEM — R53.1 GENERALIZED WEAKNESS: Status: RESOLVED | Noted: 2020-01-01 | Resolved: 2020-01-01

## 2020-12-23 PROBLEM — I10 ESSENTIAL HYPERTENSION: Status: RESOLVED | Noted: 2020-01-01 | Resolved: 2020-01-01

## 2020-12-23 PROBLEM — R79.89 BLOOD CULTURE POSITIVE FOR MICROORGANISM: Status: RESOLVED | Noted: 2020-01-01 | Resolved: 2020-01-01

## 2020-12-23 PROBLEM — R97.20 BPH WITH ELEVATED PSA: Status: RESOLVED | Noted: 2020-01-01 | Resolved: 2020-01-01

## 2020-12-23 PROBLEM — Z51.5 COMFORT MEASURES ONLY STATUS: Status: RESOLVED | Noted: 2020-01-01 | Resolved: 2020-01-01

## 2020-12-23 PROBLEM — J96.01 ACUTE HYPOXEMIC RESPIRATORY FAILURE DUE TO SEVERE ACUTE RESPIRATORY SYNDROME CORONAVIRUS 2 (SARS-COV-2) DISEASE: Status: RESOLVED | Noted: 2020-01-01 | Resolved: 2020-01-01

## 2020-12-23 PROBLEM — Z51.5 ENCOUNTER FOR PALLIATIVE CARE: Status: RESOLVED | Noted: 2020-01-01 | Resolved: 2020-01-01

## 2020-12-23 PROBLEM — E78.5 HYPERLIPEMIA: Status: RESOLVED | Noted: 2020-01-01 | Resolved: 2020-01-01

## 2020-12-23 PROBLEM — N40.0 BPH WITH ELEVATED PSA: Status: RESOLVED | Noted: 2020-01-01 | Resolved: 2020-01-01

## 2020-12-23 PROBLEM — Z51.5 COMFORT MEASURES ONLY STATUS: Status: ACTIVE | Noted: 2020-01-01

## 2020-12-23 PROBLEM — U07.1 COVID-19: Status: RESOLVED | Noted: 2020-01-01 | Resolved: 2020-01-01

## 2020-12-23 PROBLEM — D64.9 NORMOCYTIC ANEMIA: Status: RESOLVED | Noted: 2020-01-01 | Resolved: 2020-01-01

## 2020-12-23 PROBLEM — U07.1 ACUTE HYPOXEMIC RESPIRATORY FAILURE DUE TO SEVERE ACUTE RESPIRATORY SYNDROME CORONAVIRUS 2 (SARS-COV-2) DISEASE: Status: RESOLVED | Noted: 2020-01-01 | Resolved: 2020-01-01

## 2020-12-23 NOTE — PROGRESS NOTES
Called by RN to pronounce the family elected to have comfort focus care and discontinued the Bipap. Pt unresponsive, eyes closed, no pulse or respirations, no heart sounds or breath sounds B. Pupils fixed and dilated B. Cardiac monitor asystole. Pt pronounced dead at 1100 am on 12/23/2020. Family present, condolences offered.

## 2020-12-23 NOTE — ASSESSMENT & PLAN NOTE
12/20 WBC normal , awaiting final report , does not clinically appear septic, may be contaminant. Continue ceftriaxone  1221 awaiting identification and sensitivity.  Continue Rocephin and doxycycline.  Afebrile.  WBC stable   12/22 Klebsiella pneumoniae sensitive to Rocephin.  Awaiting Enterococcus sensitivity  12/23 switched to Unasyn yesterday.  Deescalate care

## 2020-12-23 NOTE — NURSING
Upon assessment, patient severely agitated/restless; not following command or consolable. Utilized prn ativan and morphine to decrease agitation and reduce pain. Post administration patient remains in same state with significant HR > 130, RR > 30, elevated BP; had to increase O2 on BIPAP from 70 to 100% to maintain O2 saturations above 88%. MD Susie made aware, will see shortly.

## 2020-12-23 NOTE — ASSESSMENT & PLAN NOTE
COVID-19 Pneumonia:     - COVID-19 testing Collection Date: 12/15/2020  - Infection Control notified      - Isolation:   - Airborne, Contact and Droplet Precautions  - Cohort patients into COVID units  - N95 masks must be fit tested, wear eye protection  - 20 second hand hygiene              - Limit visitors per hospital policy              - Consolidating lab draws, nursing care, provider visits, and interventions     - Diagnostics: (leukopenia, hyponatremia, hyperferritinemia, elevated troponin, elevated d-dimer, age, and comorbidities are significant predictors of poor clinical outcome)  CBC, Procalcitonin, Ferritin, CRP, LDH and Portable CXR     - Management:     Supplemental oxygen to maintain O2 sat >90%  Noninvasive Positive Pressure Ventilation maintain SpO2 >90%  Continuous/intermittent Pulse Ox  Albuterol treatment PRN  Careful use of steroids in the absence of other indications - unless septic shock due to increased viral replication Fluid sparing resuscitationl. Switch to IV  Empiric AZITHROMYCIN + ROCEPHIN - will initiate   Remdesivir - on going    Deep Venous Thrombosis - start complete anticoagulin      ADJUNCTIVE THERAPY: ZINC, ATORVASTATIN      Continuous/intermittent Pulse Ox  Albuterol INHALER PRN (avoid nebulization of secretions)  -    Vit C 500 mg, BID  Zinc 220 mg/day     Code Status  DNR  12/23 comfort care.  Ativan p.r.n..  Morphine p.r.n.

## 2020-12-23 NOTE — PLAN OF CARE
Patient BIPAP dependent through shift; unable to tolerate transition to vapotherm from BIPAP due to desaturation; given PO intake via fluids and boost. Restraints removed d/t absence of behavior for criteria. Discussion had with Mac, son of patient, between nurse and Dr. Richards; will review plan of care tomorrow to discuss goals and possible transition to comfort measures.

## 2020-12-23 NOTE — ASSESSMENT & PLAN NOTE
12/19 will need skin care to continue Noninvasive Positive Pressure Ventilation   12/20 tolerating vapotherm and face mask  12/21 alternate Vapotherm and BiPAP.  Keep O2 sat above 90%.  Avoid fluid overload.  Continue IV Rocephin and doxycycline IV Decadron.  Continue airborne isolation precaution.  12/22 continue Lasix for volume contraction.  Continue Rocephin and doxycycline.  Await final sensitivity on Enterococcus .  BiPAP with break to Vapotherm  12/23 comfort care.  Ativan p.r.n..  Morphine p.r.n.

## 2020-12-23 NOTE — PROGRESS NOTES
Advance Care Planning     Progress Note   Palliative Medicine      SUBJECTIVE:     History of Present Illness:  Patient examined through the ICU window and was noted to be in severe respiratory distress. Dr. Richards called and spoke to son Mac and informed him of acute decline overnight and plans were made to redirect to comfort focused care once family arrives at 10. I spoke to Mac and he confirmed the plan but would not be here on time as he was waiting on his sister to arrive. He understands that his father is in severe distress and that we are adjusting comfort medications now but waiting to remove BiPAP until they are here. Once Mac and sister Cyndee arrived they expressed understanding with the plan and wished to be at bedside when BiPAP is removed. They understand his degree of distress will not allow him to be conscious and are accepting of the comfort plan. They understand time is likely to be very short - minutes to hours.     Past Medical History:   Diagnosis Date    Abnormal liver enzymes 10/18/2020    Coronary artery disease     Elevated PSA     HLD (hyperlipidemia)     Hypertension     Leg swelling 10/18/2020    Leukocytosis 10/18/2020    Metabolic alkalosis 10/20/2020    New onset type 2 diabetes mellitus 10/21/2020    Pneumonia 11/5/2020    Rosacea     Syncope 11/5/2020    Thrombocytopenia 10/30/2020    Thyroid disease     hypothyroidism     No past surgical history on file.  Family History   Problem Relation Age of Onset    Hypertension Mother     Hypothyroidism Mother     Hypertension Father      Review of patient's allergies indicates:  No Known Allergies    Medications:    Current Facility-Administered Medications:     acetaminophen tablet 650 mg, 650 mg, Oral, Q4H PRN, CORETTA Giles, 650 mg at 12/16/20 2322    acetaminophen tablet 650 mg, 650 mg, Oral, Q8H PRN, CORETTA Giles    albuterol-ipratropium 2.5 mg-0.5 mg/3 mL nebulizer solution 3 mL, 3  mL, Nebulization, Q6H PRN, Zaira Hay ACNP-BC, 3 mL at 12/23/20 0733    ampicillin-sulbactam 3 g in sodium chloride 0.9 % 100 mL IVPB (ready to mix system), 3 g, Intravenous, Q6H, Radha Richards MD, Last Rate: 100 mL/hr at 12/23/20 0445, 3 g at 12/23/20 0445    apixaban tablet 5 mg, 5 mg, Oral, BID, Tracy Chavez MD, 5 mg at 12/22/20 2011    ascorbic acid (vitamin C) tablet 500 mg, 500 mg, Oral, BID, JORGE Giles-C, 500 mg at 12/22/20 2013    aspirin chewable tablet 81 mg, 81 mg, Oral, Daily, Tracy Chavez MD, 81 mg at 12/22/20 0827    bisacodyL suppository 10 mg, 10 mg, Rectal, Daily PRN, Butch Jarrett NP    dexamethasone injection 4 mg, 4 mg, Intravenous, Q24H, Kurtis Silva MD, 4 mg at 12/22/20 0827    dextrose 50% injection 12.5 g, 12.5 g, Intravenous, PRN, CORETTA Giles    dextrose 50% injection 25 g, 25 g, Intravenous, PRN, JORGE Giles-MILE    docusate 50 mg/5 mL liquid 100 mg, 100 mg, Oral, Daily, Kurtis Silva MD, 100 mg at 12/22/20 0826    famotidine tablet 20 mg, 20 mg, Oral, BID, Tracy Chavez MD, 20 mg at 12/22/20 2011    fenofibrate tablet 160 mg, 160 mg, Oral, Daily, CORETTA Giles, 160 mg at 12/22/20 0827    furosemide injection 20 mg, 20 mg, Intravenous, BID, Tracy Chavez MD, 20 mg at 12/22/20 2010    glucagon (human recombinant) injection 1 mg, 1 mg, Intramuscular, PRN, CORETTA Giles    glucose chewable tablet 16 g, 16 g, Oral, PRN, CORETTA Giles, 16 g at 12/20/20 1117    glucose chewable tablet 24 g, 24 g, Oral, PRN, CORETTA Giles    hydrALAZINE tablet 50 mg, 50 mg, Oral, TID, Kurtis Silva MD, 50 mg at 12/22/20 2011    HYDROmorphone injection 1 mg, 1 mg, Intravenous, Q15 Min PRN, Maya Rodriguez PA-C    insulin aspart U-100 pen 1-10 Units, 1-10 Units, Subcutaneous, QID (AC + HS) PRN, Tracy Chavez MD, 8 Units at 12/23/20 0628    ipratropium-albuteroL inhaler 2  puff, 2 puff, Inhalation, Q6H, 2 puff at 12/22/20 0903 **AND** MDI Q6H, , , Q6H, Tracy Chavez MD    isosorbide mononitrate 24 hr tablet 30 mg, 30 mg, Oral, Daily, JORGE Giles-C, 30 mg at 12/22/20 0827    lisinopriL tablet 2.5 mg, 2.5 mg, Oral, Daily, Tracy Chavez MD, 2.5 mg at 12/22/20 0827    lorazepam (ATIVAN) injection 2 mg, 2 mg, Intravenous, Once PRN, Radha Richards MD    lorazepam (ATIVAN) injection 2 mg, 2 mg, Intravenous, Q30 Min PRN, Maya Rodriguez PA-C    melatonin tablet 6 mg, 6 mg, Oral, Nightly PRN, JORGE Giles-C, 6 mg at 12/21/20 2019    morphine injection 4 mg, 4 mg, Intravenous, Once PRN, Radha Richards MD    multivitamin tablet, 1 tablet, Oral, Daily, JORGE Giles-C, 1 tablet at 12/22/20 0827    mupirocin 2 % ointment, , Nasal, BID, Tracy Chavez MD    ondansetron disintegrating tablet 8 mg, 8 mg, Oral, Q8H PRN, CORETTA Giles    polyethylene glycol packet 17 g, 17 g, Per NG tube, Daily, Butch Jarrett NP, 17 g at 12/22/20 1142    rosuvastatin tablet 10 mg, 10 mg, Oral, QHS, Tracy Chavez MD, 10 mg at 12/22/20 2011    sodium chloride 0.9% flush 10 mL, 10 mL, Intravenous, PRN, CORETTA Giles    tiZANidine tablet 4 mg, 4 mg, Oral, BID, JORGE Giles-MILE, 4 mg at 12/22/20 2011    vitamin D 1000 units tablet 1,000 Units, 1,000 Units, Oral, Daily, JORGE Giles-C, 1,000 Units at 12/22/20 0826    Review of Symptoms              Living Arrangements:  Lives in assisted living    Advance Care Planning   Advance Directives:   Living Will: No    [place]: No    Do Not Resuscitate Status: Yes        Oral Declaration: Yes  Witnesses:  Dr. Chavez   Agent's Name:  Son Mac Aburto   Agent's Contact Number:  [phone]    Decision Making:  Family answered questions and Patient unable to communicate due to disease severity/cognitive impairment         ROS:  Review of Systems   Unable to perform ROS:  Mental status change       OBJECTIVE:     Physical Exam:  Vitals: Temp: 97.5 °F (36.4 °C) (12/23/20 0758)  Pulse: (!) 140 (12/23/20 0830)  Resp: (!) 55 (12/23/20 0830)  BP: (!) 214/114 (12/23/20 0800)  SpO2: 96 % (12/23/20 0830)    Patient not physically examined due to COVID + status but conducted thorough chart review and discussion with team. I did monitor him through the ICU window and noted accessory muscle usage, tachypnea, severe respiratory distress on BiPAP, unresponsive to nurses assistance.    Labs:  WBC   Date Value Ref Range Status   12/22/2020 4.20 3.90 - 12.70 K/uL Final     Hemoglobin   Date Value Ref Range Status   12/22/2020 8.2 (L) 14.0 - 18.0 g/dL Final     Hematocrit   Date Value Ref Range Status   12/22/2020 26.4 (L) 40.0 - 54.0 % Final     MCV   Date Value Ref Range Status   12/22/2020 96 82 - 98 fL Final     Platelets   Date Value Ref Range Status   12/22/2020 193 150 - 350 K/uL Final       Lab Results   Component Value Date     12/22/2020    K 3.7 12/22/2020     12/22/2020    CO2 32 (H) 12/22/2020    BUN 40 (H) 12/22/2020    CREATININE 1.0 12/22/2020    CALCIUM 7.7 (L) 12/22/2020    ANIONGAP 9 12/22/2020    ESTGFRAFRICA >60 12/22/2020    EGFRNONAA >60 12/22/2020       Lab Results   Component Value Date    AST 77 (H) 12/20/2020    ALKPHOS 218 (H) 12/20/2020    BILITOT 0.5 12/20/2020       Albumin   Date Value Ref Range Status   12/20/2020 1.7 (L) 3.5 - 5.2 g/dL Final       Radiology:I have reviewed all pertinent imaging results/findings within the past 24 hours.  - CXR 12/15/20:  Interval development of moderate bilateral patchy infiltrate.  Findings are nonspecific but could be related to atypical pneumonia.     - CXR 12/19/20:  Bilateral pulmonary infiltrates which appear to have worsened somewhat from 12/15/2020.     ASSESSMENT   Karan ANGE Aburto is a 75 y.o. year old with a history of HTN, CAD, and hypothyroidism who presented to the emergency department complaining of shortness  of breath.  Initial workup revealed tachypnea, borderline oxygenation with non rebreather mask, and COVID infection.  He was admitted for closer monitoring.  He was alternating between Vapotherm and BiPAP however began to have episodes of hypoxia with Vapotherm so transferred to the ICU for closer monitoring.  There have been multiple code status conversations with Mr. Aburto and his son present yesterday when it was again discussed. Mr. Kan has continued to express his desire to not be intubated or resuscitated in the event of cardiopulmonary arrest or decline.  He remains in the ICU alternating Vapotherm and BiPAP however the time he is able to tolerate Vapotherm has began to slowly decrease.  Over the weekend the family expressed their desire to discuss options going forward so Palliative Medicine was consulted to discuss goals of care conversation.     PLAN   1. Encounter for Palliative Care  - Code status: DNR/ DNI- comfort care  - Surrogate: son Mac Aburto per conversations with patient and attending yesterday  - Primary outcome of meeting redirection of care with BiPAP removal.  - Orders adjusted to reflect comfort care: all orders not intended solely for comfort have been discontinued (including labs, fluids, antibiotics) and comfort orders are placed.     2. Pain/ dyspnea/ anxiety/ agitations/ secretions  - Dilaudid 1mg IV q15 min PRN pain or dyspnea  - Ativan 2mg IV q30 min PRN agitation  - Haldol 2mg IV q4hr PRN agitation/ terminal delirium  - Glycopyrrolate 0.2mg IV q15 min PRN secretions    3. Acute hypoxic respiratory failure secondary to COVID pneumonia  - BiPAP dependent and worsening mental status, oxygen demand, and respiratory status overall  - Family has opted to redirect to comfort focused care    4. ANASTASIYA  - Baseline Cr 1.1      Thank you for allowing Palliative Medicine to be involved in the care of Karan Aburto.       Medical decision making: HIGH based on high risk of death, untreated symptoms,  high risk medications, poor prognosis, deescalating treatments, management of more than one chronic illness in exacerbation or progression of disease, managing side effects of medications or polypharmacy, parenteral opioids    Plan required increased review of medication choice, interaction, dosing, frequency, and route due to patient complexity. Patient complexity increased by: Presence of renal insufficiency, Presence of altered mentation, Presence of advanced age    > 50% of 50 min visit spent in chart review, face to face discussion of goals of care, symptom assessment, coordination of care and emotional support, formulating and communicating prognosis and goals of care. 0930- [id number]    Maya Rodriguez PA-C  Palliative Medicine

## 2020-12-23 NOTE — PLAN OF CARE
"Overnight patient had increase in disorientation and agitation. Pt stated he felt as though he would not make it much longer. He expressed fear that he would "die hungry, alone, and afraid" Pt was put on vapotherm to eat dinner and take medications. Tolerated well. Transitioned back to bipap overnight. Pt became confused and removed bipap repeatedly overnight. O2 sats would instantaneously drop to 50s-60s. Prn ativan given. BSWR applied. Heart Rate 100s-1teens. Anaya in place with adequate UOP. BG monitoring performed. Coverage with [employer]. Plan for family to discuss comfort care measures.   "

## 2020-12-23 NOTE — RESPIRATORY THERAPY
Patient taken off of Bipap for withdrawal of care by YESSENIA Trejo NP at bedside. Will continue to monitor.

## 2020-12-23 NOTE — SUBJECTIVE & OBJECTIVE
Interval History:   12/23 on BiPAP more agitated distressed.  Receiving Ativan and morphine p.r.n. family to be present at bedside.  Spoke with son     Review of Systems   Unable to perform ROS: Acuity of condition         Objective:     Vital Signs (Most Recent):  Temp: 97.5 °F (36.4 °C) (12/23/20 0758)  Pulse: 70 (12/23/20 1051)  Resp: 12 (12/23/20 1051)  BP: (!) 214/114 (12/23/20 0800)  SpO2: 96 % (12/23/20 0830) Vital Signs (24h Range):  Temp:  [96.5 °F (35.8 °C)-98.4 °F (36.9 °C)] 97.5 °F (36.4 °C)  Pulse:  [] 70  Resp:  [12-55] 12  SpO2:  [76 %-99 %] 96 %  BP: ()/() 214/114     Weight: 87 kg (191 lb 12.8 oz)  Body mass index is 27.52 kg/m².      Intake/Output Summary (Last 24 hours) at 12/23/2020 1106  Last data filed at 12/23/2020 0800  Gross per 24 hour   Intake 790 ml   Output 2544 ml   Net -1754 ml       Physical Exam  Vitals signs and nursing note reviewed.   Constitutional:       General: He is in acute distress.      Appearance: He is well-developed. He is ill-appearing and toxic-appearing.   HENT:      Head: Normocephalic and atraumatic.   Neck:      Musculoskeletal: Neck supple.   Cardiovascular:      Rate and Rhythm: Normal rate.   Pulmonary:      Effort: Respiratory distress present.      Breath sounds: No stridor.   Abdominal:      Palpations: Abdomen is soft.      Tenderness: There is no abdominal tenderness.   Genitourinary:     Comments: Anaya in place  Musculoskeletal:         General: No deformity.   Skin:     General: Skin is warm.      Findings: Bruising present.   Neurological:      General: No focal deficit present.      Mental Status: He is alert.         Vents:  Oxygen Concentration (%): (S) 21 (12/23/20 1051)    Lines/Drains/Airways     Drain                 Urethral Catheter 12/19/20 1215 3 days          Peripheral Intravenous Line                 Peripheral IV - Single Lumen 12/20/20 1830 20 G Anterior;Right;Proximal Forearm 2 days                Significant  Labs:    CBC/Anemia Profile:  Recent Labs   Lab 12/21/20  1616 12/22/20  0411   WBC  --  4.20   HGB  --  8.2*   HCT  --  26.4*   PLT  --  193   MCV  --  96   RDW  --  17.5*   FERRITIN 2,413*  --         Chemistries:  Recent Labs   Lab 12/22/20  0411      K 3.7      CO2 32*   BUN 40*   CREATININE 1.0   CALCIUM 7.7*   MG 2.1     12/19/2020 blood culture x2    GRAM NEGATIVE JOSEPH   Identification pending   For susceptibility see order #[id number]   Abnormal        ENTEROCOCCUS SPECIES   Identification pending   For susceptibility see order #[id number]      KLEBSIELLA PNEUMONIAEAbnormal       ENTEROCOCCUS FAECALIS       REPEAT BLOOD CULTURE 12/20 1-SO FAR    KLEBSIELLA PANSENSITIVE      Chest x-ray 12/19/2020    Diffuse bilateral pulmonary infiltrates noted most pronounced in the upper lung zones.  These findings may have worsened somewhat from 12/15/2020.     Nuclear stress test November 2020      This is a normal myocardioal perfusion scan    Gated perfusion images showed an ejection fraction of 59% at rest and 60% post stress.    The EKG portion of this study is negative for ischemia

## 2020-12-23 NOTE — PLAN OF CARE
Restraints removed, patient made comfort measures, withdraw of care performed. Patient .  Problem: Restraint, Nonbehavioral (Nonviolent)  Goal: Discontinuation Criteria Achieved  Outcome: Met  Goal: Personal Dignity and Safety Maintained  Outcome: Met

## 2020-12-23 NOTE — PROGRESS NOTES
[facility]  Critical Care Medicine  Progress Note    Patient Name: Karan Aburto  MRN: [id number]  Admission Date: 12/15/2020  Hospital Length of Stay: 7 days  Code Status: DNR  Attending Provider: Tracy Chavez MD  Primary Care Provider: Mickey Agrawal MD   Principal Problem: COVID-19    Subjective:     HPI:  75 year old male who presented to [facility]  On 1215/2020 due to respiratory distress with associated fever, dyspnea, and generalized weakness and COVID 19 pneumonia with hypoxemic respiraroty failure.  Transferred to ICU on 12/19/2020 due to worsening respiratory status. His current medical conditions include HTN, elevated PSA, Thyroid disease, Rosacea,O2 sats were 70% on RA on admission. history of cerebral vascular accident lacunar infarct within the right lentiform nucleus on 10/30/2020.  He is uncomfortable and has developed skin breakdown on his nose. Presently on BiPAP     Hospital/ICU Course:  12/19 Transferred to ICU   12/20 Blood cultures positive for gram positive cocci and gram negative rods - awaiting final report. Desaturation while eating  12/21 ON BIPAP FIO2 70% O2 SAT 96%.  Afebrile positive 3 L since admission.  Chest x-ray reviewed.  12/22 on BiPAP O2 sat 97% on 70% O2.  Afebrile.  Negative1.6 L yesterday.  12/23 on BiPAP more agitated distressed.  Receiving Ativan and morphine p.r.n. family to be present at bedside.  Spoke with son     Interval History:   12/23 on BiPAP more agitated distressed.  Receiving Ativan and morphine p.r.n. family to be present at bedside.  Spoke with son     Review of Systems   Unable to perform ROS: Acuity of condition         Objective:     Vital Signs (Most Recent):  Temp: 97.5 °F (36.4 °C) (12/23/20 0758)  Pulse: 70 (12/23/20 1051)  Resp: 12 (12/23/20 1051)  BP: (!) 214/114 (12/23/20 0800)  SpO2: 96 % (12/23/20 0830) Vital Signs (24h Range):  Temp:  [96.5 °F (35.8 °C)-98.4 °F (36.9 °C)] 97.5 °F (36.4 °C)  Pulse:  [] 70  Resp:  [12-55]  12  SpO2:  [76 %-99 %] 96 %  BP: ()/() 214/114     Weight: 87 kg (191 lb 12.8 oz)  Body mass index is 27.52 kg/m².      Intake/Output Summary (Last 24 hours) at 12/23/2020 1106  Last data filed at 12/23/2020 0800  Gross per 24 hour   Intake 790 ml   Output 2544 ml   Net -1754 ml       Physical Exam  Vitals signs and nursing note reviewed.   Constitutional:       General: He is in acute distress.      Appearance: He is well-developed. He is ill-appearing and toxic-appearing.   HENT:      Head: Normocephalic and atraumatic.   Neck:      Musculoskeletal: Neck supple.   Cardiovascular:      Rate and Rhythm: Normal rate.   Pulmonary:      Effort: Respiratory distress present.      Breath sounds: No stridor.   Abdominal:      Palpations: Abdomen is soft.      Tenderness: There is no abdominal tenderness.   Genitourinary:     Comments: Anaya in place  Musculoskeletal:         General: No deformity.   Skin:     General: Skin is warm.      Findings: Bruising present.   Neurological:      General: No focal deficit present.      Mental Status: He is alert.         Vents:  Oxygen Concentration (%): (S) 21 (12/23/20 1051)    Lines/Drains/Airways     Drain                 Urethral Catheter 12/19/20 1215 3 days          Peripheral Intravenous Line                 Peripheral IV - Single Lumen 12/20/20 1830 20 G Anterior;Right;Proximal Forearm 2 days                Significant Labs:    CBC/Anemia Profile:  Recent Labs   Lab 12/21/20  1616 12/22/20  0411   WBC  --  4.20   HGB  --  8.2*   HCT  --  26.4*   PLT  --  193   MCV  --  96   RDW  --  17.5*   FERRITIN 2,413*  --         Chemistries:  Recent Labs   Lab 12/22/20  0411      K 3.7      CO2 32*   BUN 40*   CREATININE 1.0   CALCIUM 7.7*   MG 2.1     12/19/2020 blood culture x2    GRAM NEGATIVE JOSEPH   Identification pending   For susceptibility see order #[id number]   Abnormal        ENTEROCOCCUS SPECIES   Identification pending   For susceptibility see order  #[id number]      KLEBSIELLA PNEUMONIAEAbnormal       ENTEROCOCCUS FAECALIS       REPEAT BLOOD CULTURE 12/20 1-SO FAR    KLEBSIELLA PANSENSITIVE      Chest x-ray 12/19/2020    Diffuse bilateral pulmonary infiltrates noted most pronounced in the upper lung zones.  These findings may have worsened somewhat from 12/15/2020.     Nuclear stress test November 2020      This is a normal myocardioal perfusion scan    Gated perfusion images showed an ejection fraction of 59% at rest and 60% post stress.    The EKG portion of this study is negative for ischemia          ABG  No results for input(s): PH, PO2, PCO2, HCO3, BE in the last 168 hours.  Assessment/Plan:     Endocrine  Diabetes mellitus type 2, uncontrolled  12/21 fingerstick q.6 hours.  SSI.  12/20 continue same    Other  * Acute Viral Pneumonia COVID-19  COVID-19 Pneumonia:     - COVID-19 testing Collection Date: 12/15/2020  - Infection Control notified      - Isolation:   - Airborne, Contact and Droplet Precautions  - Cohort patients into COVID units  - N95 masks must be fit tested, wear eye protection  - 20 second hand hygiene              - Limit visitors per hospital policy              - Consolidating lab draws, nursing care, provider visits, and interventions     - Diagnostics: (leukopenia, hyponatremia, hyperferritinemia, elevated troponin, elevated d-dimer, age, and comorbidities are significant predictors of poor clinical outcome)  CBC, Procalcitonin, Ferritin, CRP, LDH and Portable CXR     - Management:     Supplemental oxygen to maintain O2 sat >90%  Noninvasive Positive Pressure Ventilation maintain SpO2 >90%  Continuous/intermittent Pulse Ox  Albuterol treatment PRN  Careful use of steroids in the absence of other indications - unless septic shock due to increased viral replication Fluid sparing resuscitationl. Switch to IV  Empiric AZITHROMYCIN + ROCEPHIN - will initiate   Remdesivir - on going    Deep Venous Thrombosis - start complete anticoagulin       ADJUNCTIVE THERAPY: ZINC, ATORVASTATIN      Continuous/intermittent Pulse Ox  Albuterol INHALER PRN (avoid nebulization of secretions)  -    Vit C 500 mg, BID  Zinc 220 mg/day     Code Status  DNR  12/23 comfort care.  Ativan p.r.n..  Morphine p.r.n.          Encounter for palliative care  12/23 removed BiPAP and comfort care.  Ativan and morphine p.r.n.    Blood culture positive for microorganism  12/20 WBC normal , awaiting final report , does not clinically appear septic, may be contaminant. Continue ceftriaxone  1221 awaiting identification and sensitivity.  Continue Rocephin and doxycycline.  Afebrile.  WBC stable   12/22 Klebsiella pneumoniae sensitive to Rocephin.  Awaiting Enterococcus sensitivity  12/23 switched to Unasyn yesterday.  Deescalate care     Acute hypoxemic respiratory failure due to severe acute respiratory syndrome coronavirus 2 (SARS-CoV-2) disease  12/19 will need skin care to continue Noninvasive Positive Pressure Ventilation   12/20 tolerating vapotherm and face mask  12/21 alternate Vapotherm and BiPAP.  Keep O2 sat above 90%.  Avoid fluid overload.  Continue IV Rocephin and doxycycline IV Decadron.  Continue airborne isolation precaution.  12/22 continue Lasix for volume contraction.  Continue Rocephin and doxycycline.  Await final sensitivity on Enterococcus .  BiPAP with break to Vapotherm  12/23 comfort care.  Ativan p.r.n..  Morphine p.r.n.     Critical Care Daily Checklist:    A: Awake: RASS Goal/Actual Goal:    Actual: Claire Agitation Sedation Scale (RASS): Agitated   B: Spontaneous Breathing Trial Performed?     C: SAT & SBT Coordinated?  Not intubated                      D: Delirium: CAM-ICU Overall CAM-ICU: Positive   E: Early Mobility Performed? No   F: Feeding Goal:    Status:     Current Diet Order   No orders of the defined types were placed in this encounter.      AS: Analgesia/Sedation Morphine Ativan p.r.n.   T: Thromboembolic Prophylaxis Comfort care   H: HOB >  300 Yes   U: Stress Ulcer Prophylaxis (if needed) On famotidine   G: Glucose Control Fingerstick q.6 hours.  SSI   B: Bowel Function Stool Occurrence: 0   I: Indwelling Catheter (Lines & Anaya) Necessity Anaya critically ill.   D: De-escalation of Antimicrobials/Pharmacotherapies Deescalate treatment    Plan for the day/ETD Y    Code Status:  Family/Goals of Care: DNR  Spoke to son this a.m. switch to comfort care.     Critical Care Time: 35 minutes  Critical secondary to Patient has a condition that poses threat to life and bodily function:  COVID-19 PNEUMONIA/RESPIRATORY FAILURE      Critical care was time spent personally by me on the following activities: development of treatment plan with patient or surrogate and bedside caregivers, discussions with consultants, evaluation of patient's response to treatment, examination of patient, ordering and performing treatments and interventions, ordering and review of laboratory studies, ordering and review of radiographic studies, pulse oximetry, re-evaluation of patient's condition. This critical care time did not overlap with that of any other provider or involve time for any procedures.     Radha Richards MD  Critical Care Medicine  [facility]

## 2020-12-23 NOTE — DISCHARGE SUMMARY
[facility] Hospital Medicine  Discharge Summary      Patient Name: Karan Aburto  MRN: [id number]  Patient Class: IP- Inpatient  Admission Date: 12/15/2020  Hospital Length of Stay: 7 days  Discharge Date and Time:  12/23/2020 12:15 PM  Attending Physician: Tracy Chavez MD   Discharging Provider: Tracy Chavez MD  Primary Care Provider: Mickey Agrawal MD      HPI:   74 y/o male with PMHx of HTN, HLD, CAD, and hypothyroidism who presented to the ED with c/o SOB that onset gradually earlier today. Symptoms are constant and moderate in severity. No mitigating or exacerbating factors reported. Associated symptoms include fever, cough, BLE edema, and weakness. Pt denies HA, dizziness, CP, palpitations, loss of smell, loss of taste, N/V/D, and all other symptoms at this time.  ED workup shows: RR 38, oxygen sat 91% on non-rebreather mask. H/H 8.9/28.6, K+ 3.4, , , , Troponin 0.250  He is a DNR and his SDM is his son, Mac.  He will be kept on OBS under the care of hospital medicine.      * No surgery found *      Hospital Course:   Patietn 74 yo male admitted to hospital with COVIFD 19 Acute Hypoxic Respiratory failure / Pneumonia. Patient initiated on Remdesivir, steroids, Vapotherm. Patient communicative and expressed he doesn't want to go through this. Patietn confirmed he is DNR. Patietn denies CP + SOB / Cough  12/18- pt looks and feels better although Oxygenation very poor, unable come off bipap this am as Sats dropped to 82% on Vapotherm, he is otherwise fully alert, Renal function worsening with Bun/Cr 74/1.7, CXR diffuse infiltrates B, K 3.1- being replaced. Pt is getting Dexa and IV remdesivir without any improvement in O2, BS very high despite SSI- will start Levemir 10 BID. Will contact family and update them about his condition.   12/19- pt transferred to ICU for closer monitoring as resp status deteriorated despite being on Bipap/ 65% for three days. Pt has developed  pressure ulcer on the nose due to Bipap- hence changed to Vapotherm 40 L/100% fio2 with improvement in Oxygenation, remains hemodynamically stable. H/H stable at 8/24, Ferritin still rising to 3300, while CRP down a little to 61, LDH also rising to 992. Bun?Cr improving, urine output steady. Couldn't get PT/OT due to Bipap. Condition remains critical with guarded prognosis.    12/20- looks better, comfortable, was on Bipap all night, just placed back on Vapotherm 40 L/100%, he is about 5 L fluid Positive, Bun/Cr 63/1.1- so given a dose of IV lasix 20 mg to help with Oxygenation. He has completed the course of Remdesivir but still on Decadron. Pt seen together with his son, Mac, who was updated about his condition and prognosis before entering the pt's room. Pt was AAOx3, speech clear, and asked in front of his son about resuscitation in case of deterioration or cardiac or resp arrest- he clearly said DNR, he does not want any intubation or mechanical ventilation. DNR ordered. Blood Cx  From 12/19 growing Strep and possibly Bacteroides- on Rocephin plus Doxy.  12/21-Palliative care consult appreciated. looks the same but gradually declining. He maintained Sats of 96% on Bipap 70% overnight and was switched to Vapotherm 40L/100% this am but sats dropped to 92%, pt started getting tired- hence switched back to Bipap after a couple of hours- maintaining Sats 97 bipap 70% again. Blood Cx from 12/19- growing Klebsiella Pneumonia and Enterococcus fecalis- on Rocephin. H/H stable. Put out about 2 L of urine yesterday and lasix 20 BID continued. Getting PT/OT.   12/22-  Looks weaker, a little more work or breathing, was intermittently confused last night, pulling off his BIPAP, placed on Vaoptherm briefly for moral meds but most remained on Bipap 70%. Sips of water. Refused meals. Good urine output with Lasix, now only about 1600 cc fluid positive, D Dimer rising to 10, hence Eliquis dose increased to 5 bid. Blood Cx  Positive- will get ID consult.   12/23- pt continued to decline all yesterday and this am looked very sick. He became confused and removed bipap repeatedly overnight. O2 sats would instantaneously drop to 50s-60s. This morning, he became severely agitated/restless; not following command or consolable. Ordered prn ativan and morphine to decrease agitation and reduce pain without much success. HR > 130, RR > 30, elevated BP; had to increase O2 on BIPAP from 70 to 100% to maintain O2 saturations above 88%. Palliative Chang contacted the son and daughter who arrived hurriedly and agreed with comfort focused care and withdrawal of Bipap. Pt already DNR. Pt passed away peacefully soon after withdrawal of Bipap and was pronounced dead at 1100 am on 12/23/2020. Family at bedside, condolences offered.      Consults:   Consults (From admission, onward)        Status Ordering Provider     Inpatient consult to Cardiology  Once     Provider:  Albaro Brumfield MD    Completed TIRSO WILLETT     Inpatient consult to Infectious Diseases  Once     Provider:  Butch Hernandez MD    Acknowledged FABIENNE RIOJAS     Inpatient consult to Palliative Care  Once     Provider:  Maya Rodriguez PA-C    Completed BIBIANA AU          No new Assessment & Plan notes have been filed under this hospital service since the last note was generated.  Service: Hospital Medicine    Final Active Diagnoses:      Problems Resolved During this Admission:    Diagnosis Date Noted Date Resolved POA    PRINCIPAL PROBLEM:  Acute Viral Pneumonia COVID-19 [U07.1] 12/15/2020 12/23/2020 Yes    Acute hypoxemic respiratory failure due to severe acute respiratory syndrome coronavirus 2 (SARS-CoV-2) disease [U07.1, J96.01] 12/15/2020 12/23/2020 Yes    Hypokalemia [E87.6] 10/18/2020 12/19/2020 Yes    Generalized weakness [R53.1] 10/30/2020 12/23/2020 Yes    Elevated troponin [R77.8] 10/19/2020 12/19/2020 Yes    Comfort measures only status [Z51.5] 12/23/2020  2020 Not Applicable    Encounter for palliative care [Z51.5] 2020 Not Applicable    Blood culture positive for microorganism [R79.89] 2020 No    BPH with elevated PSA [N40.0, R97.20] 12/15/2020 2020 Yes    Normocytic anemia [D64.9] 10/30/2020 2020 Yes    Diabetes mellitus type 2, uncontrolled [E11.65] 10/18/2020 2020 Yes    Essential hypertension [I10] 10/18/2020 2020 Yes    Hyperlipemia [E78.5] 10/18/2020 2020 Yes       Discharged Condition:     Disposition:     Follow Up:    Patient Instructions:   No discharge procedures on file.    Significant Diagnostic Studies: Labs: All labs within the past 24 hours have been reviewed    Pending Diagnostic Studies:     Procedure Component Value Units Date/Time    APTT [[id number]] Collected: 20 1542    Order Status: Sent Lab Status: In process Updated: 20    Specimen: Blood          Medications:  None (patient  at medical facility)    Indwelling Lines/Drains at time of discharge:   Lines/Drains/Airways     Drain                 Urethral Catheter 20 1215 4 days                Time spent on the discharge of patient: 33 minutes  Patient was seen and examined on the date of discharge and determined to be suitable for discharge.    Critical care time spent on the evaluation and treatment of severe organ dysfunction, review of pertinent labs and imaging studies, discussions with consulting providers and discussions with patient/family: 53 minutes.     Tracy Chavez MD  Department of Hospital Medicine  [facility]

## 2020-12-24 NOTE — PHYSICIAN QUERY
"PT Name: Karan Aburto  MR #: [id number]     Diagnosis Clarification      CDS/: RAE Moncada, RN, CDS               Contact information:[email or web address]     This form is a permanent document in the medical record.     Query Date: December 24, 2020    Dear Provider,  By submitting this query, we are merely seeking further clarification of documentation.  Please utilize your independent clinical judgment when addressing the question(s) below.     The medical record contains the following:    Supporting Clinical Information Location in Medical Record     On Bipap with foam dressing over nasal bridge, skin intact.      Pt has developed pressure ulcer on the nose due to Bipap- hence changed to Vapotherm 40 L/100%      Has pressure ulcer on nose from the Bipap      Patient has been on BIPAP due to COVID pneumonia. On assessment, he is currently on "under nose" BIPAP mask.     Blanchable redness noted to nasal bridge, no breakdown.     Wound care, 12/17    , Dr. Chavez, 12/19            Wound care, 12/21     Please clarify if the Pressure Ulcer on Nose from Bipap diagnosis has been:    [  ] Pressure Ulcer on Nose Ruled Out     [ xx ] Pressure Ulcer on Nose Ruled In    Please clarify stage:___________1_     [  ] Other/Clarification of findings (please specify)_______________    [   ] Clinically undetermined           Please document in your progress notes daily for the duration of treatment, until resolved, and include in your discharge summary.                                                                                "

## 2020-12-24 NOTE — PHYSICIAN QUERY
PT Name: Karan Aburto  MR #: [id number]     Diabetic Condition Clarification     CDS/: RAE Moncada, RN, CDS               Contact information:[email or web address]  This form is a permanent document in the medical record.     Query Date: December 24, 2020    By submitting this query, we are merely seeking further clarification of documentation to reflect the severity of illness of your patient. Please utilize your independent clinical judgment when addressing the question(s) below.    The medical record reflects the following:     Indicators   Supporting Clinical Findings Location in Medical Record   X Diabetes uncontrolled documented  Diabetes mellitus type 2, uncontrolled    H&P, CORETTA Aguiar/Dr. Zavaleta, 12/15   X Lab Value(s), POCT glucose value(s)     12/16 12/17 12/18   POCT Glucose 227 (H) 275 (H) 296 (H)      Last HAIC 10/26/20 was 9.3      Lab 12/16-12/18         HYairPARTEMIO FNP-C/Dr. Zavaleta, 12/15    Beta-OHxy Butyric Acid levels      Serum Osmolarity      pH      Anion gap/ Bicarb levels     X Treatment/Medication  Accuchecks with SSI   Diabetes educator consulted     12/18- ABIGAIL high, sec to Dexa  Will start Levemir 10 BID    DARRIUS&JAYLIN, CORETTA Aguiar/Dr. Zavaleta, 12/15       , Dr. Chavez, 12/18    Other       Provider, please specify the meaning of the term uncontrolled:    [xx ] Diabetes mellitus Type 2 with hyperglycemia   [   ] Other diabetes complication (please specify): ____________   [   ]  Clinically Undetermined       Please document in your progress notes daily for the duration of treatment until resolved, and include in your discharge summary.

## 2020-12-24 NOTE — PLAN OF CARE
20 0907   Final Note   Assessment Type Final Discharge Note   Anticipated Discharge Disposition

## 2020-12-24 NOTE — PHYSICIAN QUERY
PT Name: Karan Aburto  MR #: [id number]    Consultant Diagnosis Clarification     CDS/: RAE Moncada, RN, CDS               Contact information:[email or web address]   This form is a permanent document in the medical record.    Query Date: December 24, 2020      By submitting this query, we are merely seeking further clarification of documentation.  Please utilize your independent clinical judgment when addressing the question(s) below.    The Medical Record reflects the following:    Clinical Information Location in Medical Record      Present on admission Stage 3 pressure injury noted to coccyx measuring 3x3x0.1cm, wound bed moist red and yellow subcutaneous tissue. Concetta wound erythema noted.      Stage 3 pressure injury coccyx:  1. Cleanse with saline or bath wipes  2. Pat dry  3. Apply thin layer of critic aide paste  4. Perform twice daily and with hygiene care     Stage 3 pressure injury again noted to coccyx with moist red and yellow subcutaneous tissue to wound bed, foam dressing maintained.     Wound care, 12/16                   Wound care, 12/21       Please clarify/confirm the Wound Care Consultants diagnosis of Stage 3 Pressure Injury to Coccyx:     [xx  ] Diagnosis ruled in   [  ] Diagnosis ruled out   [  ] Other diagnosis (please specify): _____________________________   [  ] Clinically undetermined

## 2020-12-26 LAB — BACTERIA BLD CULT: NORMAL

## 2022-04-06 NOTE — ASSESSMENT & PLAN NOTE
- COVID-19 testing   - Infection Control notified     - Isolation:   - Airborne, Contact and Droplet Precautions  - Cohort patients into COVID units  - N95 mask, wear eye protection  - 20 second hand hygiene              - Limit visitors per hospital policy              - Consolidating lab draws, nursing care, provider visits, and interventions    - Diagnostics: (leukopenia, hyponatremia, hyperferritinemia, elevated troponin, elevated d-dimer, age, and comorbidities are significant predictors of poor clinical outcome)  CBC, CMP, Procalcitonin, Ferritin, CRP, LDH, BNP, Troponin, ECG, Rapid Flu, Blood Culture x2 and Portable CXR    - Management:  Supplemental O2 to maintain SpO2 >92%  Telemetry  Continuous/intermittent Pulse Ox  Albuterol treatment PRN   --Ipratropium inhaler with MIDI  --dexamethasone  --remdesivir  --monitor    Advance Care Planning   patient is a DNR and his SDM is his son, Mac      12/18- getting Dexa plus Remdersivir but still severely Hypoxemic-  Now on Bipap- O2 better to 92-94% still, cant wean down to Vapotherm  May go to ICU if deteriorates    12/19- transferred to ICU for closer monitoring- now on Vapotherm  Has pressure ulcer on nose from the Bipap  12/20- continued on Vapotherm and Bipap- will try some lasix  12/21- good urine output with lasix but Oxygenation has not really improved     Stable.

## 2023-01-01 NOTE — PROGRESS NOTES
[facility] Hospital Medicine  Progress Note    Patient Name: Karan Aburto  MRN: [id number]  Patient Class: OP- Observation   Admission Date: 11/5/2020  Length of Stay: 0 days  Attending Physician: Merry Villa MD  Primary Care Provider: Mickey Agrawal MD        Subjective:     Principal Problem:Syncope        HPI:  Pt is a 75 yo male with PMHx of Bilateral leg spastic paraplegia, HTN, diastolic heart failure, obesity and hypothyroidism who was brought to the ED due syncopal episode. Pt was discharged from [facility] 11/3/2020 to a SNF due to generalized weakness and debility. Pt has a hx of BLE spastic paraplegia and has become increasingly weak over the last few weak. Last admission, CT imaging noted subacute stroke however, Neurologist, Dr. Romano stated the stroke was not acute. He discharged to SNF to continue physical therapy. Today, pt states he was sitting in a chair then woke up lying in an ambulance. He is AAOx3. He says he remembers eating breakfast this AM. He denies other symptoms including fever, chest pain, slurred speech, asymmetrical extremity weakness, abdominal pain, vomiting, diarrhea, urine symptoms, lightheadedness, dizziness and headache.   Vital sign on arrival, Temp 97.5, pulse 65, resp 20 and B/P 107 /62. Orthostatic 164/92, pulse 78 lying and 153/92, pulse 79 sitting.  CXR notes a questionable left basilar infiltrate  CT of Head notes a subacute infarct suspected in the right cerebellum - chronic finding  Labs find  Anemia 9.5/29.9, WBC normal, gluc 249,  and troponin 0.042, lactate 2.5, procal is negative  Pt is placed on Observation for eval of Syncope and treatment for pneumonia.     Overview/Hospital Course:  11/6- Pt was admitted yesterday  for evaluation following an episode of syncope while eating breakfast . Denies chest pain, SOB, palpitation , diaphoresis  , lightheadedness or dizziness prior to the event . Initial EKG resulted sinus arhythmia  with PVC, RBBB and non specific T wave abnormality in lat lead and troponin 0.042. Cardiology is consulted for further evaluation and pt underwent MPI stress today which was reported to be normal with no evidence of ischemia . Repeat EKG resulted NSR with RBBB. Noted pt has labile BP ranges from high to normal and is on multiple antihypertensives including isosorbide dinitrate 20 mg TID  to manage blood pressure . Mild systolic BP drop noted from lying to sitting yesterday. Pt also recently diagnosed with diabetes mellitus type 2 with HgA1c 9.3 . In his previous admission , he was evaluated by Neurologist Dr. 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 And also given H/O Diabetes some autonomic dysfunction with orthostatic hypotension won't be unusual. Additionally Isordil could cause transient hypotension. Will change Isordil to extended release Imdur. Pt was noted carol have left basilar infiltrate and antibiotic therapy is initiated on admission. Labs resulted normal WBC count, procalcitonin 0.05(nl), Hgb 8.7, K 3.4 and creatinine 0.9.     11/7- No further syncope since admission. Cards recommended OP  ILR vs. Zio and/or  Tilt table with Primary Cardiologist . BP fluctuates highs to normal. Rt upper ext noted to have swelling and ecchymoses . U/S is neg for DVT. Hgb 8.3 with iron def noted . No signs of active bleed. Replace iron parenterally while in house along with B12/Folic acid. Disposition - Return to [facility].           Interval History:   No further syncope since admission. Cards recommended OP  ILR vs. Zio and/or  Tilt table with Primary Cardiologist      Review of Systems   Constitutional: Positive for activity change. Negative for appetite change and fever.   HENT: Negative for sore throat.    Eyes: Negative for visual disturbance.   Respiratory: Negative for cough, chest tightness and shortness of breath.     Cardiovascular: Positive for leg swelling (better currently ). Negative for chest pain and palpitations.   Gastrointestinal: Negative for abdominal distention, abdominal pain, constipation, diarrhea, nausea and vomiting.   Endocrine: Negative for polyuria.   Genitourinary: Negative for decreased urine volume, dysuria, flank pain, frequency and hematuria.   Musculoskeletal: Positive for gait problem. Negative for back pain.   Skin: Negative for rash.   Neurological: Positive for syncope. Negative for speech difficulty, weakness, light-headedness and headaches.   Psychiatric/Behavioral: Negative for confusion, hallucinations and sleep disturbance.     Objective:     Vital Signs (Most Recent):  Temp: 97.9 °F (36.6 °C) (11/07/20 1141)  Pulse: 90 (11/07/20 1320)  Resp: 16 (11/07/20 1141)  BP: 120/61 (11/07/20 1141)  SpO2: (!) 94 % (11/07/20 1141) Vital Signs (24h Range):  Temp:  [97.8 °F (36.6 °C)-98.6 °F (37 °C)] 97.9 °F (36.6 °C)  Pulse:  [] 90  Resp:  [16-20] 16  SpO2:  [92 %-98 %] 94 %  BP: (120-176)/(61-87) 120/61     Weight: 90 kg (198 lb 6.6 oz)  Body mass index is 28.47 kg/m².    Intake/Output Summary (Last 24 hours) at 11/7/2020 1501  Last data filed at 11/7/2020 0300  Gross per 24 hour   Intake 404 ml   Output --   Net 404 ml      Physical Exam  Constitutional:       General: He is not in acute distress.     Appearance: He is well-developed. He is not diaphoretic.   HENT:      Head: Normocephalic and atraumatic.      Mouth/Throat:      Pharynx: No oropharyngeal exudate.   Eyes:      Conjunctiva/sclera: Conjunctivae normal.      Pupils: Pupils are equal, round, and reactive to light.   Neck:      Musculoskeletal: Normal range of motion and neck supple.      Thyroid: No thyromegaly.      Vascular: No JVD.   Cardiovascular:      Rate and Rhythm: Normal rate and regular rhythm.      Heart sounds: Normal heart sounds. No murmur.   Pulmonary:      Effort: Pulmonary effort is normal. No respiratory distress.       Breath sounds: Normal breath sounds. No wheezing or rales.   Chest:      Chest wall: No tenderness.   Abdominal:      General: Bowel sounds are normal. There is no distension.      Palpations: Abdomen is soft.      Tenderness: There is no abdominal tenderness. There is no guarding or rebound.   Musculoskeletal: Normal range of motion.   Lymphadenopathy:      Cervical: No cervical adenopathy.   Skin:     General: Skin is warm and dry.      Findings: No rash.   Neurological:      Mental Status: He is alert and oriented to person, place, and time.      Cranial Nerves: No cranial nerve deficit.      Sensory: No sensory deficit.      Motor: Weakness (3/5 loretta lower ext ) present.      Deep Tendon Reflexes: Reflexes abnormal.      Comments: (+) spasticity          Significant Labs:   CBC:   Recent Labs   Lab 11/06/20  0609 11/07/20  0610   WBC 8.03 6.81   HGB 8.7* 8.3*   HCT 27.6* 25.5*    174     CMP:   Recent Labs   Lab 11/06/20  0609 11/07/20  0610    138   K 3.4* 3.6    105   CO2 28 27    122*   BUN 22 21   CREATININE 0.9 0.9   CALCIUM 8.0* 8.0*   PROT 5.0* 4.9*   ALBUMIN 2.4* 2.3*   BILITOT 0.9 0.9   ALKPHOS 83 83   AST 26 26   ALT 66* 65*   ANIONGAP 8 6*   EGFRNONAA >60 >60       Significant Imaging:       Assessment/Plan:      * Syncope  Syncope  Rule out vasovagal event, orthostasis or cardiac/neuro event  - Admit, Telemetry monitoring, orthostatic VS every shift, neuro checks every 4 hours, serial troponin levels  Not orthostatic from V/S in the ED - continue daily orthostatics  · 2 D ECHO -There is mild left ventricular concentric hypertrophy. - from 10/19/2020  · With normal systolic function. The estimated ejection fraction is 60%.  · Grade I diastolic dysfunction.  · Normal right ventricular systolic function.  · Mild left atrial enlargement.  · Mild mitral regurgitation.  · Normal central venous pressure (3 mmHg).  · The estimated PA systolic pressure is 15 mmHg.      11/6-    Cardiology is consulted for further evaluation and pt underwent MPI stress today which was reported to be normal with no evidence of ischemia . Repeat EKG resulted NSR with RBBB.     11/7 -  No further syncope since admission. Cards recommended OP  ILR vs. Zio and/or  Tilt table with Primary Cardiologist. Carotid U/S on 10/30/20- No significant stenosis.            Chronic diastolic heart failure  Compensated  Continue aldactone, lisinopril  Add Lasix po 20 mg every other day       Pneumonia  Signs of early pneumonia with left basilar infiltrate noted in CXR.   IV Azithromycin, Rocephin  Supplemental oxygen to maintain sats > 92 %  DuoNebs      Spastic paraplegia, hereditary  Continue PT/OT  11/6-  In his previous admission( 10/31/20),  he was evaluated by Neurologist Dr. 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      Elevated troponin  11/6- Cardiology is consulted for further evaluation and pt underwent MPI stress today which was reported to be normal with no evidence of ischemia      Essential hypertension  Elevated  Continue antihypertensives - Coreg, hydralazine, imdur and lisinopril  11/6-  Noted pt has labile BP ranges from high to normal and is on multiple antihypertensives including isosorbide dinitrate 20 mg TID  to manage blood pressure . Mild systolic BP drop noted from lying to sitting yesterday. Immediate release Isordil could cause transient hypotension . Will change Isordil to extended release Imdur         Diabetes mellitus type 2, uncontrolled  Lab Results   Component Value Date    HGBA1C 9.3 (H) 10/19/2020   accuchecks  Sliding scale insulin  Add Basal insulin         VTE Risk Mitigation (From admission, onward)         Ordered     enoxaparin injection 40 mg  Every 24 hours      11/05/20 1554     IP VTE HIGH RISK PATIENT  Once      11/05/20 1554     Place sequential compression device  Until discontinued       11/05/20 1554                Discharge Planning   ADAM:      Code Status: Full Code   Is the patient medically ready for discharge?:     Reason for patient still in hospital (select all that apply): Patient trending condition  Discharge Plan A: Skilled Nursing Facility                  Merry Villa MD  Department of Hospital Medicine   [facility]   0

## 2023-01-17 NOTE — PLAN OF CARE
AAO X4. VSS. NSR on monitor. Heparin gtt @ 12 units/kg/hr. Continues BiPAP at 70% FiO2. Glucose monitoring AC&HS. Bedrest due to activity intolerance. Turn Q2 hrs.  Fall precautions in place, call bell in reach, bed in low and locked position.   POC discussed w/, verbalized understanding. Will continue to monitor.    Opioid Pregnancy And Lactation Text: These medications can lead to premature delivery and should be avoided during pregnancy. These medications are also present in breast milk in small amounts.

## 2024-05-31 NOTE — PT/OT/SLP PROGRESS
Patient seen and examined independently. Chart reviewed. I confirm there have been no clinical interval changes.    Discussed risks and benefits of RHC procedure. We discussed risks including but not limited to infection, bleeding, bruising, lung injury, CVA, injury to nerve, vessel, myocardial structure resulting in bleeding requiring surgery and/or resulting in death. We discussed that the benefits outweigh the risks. All questions answered at bedside. Patient has elected to proceed with above procedure. Patient has voiced understanding.    Physical Therapy  Treatment    Karan Aburto   MRN: [id number]   Admitting Diagnosis: New onset type 2 diabetes mellitus    PT Received On: 10/23/20  PT Start Time: 1025     PT Stop Time: 1050    PT Total Time (min): 25 min       Billable Minutes:  Gait Training 10 and Therapeutic Activity 15    Treatment Type: Treatment  PT/PTA: PTA     PTA Visit Number: 1       General Precautions: Standard, fall  Orthopedic Precautions: N/A   Braces: N/A         Subjective:  Communicated with nurse Noguera and [facility] chart review completed prior to session.  Patient remains very confused which adds to overall level of impulsivity.    Pain/Comfort  Pain Rating 1: 0/10    Objective:   Patient found with: telemetry    Functional Mobility:  Bed Mobility: Supine <-> Sit EOB: SBA        Transfers: STS from EOB to RW: Min A, STS from commode to RW: Min A, stand pivot T/F to commode then to bed: Min A with RW       Gait: ~8ft x2 trials Mod A x2        Therapeutic Activities and Exercises:  Sitting EOB alternating between unsupported and self supported: Min A to prevent posterior LOB due to increased posterior lean  Patient with very poor tolerance to seated trunk excursions due to core instability.   Static stand x2 min to kuldip clean brief (attempted to have patient assist but poor ability to stand unsupported or with single UE support only)    AM-PAC 6 CLICK MOBILITY  How much help from another person does this patient currently need?   1 = Unable, Total/Dependent Assistance  2 = A lot, Maximum/Moderate Assistance  3 = A little, Minimum/Contact Guard/Supervision  4 = None, Modified Wray/Independent    Turning over in bed (including adjusting bedclothes, sheets and blankets)?: 4  Sitting down on and standing up from a chair with arms (e.g., wheelchair, bedside commode, etc.): 3  Moving from lying on back to sitting on the side of the bed?: 4  Moving to and from a bed to a chair (including a wheelchair)?: 2  Need to walk in hospital  room?: 2  Climbing 3-5 steps with a railing?: 1  Basic Mobility Total Score: 16    AM-PAC Raw Score CMS G-Code Modifier Level of Impairment Assistance   6 % Total / Unable   7 - 9 CM 80 - 100% Maximal Assist   10 - 14 CL 60 - 80% Moderate Assist   15 - 19 CK 40 - 60% Moderate Assist   20 - 22 CJ 20 - 40% Minimal Assist   23 CI 1-20% SBA / CGA   24 CH 0% Independent/ Mod I     Patient left with bed in chair position with call button in reach, bed alarm on and AVASYS and sitter present.    Assessment:  Karan Aburto is a 74 y.o. male with a medical diagnosis of New onset type 2 diabetes mellitus and presents with overall decline in functional mobility. Patient would continue to benefit from skilled PT to address functional limitations listed below in order to return to PLOF/ decrease caregiver burden.     Rehab identified problem list/impairments: Rehab identified problem list/impairments: weakness, impaired endurance, impaired self care skills, impaired functional mobilty, gait instability, impaired balance, impaired cognition, decreased coordination, decreased lower extremity function, decreased safety awareness, decreased ROM, impaired coordination, impaired cardiopulmonary response to activity    Rehab potential is good.    Activity tolerance: Fair    Discharge recommendations: Discharge Facility/Level of Care Needs: nursing facility, skilled     Barriers to discharge:      Equipment recommendations: Equipment Needed After Discharge: wheelchair     GOALS:   Multidisciplinary Problems     Physical Therapy Goals        Problem: Physical Therapy Goal    Goal Priority Disciplines Outcome Goal Variances Interventions   Physical Therapy Goal     PT, PT/OT Ongoing, Progressing     Description: LTG'S TO BE MET IN 7 DAYS (10-26-20)  1. PT WILL REQUIRE MYA FOR BED MOBILITY  2. PT WILL REQUIRE MODA FOR TF'S  3. PT WILL AMB 50' WITH RW AND MODA                   PLAN:    Patient to be seen 5 x/week  to address the  above listed problems via gait training, therapeutic activities, therapeutic exercises  Plan of Care expires: 10/26/20  Plan of Care reviewed with: patient         Letty Banguraanthony, PTA  10/23/2020

## 2025-07-23 NOTE — PLAN OF CARE
Pt AAO x4, NSR with right BBB and PVCs on monitor, Alternating BiPAP and vapotherm. Blood sugar low this morning given PRN glucose tabs and juice. Pt ate 80% lunch, but refused breakfast and dinner. Started boost supplements to meet support nutritional needs. Anaya in place, Total UOP >800. Positive blood cultures reported from Micro lab, relayed to Dr. Silva. Hep gtt d/c'd this evening. Son at bedside to discuss POC and code status with Dr. Chavez. Per pt he stated he wants to be a DNR. Per son he wants pt to remain a DNR, but stills wants to be intubated. Ultimately, decided he will speak with his sister and call/visit with updated decision. Palliative on board, Maya stated she will contact/meet with family tomorrow.    Attending